# Patient Record
Sex: FEMALE | Race: BLACK OR AFRICAN AMERICAN | Employment: UNEMPLOYED | URBAN - METROPOLITAN AREA
[De-identification: names, ages, dates, MRNs, and addresses within clinical notes are randomized per-mention and may not be internally consistent; named-entity substitution may affect disease eponyms.]

---

## 2018-07-28 ENCOUNTER — HOSPITAL ENCOUNTER (EMERGENCY)
Age: 23
Discharge: HOME | End: 2018-07-28
Payer: COMMERCIAL

## 2018-07-28 VITALS
BODY MASS INDEX: 38.76 KG/M2 | TEMPERATURE: 98.3 F | HEIGHT: 64 IN | HEART RATE: 86 BPM | RESPIRATION RATE: 16 BRPM | SYSTOLIC BLOOD PRESSURE: 146 MMHG | DIASTOLIC BLOOD PRESSURE: 68 MMHG | OXYGEN SATURATION: 99 % | WEIGHT: 227 LBS

## 2018-07-28 DIAGNOSIS — O26.899 SPOTTING AND CRAMPING AFFECTING PREGNANCY, ANTEPARTUM: ICD-10-CM

## 2018-07-28 DIAGNOSIS — R10.9 SPOTTING AND CRAMPING AFFECTING PREGNANCY, ANTEPARTUM: ICD-10-CM

## 2018-07-28 DIAGNOSIS — O26.859 SPOTTING AND CRAMPING AFFECTING PREGNANCY, ANTEPARTUM: ICD-10-CM

## 2018-07-28 DIAGNOSIS — O20.0 THREATENED ABORTION, ANTEPARTUM: Primary | ICD-10-CM

## 2018-07-28 LAB
ABO/RH: NORMAL
ALBUMIN SERPL-MCNC: 4.1 G/DL (ref 3.5–5.2)
ALP BLD-CCNC: 63 U/L (ref 35–104)
ALT SERPL-CCNC: 5 U/L (ref 0–32)
ANION GAP SERPL CALCULATED.3IONS-SCNC: 12 MMOL/L (ref 7–16)
APTT: 28.6 SEC (ref 25.7–34.7)
AST SERPL-CCNC: 16 U/L (ref 0–31)
BACTERIA: NORMAL /HPF
BILIRUB SERPL-MCNC: <0.2 MG/DL (ref 0–1.2)
BILIRUBIN URINE: NEGATIVE
BLOOD, URINE: ABNORMAL
BUN BLDV-MCNC: 14 MG/DL (ref 6–20)
CALCIUM SERPL-MCNC: 9.9 MG/DL (ref 8.6–10.2)
CHLORIDE BLD-SCNC: 102 MMOL/L (ref 98–107)
CLARITY: CLEAR
CLUE CELLS: NORMAL
CO2: 23 MMOL/L (ref 22–29)
COLOR: YELLOW
CREAT SERPL-MCNC: 0.7 MG/DL (ref 0.5–1)
EPITHELIAL CELLS, UA: NORMAL /HPF
GFR AFRICAN AMERICAN: >60
GFR NON-AFRICAN AMERICAN: >60 ML/MIN/1.73
GLUCOSE BLD-MCNC: 97 MG/DL (ref 74–109)
GLUCOSE URINE: NEGATIVE MG/DL
GONADOTROPIN, CHORIONIC (HCG) QUANT: ABNORMAL MIU/ML
HCG(URINE) PREGNANCY TEST: POSITIVE
INR BLD: 1
KETONES, URINE: NEGATIVE MG/DL
LEUKOCYTE ESTERASE, URINE: NEGATIVE
NITRITE, URINE: NEGATIVE
PH UA: 7 (ref 5–9)
POTASSIUM SERPL-SCNC: 3.9 MMOL/L (ref 3.5–5)
PROTEIN UA: NEGATIVE MG/DL
PROTHROMBIN TIME: 12 SEC (ref 9.3–12.4)
RBC UA: NORMAL /HPF (ref 0–2)
SODIUM BLD-SCNC: 137 MMOL/L (ref 132–146)
SOURCE WET PREP: NORMAL
SPECIFIC GRAVITY UA: 1.02 (ref 1–1.03)
TOTAL PROTEIN: 8.3 G/DL (ref 6.4–8.3)
TRICHOMONAS PREP: NORMAL
UROBILINOGEN, URINE: 1 E.U./DL
WBC UA: NORMAL /HPF (ref 0–5)
YEAST WET PREP: NORMAL

## 2018-07-28 PROCEDURE — 84702 CHORIONIC GONADOTROPIN TEST: CPT

## 2018-07-28 PROCEDURE — 87491 CHLMYD TRACH DNA AMP PROBE: CPT

## 2018-07-28 PROCEDURE — 85025 COMPLETE CBC W/AUTO DIFF WBC: CPT

## 2018-07-28 PROCEDURE — 6370000000 HC RX 637 (ALT 250 FOR IP): Performed by: NURSE PRACTITIONER

## 2018-07-28 PROCEDURE — 86900 BLOOD TYPING SEROLOGIC ABO: CPT

## 2018-07-28 PROCEDURE — 85610 PROTHROMBIN TIME: CPT

## 2018-07-28 PROCEDURE — 86901 BLOOD TYPING SEROLOGIC RH(D): CPT

## 2018-07-28 PROCEDURE — 81001 URINALYSIS AUTO W/SCOPE: CPT

## 2018-07-28 PROCEDURE — 2580000003 HC RX 258: Performed by: NURSE PRACTITIONER

## 2018-07-28 PROCEDURE — 87210 SMEAR WET MOUNT SALINE/INK: CPT

## 2018-07-28 PROCEDURE — 87591 N.GONORRHOEAE DNA AMP PROB: CPT

## 2018-07-28 PROCEDURE — 80053 COMPREHEN METABOLIC PANEL: CPT

## 2018-07-28 PROCEDURE — 6360000002 HC RX W HCPCS

## 2018-07-28 PROCEDURE — 96374 THER/PROPH/DIAG INJ IV PUSH: CPT

## 2018-07-28 PROCEDURE — 36415 COLL VENOUS BLD VENIPUNCTURE: CPT

## 2018-07-28 PROCEDURE — 99284 EMERGENCY DEPT VISIT MOD MDM: CPT

## 2018-07-28 PROCEDURE — 85730 THROMBOPLASTIN TIME PARTIAL: CPT

## 2018-07-28 PROCEDURE — 81025 URINE PREGNANCY TEST: CPT

## 2018-07-28 RX ORDER — ACETAMINOPHEN 500 MG
1000 TABLET ORAL ONCE
Status: COMPLETED | OUTPATIENT
Start: 2018-07-28 | End: 2018-07-28

## 2018-07-28 RX ORDER — ONDANSETRON 2 MG/ML
4 INJECTION INTRAMUSCULAR; INTRAVENOUS ONCE
Status: COMPLETED | OUTPATIENT
Start: 2018-07-28 | End: 2018-07-28

## 2018-07-28 RX ORDER — 0.9 % SODIUM CHLORIDE 0.9 %
1000 INTRAVENOUS SOLUTION INTRAVENOUS ONCE
Status: COMPLETED | OUTPATIENT
Start: 2018-07-28 | End: 2018-07-28

## 2018-07-28 RX ORDER — ONDANSETRON 2 MG/ML
INJECTION INTRAMUSCULAR; INTRAVENOUS
Status: COMPLETED
Start: 2018-07-28 | End: 2018-07-28

## 2018-07-28 RX ADMIN — ONDANSETRON 4 MG: 2 INJECTION INTRAMUSCULAR; INTRAVENOUS at 20:17

## 2018-07-28 RX ADMIN — SODIUM CHLORIDE 1000 ML: 9 INJECTION, SOLUTION INTRAVENOUS at 20:17

## 2018-07-28 RX ADMIN — ACETAMINOPHEN 1000 MG: 500 TABLET, FILM COATED ORAL at 20:17

## 2018-07-28 ASSESSMENT — PAIN DESCRIPTION - DESCRIPTORS: DESCRIPTORS: CRAMPING;CONSTANT

## 2018-07-28 ASSESSMENT — PAIN DESCRIPTION - ORIENTATION: ORIENTATION: LOWER

## 2018-07-28 ASSESSMENT — PAIN DESCRIPTION - LOCATION: LOCATION: ABDOMEN

## 2018-07-28 ASSESSMENT — PAIN SCALES - GENERAL
PAINLEVEL_OUTOF10: 5
PAINLEVEL_OUTOF10: 5

## 2018-07-28 ASSESSMENT — PAIN DESCRIPTION - ONSET: ONSET: ON-GOING

## 2018-07-28 ASSESSMENT — PAIN DESCRIPTION - PROGRESSION: CLINICAL_PROGRESSION: NOT CHANGED

## 2018-07-28 ASSESSMENT — PAIN DESCRIPTION - PAIN TYPE: TYPE: ACUTE PAIN

## 2018-07-28 NOTE — ED PROVIDER NOTES
P1    ROS    Pertinent positives and negatives are stated within HPI, all other systems reviewed and are negative. Past Surgical History:   Procedure Laterality Date     SECTION        Social History:  reports that she has never smoked. She has never used smokeless tobacco. She reports that she does not drink alcohol or use drugs. Family History: family history is not on file. Allergies: Patient has no known allergies. Physical Exam           ED Triage Vitals   BP Temp Temp src Pulse Resp SpO2 Height Weight   -- -- -- -- -- -- -- --      Oxygen Saturation Interpretation: Normal.    General Appearance/Constitutional:  Alert, development consistent with age, NAD. HEENT:  NC/NT. PERRLA. Airway patent. Neck:  Supple. No lymphadenopathy. Respiratory:  Clear to auscultation and breath sounds equal.  CV:  Regular rate and rhythm. GI:  General Appearance: normal.       Bowel sounds: normal bowel sounds. Distension:  None. Tenderness: No abdominal tenderness, no rebound tenderness, no guarding, abdominal rigidity is absent. Liver: non-palpable and non-tender. Spleen:  non-palpable and non-tender. Pulsatile Mass: absent. Hernia:  no inguinal or femoral hernias noted. Back: CVA Tenderness: No.  : (chaperone present during examination).  Rosario WORRELL       External Genitalia: General appearance; normal, Hair distribution; normal, Lesions absent. Urethral Meatus: Size normal, Location normal, Lesions absent, Prolapse absent. Vagina: no abnormal lesions, discharge slight light brown with no bright red bleeding and Wet Prep/KOH no pathogens. Cervix: normal appearing cervix without discharge or lesions, cervical motion tenderness absent, lesions absent and multiparous os. Uterus:  normal size slightly enlarged, contour, position, consistency, mobility, tenderness directly over the uterus. [facility]:  Masses script for repeat HCG quant level in 48 hours. Patient feels cramping is completely resolved and has no bleeding in the ED. Patient instructed on complete pelvic rest and no lifting until re examined and given work excuse. Counseling: The emergency provider has spoken with the patient and discussed todays results, in addition to providing specific details for the plan of care and counseling regarding the diagnosis and prognosis. Questions are answered at this time and they are agreeable with the plan. Assessment      1. Threatened , antepartum    2. Spotting and cramping affecting pregnancy, antepartum      Plan   Discharge to home  Patient condition is good    New Medications     There are no discharge medications for this patient. Electronically signed by TJ Escalante CNP   DD: 18  **This report was transcribed using voice recognition software. Every effort was made to ensure accuracy; however, inadvertent computerized transcription errors may be present.   END OF ED PROVIDER NOTE     TJ Escalante CNP  [phone]

## 2018-07-29 LAB
BASOPHILS ABSOLUTE: 0.04 E9/L (ref 0–0.2)
BASOPHILS RELATIVE PERCENT: 0.3 % (ref 0–2)
EOSINOPHILS ABSOLUTE: 0.04 E9/L (ref 0.05–0.5)
EOSINOPHILS RELATIVE PERCENT: 0.3 % (ref 0–6)
HCT VFR BLD CALC: 38.2 % (ref 34–48)
HEMOGLOBIN: 13.1 G/DL (ref 11.5–15.5)
IMMATURE GRANULOCYTES #: 0.05 E9/L
IMMATURE GRANULOCYTES %: 0.3 % (ref 0–5)
LYMPHOCYTES ABSOLUTE: 2.49 E9/L (ref 1.5–4)
LYMPHOCYTES RELATIVE PERCENT: 16.1 % (ref 20–42)
MCH RBC QN AUTO: 31 PG (ref 26–35)
MCHC RBC AUTO-ENTMCNC: 34.3 % (ref 32–34.5)
MCV RBC AUTO: 90.3 FL (ref 80–99.9)
MONOCYTES ABSOLUTE: 1.23 E9/L (ref 0.1–0.95)
MONOCYTES RELATIVE PERCENT: 7.9 % (ref 2–12)
NEUTROPHILS ABSOLUTE: 11.65 E9/L (ref 1.8–7.3)
NEUTROPHILS RELATIVE PERCENT: 75.1 % (ref 43–80)
PDW BLD-RTO: 13.2 FL (ref 11.5–15)
PLATELET # BLD: 210 E9/L (ref 130–450)
PMV BLD AUTO: 11.5 FL (ref 7–12)
RBC # BLD: 4.23 E12/L (ref 3.5–5.5)
RBC # BLD: NORMAL 10*6/UL
WBC # BLD: 15.5 E9/L (ref 4.5–11.5)

## 2018-07-29 NOTE — ED NOTES
Second call to [facility] for Dr. Mariela Wallace.       Yvonne Harris, RN  07/28/18 0789

## 2018-07-31 LAB
CHLAMYDIA TRACHOMATIS AMPLIFIED DET: NORMAL
N GONORRHOEAE AMPLIFIED DET: NORMAL

## 2018-09-22 ENCOUNTER — HOSPITAL ENCOUNTER (EMERGENCY)
Age: 23
Discharge: HOME | End: 2018-09-22
Attending: EMERGENCY MEDICINE
Payer: COMMERCIAL

## 2018-09-22 ENCOUNTER — APPOINTMENT (OUTPATIENT)
Dept: ULTRASOUND IMAGING | Age: 23
End: 2018-09-22
Payer: COMMERCIAL

## 2018-09-22 VITALS
TEMPERATURE: 98.6 F | SYSTOLIC BLOOD PRESSURE: 143 MMHG | HEIGHT: 64 IN | RESPIRATION RATE: 18 BRPM | BODY MASS INDEX: 38.76 KG/M2 | WEIGHT: 227 LBS | DIASTOLIC BLOOD PRESSURE: 82 MMHG | OXYGEN SATURATION: 98 % | HEART RATE: 99 BPM

## 2018-09-22 DIAGNOSIS — R10.9 ABDOMINAL PAIN IN PREGNANCY, FIRST TRIMESTER: Primary | ICD-10-CM

## 2018-09-22 DIAGNOSIS — O26.891 ABDOMINAL PAIN IN PREGNANCY, FIRST TRIMESTER: Primary | ICD-10-CM

## 2018-09-22 DIAGNOSIS — N39.0 URINARY TRACT INFECTION WITH HEMATURIA, SITE UNSPECIFIED: ICD-10-CM

## 2018-09-22 DIAGNOSIS — R31.21 ASYMPTOMATIC MICROSCOPIC HEMATURIA: ICD-10-CM

## 2018-09-22 DIAGNOSIS — R31.9 URINARY TRACT INFECTION WITH HEMATURIA, SITE UNSPECIFIED: ICD-10-CM

## 2018-09-22 LAB
AMORPHOUS: ABNORMAL
BACTERIA: ABNORMAL /HPF
BILIRUBIN URINE: NEGATIVE
BLOOD, URINE: ABNORMAL
CLARITY: CLEAR
CLUE CELLS: NORMAL
COLOR: YELLOW
EPITHELIAL CELLS, UA: ABNORMAL /HPF
GLUCOSE URINE: NEGATIVE MG/DL
KETONES, URINE: 40 MG/DL
LEUKOCYTE ESTERASE, URINE: NEGATIVE
NITRITE, URINE: NEGATIVE
PH UA: 6 (ref 5–9)
PROTEIN UA: ABNORMAL MG/DL
RBC UA: ABNORMAL /HPF (ref 0–2)
SOURCE WET PREP: NORMAL
SPECIFIC GRAVITY UA: 1.02 (ref 1–1.03)
TRICHOMONAS PREP: NORMAL
UROBILINOGEN, URINE: 0.2 E.U./DL
WBC UA: ABNORMAL /HPF (ref 0–5)
YEAST WET PREP: NORMAL

## 2018-09-22 PROCEDURE — 81001 URINALYSIS AUTO W/SCOPE: CPT

## 2018-09-22 PROCEDURE — 87591 N.GONORRHOEAE DNA AMP PROB: CPT

## 2018-09-22 PROCEDURE — 99284 EMERGENCY DEPT VISIT MOD MDM: CPT

## 2018-09-22 PROCEDURE — 87491 CHLMYD TRACH DNA AMP PROBE: CPT

## 2018-09-22 PROCEDURE — 87210 SMEAR WET MOUNT SALINE/INK: CPT

## 2018-09-22 PROCEDURE — 76770 US EXAM ABDO BACK WALL COMP: CPT

## 2018-09-22 PROCEDURE — 76805 OB US >/= 14 WKS SNGL FETUS: CPT

## 2018-09-22 RX ORDER — CEPHALEXIN 500 MG/1
500 CAPSULE ORAL 2 TIMES DAILY
Qty: 20 CAPSULE | Refills: 0 | Status: SHIPPED | OUTPATIENT
Start: 2018-09-22 | End: 2018-10-02

## 2018-09-22 ASSESSMENT — PAIN DESCRIPTION - DESCRIPTORS: DESCRIPTORS: DULL;PRESSURE

## 2018-09-22 ASSESSMENT — PAIN DESCRIPTION - PAIN TYPE: TYPE: ACUTE PAIN

## 2018-09-22 ASSESSMENT — PAIN SCALES - GENERAL: PAINLEVEL_OUTOF10: 7

## 2018-09-22 ASSESSMENT — PAIN DESCRIPTION - LOCATION: LOCATION: ABDOMEN

## 2018-09-22 NOTE — ED PROVIDER NOTES
HPI:  18, Time: 5:19 PM         Mayra Villa is a 20 y.o. female presenting to the ED for low abdominal pain and back, beginning yesterday and worsening today. The complaint has been constant, moderate in severity, and worsened by nothing. She states she works as an [employer] but hasnt lifted patients. She denies vaginal bleeding, discharge, leaking fluid, dysuria, hematuria or fever or trauma. She is  currently at 14 weeks 6 days gestation. Her first delivery was a  but otherwise uncomplicated. She also c/o nausea, but that has been an ongoing issue during this pregnancy. Patient denies fever/chills, cough, congestion, chest pain, shortness of breath, edema, headache, visual disturbances, focal paresthesias, focal weakness, vomiting, diarrhea, constipation, dysuria, hematuria, trauma, neck or back pain or other complaints. ROS:   Pertinent positives and negatives are stated within HPI, all other systems reviewed and are negative.      --------------------------------------------- PAST HISTORY ---------------------------------------------  Past Medical History:  has no past medical history on file. Past Surgical History:  has a past surgical history that includes  section. Social History:  reports that she has never smoked. She has never used smokeless tobacco. She reports that she does not drink alcohol or use drugs. Family History: family history is not on file. The patients home medications have been reviewed. Allergies: Patient has no known allergies.         ---------------------------------------------------PHYSICAL EXAM--------------------------------------    Constitutional:  Well developed, well nourished, no acute distress, non-toxic appearance   Eyes:  PERRL, conjunctiva normal, EOMI  HENT:  Atraumatic, external ears normal, nose normal, oropharynx moist. Neck- normal range of motion, no tenderness, supple   Respiratory:  No respiratory distress, normal breath sounds, no rales, no wheezing   Cardiovascular:  Normal rate, normal rhythm, no murmurs, no gallops, no rubs. Radial and DP pulses 2+ bilaterally. GI:  Soft, gravid, normal bowel sounds, nontender, no organomegaly, no mass, no rebound, no guarding   :  No costovertebral angle tenderness   Pelvic: expected long and closed. No discharge, no bleeding. No adnexal tenderness, no cervical motion tenderness. No lesions noted. Musculoskeletal:  No edema, no tenderness, no deformities. Back- no tenderness  Integument:  Well hydrated, no rash. Adequate perfusion. Neurologic:  Alert & oriented x 3, CN 2-12 normal, normal gait, no focal deficits noted. Psychiatric:  Speech and behavior appropriate     Fetal heart tones: 180 per RN  -------------------------------------------------- RESULTS -------------------------------------------------  I have personally reviewed all laboratory and imaging results for this patient. Results are listed below.      LABS:  Results for orders placed or performed during the hospital encounter of 09/22/18   Wet prep, genital   Result Value Ref Range    Trichomonas Prep None Seen     Yeast, Wet Prep None Seen     Clue Cells, Wet Prep None Seen     Source Wet Prep VAGINAL    Urinalysis with Microscopic   Result Value Ref Range    Color, UA Yellow Straw/Yellow    Clarity, UA Clear Clear    Glucose, Ur Negative Negative mg/dL    Bilirubin Urine Negative Negative    Ketones, Urine 40 (A) Negative mg/dL    Specific Gravity, UA 1.025 1.005 - 1.030    Blood, Urine MODERATE (A) Negative    pH, UA 6.0 5.0 - 9.0    Protein, UA TRACE Negative mg/dL    Urobilinogen, Urine 0.2 <2.0 E.U./dL    Nitrite, Urine Negative Negative    Leukocyte Esterase, Urine Negative Negative    WBC, UA 0-1 0 - 5 /HPF    RBC, UA 5-10 (A) 0 - 2 /HPF    Epi Cells FEW /HPF    Bacteria, UA RARE (A) /HPF    Amorphous, UA RARE        RADIOLOGY:  Interpreted by Radiologist.  US OB 14 PLUS WEEKS SINGLE OR FIRST GESTATION   Final Result   Single living intrauterine pregnancy with 15 weeks and 1   day of development as above commented. Cervical canal is closed, measures 3.1 cm. US RETROPERITONEAL COMPLETE   Final Result   Unremarkable bilateral renal ultrasound.            ------------------------- NURSING NOTES AND VITALS REVIEWED ---------------------------   The nursing notes within the ED encounter and vital signs as below have been reviewed by myself. BP (!) 143/82   Pulse 99   Temp 98.6 °F (37 °C)   Resp 18   Ht 5' 4\" (1.626 m)   Wt 227 lb (103 kg)   LMP 06/01/2018   SpO2 98%   BMI 38.96 kg/m²   Oxygen Saturation Interpretation: Normal    The patients available past medical records and past encounters were reviewed. ------------------------------ ED COURSE/MEDICAL DECISION MAKING----------------------  Medications - No data to display        Procedures:  none      Medical Decision Making:    Blood type A+ per medical record    This patient's ED course included: re-evaluation prior to disposition and a personal history and physicial eaxmination    This patient has remained hemodynamically stable and improved during their ED course. Re-Evaluations:             Time: 8:11 PM  Re-evaluation. Patients symptoms are improving  Repeat physical examination is not changed        Consultations:             5:52 PM  Spoke with Dr Kendall Ochoa, discussed case, patient may go home    Critical Care: none        Counseling: The emergency provider has spoken with the patient and spouse/SO and discussed todays results, in addition to providing specific details for the plan of care and counseling regarding the diagnosis and prognosis. Questions are answered at this time and they are agreeable with the plan.       --------------------------------- IMPRESSION AND DISPOSITION ---------------------------------    IMPRESSION  1. Abdominal pain in pregnancy, first trimester    2.  Asymptomatic microscopic hematuria    3.  Urinary tract infection with hematuria, site unspecified        DISPOSITION  Disposition: Discharge to home  Patient condition is stable                 Lita Fried DO  09/22/18 2011

## 2018-09-27 LAB
CHLAMYDIA TRACHOMATIS AMPLIFIED DET: NORMAL
N GONORRHOEAE AMPLIFIED DET: NORMAL

## 2018-12-21 ENCOUNTER — HOSPITAL ENCOUNTER (OUTPATIENT)
Age: 23
Discharge: HOME | End: 2018-12-22
Attending: OBSTETRICS & GYNECOLOGY | Admitting: OBSTETRICS & GYNECOLOGY
Payer: COMMERCIAL

## 2018-12-21 VITALS
SYSTOLIC BLOOD PRESSURE: 110 MMHG | BODY MASS INDEX: 39.95 KG/M2 | WEIGHT: 234 LBS | HEIGHT: 64 IN | HEART RATE: 101 BPM | TEMPERATURE: 98.5 F | RESPIRATION RATE: 16 BRPM | DIASTOLIC BLOOD PRESSURE: 59 MMHG

## 2018-12-21 LAB
BILIRUBIN URINE: NEGATIVE
BLOOD, URINE: ABNORMAL
CLARITY: CLEAR
COLOR: YELLOW
GLUCOSE URINE: NEGATIVE MG/DL
KETONES, URINE: ABNORMAL MG/DL
LEUKOCYTE ESTERASE, URINE: NEGATIVE
NITRITE, URINE: NEGATIVE
PH UA: 6 (ref 5–9)
PROTEIN UA: NEGATIVE MG/DL
SPECIFIC GRAVITY UA: >=1.03 (ref 1–1.03)
UROBILINOGEN, URINE: 0.2 E.U./DL

## 2018-12-21 PROCEDURE — 81001 URINALYSIS AUTO W/SCOPE: CPT

## 2018-12-21 PROCEDURE — 87088 URINE BACTERIA CULTURE: CPT

## 2018-12-22 LAB
BACTERIA: ABNORMAL /HPF
EPITHELIAL CELLS, UA: ABNORMAL /HPF
MUCUS: PRESENT
RBC UA: ABNORMAL /HPF (ref 0–2)
WBC UA: ABNORMAL /HPF (ref 0–5)

## 2018-12-22 PROCEDURE — 99212 OFFICE O/P EST SF 10 MIN: CPT

## 2018-12-22 NOTE — H&P
Subjective:      Hue Sewell is an 20 y.o. female at 32 and 5/7 weeks gestation presenting with   Chief Complaint   Patient presents with    Lower Back Pain   she denies any contractions or flank pain. Denies fever or chills. Pain is mostly mid lower. Fetal Movement: normal.  No past medical history on file. Review of Systems  Pertinent items are noted in HPI. Objective:      BP (!) 110/59   Pulse 101   Temp 98.5 °F (36.9 °C) (Oral)   Resp 16   Ht 5' 4\" (1.626 m)   Wt 234 lb (106.1 kg)   LMP 06/01/2018   BMI 40.17 kg/m²   General:   alert, appears stated age and cooperative   Cervix:   not checked. FHT:   140 BPM     Lab Review    CBC:   Lab Results   Component Value Date    WBC 15.5 07/28/2018    RBC 4.23 07/28/2018    HGB 13.1 07/28/2018    HCT 38.2 07/28/2018    MCV 90.3 07/28/2018    MCH 31.0 07/28/2018    MCHC 34.3 07/28/2018    RDW 13.2 07/28/2018     07/28/2018    MPV 11.5 07/28/2018     CMP:    Lab Results   Component Value Date     07/28/2018    K 3.9 07/28/2018     07/28/2018    CO2 23 07/28/2018    BUN 14 07/28/2018    CREATININE 0.7 07/28/2018    GFRAA >60 07/28/2018    LABGLOM >60 07/28/2018    GLUCOSE 97 07/28/2018    PROT 8.3 07/28/2018    LABALBU 4.1 07/28/2018    CALCIUM 9.9 07/28/2018    BILITOT <0.2 07/28/2018    ALKPHOS 63 07/28/2018    AST 16 07/28/2018    ALT 5 07/28/2018     U/A:    Lab Results   Component Value Date    COLORU Yellow 09/22/2018    PHUR 6.0 09/22/2018    WBCUA 0-1 09/22/2018    RBCUA 5-10 09/22/2018    BACTERIA RARE 09/22/2018    CLARITYU Clear 09/22/2018    SPECGRAV 1.025 09/22/2018    LEUKOCYTESUR Negative 09/22/2018    UROBILINOGEN 0.2 09/22/2018    BILIRUBINUR Negative 09/22/2018    BLOODU MODERATE 09/22/2018    GLUCOSEU Negative 09/22/2018    AMORPHOUS RARE 09/22/2018          Assessment:     Low back pain in pregnancy     Plan:      Monitored for contractions - findings: no contractions noted and reactive strip. Orders: U/A.

## 2018-12-24 LAB — URINE CULTURE, ROUTINE: NORMAL

## 2019-03-11 ENCOUNTER — HOSPITAL ENCOUNTER (INPATIENT)
Age: 24
LOS: 3 days | Discharge: HOME | DRG: 560 | End: 2019-03-14
Attending: OBSTETRICS & GYNECOLOGY | Admitting: OBSTETRICS & GYNECOLOGY
Payer: COMMERCIAL

## 2019-03-11 ENCOUNTER — ANESTHESIA EVENT (OUTPATIENT)
Dept: LABOR AND DELIVERY | Age: 24
DRG: 560 | End: 2019-03-11
Payer: COMMERCIAL

## 2019-03-11 ENCOUNTER — ANESTHESIA (OUTPATIENT)
Dept: LABOR AND DELIVERY | Age: 24
DRG: 560 | End: 2019-03-11
Payer: COMMERCIAL

## 2019-03-11 PROBLEM — Z34.90 NORMAL PREGNANCY, ANTEPARTUM: Status: ACTIVE | Noted: 2019-03-11

## 2019-03-11 LAB
ABO/RH: NORMAL
AMPHETAMINE SCREEN, URINE: NOT DETECTED
ANTIBODY SCREEN: NORMAL
BARBITURATE SCREEN URINE: NOT DETECTED
BENZODIAZEPINE SCREEN, URINE: NOT DETECTED
CANNABINOID SCREEN URINE: NOT DETECTED
COCAINE METABOLITE SCREEN URINE: NOT DETECTED
HCT VFR BLD CALC: 37.7 % (ref 34–48)
HEMOGLOBIN: 12.4 G/DL (ref 11.5–15.5)
MCH RBC QN AUTO: 29.5 PG (ref 26–35)
MCHC RBC AUTO-ENTMCNC: 32.9 % (ref 32–34.5)
MCV RBC AUTO: 89.8 FL (ref 80–99.9)
METHADONE SCREEN, URINE: NOT DETECTED
OPIATE SCREEN URINE: NOT DETECTED
PDW BLD-RTO: 14.1 FL (ref 11.5–15)
PHENCYCLIDINE SCREEN URINE: NOT DETECTED
PLATELET # BLD: 258 E9/L (ref 130–450)
PMV BLD AUTO: 10.4 FL (ref 7–12)
PROPOXYPHENE SCREEN: NOT DETECTED
RBC # BLD: 4.2 E12/L (ref 3.5–5.5)
WBC # BLD: 12.7 E9/L (ref 4.5–11.5)

## 2019-03-11 PROCEDURE — 86900 BLOOD TYPING SEROLOGIC ABO: CPT

## 2019-03-11 PROCEDURE — 2500000003 HC RX 250 WO HCPCS: Performed by: ANESTHESIOLOGY

## 2019-03-11 PROCEDURE — 3700000025 EPIDURAL BLOCK: Performed by: ANESTHESIOLOGY

## 2019-03-11 PROCEDURE — 6360000002 HC RX W HCPCS

## 2019-03-11 PROCEDURE — 86850 RBC ANTIBODY SCREEN: CPT

## 2019-03-11 PROCEDURE — 6360000002 HC RX W HCPCS: Performed by: OBSTETRICS & GYNECOLOGY

## 2019-03-11 PROCEDURE — 86901 BLOOD TYPING SEROLOGIC RH(D): CPT

## 2019-03-11 PROCEDURE — 36415 COLL VENOUS BLD VENIPUNCTURE: CPT

## 2019-03-11 PROCEDURE — 59050 FETAL MONITOR W/REPORT: CPT

## 2019-03-11 PROCEDURE — 80307 DRUG TEST PRSMV CHEM ANLYZR: CPT

## 2019-03-11 PROCEDURE — 85027 COMPLETE CBC AUTOMATED: CPT

## 2019-03-11 PROCEDURE — 2580000003 HC RX 258: Performed by: OBSTETRICS & GYNECOLOGY

## 2019-03-11 PROCEDURE — 1220000001 HC SEMI PRIVATE L&D R&B

## 2019-03-11 RX ORDER — ONDANSETRON 2 MG/ML
4 INJECTION INTRAMUSCULAR; INTRAVENOUS EVERY 6 HOURS PRN
Status: DISCONTINUED | OUTPATIENT
Start: 2019-03-11 | End: 2019-03-12 | Stop reason: HOSPADM

## 2019-03-11 RX ORDER — ACETAMINOPHEN 650 MG
TABLET, EXTENDED RELEASE ORAL
Status: COMPLETED
Start: 2019-03-11 | End: 2019-03-12

## 2019-03-11 RX ORDER — LIDOCAINE HYDROCHLORIDE AND EPINEPHRINE 15; 5 MG/ML; UG/ML
INJECTION, SOLUTION EPIDURAL
Status: DISPENSED
Start: 2019-03-11 | End: 2019-03-12

## 2019-03-11 RX ORDER — SODIUM CHLORIDE, SODIUM LACTATE, POTASSIUM CHLORIDE, CALCIUM CHLORIDE 600; 310; 30; 20 MG/100ML; MG/100ML; MG/100ML; MG/100ML
INJECTION, SOLUTION INTRAVENOUS CONTINUOUS
Status: DISCONTINUED | OUTPATIENT
Start: 2019-03-11 | End: 2019-03-14 | Stop reason: HOSPADM

## 2019-03-11 RX ORDER — NALOXONE HYDROCHLORIDE 0.4 MG/ML
0.4 INJECTION, SOLUTION INTRAMUSCULAR; INTRAVENOUS; SUBCUTANEOUS PRN
Status: DISCONTINUED | OUTPATIENT
Start: 2019-03-11 | End: 2019-03-12 | Stop reason: HOSPADM

## 2019-03-11 RX ORDER — NALBUPHINE HCL 10 MG/ML
5 AMPUL (ML) INJECTION EVERY 4 HOURS PRN
Status: DISCONTINUED | OUTPATIENT
Start: 2019-03-11 | End: 2019-03-12 | Stop reason: HOSPADM

## 2019-03-11 RX ORDER — LIDOCAINE HYDROCHLORIDE 10 MG/ML
INJECTION, SOLUTION INFILTRATION; PERINEURAL
Status: COMPLETED
Start: 2019-03-11 | End: 2019-03-12

## 2019-03-11 RX ADMIN — BUTORPHANOL TARTRATE 2 MG: 2 INJECTION, SOLUTION INTRAMUSCULAR; INTRAVENOUS at 09:51

## 2019-03-11 RX ADMIN — SODIUM CHLORIDE, POTASSIUM CHLORIDE, SODIUM LACTATE AND CALCIUM CHLORIDE: 600; 310; 30; 20 INJECTION, SOLUTION INTRAVENOUS at 17:00

## 2019-03-11 RX ADMIN — BUTORPHANOL TARTRATE 2 MG: 2 INJECTION, SOLUTION INTRAMUSCULAR; INTRAVENOUS at 14:58

## 2019-03-11 RX ADMIN — Medication 15 ML/HR: at 19:20

## 2019-03-11 RX ADMIN — SODIUM CHLORIDE, POTASSIUM CHLORIDE, SODIUM LACTATE AND CALCIUM CHLORIDE: 600; 310; 30; 20 INJECTION, SOLUTION INTRAVENOUS at 08:00

## 2019-03-11 RX ADMIN — Medication 2 MG: at 14:58

## 2019-03-11 RX ADMIN — Medication 1 MILLI-UNITS/MIN: at 14:08

## 2019-03-11 RX ADMIN — Medication 15 ML: at 19:20

## 2019-03-11 RX ADMIN — SODIUM CHLORIDE, POTASSIUM CHLORIDE, SODIUM LACTATE AND CALCIUM CHLORIDE: 600; 310; 30; 20 INJECTION, SOLUTION INTRAVENOUS at 20:28

## 2019-03-11 ASSESSMENT — PAIN SCALES - GENERAL
PAINLEVEL_OUTOF10: 9
PAINLEVEL_OUTOF10: 8

## 2019-03-12 PROCEDURE — 6360000002 HC RX W HCPCS: Performed by: ANESTHESIOLOGY

## 2019-03-12 PROCEDURE — 51701 INSERT BLADDER CATHETER: CPT

## 2019-03-12 PROCEDURE — 1220000000 HC SEMI PRIVATE OB R&B

## 2019-03-12 PROCEDURE — 6360000002 HC RX W HCPCS

## 2019-03-12 PROCEDURE — 7200000001 HC VAGINAL DELIVERY

## 2019-03-12 PROCEDURE — 6370000000 HC RX 637 (ALT 250 FOR IP): Performed by: OBSTETRICS & GYNECOLOGY

## 2019-03-12 PROCEDURE — 6370000000 HC RX 637 (ALT 250 FOR IP)

## 2019-03-12 PROCEDURE — 59050 FETAL MONITOR W/REPORT: CPT

## 2019-03-12 RX ORDER — LANOLIN 100 %
OINTMENT (GRAM) TOPICAL PRN
Status: DISCONTINUED | OUTPATIENT
Start: 2019-03-12 | End: 2019-03-14 | Stop reason: HOSPADM

## 2019-03-12 RX ORDER — IBUPROFEN 800 MG/1
800 TABLET ORAL EVERY 8 HOURS PRN
Status: DISCONTINUED | OUTPATIENT
Start: 2019-03-12 | End: 2019-03-12

## 2019-03-12 RX ORDER — METHYLERGONOVINE MALEATE 0.2 MG/ML
INJECTION INTRAVENOUS
Status: COMPLETED
Start: 2019-03-12 | End: 2019-03-12

## 2019-03-12 RX ORDER — DOCUSATE SODIUM 100 MG/1
100 CAPSULE, LIQUID FILLED ORAL 2 TIMES DAILY
Status: DISCONTINUED | OUTPATIENT
Start: 2019-03-12 | End: 2019-03-14 | Stop reason: HOSPADM

## 2019-03-12 RX ORDER — ACETAMINOPHEN 325 MG/1
TABLET ORAL
Status: COMPLETED
Start: 2019-03-12 | End: 2019-03-12

## 2019-03-12 RX ORDER — SODIUM CHLORIDE 0.9 % (FLUSH) 0.9 %
10 SYRINGE (ML) INJECTION PRN
Status: DISCONTINUED | OUTPATIENT
Start: 2019-03-12 | End: 2019-03-14 | Stop reason: HOSPADM

## 2019-03-12 RX ORDER — SODIUM CHLORIDE 0.9 % (FLUSH) 0.9 %
10 SYRINGE (ML) INJECTION EVERY 12 HOURS SCHEDULED
Status: DISCONTINUED | OUTPATIENT
Start: 2019-03-12 | End: 2019-03-14 | Stop reason: HOSPADM

## 2019-03-12 RX ORDER — ACETAMINOPHEN 325 MG/1
650 TABLET ORAL EVERY 4 HOURS PRN
Status: DISCONTINUED | OUTPATIENT
Start: 2019-03-12 | End: 2019-03-14 | Stop reason: HOSPADM

## 2019-03-12 RX ORDER — FERROUS SULFATE 325(65) MG
325 TABLET ORAL 2 TIMES DAILY WITH MEALS
Status: DISCONTINUED | OUTPATIENT
Start: 2019-03-12 | End: 2019-03-14 | Stop reason: HOSPADM

## 2019-03-12 RX ORDER — IBUPROFEN 800 MG/1
800 TABLET ORAL EVERY 8 HOURS
Status: DISCONTINUED | OUTPATIENT
Start: 2019-03-12 | End: 2019-03-14 | Stop reason: HOSPADM

## 2019-03-12 RX ORDER — SODIUM CHLORIDE, SODIUM LACTATE, POTASSIUM CHLORIDE, CALCIUM CHLORIDE 600; 310; 30; 20 MG/100ML; MG/100ML; MG/100ML; MG/100ML
INJECTION, SOLUTION INTRAVENOUS CONTINUOUS
Status: DISCONTINUED | OUTPATIENT
Start: 2019-03-12 | End: 2019-03-14 | Stop reason: HOSPADM

## 2019-03-12 RX ADMIN — IBUPROFEN 800 MG: 800 TABLET, FILM COATED ORAL at 19:42

## 2019-03-12 RX ADMIN — DOCUSATE SODIUM 100 MG: 100 CAPSULE, LIQUID FILLED ORAL at 08:24

## 2019-03-12 RX ADMIN — DOCUSATE SODIUM 100 MG: 100 CAPSULE, LIQUID FILLED ORAL at 21:08

## 2019-03-12 RX ADMIN — ACETAMINOPHEN 650 MG: 325 TABLET ORAL at 11:17

## 2019-03-12 RX ADMIN — METHYLERGONOVINE MALEATE 200 MCG: 0.2 INJECTION INTRAVENOUS at 00:56

## 2019-03-12 RX ADMIN — IBUPROFEN 800 MG: 800 TABLET, FILM COATED ORAL at 08:24

## 2019-03-12 RX ADMIN — Medication: at 00:30

## 2019-03-12 RX ADMIN — ONDANSETRON 4 MG: 2 INJECTION INTRAMUSCULAR; INTRAVENOUS at 01:30

## 2019-03-12 RX ADMIN — ACETAMINOPHEN 650 MG: 325 TABLET ORAL at 02:53

## 2019-03-12 ASSESSMENT — PAIN SCALES - GENERAL
PAINLEVEL_OUTOF10: 8
PAINLEVEL_OUTOF10: 3
PAINLEVEL_OUTOF10: 5

## 2019-03-13 LAB
ATYPICAL LYMPHOCYTE RELATIVE PERCENT: 1.7 % (ref 0–4)
BASOPHILS ABSOLUTE: 0.27 E9/L (ref 0–0.2)
BASOPHILS RELATIVE PERCENT: 1.7 % (ref 0–2)
EOSINOPHILS ABSOLUTE: 0.14 E9/L (ref 0.05–0.5)
EOSINOPHILS RELATIVE PERCENT: 0.9 % (ref 0–6)
HCT VFR BLD CALC: 30.5 % (ref 34–48)
HEMOGLOBIN: 9.8 G/DL (ref 11.5–15.5)
LYMPHOCYTES ABSOLUTE: 2.84 E9/L (ref 1.5–4)
LYMPHOCYTES RELATIVE PERCENT: 16.7 % (ref 20–42)
MCH RBC QN AUTO: 29.6 PG (ref 26–35)
MCHC RBC AUTO-ENTMCNC: 32.1 % (ref 32–34.5)
MCV RBC AUTO: 92.1 FL (ref 80–99.9)
MONOCYTES ABSOLUTE: 1.42 E9/L (ref 0.1–0.95)
MONOCYTES RELATIVE PERCENT: 8.8 % (ref 2–12)
MYELOCYTE PERCENT: 0.9 % (ref 0–0)
NEUTROPHILS ABSOLUTE: 11.06 E9/L (ref 1.8–7.3)
NEUTROPHILS RELATIVE PERCENT: 69.3 % (ref 43–80)
NUCLEATED RED BLOOD CELLS: 0 /100 WBC
PDW BLD-RTO: 14.4 FL (ref 11.5–15)
PLATELET # BLD: 222 E9/L (ref 130–450)
PMV BLD AUTO: 10.4 FL (ref 7–12)
RBC # BLD: 3.31 E12/L (ref 3.5–5.5)
RBC # BLD: NORMAL 10*6/UL
WBC # BLD: 15.8 E9/L (ref 4.5–11.5)

## 2019-03-13 PROCEDURE — 85025 COMPLETE CBC W/AUTO DIFF WBC: CPT

## 2019-03-13 PROCEDURE — 36415 COLL VENOUS BLD VENIPUNCTURE: CPT

## 2019-03-13 PROCEDURE — 1220000000 HC SEMI PRIVATE OB R&B

## 2019-03-13 PROCEDURE — 6370000000 HC RX 637 (ALT 250 FOR IP): Performed by: OBSTETRICS & GYNECOLOGY

## 2019-03-13 RX ORDER — IBUPROFEN 800 MG/1
800 TABLET ORAL EVERY 8 HOURS
Qty: 120 TABLET | Refills: 1 | Status: SHIPPED | OUTPATIENT
Start: 2019-03-13 | End: 2019-12-09

## 2019-03-13 RX ADMIN — ACETAMINOPHEN 650 MG: 325 TABLET ORAL at 14:57

## 2019-03-13 RX ADMIN — DOCUSATE SODIUM 100 MG: 100 CAPSULE, LIQUID FILLED ORAL at 08:23

## 2019-03-13 RX ADMIN — DOCUSATE SODIUM 100 MG: 100 CAPSULE, LIQUID FILLED ORAL at 20:32

## 2019-03-13 RX ADMIN — ACETAMINOPHEN 650 MG: 325 TABLET ORAL at 08:23

## 2019-03-13 RX ADMIN — IBUPROFEN 800 MG: 800 TABLET, FILM COATED ORAL at 20:32

## 2019-03-13 RX ADMIN — FERROUS SULFATE TAB 325 MG (65 MG ELEMENTAL FE) 325 MG: 325 (65 FE) TAB at 08:23

## 2019-03-13 RX ADMIN — IBUPROFEN 800 MG: 800 TABLET, FILM COATED ORAL at 12:00

## 2019-03-13 RX ADMIN — FERROUS SULFATE TAB 325 MG (65 MG ELEMENTAL FE) 325 MG: 325 (65 FE) TAB at 20:32

## 2019-03-13 RX ADMIN — IBUPROFEN 800 MG: 800 TABLET, FILM COATED ORAL at 04:00

## 2019-03-13 RX ADMIN — ACETAMINOPHEN 650 MG: 325 TABLET ORAL at 02:22

## 2019-03-13 ASSESSMENT — PAIN SCALES - GENERAL
PAINLEVEL_OUTOF10: 3
PAINLEVEL_OUTOF10: 7
PAINLEVEL_OUTOF10: 5
PAINLEVEL_OUTOF10: 7
PAINLEVEL_OUTOF10: 3

## 2019-03-14 VITALS
TEMPERATURE: 98.2 F | RESPIRATION RATE: 16 BRPM | DIASTOLIC BLOOD PRESSURE: 69 MMHG | HEIGHT: 64 IN | BODY MASS INDEX: 40.97 KG/M2 | SYSTOLIC BLOOD PRESSURE: 114 MMHG | HEART RATE: 91 BPM | OXYGEN SATURATION: 99 % | WEIGHT: 240 LBS

## 2019-03-14 PROCEDURE — 6370000000 HC RX 637 (ALT 250 FOR IP): Performed by: OBSTETRICS & GYNECOLOGY

## 2019-03-14 RX ADMIN — IBUPROFEN 800 MG: 800 TABLET, FILM COATED ORAL at 09:25

## 2019-03-14 RX ADMIN — Medication: at 12:24

## 2019-03-14 RX ADMIN — IBUPROFEN 800 MG: 800 TABLET, FILM COATED ORAL at 04:36

## 2019-03-14 RX ADMIN — DOCUSATE SODIUM 100 MG: 100 CAPSULE, LIQUID FILLED ORAL at 09:24

## 2019-03-14 RX ADMIN — FERROUS SULFATE TAB 325 MG (65 MG ELEMENTAL FE) 325 MG: 325 (65 FE) TAB at 09:24

## 2019-03-14 ASSESSMENT — PAIN SCALES - GENERAL
PAINLEVEL_OUTOF10: 6
PAINLEVEL_OUTOF10: 6

## 2019-08-15 ENCOUNTER — HOSPITAL ENCOUNTER (EMERGENCY)
Age: 24
Discharge: HOME | End: 2019-08-15
Payer: COMMERCIAL

## 2019-08-15 ENCOUNTER — APPOINTMENT (OUTPATIENT)
Dept: CT IMAGING | Age: 24
End: 2019-08-15
Payer: COMMERCIAL

## 2019-08-15 VITALS
WEIGHT: 235 LBS | BODY MASS INDEX: 40.12 KG/M2 | TEMPERATURE: 98.9 F | HEART RATE: 90 BPM | OXYGEN SATURATION: 99 % | SYSTOLIC BLOOD PRESSURE: 128 MMHG | RESPIRATION RATE: 16 BRPM | DIASTOLIC BLOOD PRESSURE: 88 MMHG | HEIGHT: 64 IN

## 2019-08-15 DIAGNOSIS — S39.012A STRAIN OF LUMBAR REGION, INITIAL ENCOUNTER: Primary | ICD-10-CM

## 2019-08-15 DIAGNOSIS — R10.30 LOWER ABDOMINAL PAIN: ICD-10-CM

## 2019-08-15 LAB
ALBUMIN SERPL-MCNC: 4.4 G/DL (ref 3.5–5.2)
ALP BLD-CCNC: 92 U/L (ref 35–104)
ALT SERPL-CCNC: 5 U/L (ref 0–32)
AMORPHOUS: ABNORMAL
ANION GAP SERPL CALCULATED.3IONS-SCNC: 13 MMOL/L (ref 7–16)
AST SERPL-CCNC: 27 U/L (ref 0–31)
BACTERIA: ABNORMAL /HPF
BASOPHILS ABSOLUTE: 0.05 E9/L (ref 0–0.2)
BASOPHILS RELATIVE PERCENT: 0.5 % (ref 0–2)
BILIRUB SERPL-MCNC: 0.5 MG/DL (ref 0–1.2)
BILIRUBIN URINE: NEGATIVE
BLOOD, URINE: NEGATIVE
BUN BLDV-MCNC: 9 MG/DL (ref 6–20)
CALCIUM SERPL-MCNC: 9.4 MG/DL (ref 8.6–10.2)
CHLORIDE BLD-SCNC: 105 MMOL/L (ref 98–107)
CLARITY: NORMAL
CO2: 23 MMOL/L (ref 22–29)
COLOR: YELLOW
CREAT SERPL-MCNC: 0.8 MG/DL (ref 0.5–1)
EOSINOPHILS ABSOLUTE: 0.09 E9/L (ref 0.05–0.5)
EOSINOPHILS RELATIVE PERCENT: 0.9 % (ref 0–6)
EPITHELIAL CELLS, UA: ABNORMAL /HPF
GFR AFRICAN AMERICAN: >60
GFR NON-AFRICAN AMERICAN: >60 ML/MIN/1.73
GLUCOSE BLD-MCNC: 78 MG/DL (ref 74–99)
GLUCOSE URINE: NEGATIVE MG/DL
HCG(URINE) PREGNANCY TEST: NEGATIVE
HCT VFR BLD CALC: 40.1 % (ref 34–48)
HEMOGLOBIN: 13.1 G/DL (ref 11.5–15.5)
IMMATURE GRANULOCYTES #: 0.03 E9/L
IMMATURE GRANULOCYTES %: 0.3 % (ref 0–5)
KETONES, URINE: NEGATIVE MG/DL
LEUKOCYTE ESTERASE, URINE: NEGATIVE
LYMPHOCYTES ABSOLUTE: 2.87 E9/L (ref 1.5–4)
LYMPHOCYTES RELATIVE PERCENT: 29.9 % (ref 20–42)
MCH RBC QN AUTO: 29.6 PG (ref 26–35)
MCHC RBC AUTO-ENTMCNC: 32.7 % (ref 32–34.5)
MCV RBC AUTO: 90.5 FL (ref 80–99.9)
MONOCYTES ABSOLUTE: 0.88 E9/L (ref 0.1–0.95)
MONOCYTES RELATIVE PERCENT: 9.2 % (ref 2–12)
NEUTROPHILS ABSOLUTE: 5.67 E9/L (ref 1.8–7.3)
NEUTROPHILS RELATIVE PERCENT: 59.2 % (ref 43–80)
NITRITE, URINE: NEGATIVE
PDW BLD-RTO: 14 FL (ref 11.5–15)
PH UA: 7.5 (ref 5–9)
PLATELET # BLD: 276 E9/L (ref 130–450)
PMV BLD AUTO: 10.3 FL (ref 7–12)
POTASSIUM REFLEX MAGNESIUM: 4.1 MMOL/L (ref 3.5–5)
PROTEIN UA: NEGATIVE MG/DL
RBC # BLD: 4.43 E12/L (ref 3.5–5.5)
RBC UA: ABNORMAL /HPF (ref 0–2)
SODIUM BLD-SCNC: 141 MMOL/L (ref 132–146)
SPECIFIC GRAVITY UA: 1.01 (ref 1–1.03)
TOTAL PROTEIN: 8.1 G/DL (ref 6.4–8.3)
UROBILINOGEN, URINE: 1 E.U./DL
WBC # BLD: 9.6 E9/L (ref 4.5–11.5)
WBC UA: ABNORMAL /HPF (ref 0–5)

## 2019-08-15 PROCEDURE — 74176 CT ABD & PELVIS W/O CONTRAST: CPT

## 2019-08-15 PROCEDURE — 81001 URINALYSIS AUTO W/SCOPE: CPT

## 2019-08-15 PROCEDURE — 2580000003 HC RX 258: Performed by: PHYSICIAN ASSISTANT

## 2019-08-15 PROCEDURE — 36415 COLL VENOUS BLD VENIPUNCTURE: CPT

## 2019-08-15 PROCEDURE — 81025 URINE PREGNANCY TEST: CPT

## 2019-08-15 PROCEDURE — 80053 COMPREHEN METABOLIC PANEL: CPT

## 2019-08-15 PROCEDURE — 85025 COMPLETE CBC W/AUTO DIFF WBC: CPT

## 2019-08-15 PROCEDURE — 99283 EMERGENCY DEPT VISIT LOW MDM: CPT

## 2019-08-15 RX ORDER — METHOCARBAMOL 750 MG/1
750 TABLET, FILM COATED ORAL 4 TIMES DAILY
Qty: 40 TABLET | Refills: 0 | Status: SHIPPED | OUTPATIENT
Start: 2019-08-15 | End: 2019-08-25

## 2019-08-15 RX ORDER — 0.9 % SODIUM CHLORIDE 0.9 %
1000 INTRAVENOUS SOLUTION INTRAVENOUS ONCE
Status: COMPLETED | OUTPATIENT
Start: 2019-08-15 | End: 2019-08-15

## 2019-08-15 RX ORDER — TRAMADOL HYDROCHLORIDE 50 MG/1
50 TABLET ORAL EVERY 6 HOURS PRN
Qty: 12 TABLET | Refills: 0 | Status: SHIPPED | OUTPATIENT
Start: 2019-08-15 | End: 2019-08-18

## 2019-08-15 RX ADMIN — SODIUM CHLORIDE 1000 ML: 9 INJECTION, SOLUTION INTRAVENOUS at 21:19

## 2019-08-15 ASSESSMENT — PAIN DESCRIPTION - ONSET: ONSET: GRADUAL

## 2019-08-15 ASSESSMENT — PAIN DESCRIPTION - PROGRESSION: CLINICAL_PROGRESSION: NOT CHANGED

## 2019-08-15 ASSESSMENT — PAIN DESCRIPTION - FREQUENCY: FREQUENCY: CONTINUOUS

## 2019-08-15 ASSESSMENT — PAIN DESCRIPTION - ORIENTATION: ORIENTATION: LOWER;RIGHT;LEFT

## 2019-08-15 ASSESSMENT — PAIN SCALES - GENERAL: PAINLEVEL_OUTOF10: 8

## 2019-08-15 ASSESSMENT — PAIN DESCRIPTION - LOCATION: LOCATION: BACK

## 2019-08-15 ASSESSMENT — PAIN DESCRIPTION - PAIN TYPE: TYPE: ACUTE PAIN

## 2019-08-15 ASSESSMENT — PAIN DESCRIPTION - DESCRIPTORS: DESCRIPTORS: ACHING;CONSTANT;CRAMPING

## 2019-08-16 NOTE — ED PROVIDER NOTES
[facility]  HPI:  8/15/19, Time: 8:16 PM         Angella Bhat is a 30 y.o. female presenting to the ED for dysuria, beginning \"a while\" ago. The complaint has been persistent, moderate in severity, and worsened by nothing. Complains of lower back pain with dysuria that is been going on for quite a while now. The pain is nonradiating. She denies any екатерина hematuria denies any flank discomfort. Denies any fever chills or abdominal pain no vomiting no diarrhea. She denies IV drug abuse. Denies any saddle paresthesias. Denies urinary retention or urinary incontinence. Review of Systems:   Pertinent positives and negatives are stated within HPI, all other systems reviewed and are negative.          --------------------------------------------- PAST HISTORY ---------------------------------------------  Past Medical History:  has no past medical history on file. Past Surgical History:  has a past surgical history that includes  section. Social History:  reports that she has never smoked. She has never used smokeless tobacco. She reports that she does not drink alcohol or use drugs. Family History: family history is not on file. The patients home medications have been reviewed. Allergies: Patient has no known allergies.     -------------------------------------------------- RESULTS -------------------------------------------------  All laboratory and radiology results have been personally reviewed by myself   LABS:  Results for orders placed or performed during the hospital encounter of 08/15/19   Urinalysis   Result Value Ref Range    Color, UA Yellow Straw/Yellow    Clarity, UA SL CLOUDY Clear    Glucose, Ur Negative Negative mg/dL    Bilirubin Urine Negative Negative    Ketones, Urine Negative Negative mg/dL    Specific Gravity, UA 1.015 1.005 - 1.030    Blood, Urine Negative Negative    pH, UA 7.5 5.0 - 9.0    Protein, UA Negative Negative mg/dL    Urobilinogen, Urine 1.0 <2.0 E.U./dL    Nitrite, Urine Negative Negative    Leukocyte Esterase, Urine Negative Negative   Pregnancy, Urine   Result Value Ref Range    HCG(Urine) Pregnancy Test NEGATIVE NEGATIVE   Microscopic Urinalysis   Result Value Ref Range    WBC, UA 0-1 0 - 5 /HPF    RBC, UA NONE 0 - 2 /HPF    Epi Cells FEW /HPF    Bacteria, UA RARE (A) /HPF    Amorphous, UA MODERATE    CBC Auto Differential   Result Value Ref Range    WBC 9.6 4.5 - 11.5 E9/L    RBC 4.43 3.50 - 5.50 E12/L    Hemoglobin 13.1 11.5 - 15.5 g/dL    Hematocrit 40.1 34.0 - 48.0 %    MCV 90.5 80.0 - 99.9 fL    MCH 29.6 26.0 - 35.0 pg    MCHC 32.7 32.0 - 34.5 %    RDW 14.0 11.5 - 15.0 fL    Platelets 492 029 - 678 E9/L    MPV 10.3 7.0 - 12.0 fL    Neutrophils % 59.2 43.0 - 80.0 %    Immature Granulocytes % 0.3 0.0 - 5.0 %    Lymphocytes % 29.9 20.0 - 42.0 %    Monocytes % 9.2 2.0 - 12.0 %    Eosinophils % 0.9 0.0 - 6.0 %    Basophils % 0.5 0.0 - 2.0 %    Neutrophils # 5.67 1.80 - 7.30 E9/L    Immature Granulocytes # 0.03 E9/L    Lymphocytes # 2.87 1.50 - 4.00 E9/L    Monocytes # 0.88 0.10 - 0.95 E9/L    Eosinophils # 0.09 0.05 - 0.50 E9/L    Basophils # 0.05 0.00 - 0.20 E9/L   Comprehensive Metabolic Panel w/ Reflex to MG   Result Value Ref Range    Sodium 141 132 - 146 mmol/L    Potassium reflex Magnesium 4.1 3.5 - 5.0 mmol/L    Chloride 105 98 - 107 mmol/L    CO2 23 22 - 29 mmol/L    Anion Gap 13 7 - 16 mmol/L    Glucose 78 74 - 99 mg/dL    BUN 9 6 - 20 mg/dL    CREATININE 0.8 0.5 - 1.0 mg/dL    GFR Non-African American >60 >=60 mL/min/1.73    GFR African American >60     Calcium 9.4 8.6 - 10.2 mg/dL    Total Protein 8.1 6.4 - 8.3 g/dL    Alb 4.4 3.5 - 5.2 g/dL    Total Bilirubin 0.5 0.0 - 1.2 mg/dL    Alkaline Phosphatase 92 35 - 104 U/L    ALT 5 0 - 32 U/L    AST 27 0 - 31 U/L       RADIOLOGY:  Interpreted by Radiologist.  CT ABDOMEN PELVIS WO CONTRAST Additional Contrast? None   Final Result   Possible enteritis. Recommend clinical correlation.       Note diverticulosis or diverticulitis. There is no evidence of appendicitis, colitis, bowel obstruction or bowel    perforation. No intrarenal calculus or hydronephrosis. This report has been electronically signed by David Connelly MD.          ------------------------- NURSING NOTES AND VITALS REVIEWED ---------------------------   The nursing notes within the ED encounter and vital signs as below have been reviewed. /88   Pulse 90   Temp 98.9 °F (37.2 °C) (Oral)   Resp 16   Ht 5' 4\" (1.626 m)   Wt 235 lb (106.6 kg)   SpO2 99%   BMI 40.34 kg/m²   Oxygen Saturation Interpretation: Normal      ---------------------------------------------------PHYSICAL EXAM--------------------------------------      Constitutional/General: Alert and oriented x3, well appearing, non toxic in NAD  Head: Normocephalic and atraumatic  Eyes: PERRL, EOMI  Mouth: Oropharynx clear, handling secretions, no trismus  Neck: Supple, full ROM, no meningeal signs  Pulmonary: Lungs clear to auscultation bilaterally, no wheezes, rales, or rhonchi. Not in respiratory distress  Cardiovascular:  Regular rate and rhythm, no murmurs, gallops, or rubs. 2+ distal pulses  Abdomen: Soft, non tender, non distended, no peritoneal signs. No CVA tenderness. She is tender in the lumbar spine region of L4-L5 without evidence of radiculopathy. Straight leg test is negative. DTRs 2+/4. Good strength of the hallux longus bilaterally. Extremities: Moves all extremities x 4. Warm and well perfused  Skin: warm and dry without rash  Neurologic: GCS 15,  Psych: Normal Affect      ------------------------------ ED COURSE/MEDICAL DECISION MAKING----------------------  Medications   0.9 % sodium chloride bolus (0 mLs Intravenous Stopped 8/15/19 1679)         ED COURSE:       Medical Decision Making:    Her urine not look infected. She has 1 white cell, will send off for urine culture.   Her abdomen she had some diffuse tenderness in the lateral aspects of her abdomen. She certainly does not have any peritoneal signs. Her labs look okay. CT scan did not show anything acute. We will treat her pain. If she has any worsening symptoms she is to come back for reevaluation. Otherwise she is to follow-up with her primary care physician. She verbalized understanding. Discharged in stable condition  Counseling: The emergency provider has spoken with the patient and discussed todays results, in addition to providing specific details for the plan of care and counseling regarding the diagnosis and prognosis. Questions are answered at this time and they are agreeable with the plan.      --------------------------------- IMPRESSION AND DISPOSITION ---------------------------------    IMPRESSION  1. Strain of lumbar region, initial encounter    2. Lower abdominal pain        DISPOSITION  Disposition: Discharge to home  Patient condition is good      NOTE: This report was transcribed using voice recognition software.  Every effort was made to ensure accuracy; however, inadvertent computerized transcription errors may be present       Zoe Lerma PA-C  08/15/19 5901

## 2019-12-09 ENCOUNTER — HOSPITAL ENCOUNTER (EMERGENCY)
Age: 24
Discharge: HOME | End: 2019-12-09
Attending: EMERGENCY MEDICINE
Payer: COMMERCIAL

## 2019-12-09 VITALS
DIASTOLIC BLOOD PRESSURE: 82 MMHG | RESPIRATION RATE: 16 BRPM | BODY MASS INDEX: 40.97 KG/M2 | TEMPERATURE: 98.7 F | WEIGHT: 240 LBS | HEIGHT: 64 IN | SYSTOLIC BLOOD PRESSURE: 136 MMHG | OXYGEN SATURATION: 99 % | HEART RATE: 92 BPM

## 2019-12-09 DIAGNOSIS — N30.00 ACUTE CYSTITIS WITHOUT HEMATURIA: Primary | ICD-10-CM

## 2019-12-09 LAB
BACTERIA: ABNORMAL /HPF
BILIRUBIN URINE: NEGATIVE
BLOOD, URINE: ABNORMAL
CLARITY: ABNORMAL
COLOR: YELLOW
EPITHELIAL CELLS, UA: ABNORMAL /HPF
GLUCOSE URINE: NEGATIVE MG/DL
HCG(URINE) PREGNANCY TEST: NEGATIVE
KETONES, URINE: NEGATIVE MG/DL
LEUKOCYTE ESTERASE, URINE: NEGATIVE
NITRITE, URINE: NEGATIVE
PH UA: 8 (ref 5–9)
PROTEIN UA: NEGATIVE MG/DL
RBC UA: ABNORMAL /HPF (ref 0–2)
SPECIFIC GRAVITY UA: 1.02 (ref 1–1.03)
UROBILINOGEN, URINE: 1 E.U./DL
WBC UA: ABNORMAL /HPF (ref 0–5)

## 2019-12-09 PROCEDURE — 81025 URINE PREGNANCY TEST: CPT

## 2019-12-09 PROCEDURE — 81001 URINALYSIS AUTO W/SCOPE: CPT

## 2019-12-09 PROCEDURE — 99283 EMERGENCY DEPT VISIT LOW MDM: CPT

## 2019-12-09 RX ORDER — LEVOFLOXACIN 250 MG/1
250 TABLET ORAL DAILY
Qty: 3 TABLET | Refills: 0 | Status: SHIPPED | OUTPATIENT
Start: 2019-12-09 | End: 2019-12-12

## 2019-12-09 ASSESSMENT — PAIN DESCRIPTION - LOCATION: LOCATION: BACK

## 2019-12-09 ASSESSMENT — PAIN DESCRIPTION - ORIENTATION: ORIENTATION: LOWER

## 2019-12-09 ASSESSMENT — PAIN DESCRIPTION - PAIN TYPE: TYPE: ACUTE PAIN

## 2019-12-09 ASSESSMENT — PAIN SCALES - GENERAL: PAINLEVEL_OUTOF10: 7

## 2019-12-09 ASSESSMENT — PAIN DESCRIPTION - DESCRIPTORS: DESCRIPTORS: STABBING

## 2019-12-21 ENCOUNTER — APPOINTMENT (OUTPATIENT)
Dept: GENERAL RADIOLOGY | Age: 24
End: 2019-12-21
Payer: COMMERCIAL

## 2019-12-21 ENCOUNTER — HOSPITAL ENCOUNTER (EMERGENCY)
Age: 24
Discharge: HOME | End: 2019-12-21
Attending: EMERGENCY MEDICINE
Payer: COMMERCIAL

## 2019-12-21 VITALS
RESPIRATION RATE: 20 BRPM | TEMPERATURE: 98.2 F | DIASTOLIC BLOOD PRESSURE: 87 MMHG | OXYGEN SATURATION: 99 % | SYSTOLIC BLOOD PRESSURE: 142 MMHG | HEART RATE: 87 BPM

## 2019-12-21 DIAGNOSIS — S82.891A CLOSED FRACTURE OF RIGHT ANKLE, INITIAL ENCOUNTER: Primary | ICD-10-CM

## 2019-12-21 PROCEDURE — 29515 APPLICATION SHORT LEG SPLINT: CPT

## 2019-12-21 PROCEDURE — 99283 EMERGENCY DEPT VISIT LOW MDM: CPT

## 2019-12-21 PROCEDURE — 73610 X-RAY EXAM OF ANKLE: CPT

## 2019-12-21 RX ORDER — HYDROCODONE BITARTRATE AND ACETAMINOPHEN 5; 325 MG/1; MG/1
1 TABLET ORAL EVERY 6 HOURS PRN
Qty: 8 TABLET | Refills: 0 | Status: SHIPPED | OUTPATIENT
Start: 2019-12-21 | End: 2019-12-24

## 2019-12-21 RX ORDER — HYDROCODONE BITARTRATE AND ACETAMINOPHEN 5; 325 MG/1; MG/1
1 TABLET ORAL EVERY 6 HOURS PRN
Qty: 8 TABLET | Refills: 0 | Status: SHIPPED | OUTPATIENT
Start: 2019-12-21 | End: 2019-12-21 | Stop reason: SDUPTHER

## 2019-12-21 ASSESSMENT — PAIN SCALES - GENERAL: PAINLEVEL_OUTOF10: 10

## 2019-12-21 ASSESSMENT — PAIN DESCRIPTION - ORIENTATION: ORIENTATION: RIGHT

## 2019-12-21 ASSESSMENT — PAIN DESCRIPTION - LOCATION: LOCATION: ANKLE

## 2019-12-21 ASSESSMENT — PAIN DESCRIPTION - PAIN TYPE: TYPE: ACUTE PAIN

## 2019-12-23 ENCOUNTER — TELEPHONE (OUTPATIENT)
Dept: ADMINISTRATIVE | Age: 24
End: 2019-12-23

## 2019-12-23 NOTE — TELEPHONE ENCOUNTER
Patient called was in the ER at [address] [place] [address] over the weekend and was told she has a broken right ankle and to see Dr. Daiana De Luna I one week, I was unable to find an appointment, patient can be reached at [phone].

## 2020-01-03 ENCOUNTER — OFFICE VISIT (OUTPATIENT)
Dept: ORTHOPEDIC SURGERY | Age: 25
End: 2020-01-03
Payer: COMMERCIAL

## 2020-01-03 ENCOUNTER — HOSPITAL ENCOUNTER (OUTPATIENT)
Dept: GENERAL RADIOLOGY | Age: 25
Discharge: HOME | End: 2020-01-05
Payer: COMMERCIAL

## 2020-01-03 VITALS — SYSTOLIC BLOOD PRESSURE: 141 MMHG | HEART RATE: 99 BPM | DIASTOLIC BLOOD PRESSURE: 88 MMHG

## 2020-01-03 PROBLEM — S82.831A CLOSED FRACTURE OF DISTAL END OF RIGHT FIBULA: Status: ACTIVE | Noted: 2020-01-03

## 2020-01-03 PROCEDURE — 99203 OFFICE O/P NEW LOW 30 MIN: CPT | Performed by: ORTHOPAEDIC SURGERY

## 2020-01-03 PROCEDURE — G8427 DOCREV CUR MEDS BY ELIG CLIN: HCPCS | Performed by: ORTHOPAEDIC SURGERY

## 2020-01-03 PROCEDURE — 73610 X-RAY EXAM OF ANKLE: CPT

## 2020-01-03 PROCEDURE — G8484 FLU IMMUNIZE NO ADMIN: HCPCS | Performed by: ORTHOPAEDIC SURGERY

## 2020-01-03 PROCEDURE — 1036F TOBACCO NON-USER: CPT | Performed by: ORTHOPAEDIC SURGERY

## 2020-01-03 PROCEDURE — G8417 CALC BMI ABV UP PARAM F/U: HCPCS | Performed by: ORTHOPAEDIC SURGERY

## 2020-01-03 RX ORDER — HYDROCODONE BITARTRATE AND ACETAMINOPHEN 5; 325 MG/1; MG/1
1 TABLET ORAL EVERY 6 HOURS PRN
Qty: 28 TABLET | Refills: 0 | Status: SHIPPED | OUTPATIENT
Start: 2020-01-03 | End: 2020-01-10

## 2020-01-03 NOTE — PATIENT INSTRUCTIONS
Continue nonweightbearing right lower extremity  Maintain foot in boot, okay to come out of boot for hygiene purposes and at home to work on gentle range of motion exercises   Take pain medication as prescribed, attempt to decrease dosage as possible, call for refill if needed  Call [facility] to get prescription filled for scooter   Ice and elevation to affected extremity

## 2020-01-03 NOTE — PROGRESS NOTES
Department of Orthopedic Trauma Surgery  Office History & Physical Exam      CHIEF COMPLAINT:   Chief Complaint   Patient presents with    Follow-up     ED f/u R ankle fx. Fall , felt and heard a \"pop\" in R ankle. Cast removed. Tingling on top of foot, denies numbness anywhere. Good cap refill. Pain 7/10    Other     XR in EPIC       HISTORY OF PRESENT ILLNESS:                The patient is a 30 y.o. female who presents roughly 2 weeks out from the above mentioned injury. Patient was initially seen in the [facility] ER on  after she had a fall and she rolled her right ankle. Patient states that she felt and heard an immediate pop in the right ankle and had 10 out of 10 pain and inability to bear weight. She was seen by ER staff and placed into a splint and told to follow-up with orthopedics for a lateral malleolus fracture. Patient states pain today is roughly 7 out of 10, she is out of pain medications, she states that she has tingling and some numbness on the dorsum of her foot but nowhere else. She states difficulty with dorsiflexing foot as well as wiggling her toes. She denies any tobacco use or history of diabetes. Denies any other acute orthopedic complaints today. Past Medical History:    History reviewed. No pertinent past medical history. Past Surgical History:        Procedure Laterality Date     SECTION       Current Medications:   No current facility-administered medications for this visit. Allergies:  Patient has no known allergies. Social History:   TOBACCO:   reports that she has never smoked. She has never used smokeless tobacco.  ETOH:   reports no history of alcohol use. DRUGS:   reports no history of drug use. ACTIVITIES OF DAILY LIVING:    OCCUPATION:    Family History:   History reviewed. No pertinent family history.     REVIEW OF SYSTEMS:   Skin: no abnormal pigmentation, rash  Eyes: no blurring or eye pain   Ears/Nose/Throat: no hearing loss,

## 2020-01-14 ENCOUNTER — TELEPHONE (OUTPATIENT)
Dept: ORTHOPEDIC SURGERY | Age: 25
End: 2020-01-14

## 2020-06-03 ENCOUNTER — HOSPITAL ENCOUNTER (OUTPATIENT)
Dept: PHYSICAL THERAPY | Age: 25
Setting detail: THERAPIES SERIES
Discharge: HOME | End: 2020-06-03
Payer: COMMERCIAL

## 2020-06-03 PROCEDURE — 97161 PT EVAL LOW COMPLEX 20 MIN: CPT | Performed by: PHYSICAL THERAPIST

## 2020-06-03 PROCEDURE — 97110 THERAPEUTIC EXERCISES: CPT | Performed by: PHYSICAL THERAPIST

## 2020-06-03 ASSESSMENT — PAIN SCALES - GENERAL: PAINLEVEL_OUTOF10: 10

## 2020-06-03 ASSESSMENT — PAIN DESCRIPTION - DESCRIPTORS: DESCRIPTORS: ACHING;CONSTANT

## 2020-06-03 ASSESSMENT — PAIN DESCRIPTION - PAIN TYPE: TYPE: ACUTE PAIN

## 2020-06-03 ASSESSMENT — PAIN - FUNCTIONAL ASSESSMENT: PAIN_FUNCTIONAL_ASSESSMENT: PREVENTS OR INTERFERES WITH MANY ACTIVE NOT PASSIVE ACTIVITIES

## 2020-06-03 ASSESSMENT — PAIN DESCRIPTION - ORIENTATION: ORIENTATION: RIGHT

## 2020-06-03 ASSESSMENT — PAIN DESCRIPTION - LOCATION: LOCATION: ANKLE

## 2020-06-03 NOTE — PROGRESS NOTES
[facility]                Phone: [phone]   Fax: [phone]    Physical Therapy Daily Treatment Note  Date:  6/3/2020    Patient Name:  Bishop Tam    :  1995  MRN: [id number]    Evaluating therapist:  ROMEO Allan               (6/3/20)  Restrictions/Precautions:    Diagnosis:  R fibular fx                   (29)  Treatment Diagnosis:    Insurance/Certification information:  [employer]   Referring Physician:  Elisabet Felix Plan of care signed (Y/N):    Visit# / total visits:  -10  Pain level: 10/10   Time In:  Time Out:    Subjective:      Exercises:  Exercise/Equipment Resistance/Repetitions Other comments   StepOne              towel st 4x20s ea    ankle PF/DF 15x3              IN/EV 15x3s    toe crunches 15x3s    fascial massage 5 min            toe raises     step ups             side             rocker board:  A/P                              M/L                                          Other Therapeutic Activities:      Home Exercise Program:  provided 6/3/20    Manual Treatments:      Modalities:  IFC/ICE to R ankle PRN    Timed Code Treatment Minutes: Total Treatment Minutes:      Treatment/Activity Tolerance:  [] Patient tolerated treatment well [] Patient limited by fatique  [] Patient limited by pain  [] Patient limited by other medical complications  [] Other:     Prognosis: [] Good [] Fair  [] Poor    Patient Requires Follow-up: [] Yes  [] No    Plan:   [] Continue per plan of care [] Alter current plan (see comments)  [] Plan of care initiated [] Hold pending MD visit [] Discharge  Plan for Next Session:      See Weekly Progress Note: []  Yes  []  No  Next due:        Electronically signed by:   Daniel Prieto
tightness/pain   Balance  Comments: static/dynamic balance is GOOD-     Assessment   Conditions Requiring Skilled Therapeutic Intervention  Body structures, Functions, Activity limitations: Decreased strength;Decreased endurance;Decreased balance;Decreased functional mobility   Prognosis: Good  Decision Making: Low Complexity  Activity Tolerance  Activity Tolerance: Patient Tolerated treatment well       Plan   Plan  Times per week: pt to be seen 2x/week/4-5 weeks   Current Treatment Recommendations: ROM, Strengthening, Gait Training, Balance Training, Endurance Training, Modalities, Home Exercise Program    Goals  Time Frame for goals: 4-5 weeks   goal 1: decrease pain across R ankle with all prolonged activities, 0-3/10   goal 2: increase strength across R ankle to grossly 4+, 5/5 for all ranges improving static/dynamic balance to GOOD/GOOD+  goal 3: improve endurance for all prolonged activities to GOOD/GOOD+  goal 4: restore normal/independent gait mechanics across R LE  goal 5: assure I with [facility] for home management of condition              Jett Paris, PT

## 2020-06-09 ENCOUNTER — HOSPITAL ENCOUNTER (OUTPATIENT)
Dept: PHYSICAL THERAPY | Age: 25
Setting detail: THERAPIES SERIES
Discharge: HOME | End: 2020-06-09
Payer: COMMERCIAL

## 2020-06-09 PROCEDURE — 97110 THERAPEUTIC EXERCISES: CPT

## 2020-06-09 PROCEDURE — 97530 THERAPEUTIC ACTIVITIES: CPT

## 2020-06-12 ENCOUNTER — HOSPITAL ENCOUNTER (OUTPATIENT)
Dept: PHYSICAL THERAPY | Age: 25
Setting detail: THERAPIES SERIES
Discharge: HOME | End: 2020-06-12
Payer: COMMERCIAL

## 2020-06-12 PROCEDURE — 97110 THERAPEUTIC EXERCISES: CPT

## 2020-06-12 PROCEDURE — 97530 THERAPEUTIC ACTIVITIES: CPT

## 2020-06-12 NOTE — PROGRESS NOTES
Other:     Prognosis: [x] Good [x] Fair  [] Poor    Patient Requires Follow-up: [x] Yes  [] No    Plan:   [x] Continue per plan of care [] Alter current plan (see comments)  [] Plan of care initiated [] Hold pending MD visit [] Discharge    CPT codes 6/9/2020 Units    Low Complexity PT evaluation 17130 46573     Moderate Complexity PT evaluation  24046     High Complexity PT evaluation 68741     PT Re-evaluation  50656     Gait training 90766     Manual therapy  [id number]     Therapeutic activities  52248 2    Therapeutic exercises  06914 2   Neuromuscular reeducation  [phone]         Plan for Next Session:  Continue to progress R ankle ROM; progress R ankle strengthening when able as well as gait training and stair training.     See Weekly Progress Note: []  Yes  [x]  No         Electronically signed by:  Gilbert Fernandes, PT, DPT

## 2020-06-17 ENCOUNTER — HOSPITAL ENCOUNTER (OUTPATIENT)
Dept: PHYSICAL THERAPY | Age: 25
Setting detail: THERAPIES SERIES
Discharge: HOME | End: 2020-06-17
Payer: COMMERCIAL

## 2020-06-17 PROCEDURE — 97530 THERAPEUTIC ACTIVITIES: CPT

## 2020-06-17 PROCEDURE — 97110 THERAPEUTIC EXERCISES: CPT

## 2020-06-17 NOTE — PROGRESS NOTES
[address] [facility]                Phone: [phone]   Fax: [phone]    Physical Therapy Daily Treatment Note  Date:  2020    Patient Name:  Colleen Ward    :  1995  MRN: [id number]    Evaluating therapist:  ROMEO Betancourt               (6/3/20)  Diagnosis:  R fibular fx                   (10/29/97)  Insurance/Certification information:  [facility]   Referring Physician:  Zhou Mariscal Visit# / total visits:  -10   Time In:  08  Time Out:  0900    7/3/2020 follow-up with Dr. Blackwell Record    Subjective:  Pt reported \"minimal\" R ankle pain this morning. Per pt, she saw Dr. Blackwell Record yesterday and is going to be starting a steroid to help with the inflammation. Pt stated she goes back to Dr. Rishi Juárez on 3 weeks for another follow-up. Exercises:   Exercise/Equipment Resistance/Repetitions Other comments     Bike 10:00, L3            NT towel st 4x20s ea    NT ankle PF/DF 15x3    NT           IN/EV 15x3s    NT toe crunches 15x3s    NT fascial massage 5 min              Heel raises 3x12     step ups              side              NT rocker board:  A/P  2x25                             M/L 2x25               BAPS board L2 2x25 A/P, M/L            NT   Calf stretch Slant board 5x 30sec each Unable to tolerate step stretch this morning. Ankle 5-way Light flexband 3x12 each direction           Gait training 200' without R ankle brace Verbal cues for gait mechanics. Stair training 4 steps with 2 HR's - ascend reciprocal/descend step-to    8 steps with 2 HR's - ascend/descend reciprocal       Focused on descending with reciprocal pattern; pt reported pain and noted decreased ROM. Assessment/Comments:  Pt presented wearing R ankle brace; brace removed prior to beginning session this morning. Pt given verbal cues PRN for R ankle strengthening exercises with band.   Noted slight improvement in gait mechanics this morning however, pt continues to demonstrate slow gait speed and antalgic gait pattern. Focused on descending steps with reciprocal pattern; pt demonstrated decreased R ankle DF when descending and c/o pain. Pt relied somewhat heavily on HR's when descending steps. Pt encouraged to start going down steps with reciprocal pattern at home and to start weaning out of R ankle brace when at home. Home Exercise Program:  provided 6/3/20    Treatment/Activity Tolerance:  [x] Patient tolerated treatment well [] Patient limited by fatigue  [] Patient limited by pain  [] Patient limited by other medical complications  [] Other:     Prognosis: [x] Good [x] Fair  [] Poor    Patient Requires Follow-up: [x] Yes  [] No    Plan:   [x] Continue per plan of care [] Alter current plan (see comments)  [] Plan of care initiated [] Hold pending MD visit [] Discharge    CPT codes 6/9/2020 Units    Low Complexity PT evaluation 92473 79468     Moderate Complexity PT evaluation  47162     High Complexity PT evaluation 40899     PT Re-evaluation  93403     Gait training 68700     Manual therapy  [id number]     Therapeutic activities  14337 2    Therapeutic exercises  37386 2   Neuromuscular reeducation  [phone]         Plan for Next Session:  Continue to progress R ankle ROM; progress R ankle strengthening when able as well as gait training and stair training.     See Weekly Progress Note: []  Yes  [x]  No         Electronically signed by:  Gillian Nagy, PT, DPT

## 2020-06-19 ENCOUNTER — HOSPITAL ENCOUNTER (OUTPATIENT)
Dept: PHYSICAL THERAPY | Age: 25
Setting detail: THERAPIES SERIES
Discharge: HOME | End: 2020-06-19
Payer: COMMERCIAL

## 2020-06-19 PROCEDURE — 97110 THERAPEUTIC EXERCISES: CPT

## 2020-06-19 PROCEDURE — 97530 THERAPEUTIC ACTIVITIES: CPT

## 2020-06-23 ENCOUNTER — HOSPITAL ENCOUNTER (OUTPATIENT)
Dept: PHYSICAL THERAPY | Age: 25
Setting detail: THERAPIES SERIES
Discharge: HOME | End: 2020-06-23
Payer: COMMERCIAL

## 2020-06-23 NOTE — PROGRESS NOTES
[address] [facility]                Phone: [phone]  Fax: [phone]    Physical Therapy  Cancellation/No-show Note  Patient Name:  Lynne Gordillo  :  1995   Date:  2020    For today's appointment patient:  [x]  Cancelled  []  Rescheduled appointment  []  No-show     Reason given by patient:  []  Patient ill  []  Conflicting appointment  []  No transportation    []  Conflict with work  [x]  No reason given  []  Other:     Comments:  Pt's next scheduled appointment is 2020 at 0800.     Electronically signed by:  Brian Walker, PT, DPT

## 2020-06-26 ENCOUNTER — HOSPITAL ENCOUNTER (OUTPATIENT)
Dept: PHYSICAL THERAPY | Age: 25
Setting detail: THERAPIES SERIES
Discharge: HOME | End: 2020-06-26
Payer: COMMERCIAL

## 2020-06-26 PROCEDURE — 97530 THERAPEUTIC ACTIVITIES: CPT

## 2020-06-26 PROCEDURE — 97110 THERAPEUTIC EXERCISES: CPT

## 2020-06-26 NOTE — PROGRESS NOTES
session well despite c/o increased pain upon arrival this morning. Per pt request, session ended early this morning due to pt having another appointment at 0900. Pt instructed to continue with band strengthening exercises at home at least 2x/day. Home Exercise Program:  provided 6/3/20  6/19/2020 - PTB and handout administered for ankle 5-way exercises (3x25, 2x/day)    Treatment/Activity Tolerance:  [x] Patient tolerated treatment well [] Patient limited by fatigue  [] Patient limited by pain  [] Patient limited by other medical complications  [] Other:     Prognosis: [x] Good [x] Fair  [] Poor    Patient Requires Follow-up: [x] Yes  [] No    Plan:   [x] Continue per plan of care [] Alter current plan (see comments)  [] Plan of care initiated [] Hold pending MD visit [] Discharge    CPT codes 6/9/2020 Units    Low Complexity PT evaluation 79695 35357     Moderate Complexity PT evaluation  09174     High Complexity PT evaluation 44004     PT Re-evaluation  03386     Gait training 70153     Manual therapy  [id number]     Therapeutic activities  62268 1   Therapeutic exercises  32129 2   Neuromuscular reeducation  [phone]         Plan for Next Session:  Continue to progress R ankle ROM; progress R ankle strengthening when able as well as gait training and stair training.     See Weekly Progress Note: []  Yes  [x]  No         Electronically signed by:  Wan Tubbs, PT, DPT

## 2020-06-30 ENCOUNTER — HOSPITAL ENCOUNTER (OUTPATIENT)
Dept: PHYSICAL THERAPY | Age: 25
Setting detail: THERAPIES SERIES
Discharge: HOME | End: 2020-06-30
Payer: COMMERCIAL

## 2020-06-30 PROCEDURE — 97110 THERAPEUTIC EXERCISES: CPT

## 2020-06-30 PROCEDURE — 97530 THERAPEUTIC ACTIVITIES: CPT

## 2020-06-30 NOTE — PROGRESS NOTES
[address] [facility]                Phone: [phone]   Fax: [phone]    Physical Therapy Daily Treatment Note  Date:  2020    Patient Name:  Nico Olivares    :  1995  MRN: [id number]    Evaluating therapist:  ROMEO Dunn               (6/3/20)  Diagnosis:  R fibular fx                   (8970/11)  Insurance/Certification information:  [facility]   Referring Physician:  Katarzyna Huertas Visit# / total visits:  -10   Time In:  0805  Time Out:  5353    2020 follow-up with Dr. Shay Huang    Subjective:  Pt reported 5/10 pain in R ankle this morning. Exercises:   Exercise/Equipment Resistance/Repetitions Other comments     Bike 10:00, L3            NT towel st 4x20s ea    NT ankle PF/DF 15x3    NT           IN/EV 15x3s    NT toe crunches 15x3s    NT fascial massage 5 min              Heel raises 3x12     step ups              side              NT rocker board:  A/P  2x25                             M/L 2x25            NT   BAPS board L2 2x25 A/P, M/L           Squats  3x12 Verbal cues and demonstration for form; chair used for tactile cue for form. Calf stretch Slant board 5x 1 minute each           Single-leg stance Level surface with parallel bars for support - to fatigue    1 hand on bars with mini-knee bends x10          NT Ankle 5-way PTB 3x15 each direction           Gait training Pt ambulated throughout PT gym without R ankle brace Verbal cues for gait mechanics. NT Stair training 4 steps with 2 HR's - ascend reciprocal/descend step-to    8 steps with 2 HR's - ascend/descend reciprocal       Focused on descending with reciprocal pattern; pt reported pain and noted decreased ROM. Assessment/Comments:  Pt presented wearing R ankle brace; brace removed prior to beginning session this morning. Pt given verbal cues and demonstrations for proper form with exercises PRN this morning.   Pt had difficulty tolerating single-leg stance this morning and required frequent rest breaks; pt also c/o weakness with single-leg stance this morning. Home Exercise Program:  provided 6/3/20  6/19/2020 - PTB and handout administered for ankle 5-way exercises (3x25, 2x/day)    Treatment/Activity Tolerance:  [x] Patient tolerated treatment well [] Patient limited by fatigue  [] Patient limited by pain  [] Patient limited by other medical complications  [] Other:     Prognosis: [x] Good [x] Fair  [] Poor    Patient Requires Follow-up: [x] Yes  [] No    Plan:   [x] Continue per plan of care [] Alter current plan (see comments)  [] Plan of care initiated [] Hold pending MD visit [] Discharge    CPT codes 6/9/2020 Units    Low Complexity PT evaluation 17963 79184     Moderate Complexity PT evaluation  06030     High Complexity PT evaluation 62035     PT Re-evaluation  97533     Gait training 10130     Manual therapy  [id number]     Therapeutic activities  12378 1   Therapeutic exercises  12964 2   Neuromuscular reeducation  [id number]         Plan for Next Session:  Continue to progress R ankle ROM; progress R ankle strengthening when able as well as gait training and stair training.     See Weekly Progress Note: []  Yes  [x]  No         Electronically signed by:  Dora Christensen, PT, DPT

## 2020-07-07 ENCOUNTER — HOSPITAL ENCOUNTER (OUTPATIENT)
Dept: PHYSICAL THERAPY | Age: 25
Setting detail: THERAPIES SERIES
Discharge: HOME | End: 2020-07-07
Payer: COMMERCIAL

## 2020-07-07 PROCEDURE — 97110 THERAPEUTIC EXERCISES: CPT

## 2020-07-07 PROCEDURE — 97530 THERAPEUTIC ACTIVITIES: CPT

## 2020-07-07 NOTE — PROGRESS NOTES
[address] [facility]                Phone: [phone]   Fax: [phone]    Physical Therapy Daily Treatment Note  Date:  2020    Patient Name:  Jamarcus Gutiérrez    :  1995  MRN: [id number]    Evaluating therapist:  ROMEO Maria               (6/3/20)  Diagnosis:  R fibular fx                   (48//49)  Insurance/Certification information:  [facility]   Referring Physician:  Portillo Price Visit# / total visits:  -10   Time In:  1829  Time Out:  6953    2020 follow-up with Dr. Melchor Bolivar    Subjective:  Pt reported soreness in R ankle this morning. Exercises:   Exercise/Equipment Resistance/Repetitions Other comments     Bike 10:00, L3            NT towel st 4x20s ea    NT ankle PF/DF 15x3    NT           IN/EV 15x3s    NT toe crunches 15x3s    NT fascial massage 5 min              Heel raises 3x15     step ups              side              NT rocker board:  A/P  2x25                             M/L 2x25            NT   BAPS board L2 2x25 A/P, M/L           Squats  3x15 Verbal cues and demonstration for form; chair used for tactile cue for form. Calf stretch Slant board 5x 1 minute each           Single-leg stance Level surface with parallel bars for support - to fatigue    With mini-knee bends 2x10    Static on blue foam pad - to fatigue          NT Ankle 5-way PTB 3x15 each direction           Gait training Pt ambulated throughout PT gym without R ankle brace Verbal cues for gait mechanics. NT Stair training 4 steps with 2 HR's - ascend reciprocal/descend step-to    8 steps with 2 HR's - ascend/descend reciprocal       Focused on descending with reciprocal pattern; pt reported pain and noted decreased ROM. Assessment/Comments:  Pt presented wearing R ankle brace; brace removed prior to beginning session this morning. Pt given verbal cues and demonstrations for proper form with exercises PRN this morning.   Pt continues to c/o R ankle weakness and pain with single-leg balance and requires frequent breaks due to R LE fatigue. Home Exercise Program:  provided 6/3/20  6/19/2020 - PTB and handout administered for ankle 5-way exercises (3x25, 2x/day)    Treatment/Activity Tolerance:  [x] Patient tolerated treatment well [] Patient limited by fatigue  [] Patient limited by pain  [] Patient limited by other medical complications  [] Other:     Prognosis: [x] Good [x] Fair  [] Poor    Patient Requires Follow-up: [x] Yes  [] No    Plan:   [x] Continue per plan of care [] Alter current plan (see comments)  [] Plan of care initiated [] Hold pending MD visit [] Discharge    CPT codes 6/9/2020 Units    Low Complexity PT evaluation 92711 43642     Moderate Complexity PT evaluation  66578     High Complexity PT evaluation 49493     PT Re-evaluation  54694     Gait training 48717     Manual therapy  [id number]     Therapeutic activities  02627 2   Therapeutic exercises  01827 2   Neuromuscular reeducation  [phone]         Plan for Next Session:  Continue to progress R ankle ROM; progress R ankle strengthening when able as well as gait training and stair training.     See Weekly Progress Note: []  Yes  [x]  No         Electronically signed by:  Alphonse Encarnacion, PT, DPT

## 2020-07-10 ENCOUNTER — HOSPITAL ENCOUNTER (OUTPATIENT)
Dept: PHYSICAL THERAPY | Age: 25
Setting detail: THERAPIES SERIES
Discharge: HOME | End: 2020-07-10
Payer: COMMERCIAL

## 2020-07-10 PROCEDURE — 97530 THERAPEUTIC ACTIVITIES: CPT

## 2020-07-10 NOTE — PROGRESS NOTES
[address] [facility]                Phone: [phone]   Fax: [phone]    Physical Therapy Daily Treatment Note  Date:  7/10/2020    Patient Name:  Lacey Shelton    :  1995  MRN: [id number]    Evaluating therapist:  ROMEO Davison               (6/3/20)  Diagnosis:  R fibular fx                   (94/87/85)  Insurance/Certification information:  [facility]   Referring Physician:  Ronaldo Raygoza Visit# / total visits:  9/8-10   Time In:  805  Time Out:  2020 follow-up with Dr. Tay Ricketts    Subjective:  Pt reported soreness in R ankle this morning. Pt stated she received a cortisone injection on Tuesday when she went for her follow-up with Dr. Tay Ricketts. Otherwise, pt stated her appointment was uneventful and she has another follow-up in 3 weeks. Exercises:   Exercise/Equipment Resistance/Repetitions Other comments     Bike 10:00, L3            NT towel st 4x20s ea    NT ankle PF/DF 15x3    NT           IN/EV 15x3s    NT toe crunches 15x3s    NT fascial massage 5 min             NT Heel raises 3x15     step ups              side              NT rocker board:  A/P  2x25                             M/L 2x25            NT   BAPS board L2 2x25 A/P, M/L          NT Squats  3x15 Verbal cues and demonstration for form; chair used for tactile cue for form. NT   Calf stretch Slant board 5x 1 minute each           Single-leg stance Level surface - to fatigue        Static on blue foam pad - to fatigue    BOSU (black side up) - to fatigue Parallel bars for support with balance activities. NT Ankle 5-way PTB 3x15 each direction           Gait training Pt ambulated throughout PT gym without R ankle brace Verbal cues for gait mechanics.          NT Stair training 4 steps with 2 HR's - ascend reciprocal/descend step-to    8 steps with 2 HR's - ascend/descend reciprocal       Focused on descending with reciprocal pattern; pt reported pain and noted decreased ROM.     Assessment/Comments:  Pt presented wearing R ankle brace; brace removed prior to beginning session this morning. Pt requested to end session early this morning due to having another commitment. Home Exercise Program:  provided 6/3/20  6/19/2020 - PTB and handout administered for ankle 5-way exercises (3x25, 2x/day)    Treatment/Activity Tolerance:  [x] Patient tolerated treatment well [] Patient limited by fatigue  [] Patient limited by pain  [] Patient limited by other medical complications  [] Other:     Prognosis: [x] Good [x] Fair  [] Poor    Patient Requires Follow-up: [x] Yes  [] No    Plan:   [x] Continue per plan of care [] Alter current plan (see comments)  [] Plan of care initiated [] Hold pending MD visit [] Discharge    CPT codes 6/9/2020 Units    Low Complexity PT evaluation 99467 85226     Moderate Complexity PT evaluation  93173     High Complexity PT evaluation 26648     PT Re-evaluation  41373     Gait training 41868     Manual therapy  [id number]     Therapeutic activities  34241 2   Therapeutic exercises  21422    Neuromuscular reeducation  [id number]         Plan for Next Session:  Continue to progress R ankle ROM; progress R ankle strengthening when able as well as gait training and stair training.     See Weekly Progress Note: []  Yes  [x]  No         Electronically signed by:  Dora Christensen, PT, DPT

## 2020-07-14 ENCOUNTER — HOSPITAL ENCOUNTER (OUTPATIENT)
Dept: PHYSICAL THERAPY | Age: 25
Setting detail: THERAPIES SERIES
Discharge: HOME | End: 2020-07-14
Payer: COMMERCIAL

## 2020-07-14 PROCEDURE — 97110 THERAPEUTIC EXERCISES: CPT

## 2020-07-14 PROCEDURE — 97530 THERAPEUTIC ACTIVITIES: CPT

## 2020-07-14 NOTE — DISCHARGE SUMMARY
[address] [facility]                Phone: [phone]   Fax: [phone]      Date: 2020  To:  Dr. Marijo Holter   From: Martha Summers PT, DPT    Patient: Lupe Friedman         : 1995  Diagnosis:  R fibular fracture          Physical Therapy Progress/Discharge Note    Total Visits to date:   10 Cancels/No-shows to date:  1 cancellation    Plan of Care/Treatment to date:  [x] Therapeutic Exercise    [] Modalities:  [x] Therapeutic Activity     [] Ultrasound  [] Electrical Stimulation  [x] Gait Training      [] Cervical Traction   [] Lumbar Traction  [] Neuromuscular Re-education  [] Cold/hotpack [] Iontophoresis  [x] Instruction in HEP      Other:  [] Manual Therapy       []    [] Aquatic Therapy       []                          Significant Findings At Last Visit/Comments:    R ankle AROM grossly WFL    R ankle strength:  DF/PF 4/5  INV/EV 4-/5    Gait:  Pt ambulated throughout PT gym without AD and without R ankle brace independently. Pt demonstrated slow gait speed and stiff, guarded movements. Steps:  Pt ascended/descended 12 steps with 1 HR modified independent with reciprocal pattern. Pt again demonstrated slow speed and stiff/guarded movements. Pt also reported fear or re-injuring R ankle while on steps. Progress towards goals:    Pt has made fair progress towards established physical therapy goals. Goal Status:  [] Achieved [x] Partially Achieved  [] Not Achieved      Rehab Potential: [] Excellent [x] Good [] Fair  [] Poor        Patient Status: [] Continue per initial plan of Care. [x] Patient now discharged. [] Additional visits requested, Please re-certify for additional visits:      Requested frequency/duration:  X/week for weeks    Electronically signed by:  Martha Summers PT, DPT    If you have any questions or concerns, please don't hesitate to call.   Thank you for your referral.

## 2020-07-14 NOTE — PROGRESS NOTES
[address] [facility]                Phone: [phone]   Fax: [phone]    Physical Therapy Daily Treatment Note  Date:  2020    Patient Name:  Emily Flowers    :  1995  MRN: [id number]    Evaluating therapist:  ROMEO Ulloa               (6/3/20)  Diagnosis:  R fibular fx                   ()  Insurance/Certification information:  [facility]   Referring Physician:  Thanh Verma Visit# / total visits:  10/8-10   Time In:  1000  Time Out:  1100    2020 follow-up with Dr. Cam Victoria    Subjective:  Pt reported 6/10 pain in R ankle this morning. Exercises:   Exercise/Equipment Resistance/Repetitions Other comments     Bike 10:00, L3            NT towel st 4x20s ea    NT ankle PF/DF 15x3    NT           IN/EV 15x3s    NT toe crunches 15x3s    NT fascial massage 5 min              Heel raises 3x15     step ups              side              NT rocker board:  A/P  2x25                             M/L 2x25            NT   BAPS board L2 2x25 A/P, M/L           Squats  3x15 Verbal cues and demonstration for form; chair used for tactile cue for form. Calf stretch Slant board 5x 1 minute each           Single-leg stance Level surface - to fatigue        Static on blue foam pad - to fatigue    BOSU (black side up) - to fatigue Parallel bars for support with balance activities. NT Ankle 5-way PTB 3x15 each direction           Gait training Pt ambulated throughout PT gym without R ankle brace Verbal cues for gait mechanics. Stair training 12 steps with 1 HR Verbal cues to use reciprocal pattern. R ankle strength:  DF/PF 4/5  INV/EV 4-/5    Assessment/Comments:  Pt presented wearing R ankle brace; brace removed prior to beginning session this morning. Pt tolerated session well; pt c/o pain with single-leg balance only. Pt instructed to continue with R ankle strengthening exercises and balance activities at home.   Pt agreeable to discharge at this time with HEP. Home Exercise Program:  provided 6/3/20  6/19/2020 - PTB and handout administered for ankle 5-way exercises (3x25, 2x/day)    Treatment/Activity Tolerance:  [x] Patient tolerated treatment well [] Patient limited by fatigue  [] Patient limited by pain  [] Patient limited by other medical complications  [] Other:     Prognosis: [x] Good [x] Fair  [] Poor    Patient Requires Follow-up: [] Yes  [x] No    Plan:   [] Continue per plan of care [] Alter current plan (see comments)  [] Plan of care initiated [] Hold pending MD visit [x] Discharge    CPT codes 6/9/2020 Units    Low Complexity PT evaluation 66981 00358     Moderate Complexity PT evaluation  16427     High Complexity PT evaluation 97454     PT Re-evaluation  18954     Gait training 37222     Manual therapy  [phone]     Therapeutic activities   2   Therapeutic exercises  61621 2   Neuromuscular reeducation  [id number]         Plan for Next Session:  Pt now discharged.     See Weekly Progress Note: []  Yes  [x]  No         Electronically signed by:  Vinny Eagle, PT, DPT

## 2020-10-28 ENCOUNTER — HOSPITAL ENCOUNTER (OUTPATIENT)
Dept: PHYSICAL THERAPY | Age: 25
Setting detail: THERAPIES SERIES
Discharge: HOME | End: 2020-10-28
Payer: COMMERCIAL

## 2020-10-28 PROCEDURE — 97161 PT EVAL LOW COMPLEX 20 MIN: CPT | Performed by: PHYSICAL THERAPIST

## 2020-10-28 ASSESSMENT — PAIN DESCRIPTION - DESCRIPTORS: DESCRIPTORS: ACHING;CONSTANT

## 2020-10-28 ASSESSMENT — PAIN SCALES - GENERAL: PAINLEVEL_OUTOF10: 8

## 2020-10-28 ASSESSMENT — PAIN DESCRIPTION - PAIN TYPE: TYPE: ACUTE PAIN;SURGICAL PAIN

## 2020-10-28 ASSESSMENT — PAIN DESCRIPTION - LOCATION: LOCATION: ANKLE

## 2020-10-28 ASSESSMENT — PAIN DESCRIPTION - ORIENTATION: ORIENTATION: RIGHT

## 2020-10-28 ASSESSMENT — PAIN - FUNCTIONAL ASSESSMENT: PAIN_FUNCTIONAL_ASSESSMENT: PREVENTS OR INTERFERES WITH MANY ACTIVE NOT PASSIVE ACTIVITIES

## 2020-10-28 NOTE — PROGRESS NOTES
Physical Therapy  Initial Assessment  Date: 10/28/2020  Patient Name: Miesha Vaughan  MRN: [id number]  : 1995        Subjective   General  Chart Reviewed: Yes  Referring Practitioner: Yuliya Almanza   Referral Date : 10/19/20  Diagnosis: s/p ankle stabilization  PT Visit Information  Onset Date: 10/19/20  Total # of Visits Approved: 12  Total # of Visits to Date: 1  Subjective  Subjective: pt presents to therapy s/p R ankle stabilization done back in August of this yr; pt fx R ankle back in  had previous PT with little relief noted; [facility] for R ankle sp/st; MEDS of little help; c/o N/T into R foot at times as well as feeling of buckling; sleep hampered due to pain; RTD for follow-up 11/10/20  Pain Screening  Patient Currently in Pain: Yes  Pain Assessment  Pain Assessment: 0-10  Pain Level: 8  Pain Type: Acute pain;Surgical pain  Pain Location: Ankle  Pain Orientation: Right  Pain Descriptors: Aching;Constant  Functional Pain Assessment: Prevents or interferes with many active not passive activities  Vital Signs  Patient Currently in Pain: Yes    Objective     Observation/Palpation  Palpation: discomfort noted across lateral aspect of ankle across malleolous into midfoot and toes  Observation: moderate soft tissue swelling observed across B malleoli into midfoot and toes    AROM RLE (degrees)  RLE General AROM: WNL for all ranges R hip/knee; R ankle:  PF/DF= 30*/0*, IN/EV= 4*/4*  AROM LLE (degrees)  LLE AROM : WNL    Strength Other  Other: grossly 3, 3+/5 for all ranges R ankle     Additional Measures  Other: endurance for all prolonged activities is FAIR+  Sensation  Overall Sensation Status: [facility]     Ambulation  Ambulation?: Yes  Ambulation 1  Surface: level tile  Device: No Device  Other Apparatus: AFO  Assistance: Independent  Quality of Gait: slow/guarded shelia noted with decreased heel-toe progression/stance noted R LE due to ankle stiffness/pain  Balance  Comments: static/dynamic balance is GOOD-     Assessment   Conditions Requiring Skilled Therapeutic Intervention  Body structures, Functions, Activity limitations: Decreased ROM; Decreased strength;Decreased endurance  Assessment: pain noted across R ankle/foot with all prolonged activities, 8/10  Prognosis: Good  Decision Making: Low Complexity  Activity Tolerance  Activity Tolerance: Patient Tolerated treatment well       Plan   Plan  Times per week: pt to be seen 2x/week/6 weeks  Current Treatment Recommendations: ROM, Strengthening, Gait Training, Endurance Training    Goals  Time Frame for goals: 6 weeks  goal 1: decrease pain across R ankle with all prolonged activities, 0-3/10  goal 2: restore AROM across R ankle to [facility] allowing for normal/independent gait mechanics across R LE  goal 3: increase strength across R ankle to grossly 4, 4+/5 for all ranges improving static/dynamic balance to GOOD/GOOD+  goal 4: assure I with [facility] for home management of condition    Patient goals : to be able to walk normally again          Nany Ngo, PT

## 2020-11-03 ENCOUNTER — HOSPITAL ENCOUNTER (OUTPATIENT)
Dept: PHYSICAL THERAPY | Age: 25
Setting detail: THERAPIES SERIES
Discharge: HOME | End: 2020-11-03
Payer: COMMERCIAL

## 2020-11-03 PROCEDURE — 97110 THERAPEUTIC EXERCISES: CPT | Performed by: PHYSICAL THERAPIST

## 2020-11-03 NOTE — PROGRESS NOTES
[address] [facility]                Phone: [phone]   Fax: [phone]    Physical Therapy Daily Treatment Note  Date:  11/3/2020    Patient Name:  Chantal Wade    :  1995  MRN: [id number]    Evaluating therapist:  Lynelle Collet, MPT                 (10/28/20)  Restrictions/Precautions:    Diagnosis:  s/p R ankle stabilization  Treatment Diagnosis:    Insurance/Certification information:  [id number] [facility],[address]  Referring Physician:  Rosina Denise. Plan of care signed (Y/N):    Visit# / total visits:    Pain level: 8/10   Time In:  1540  Time Out: 1610    Subjective:  Pt with no new report.     Exercises:  Exercise/Equipment Resistance/Repetitions Other comments   StepOne   5 minutes           towel st 3x20s    toe crunches   20x3s    ankle: PF/DF 15x3s              IN/EV 15x3s AAROM    fascial massage 5 min       alphabet x1      Ankle circles CW and CCW x 20 reps each                                                                               Other Therapeutic Activities:      Home Exercise Program:  provided 10/28/20    Manual Treatments:      Modalities:  IFC/ICE to R ankle PRN     Timed Code Treatment Minutes:  30    Total Treatment Minutes:  30    Treatment/Activity Tolerance:  [] Patient tolerated treatment well [] Patient limited by fatique  [x] Patient limited by pain  [] Patient limited by other medical complications  [] Other:     Prognosis: [x] Good [] Fair  [] Poor    Patient Requires Follow-up: [] Yes  [] No    Plan:   [x] Continue per plan of care [] Alter current plan (see comments)  [] Plan of care initiated [] Hold pending MD visit [] Discharge  Plan for Next Session:      See Weekly Progress Note: []  Yes  []  No  Next due:        Electronically signed by:  Enrique Rubalcava, PT [id number]

## 2020-11-07 ENCOUNTER — HOSPITAL ENCOUNTER (EMERGENCY)
Age: 25
Discharge: HOME | End: 2020-11-07
Attending: EMERGENCY MEDICINE
Payer: COMMERCIAL

## 2020-11-07 ENCOUNTER — APPOINTMENT (OUTPATIENT)
Dept: CT IMAGING | Age: 25
End: 2020-11-07
Payer: COMMERCIAL

## 2020-11-07 VITALS
TEMPERATURE: 98 F | DIASTOLIC BLOOD PRESSURE: 93 MMHG | BODY MASS INDEX: 40.97 KG/M2 | RESPIRATION RATE: 18 BRPM | SYSTOLIC BLOOD PRESSURE: 149 MMHG | OXYGEN SATURATION: 99 % | HEART RATE: 94 BPM | WEIGHT: 240 LBS | HEIGHT: 64 IN

## 2020-11-07 LAB
ALBUMIN SERPL-MCNC: 4.1 G/DL (ref 3.5–5.2)
ALP BLD-CCNC: 90 U/L (ref 35–104)
ALT SERPL-CCNC: <5 U/L (ref 0–32)
ANION GAP SERPL CALCULATED.3IONS-SCNC: 11 MMOL/L (ref 7–16)
AST SERPL-CCNC: 20 U/L (ref 0–31)
BACTERIA: ABNORMAL /HPF
BASOPHILS ABSOLUTE: 0.05 E9/L (ref 0–0.2)
BASOPHILS RELATIVE PERCENT: 0.5 % (ref 0–2)
BILIRUB SERPL-MCNC: 0.5 MG/DL (ref 0–1.2)
BILIRUBIN URINE: NEGATIVE
BLOOD, URINE: NORMAL
BUN BLDV-MCNC: 7 MG/DL (ref 6–20)
CALCIUM SERPL-MCNC: 9.1 MG/DL (ref 8.6–10.2)
CHLORIDE BLD-SCNC: 103 MMOL/L (ref 98–107)
CLARITY: CLEAR
CO2: 25 MMOL/L (ref 22–29)
COLOR: YELLOW
CREAT SERPL-MCNC: 0.8 MG/DL (ref 0.5–1)
EOSINOPHILS ABSOLUTE: 0.05 E9/L (ref 0.05–0.5)
EOSINOPHILS RELATIVE PERCENT: 0.5 % (ref 0–6)
GFR AFRICAN AMERICAN: >60
GFR NON-AFRICAN AMERICAN: >60 ML/MIN/1.73
GLUCOSE BLD-MCNC: 91 MG/DL (ref 74–99)
GLUCOSE URINE: NEGATIVE MG/DL
HCG(URINE) PREGNANCY TEST: NEGATIVE
HCT VFR BLD CALC: 41 % (ref 34–48)
HEMOGLOBIN: 13.6 G/DL (ref 11.5–15.5)
IMMATURE GRANULOCYTES #: 0.03 E9/L
IMMATURE GRANULOCYTES %: 0.3 % (ref 0–5)
KETONES, URINE: NEGATIVE MG/DL
LACTIC ACID: 1.4 MMOL/L (ref 0.5–2.2)
LEUKOCYTE ESTERASE, URINE: NEGATIVE
LIPASE: 36 U/L (ref 13–60)
LYMPHOCYTES ABSOLUTE: 2.75 E9/L (ref 1.5–4)
LYMPHOCYTES RELATIVE PERCENT: 27.2 % (ref 20–42)
MCH RBC QN AUTO: 30.6 PG (ref 26–35)
MCHC RBC AUTO-ENTMCNC: 33.2 % (ref 32–34.5)
MCV RBC AUTO: 92.1 FL (ref 80–99.9)
MONOCYTES ABSOLUTE: 0.98 E9/L (ref 0.1–0.95)
MONOCYTES RELATIVE PERCENT: 9.7 % (ref 2–12)
MUCUS: PRESENT /LPF
NEUTROPHILS ABSOLUTE: 6.25 E9/L (ref 1.8–7.3)
NEUTROPHILS RELATIVE PERCENT: 61.8 % (ref 43–80)
NITRITE, URINE: NEGATIVE
PDW BLD-RTO: 13.2 FL (ref 11.5–15)
PH UA: 7.5 (ref 5–9)
PLATELET # BLD: 292 E9/L (ref 130–450)
PMV BLD AUTO: 10.4 FL (ref 7–12)
POTASSIUM REFLEX MAGNESIUM: 4.3 MMOL/L (ref 3.5–5)
PROTEIN UA: NEGATIVE MG/DL
RBC # BLD: 4.45 E12/L (ref 3.5–5.5)
RBC UA: ABNORMAL /HPF (ref 0–2)
SARS-COV-2, NAAT: NOT DETECTED
SODIUM BLD-SCNC: 139 MMOL/L (ref 132–146)
SPECIFIC GRAVITY UA: 1.01 (ref 1–1.03)
TOTAL PROTEIN: 7.8 G/DL (ref 6.4–8.3)
UROBILINOGEN, URINE: 1 E.U./DL
WBC # BLD: 10.1 E9/L (ref 4.5–11.5)
WBC UA: ABNORMAL /HPF (ref 0–5)

## 2020-11-07 PROCEDURE — 81025 URINE PREGNANCY TEST: CPT

## 2020-11-07 PROCEDURE — 85025 COMPLETE CBC W/AUTO DIFF WBC: CPT

## 2020-11-07 PROCEDURE — 96372 THER/PROPH/DIAG INJ SC/IM: CPT

## 2020-11-07 PROCEDURE — 6360000002 HC RX W HCPCS: Performed by: EMERGENCY MEDICINE

## 2020-11-07 PROCEDURE — 81001 URINALYSIS AUTO W/SCOPE: CPT

## 2020-11-07 PROCEDURE — U0002 COVID-19 LAB TEST NON-CDC: HCPCS

## 2020-11-07 PROCEDURE — 99283 EMERGENCY DEPT VISIT LOW MDM: CPT

## 2020-11-07 PROCEDURE — 2580000003 HC RX 258: Performed by: EMERGENCY MEDICINE

## 2020-11-07 PROCEDURE — 83605 ASSAY OF LACTIC ACID: CPT

## 2020-11-07 PROCEDURE — 83690 ASSAY OF LIPASE: CPT

## 2020-11-07 PROCEDURE — 80053 COMPREHEN METABOLIC PANEL: CPT

## 2020-11-07 PROCEDURE — 70450 CT HEAD/BRAIN W/O DYE: CPT

## 2020-11-07 RX ORDER — BUTALBITAL, ACETAMINOPHEN, CAFFEINE AND CODEINE PHOSPHATE 50; 325; 40; 30 MG/1; MG/1; MG/1; MG/1
1 CAPSULE ORAL EVERY 6 HOURS PRN
Qty: 10 CAPSULE | Refills: 0 | Status: SHIPPED | OUTPATIENT
Start: 2020-11-07 | End: 2020-11-08

## 2020-11-07 RX ORDER — KETOROLAC TROMETHAMINE 30 MG/ML
15 INJECTION, SOLUTION INTRAMUSCULAR; INTRAVENOUS ONCE
Status: DISCONTINUED | OUTPATIENT
Start: 2020-11-07 | End: 2020-11-08 | Stop reason: HOSPADM

## 2020-11-07 RX ORDER — ONDANSETRON 8 MG/1
8 TABLET, ORALLY DISINTEGRATING ORAL EVERY 8 HOURS PRN
Qty: 12 TABLET | Refills: 0 | Status: SHIPPED | OUTPATIENT
Start: 2020-11-07 | End: 2021-05-03 | Stop reason: ALTCHOICE

## 2020-11-07 RX ORDER — KETOROLAC TROMETHAMINE 30 MG/ML
15 INJECTION, SOLUTION INTRAMUSCULAR; INTRAVENOUS ONCE
Status: COMPLETED | OUTPATIENT
Start: 2020-11-07 | End: 2020-11-07

## 2020-11-07 RX ORDER — SODIUM CHLORIDE 9 MG/ML
1000 INJECTION, SOLUTION INTRAVENOUS CONTINUOUS
Status: DISCONTINUED | OUTPATIENT
Start: 2020-11-07 | End: 2020-11-08 | Stop reason: HOSPADM

## 2020-11-07 RX ORDER — DIPHENHYDRAMINE HYDROCHLORIDE 50 MG/ML
25 INJECTION INTRAMUSCULAR; INTRAVENOUS ONCE
Status: DISCONTINUED | OUTPATIENT
Start: 2020-11-07 | End: 2020-11-08 | Stop reason: HOSPADM

## 2020-11-07 RX ORDER — METOCLOPRAMIDE HYDROCHLORIDE 5 MG/ML
10 INJECTION INTRAMUSCULAR; INTRAVENOUS ONCE
Status: DISCONTINUED | OUTPATIENT
Start: 2020-11-07 | End: 2020-11-08 | Stop reason: HOSPADM

## 2020-11-07 RX ADMIN — KETOROLAC TROMETHAMINE 15 MG: 30 INJECTION, SOLUTION INTRAMUSCULAR at 22:30

## 2020-11-07 RX ADMIN — SODIUM CHLORIDE 1000 ML: 9 INJECTION, SOLUTION INTRAVENOUS at 21:26

## 2020-11-07 ASSESSMENT — PAIN SCALES - GENERAL: PAINLEVEL_OUTOF10: 9

## 2020-11-07 ASSESSMENT — PAIN DESCRIPTION - PAIN TYPE: TYPE: ACUTE PAIN

## 2020-11-07 ASSESSMENT — PAIN DESCRIPTION - LOCATION: LOCATION: HEAD

## 2020-11-07 NOTE — ED NOTES
Bed: 18B-18  Expected date:   Expected time:   Means of arrival:   Comments:  Triage      Pamela Jones RN  11/07/20 7293

## 2020-11-07 NOTE — ED PROVIDER NOTES
HPI:  20,   Time: 6:54 PM EST Sari Primrose is a 30 y.o. female presenting to the ED for vague nausea fatigue back pain general malaise, beginning 3 days ago. The complaint has been persistent, moderate in severity, and worsened by nothing. Patient states today her headache became dramatically worse. She states there is associated nausea with it. Patient states the headache is around her temples and throbbing and severe. ROS:   Pertinent positives and negatives are stated within HPI, all other systems reviewed and are negative.  --------------------------------------------- PAST HISTORY ---------------------------------------------  Past Medical History:  has a past medical history of Closed fracture of distal end of right fibula. Past Surgical History:  has a past surgical history that includes  section. Social History:  reports that she has never smoked. She has never used smokeless tobacco. She reports that she does not drink alcohol or use drugs. Family History: family history is not on file. The patients home medications have been reviewed. Allergies: Patient has no known allergies.     -------------------------------------------------- RESULTS -------------------------------------------------  All laboratory and radiology results have been personally reviewed by myself   LABS:  Results for orders placed or performed during the hospital encounter of 20   CBC Auto Differential   Result Value Ref Range    WBC 10.1 4.5 - 11.5 E9/L    RBC 4.45 3.50 - 5.50 E12/L    Hemoglobin 13.6 11.5 - 15.5 g/dL    Hematocrit 41.0 34.0 - 48.0 %    MCV 92.1 80.0 - 99.9 fL    MCH 30.6 26.0 - 35.0 pg    MCHC 33.2 32.0 - 34.5 %    RDW 13.2 11.5 - 15.0 fL    Platelets 904 695 - 481 E9/L    MPV 10.4 7.0 - 12.0 fL    Neutrophils % 61.8 43.0 - 80.0 %    Immature Granulocytes % 0.3 0.0 - 5.0 %    Lymphocytes % 27.2 20.0 - 42.0 %    Monocytes % 9.7 2.0 - 12.0 %    Eosinophils % 0.5 0.0 - 6.0 %    Basophils % 0.5 0.0 - 2.0 %    Neutrophils Absolute 6.25 1.80 - 7.30 E9/L    Immature Granulocytes # 0.03 E9/L    Lymphocytes Absolute 2.75 1.50 - 4.00 E9/L    Monocytes Absolute 0.98 (H) 0.10 - 0.95 E9/L    Eosinophils Absolute 0.05 0.05 - 0.50 E9/L    Basophils Absolute 0.05 0.00 - 0.20 E9/L   Comprehensive Metabolic Panel w/ Reflex to MG   Result Value Ref Range    Sodium 139 132 - 146 mmol/L    Potassium reflex Magnesium 4.3 3.5 - 5.0 mmol/L    Chloride 103 98 - 107 mmol/L    CO2 25 22 - 29 mmol/L    Anion Gap 11 7 - 16 mmol/L    Glucose 91 74 - 99 mg/dL    BUN 7 6 - 20 mg/dL    CREATININE 0.8 0.5 - 1.0 mg/dL    GFR Non-African American >60 >=60 mL/min/1.73    GFR African American >60     Calcium 9.1 8.6 - 10.2 mg/dL    Total Protein 7.8 6.4 - 8.3 g/dL    Alb 4.1 3.5 - 5.2 g/dL    Total Bilirubin 0.5 0.0 - 1.2 mg/dL    Alkaline Phosphatase 90 35 - 104 U/L    ALT <5 0 - 32 U/L    AST 20 0 - 31 U/L   Lipase   Result Value Ref Range    Lipase 36 13 - 60 U/L   Lactic Acid, Plasma   Result Value Ref Range    Lactic Acid 1.4 0.5 - 2.2 mmol/L   Urinalysis, reflex to microscopic   Result Value Ref Range    Color, UA Yellow Straw/Yellow    Clarity, UA Clear Clear    Glucose, Ur Negative Negative mg/dL    Bilirubin Urine Negative Negative    Ketones, Urine Negative Negative mg/dL    Specific Gravity, UA 1.015 1.005 - 1.030    Blood, Urine TRACE-INTACT Negative    pH, UA 7.5 5.0 - 9.0    Protein, UA Negative Negative mg/dL    Urobilinogen, Urine 1.0 <2.0 E.U./dL    Nitrite, Urine Negative Negative    Leukocyte Esterase, Urine Negative Negative   Microscopic Urinalysis   Result Value Ref Range    Mucus, UA Present (A) None Seen /LPF    WBC, UA 0-1 0 - 5 /HPF    RBC, UA 1-3 0 - 2 /HPF    Bacteria, UA NONE SEEN None Seen /HPF   Pregnancy, urine   Result Value Ref Range    HCG(Urine) Pregnancy Test NEGATIVE NEGATIVE   COVID-19   Result Value Ref Range    SARS-CoV-2, NAAT Not Detected Not Detected regarding the diagnosis and prognosis. Questions are answered at this time and they are agreeable with the plan.      --------------------------------- IMPRESSION AND DISPOSITION ---------------------------------    IMPRESSION  1. Acute nonintractable headache, unspecified headache type    2.  Generalized weakness        DISPOSITION  Disposition: Discharge to home  Patient condition is stable                  Antonina Padron MD  11/08/20 6408

## 2020-11-08 NOTE — ED PROVIDER NOTES
Care of patient from Dr. Jose San pending CT imaging reevaluation with the intent to discharge home if everything is negative. Patient imaging and labs showed the following:    Labs Reviewed   CBC WITH AUTO DIFFERENTIAL - Abnormal; Notable for the following components:       Result Value    Monocytes Absolute 0.98 (*)     All other components within normal limits   MICROSCOPIC URINALYSIS - Abnormal; Notable for the following components:    Mucus, UA Present (*)     All other components within normal limits   COMPREHENSIVE METABOLIC PANEL W/ REFLEX TO MG FOR LOW K   LIPASE   LACTIC ACID, PLASMA   URINALYSIS   PREGNANCY, URINE   COVID-19   COVID-19   COVID-19    Narrative:     COVID-19, DMITRY   POC PREGNANCY UR-QUAL     CT Head WO Contrast   Final Result   No acute intracranial abnormality. Patient's labs unremarkable. CT negative. Patient received Toradol with improvement of her symptoms. Patient swab for Covid. Patient given self-isolation instructions until Covid swab back. She will be discharged home with conservative treatment. Return precautions given. Patient agrees with plan.      Keyla Azul DO  11/07/20 2127

## 2021-04-15 LAB — VARICELLA-ZOSTER VIRUS AB, IGG: NORMAL

## 2021-06-28 ENCOUNTER — APPOINTMENT (OUTPATIENT)
Dept: GENERAL RADIOLOGY | Age: 26
End: 2021-06-28
Payer: COMMERCIAL

## 2021-06-28 ENCOUNTER — HOSPITAL ENCOUNTER (EMERGENCY)
Age: 26
Discharge: HOME | End: 2021-06-28
Payer: COMMERCIAL

## 2021-06-28 ENCOUNTER — APPOINTMENT (OUTPATIENT)
Dept: CT IMAGING | Age: 26
End: 2021-06-28
Payer: COMMERCIAL

## 2021-06-28 VITALS
HEIGHT: 64 IN | WEIGHT: 245 LBS | HEART RATE: 90 BPM | BODY MASS INDEX: 41.83 KG/M2 | TEMPERATURE: 97.7 F | DIASTOLIC BLOOD PRESSURE: 81 MMHG | OXYGEN SATURATION: 97 % | SYSTOLIC BLOOD PRESSURE: 140 MMHG | RESPIRATION RATE: 20 BRPM

## 2021-06-28 DIAGNOSIS — E86.0 DEHYDRATION: ICD-10-CM

## 2021-06-28 DIAGNOSIS — R42 DIZZINESS: Primary | ICD-10-CM

## 2021-06-28 LAB
ALBUMIN SERPL-MCNC: 4 G/DL (ref 3.5–5.2)
ALP BLD-CCNC: 88 U/L (ref 35–104)
ALT SERPL-CCNC: <5 U/L (ref 0–32)
ANION GAP SERPL CALCULATED.3IONS-SCNC: 16 MMOL/L (ref 7–16)
AST SERPL-CCNC: 20 U/L (ref 0–31)
BACTERIA: ABNORMAL /HPF
BASOPHILS ABSOLUTE: 0.04 E9/L (ref 0–0.2)
BASOPHILS RELATIVE PERCENT: 0.3 % (ref 0–2)
BILIRUB SERPL-MCNC: 0.5 MG/DL (ref 0–1.2)
BILIRUBIN URINE: NEGATIVE
BLOOD, URINE: ABNORMAL
BUN BLDV-MCNC: 11 MG/DL (ref 6–20)
CALCIUM SERPL-MCNC: 9.2 MG/DL (ref 8.6–10.2)
CHLORIDE BLD-SCNC: 106 MMOL/L (ref 98–107)
CLARITY: CLEAR
CO2: 18 MMOL/L (ref 22–29)
COLOR: YELLOW
CREAT SERPL-MCNC: 0.7 MG/DL (ref 0.5–1)
EOSINOPHILS ABSOLUTE: 0.06 E9/L (ref 0.05–0.5)
EOSINOPHILS RELATIVE PERCENT: 0.5 % (ref 0–6)
EPITHELIAL CELLS, UA: ABNORMAL /HPF
GFR AFRICAN AMERICAN: >60
GFR NON-AFRICAN AMERICAN: >60 ML/MIN/1.73
GLUCOSE BLD-MCNC: 84 MG/DL (ref 74–99)
GLUCOSE URINE: NEGATIVE MG/DL
HCG, URINE, POC: NEGATIVE
HCT VFR BLD CALC: 41.7 % (ref 34–48)
HEMOGLOBIN: 13.2 G/DL (ref 11.5–15.5)
IMMATURE GRANULOCYTES #: 0.04 E9/L
IMMATURE GRANULOCYTES %: 0.3 % (ref 0–5)
KETONES, URINE: ABNORMAL MG/DL
LEUKOCYTE ESTERASE, URINE: NEGATIVE
LYMPHOCYTES ABSOLUTE: 2.75 E9/L (ref 1.5–4)
LYMPHOCYTES RELATIVE PERCENT: 22.2 % (ref 20–42)
Lab: NORMAL
MAGNESIUM: 2.4 MG/DL (ref 1.6–2.6)
MCH RBC QN AUTO: 29.1 PG (ref 26–35)
MCHC RBC AUTO-ENTMCNC: 31.7 % (ref 32–34.5)
MCV RBC AUTO: 91.9 FL (ref 80–99.9)
MONOCYTES ABSOLUTE: 0.83 E9/L (ref 0.1–0.95)
MONOCYTES RELATIVE PERCENT: 6.7 % (ref 2–12)
NEGATIVE QC PASS/FAIL: NORMAL
NEUTROPHILS ABSOLUTE: 8.67 E9/L (ref 1.8–7.3)
NEUTROPHILS RELATIVE PERCENT: 70 % (ref 43–80)
NITRITE, URINE: NEGATIVE
PDW BLD-RTO: 13.5 FL (ref 11.5–15)
PH UA: 6 (ref 5–9)
PLATELET # BLD: 272 E9/L (ref 130–450)
PMV BLD AUTO: 10.6 FL (ref 7–12)
POSITIVE QC PASS/FAIL: NORMAL
POTASSIUM SERPL-SCNC: 4.2 MMOL/L (ref 3.5–5)
PROTEIN UA: NEGATIVE MG/DL
RBC # BLD: 4.54 E12/L (ref 3.5–5.5)
RBC UA: ABNORMAL /HPF (ref 0–2)
SODIUM BLD-SCNC: 140 MMOL/L (ref 132–146)
SPECIFIC GRAVITY UA: >=1.03 (ref 1–1.03)
TOTAL PROTEIN: 7.9 G/DL (ref 6.4–8.3)
TROPONIN, HIGH SENSITIVITY: <6 NG/L (ref 0–9)
UROBILINOGEN, URINE: 0.2 E.U./DL
WBC # BLD: 12.4 E9/L (ref 4.5–11.5)
WBC UA: ABNORMAL /HPF (ref 0–5)

## 2021-06-28 PROCEDURE — 83735 ASSAY OF MAGNESIUM: CPT

## 2021-06-28 PROCEDURE — 96372 THER/PROPH/DIAG INJ SC/IM: CPT

## 2021-06-28 PROCEDURE — 99285 EMERGENCY DEPT VISIT HI MDM: CPT

## 2021-06-28 PROCEDURE — 80053 COMPREHEN METABOLIC PANEL: CPT

## 2021-06-28 PROCEDURE — 84484 ASSAY OF TROPONIN QUANT: CPT

## 2021-06-28 PROCEDURE — 6370000000 HC RX 637 (ALT 250 FOR IP): Performed by: NURSE PRACTITIONER

## 2021-06-28 PROCEDURE — 70450 CT HEAD/BRAIN W/O DYE: CPT

## 2021-06-28 PROCEDURE — 93005 ELECTROCARDIOGRAM TRACING: CPT | Performed by: NURSE PRACTITIONER

## 2021-06-28 PROCEDURE — 71046 X-RAY EXAM CHEST 2 VIEWS: CPT

## 2021-06-28 PROCEDURE — 85025 COMPLETE CBC W/AUTO DIFF WBC: CPT

## 2021-06-28 PROCEDURE — 81001 URINALYSIS AUTO W/SCOPE: CPT

## 2021-06-28 PROCEDURE — 6360000002 HC RX W HCPCS: Performed by: NURSE PRACTITIONER

## 2021-06-28 RX ORDER — IBUPROFEN 800 MG/1
800 TABLET ORAL EVERY 8 HOURS PRN
Qty: 30 TABLET | Refills: 0 | Status: SHIPPED | OUTPATIENT
Start: 2021-06-28 | End: 2022-08-06 | Stop reason: ALTCHOICE

## 2021-06-28 RX ORDER — ONDANSETRON 4 MG/1
4 TABLET, ORALLY DISINTEGRATING ORAL EVERY 8 HOURS PRN
Qty: 12 TABLET | Refills: 0 | Status: SHIPPED | OUTPATIENT
Start: 2021-06-28 | End: 2021-06-28 | Stop reason: SDUPTHER

## 2021-06-28 RX ORDER — IBUPROFEN 800 MG/1
800 TABLET ORAL ONCE
Status: COMPLETED | OUTPATIENT
Start: 2021-06-28 | End: 2021-06-28

## 2021-06-28 RX ORDER — KETOROLAC TROMETHAMINE 30 MG/ML
60 INJECTION, SOLUTION INTRAMUSCULAR; INTRAVENOUS ONCE
Status: COMPLETED | OUTPATIENT
Start: 2021-06-28 | End: 2021-06-28

## 2021-06-28 RX ORDER — IBUPROFEN 800 MG/1
800 TABLET ORAL EVERY 8 HOURS PRN
Qty: 30 TABLET | Refills: 0 | Status: SHIPPED | OUTPATIENT
Start: 2021-06-28 | End: 2021-06-28 | Stop reason: SDUPTHER

## 2021-06-28 RX ORDER — ONDANSETRON 4 MG/1
4 TABLET, ORALLY DISINTEGRATING ORAL ONCE
Status: COMPLETED | OUTPATIENT
Start: 2021-06-28 | End: 2021-06-28

## 2021-06-28 RX ORDER — ONDANSETRON 4 MG/1
4 TABLET, ORALLY DISINTEGRATING ORAL EVERY 8 HOURS PRN
Qty: 12 TABLET | Refills: 0 | Status: SHIPPED | OUTPATIENT
Start: 2021-06-28 | End: 2022-06-24

## 2021-06-28 RX ORDER — 0.9 % SODIUM CHLORIDE 0.9 %
1000 INTRAVENOUS SOLUTION INTRAVENOUS ONCE
Status: DISCONTINUED | OUTPATIENT
Start: 2021-06-28 | End: 2021-06-28 | Stop reason: HOSPADM

## 2021-06-28 RX ADMIN — ONDANSETRON 4 MG: 4 TABLET, ORALLY DISINTEGRATING ORAL at 19:44

## 2021-06-28 RX ADMIN — IBUPROFEN 800 MG: 800 TABLET, FILM COATED ORAL at 15:42

## 2021-06-28 RX ADMIN — ONDANSETRON 4 MG: 4 TABLET, ORALLY DISINTEGRATING ORAL at 15:42

## 2021-06-28 RX ADMIN — KETOROLAC TROMETHAMINE 60 MG: 30 INJECTION, SOLUTION INTRAMUSCULAR at 19:44

## 2021-06-28 ASSESSMENT — PAIN SCALES - GENERAL
PAINLEVEL_OUTOF10: 8

## 2021-06-28 ASSESSMENT — PAIN DESCRIPTION - LOCATION: LOCATION: HEAD;BACK

## 2021-06-28 ASSESSMENT — PAIN DESCRIPTION - PAIN TYPE: TYPE: ACUTE PAIN

## 2021-06-28 NOTE — ED NOTES
Called pt 3 times to move to room w/no answer. Pt has eloped before treatment complete.      Rakesh Martinez RN  06/28/21 2822

## 2021-06-28 NOTE — ED NOTES
FIRST PROVIDER CONTACT ASSESSMENT NOTE      Department of Emergency Medicine   Admit Date: No admission date for patient encounter. Chief Complaint: Dizziness (w/nausea and a headache.  Started around noon today while working. )      History of Present Illness:    Al Lockett is a 27 y.o. female who presents to the ED for dizziness, nausea, headache, began around noon, hash history of headaches, denies fall or head injury        -----------------END OF FIRST PROVIDER CONTACT ASSESSMENT NOTE--------------  Electronically signed by TJ Callahan CNP   DD: 6/28/21               TJ Botello CNP  06/28/21 1515

## 2021-06-29 LAB
EKG ATRIAL RATE: 66 BPM
EKG P AXIS: 36 DEGREES
EKG P-R INTERVAL: 164 MS
EKG Q-T INTERVAL: 386 MS
EKG QRS DURATION: 90 MS
EKG QTC CALCULATION (BAZETT): 404 MS
EKG R AXIS: 78 DEGREES
EKG T AXIS: 62 DEGREES
EKG VENTRICULAR RATE: 66 BPM

## 2021-06-29 PROCEDURE — 93010 ELECTROCARDIOGRAM REPORT: CPT | Performed by: INTERNAL MEDICINE

## 2021-06-29 NOTE — ED PROVIDER NOTES
[address]  Department of Emergency Medicine   ED  Encounter Note  Admit Date/RoomTime: 2021  6:39 PM  ED Room: [facility]    NAME: Jeremy Ly  : 1995  MRN: [id number]     Chief Complaint:  Dizziness (w/nausea and a headache. Started around noon today while working. )    History of Present Illness        Jeremy Ly is a 27 y.o. old female who presents to the emergency department by private vehicle, for gradual onset dizziness and Lightheaded which began a few hour(s) prior to arrival.  Since recognized her symptoms have been stable. She has associated symptoms of headaches and nausea and denies any confusion, fever, chest pain, palpitations, diplopia, loss of vision, slurred speech, focal weakness, focal sensory loss, vomiting or neck pain. The symptoms are aggravated by standing up. She has a history of no prior episodes. She takes no blood thinning agents. Pertinent positives and negatives are stated within HPI, all other systems reviewed and are negative. Past Medical History:  has a past medical history of Abnormal Papanicolaou smear of cervix with positive human papilloma virus (HPV) test and Closed fracture of distal end of right fibula. Surgical History:  has a past surgical history that includes LEEP (2017) and  section (). Social History:  reports that she has never smoked. She has never used smokeless tobacco. She reports that she does not drink alcohol and does not use drugs. Family History: family history is not on file. Allergies: Patient has no known allergies.     Physical Exam   Oxygen Saturation Interpretation: Normal.        ED Triage Vitals   BP Temp Temp Source Pulse Resp SpO2 Height Weight   21 1514 21 1315 21 1315 21 1315 21 1315 21 1315 21 1514 21 1514   (!) 140/81 97.7 °F (36.5 °C) Temporal 105 18 97 % 5' 4\" (1.626 m) 245 lb (111.1 kg) Constitutional:  Level of Consciousness: Awake and alert. ETOH: No.         Distress: none,  cooperative. Eyes:  PERRL, EOMI, no discharge or conjunctival injection. Ears:  External ears without lesions. Throat:  Pharynx without injection, exudate, or tonsillar hypertrophy. Airway patient. Neck:  Normal ROM. Supple. Respiratory:  Clear to auscultation and breath sounds equal.  CV:  Regular rate and rhythm, normal heart sounds, without pathological murmurs, ectopy, gallops, or rubs. GI:  Abdomen Soft, nontender, good bowel sounds. No firm or pulsatile mass. Back:  No costovertebral tenderness. Integument:  Normal turgor. Warm, dry, without visible rash, unless noted elsewhere. Lymphatic: no lymphadenopathy noted  Neurological:  Oriented. Motor functions intact. 5/5 strength upper/lower extremities bilaterally.     Lab / Imaging Results   (All laboratory and radiology results have been personally reviewed by myself)  Labs:  Results for orders placed or performed during the hospital encounter of 06/28/21   Troponin   Result Value Ref Range    Troponin, High Sensitivity <6 0 - 9 ng/L   CBC Auto Differential   Result Value Ref Range    WBC 12.4 (H) 4.5 - 11.5 E9/L    RBC 4.54 3.50 - 5.50 E12/L    Hemoglobin 13.2 11.5 - 15.5 g/dL    Hematocrit 41.7 34.0 - 48.0 %    MCV 91.9 80.0 - 99.9 fL    MCH 29.1 26.0 - 35.0 pg    MCHC 31.7 (L) 32.0 - 34.5 %    RDW 13.5 11.5 - 15.0 fL    Platelets 664 555 - 483 E9/L    MPV 10.6 7.0 - 12.0 fL    Neutrophils % 70.0 43.0 - 80.0 %    Immature Granulocytes % 0.3 0.0 - 5.0 %    Lymphocytes % 22.2 20.0 - 42.0 %    Monocytes % 6.7 2.0 - 12.0 %    Eosinophils % 0.5 0.0 - 6.0 %    Basophils % 0.3 0.0 - 2.0 %    Neutrophils Absolute 8.67 (H) 1.80 - 7.30 E9/L    Immature Granulocytes # 0.04 E9/L    Lymphocytes Absolute 2.75 1.50 - 4.00 E9/L    Monocytes Absolute 0.83 0.10 - 0.95 E9/L    Eosinophils Absolute 0.06 0.05 - 0.50 E9/L    Basophils Absolute 0.04 0.00 - 0.20 E9/L Comprehensive Metabolic Panel   Result Value Ref Range    Sodium 140 132 - 146 mmol/L    Potassium 4.2 3.5 - 5.0 mmol/L    Chloride 106 98 - 107 mmol/L    CO2 18 (L) 22 - 29 mmol/L    Anion Gap 16 7 - 16 mmol/L    Glucose 84 74 - 99 mg/dL    BUN 11 6 - 20 mg/dL    CREATININE 0.7 0.5 - 1.0 mg/dL    GFR Non-African American >60 >=60 mL/min/1.73    GFR African American >60     Calcium 9.2 8.6 - 10.2 mg/dL    Total Protein 7.9 6.4 - 8.3 g/dL    Albumin 4.0 3.5 - 5.2 g/dL    Total Bilirubin 0.5 0.0 - 1.2 mg/dL    Alkaline Phosphatase 88 35 - 104 U/L    ALT <5 0 - 32 U/L    AST 20 0 - 31 U/L   Magnesium   Result Value Ref Range    Magnesium 2.4 1.6 - 2.6 mg/dL   Urinalysis   Result Value Ref Range    Color, UA Yellow Straw/Yellow    Clarity, UA Clear Clear    Glucose, Ur Negative Negative mg/dL    Bilirubin Urine Negative Negative    Ketones, Urine TRACE (A) Negative mg/dL    Specific Gravity, UA >=1.030 1.005 - 1.030    Blood, Urine TRACE-INTACT Negative    pH, UA 6.0 5.0 - 9.0    Protein, UA Negative Negative mg/dL    Urobilinogen, Urine 0.2 <2.0 E.U./dL    Nitrite, Urine Negative Negative    Leukocyte Esterase, Urine Negative Negative   Microscopic Urinalysis   Result Value Ref Range    WBC, UA NONE 0 - 5 /HPF    RBC, UA NONE 0 - 2 /HPF    Epithelial Cells, UA FEW /HPF    Bacteria, UA RARE (A) None Seen /HPF   POC Pregnancy Urine   Result Value Ref Range    HCG, Urine, POC Negative Negative    Lot Number [id number]     Positive QC Pass/Fail Pass     Negative QC Pass/Fail Pass    EKG 12 Lead   Result Value Ref Range    Ventricular Rate 66 BPM    Atrial Rate 66 BPM    P-R Interval 164 ms    QRS Duration 90 ms    Q-T Interval 386 ms    QTc Calculation (Bazett) 404 ms    P Axis 36 degrees    R Axis 78 degrees    T Axis 62 degrees     Imaging: All Radiology results interpreted by Radiologist unless otherwise noted. CT HEAD WO CONTRAST   Final Result   No acute intracranial abnormality.          XR CHEST (2 VW)   Final Result No acute process. EKG #1:  Interpreted by emergency department physician unless otherwise noted. Time:  1520    Rate: 66 bpm  Rhythm: Sinus rhythm. Interpretation: Normal EKG, normal sinus rhythm. Comparison: There are no  prior tracings. ED Course / Medical Decision Making     Medications   ibuprofen (ADVIL;MOTRIN) tablet 800 mg (800 mg Oral Given 6/28/21 1542)   ondansetron (ZOFRAN-ODT) disintegrating tablet 4 mg (4 mg Oral Given 6/28/21 1542)   ondansetron (ZOFRAN-ODT) disintegrating tablet 4 mg (4 mg Oral Given 6/28/21 1944)   ketorolac (TORADOL) injection 60 mg (60 mg Intramuscular Given 6/28/21 1944)        Re-Evaluations:  6/29/21      Time: 2102 patient reports last headache and nausea. Consultations:             None    Procedures:   none    MDM: Patient is well-appearing, afebrile. Presents with dizziness lightheadedness and headache nausea that began while working earlier today. Labs obtained reviewed slight leukocytosis trace ketonuria otherwise reassuring. No neurological deficits or meningeal signs. CT scan of the head and chest x-ray were obtained negative for any acute findings. Patient was given pain control while in ED. IV fluids were ordered however nursing unable to place IV patient tolerating p.o. fluids therefore she declined further attempts. Plan will be for symptom control and appropriate outpatient follow-up with PCP as needed. Educated on signs and symptoms which require emergent evaluation. Plan of Care/Counseling:  TJ Irwin CNP reviewed today's visit with the patient in addition to providing specific details for the plan of care and counseling regarding the diagnosis and prognosis. Questions are answered at this time and are agreeable with the plan. Assessment      1. Dizziness    2.  Dehydration      This patient's ED course included: a personal history and physicial examination and re-evaluation prior to disposition  This patient has remained hemodynamically stable, improved and remained unchanged during their ED course. Plan   Discharged home. Patient condition is good. New Medications     Discharge Medication List as of 6/28/2021  8:56 PM        Electronically signed by TJ Carrillo CNP   DD: 6/29/21  **This report was transcribed using voice recognition software. Every effort was made to ensure accuracy; however, inadvertent computerized transcription errors may be present.   END OF PROVIDER NOTE      TJ Bartlett CNP  06/29/21 0354

## 2021-08-12 ENCOUNTER — HOSPITAL ENCOUNTER (OUTPATIENT)
Dept: PHYSICAL THERAPY | Age: 26
Setting detail: THERAPIES SERIES
Discharge: HOME | End: 2021-08-12
Payer: COMMERCIAL

## 2021-08-12 PROCEDURE — 97161 PT EVAL LOW COMPLEX 20 MIN: CPT | Performed by: PHYSICAL THERAPIST

## 2021-08-12 ASSESSMENT — PAIN DESCRIPTION - PAIN TYPE: TYPE: CHRONIC PAIN

## 2021-08-12 ASSESSMENT — PAIN DESCRIPTION - LOCATION: LOCATION: BACK

## 2021-08-12 ASSESSMENT — PAIN DESCRIPTION - DESCRIPTORS: DESCRIPTORS: ACHING;CONSTANT

## 2021-08-12 ASSESSMENT — PAIN SCALES - GENERAL: PAINLEVEL_OUTOF10: 10

## 2021-08-12 ASSESSMENT — PAIN - FUNCTIONAL ASSESSMENT: PAIN_FUNCTIONAL_ASSESSMENT: PREVENTS OR INTERFERES WITH MANY ACTIVE NOT PASSIVE ACTIVITIES

## 2021-08-12 ASSESSMENT — PAIN DESCRIPTION - ORIENTATION: ORIENTATION: RIGHT;LEFT

## 2021-08-12 NOTE — PROGRESS NOTES
Physical Therapy  Initial Assessment  Date: 2021  Patient Name: Thelma Cee  MRN: [id number]  : 1995           Subjective   General  Chart Reviewed: Yes  Referring Practitioner: JUNIOR Zimmerman  Referral Date : 21  Diagnosis: LB sp/st  PT Visit Information  Onset Date: 21  PT Insurance Information: [id number] [place], [place] [address]                            CPT codes:  51517, 53758, 95266  Total # of Visits Approved: 6  Total # of Visits to Date: 1  Subjective  Subjective: pt presents to therapy with c/o LBP for several months of insidious onset; no PMH for LB/LE injury/sx per pt; symptoms have increased over last 2 months; MEDS help somewhat; no c/o N/T or buckling B LE's; sleep is hampered due to aching; RTD for follow-up in 4 weeks  Pain Screening  Patient Currently in Pain: Yes  Pain Assessment  Pain Assessment: 0-10  Pain Level: 10  Pain Type: Chronic pain  Pain Location: Back  Pain Orientation: Right;Left  Pain Descriptors: Aching;Constant  Functional Pain Assessment: Prevents or interferes with many active not passive activities  Vital Signs  Patient Currently in Pain: Yes    Objective     Observation/Palpation  Palpation: discomfort noted across B sides LB paraspinals L1-5 into B SI lines  Observation: posture:  level ASIS/PSIS/iliacs; ant pelvic tilt noted    AROM RLE (degrees)  RLE AROM: WNL  AROM LLE (degrees)  LLE AROM : WNL  AROM RUE (degrees)  RUE AROM : WNL  AROM LUE (degrees)  LUE AROM : WNL  Spine  Lumbar: limited ~ 25% WNL for all ranges with no c/o radiculopathy noted  Special Tests: hyperflex/rot  +/+;  slump  -    Strength Other  Other: grossly 5/5 for all ranges B LE's     Additional Measures  Other: endurance for all prolonged activities is FAIR+/GOOD-  Sensation  Overall Sensation Status: WNL     Ambulation  Ambulation?: Yes  Ambulation 1  Surface: level tile  Device: No Device  Assistance: Independent  Gait Deviations: None  Balance  Comments: static/dynamic balance is GOOD/GOOD+ Assessment   Conditions Requiring Skilled Therapeutic Intervention  Body structures, Functions, Activity limitations: Decreased ROM; Decreased endurance  Assessment: pain noted across B sides LB with all prolonged activities, 10/10  Prognosis: Fair;Good  Decision Making: Low Complexity  Activity Tolerance  Activity Tolerance: Patient Tolerated treatment well       Plan   Plan  Times per week: pt to be seen 2x/week/3 weeks  Current Treatment Recommendations: ROM, Strengthening, Endurance Training, Modalities, Home Exercise Program    Goals  Time Frame for goals: 3 weeks  goal 1: decrease pain across B sides LB with all prolonged activities, 0-4/10  goal 2: restore LB AROM to WNL for all ranges  goal 3: improve endurance for all prolonged activities to GOOD/GOOD+  goal 4: assure I with [facility] for home management of condition    Patient goals : to get rid of the pain across her back          Zuly Estrada, PT

## 2021-08-12 NOTE — PROGRESS NOTES
[address] [facility]                Phone: [phone]   Fax: [phone]    Physical Therapy Daily Treatment Note  Date:  2021    Patient Name:  Alison Otero    :  1995  MRN: [id number]    Evaluating therapist:  ROMEO Jacobsen                  (21)  Restrictions/Precautions:    Diagnosis:  LB sp/st  Treatment Diagnosis:    Insurance/Certification information:  [id number] [facility],[address]  Referring Physician:  Annetta Kamara of care signed (Y/N):    Visit# / total visits:    Pain level: 10/10   Time In:  Time Out:    Subjective:      Exercises:  Exercise/Equipment Resistance/Repetitions Other comments   StepOne              tball flex/rot             rot st     SKTC     piriformis st            pelvic tilts               bridges              standing ext st                                                        Other Therapeutic Activities:      Home Exercise Program:  provided 21    Manual Treatments:      Modalities:  IFC/MH to LB       Timed Code Treatment Minutes: Total Treatment Minutes:      Treatment/Activity Tolerance:  [] Patient tolerated treatment well [] Patient limited by fatique  [] Patient limited by pain  [] Patient limited by other medical complications  [] Other:     Prognosis: [] Good [] Fair  [] Poor    Patient Requires Follow-up: [] Yes  [] No    Plan:   [] Continue per plan of care [] Alter current plan (see comments)  [] Plan of care initiated [] Hold pending MD visit [] Discharge  Plan for Next Session:      See Weekly Progress Note: []  Yes  []  No  Next due:        Electronically signed by:   Myriam Buchanan PT

## 2021-08-17 ENCOUNTER — HOSPITAL ENCOUNTER (OUTPATIENT)
Dept: PHYSICAL THERAPY | Age: 26
Setting detail: THERAPIES SERIES
Discharge: HOME | End: 2021-08-17
Payer: COMMERCIAL

## 2021-08-17 PROCEDURE — 97110 THERAPEUTIC EXERCISES: CPT

## 2021-08-17 PROCEDURE — G0283 ELEC STIM OTHER THAN WOUND: HCPCS

## 2021-08-17 NOTE — PROGRESS NOTES
[address] [facility]                Phone: [phone]   Fax: [phone]    Physical Therapy Daily Treatment Note  Date:  2021    Patient Name:  Andriy Sanders    :  1995  MRN: [id number]    Evaluating therapist:  ROMEO Dixon                  (21)  Restrictions/Precautions:    Diagnosis:  LB sp/st  Treatment Diagnosis:    Insurance/Certification information:  [id number] [facility],[address]  Referring Physician:  Annetta Singer of care signed (Y/N):    Visit# / total visits:   2/6  Pain level: 8/10     Time In:     8549  Time Out:  1008    Subjective:  Patient presents for first treatment session following initial evaluation. She reports constant pain in LB as 8/10 prior to session this morning. Exercises:  Exercise/Equipment Resistance/Repetitions Other comments   StepOne   8 min L2           tball flex/rot  5x 10s ea stopped after 2 sets due to c/o increased pain          LTR st   L/R 2 x20s ea increased pain   SKTC    L/R NT    piriformis st   L/R 4 x20s ea increased pain          pelvic tilts NT              bridges  NT            Prone props 3 x10           supine HF stretch   4 x20s ea w/ manual PTA assist        standing ext st 3 x10                    Objective:  Ther. exercise/activity as listed per flow sheet above. Treatment completed with MH/IFC to LB x 15 minutes    Assessment:  Tball flexion/rotation discontinued after 2 sets as patient had c/o of increasing pain in LB. Patient also reported increased pain with Piriformis and LTR stretches. She reports standing trunk extension were more uncomfortable than prone props. Home Exercise Program:  provided 21   hold on previous HEP begin prone props 3-4x10 every 2-3 hours 21  Postural awareness/positioning reviewed with patient 21    Manual Treatments:      Modalities:  IFC/MH to LB  x 15 minutes    Timed Code Treatment Minutes:       Total Treatment Minutes:      Treatment/Activity Tolerance:  [] Patient tolerated treatment well [] Patient limited by fatigue  [x] Patient limited by pain  [] Patient limited by other medical complications  [] Other:     Prognosis: [] Good [] Fair  [] Poor    Patient Requires Follow-up: [x] Yes  [] No    Plan:   [x] Continue per plan of care [] Alter current plan (see comments)  [] Plan of care initiated [] Hold pending MD visit [] Discharge    Plan for Next Session:  Progressions per patient tolerance.       See Weekly Progress Note: []  Yes  []  No  Next due:        CPT codes 6/2/2020 Units    Low Complexity PT evaluation 79265 91829     Moderate Complexity PT evaluation  53564     High Complexity PT evaluation 36144     PT Re-evaluation  02908     Gait training 60125     Electrical Stimulation 80018 1   Manual therapy  09478     Therapeutic activities  37053     Therapeutic exercises  98936 3    Neuromuscular reeducation  01747             Electronically signed by:  Jeanmarie Richards, GREGORY [id number]

## 2021-08-20 ENCOUNTER — HOSPITAL ENCOUNTER (OUTPATIENT)
Dept: PHYSICAL THERAPY | Age: 26
Setting detail: THERAPIES SERIES
Discharge: HOME | End: 2021-08-20
Payer: COMMERCIAL

## 2021-08-20 PROCEDURE — 97110 THERAPEUTIC EXERCISES: CPT

## 2021-08-20 NOTE — PROGRESS NOTES
[address] [facility]                Phone: [phone]   Fax: [phone]    Physical Therapy Daily Treatment Note  Date:  2021    Patient Name:  Aye Redman    :  1995  MRN: [id number]    Evaluating therapist:  ROMEO Carreon                  (21)  Restrictions/Precautions:    Diagnosis:  LB sp/st  Treatment Diagnosis:    Insurance/Certification information:  [id number] [facility],[address]  Referring Physician:  Annetta Brunner of care signed (Y/N):    Visit# / total visits:   3/6  Pain level: 8/10     Time In:     1050  Time Out:  1135    Subjective:  Patient reports that her pain in low back is \"the same\" since performing hip flexor stretching at home. She reports doing stretches 3x daily since last visit. Pain is located in low back and bilateral buttock today with R worse than L. Exercises:   Exercise/Equipment Resistance/Repetitions Other comments   NT StepOne   8 min L2            NT tball flex/rot  5x 10s ea stopped after 2 sets due to c/o increased pain           NT LTR st   L/R 2 x20s ea increased pain   NT SKTC    L/R NT    NT piriformis st   L/R 4 x20s ea increased pain           NT pelvic tilts NT    NT           bridges  NT              Prone press-ups  3 x10          -flat 3x10   I, NW          - flat with exhale at top  2x10  I, NW           - on wedge with exhale  3x10  I, W (midline)  3x10  I, B Pt reports possible slight decrease in lateral low back pain with pain slightly more midline                NT supine HF stretch   4 x20s ea w/ manual PTA assist         NT standing ext st 3 x10                      Objective:  Ther. exercise/activity as listed per flow sheet above. HEP changed to prone press-ups on elevated surface (wedge) for 3x10 every 2-3 hours. Pt verbalized understanding and to discontinue if any symptoms begin to peripheralize.       Assessment:    Slight centralization of low back pain by end of session     Home Exercise Program:  provided 8/12/21   hold on previous HEP begin prone props 3-4x10 every 2-3 hours 8/17/21  Postural awareness/positioning reviewed with patient 8/17/21    Manual Treatments:      Modalities:  IFC/MH to LB  x 15 minutes- NT     Timed Code Treatment Minutes:    45 min   Total Treatment Minutes:    45 min   Treatment/Activity Tolerance:  [] Patient tolerated treatment well [] Patient limited by fatigue  [x] Patient limited by pain  [] Patient limited by other medical complications  [] Other:     Prognosis: [] Good [x] Fair  [] Poor    Patient Requires Follow-up: [x] Yes  [] No    Plan:   [x] Continue per plan of care [] Alter current plan (see comments)  [] Plan of care initiated [] Hold pending MD visit [] Discharge    Plan for Next Session:  Progressions per patient tolerance.       See Weekly Progress Note: []  Yes  []  No  Next due:        CPT codes 6/2/2020 Units    Low Complexity PT evaluation 78688 94895     Moderate Complexity PT evaluation  52636     High Complexity PT evaluation 45556     PT Re-evaluation  47799     Gait training 77674     Electrical Stimulation 57864    Manual therapy  56477     Therapeutic activities  82943     Therapeutic exercises  90465 3    Neuromuscular reeducation  39813             Electronically signed by:    Emilee Cantrell, PT, DPT  [id number]

## 2021-08-24 ENCOUNTER — HOSPITAL ENCOUNTER (OUTPATIENT)
Dept: PHYSICAL THERAPY | Age: 26
Setting detail: THERAPIES SERIES
Discharge: HOME | End: 2021-08-24
Payer: COMMERCIAL

## 2021-08-24 PROCEDURE — G0283 ELEC STIM OTHER THAN WOUND: HCPCS

## 2021-08-24 PROCEDURE — 97110 THERAPEUTIC EXERCISES: CPT

## 2021-08-24 NOTE — PROGRESS NOTES
[address] [facility]                Phone: [phone]   Fax: [phone]    Physical Therapy Daily Treatment Note  Date:  2021    Patient Name:  Lilian Martins    :  1995  MRN: [id number]      Evaluating therapist:  ROMEO Borrego                  (21)  Restrictions/Precautions:    Diagnosis:  LB sp/st  Treatment Diagnosis:    Insurance/Certification information:  [id number] [facility],[address]  Referring Physician:  Annetta Allen of care signed (Y/N):    Visit# / total visits:      Pain level:  8/10     Time In:       2538  Time Out:    1030     Subjective:   Patient presents for first of two scheduled treatment sessions this week. She reports pain in LB as 8/10 prior to session. Exercises:   Exercise/Equipment Resistance/Repetitions Other comments    StepOne   8 min L2             pelvic tilts 2 x10               bridges  2 x10              Prone press-ups  3 x10           Step ups fwd  L/R 2 x15 6\"           Total gym squats BL 2 x15 L7          NT supine HF stretch   w/ manual PTA assist        NT standing ext st                      Objective:  Ther. exercise/activity as listed per flow sheet above. Treatment completed with MH/IFC x 15 minutes. Assessment:  Patient performs activities well with good effort and pacing. No significant increase in pain noted with exercises of session. Home Exercise Program:  provided 21   hold on previous HEP begin prone props 3-4x10 every 2-3 hours 21  Postural awareness/positioning reviewed with patient 21    Manual Treatments:      Modalities:  IFC/MH to LB  x 15 minutes- NT     Timed Code Treatment Minutes:          Total Treatment Minutes:        Treatment/Activity Tolerance:  [x] Patient tolerated treatment well [] Patient limited by fatigue  [] Patient limited by pain  [] Patient limited by other medical complications  [] Other:     Prognosis: [] Good [x] Fair  [] Poor    Patient Requires Follow-up: [x] Yes  [] No    Plan:   [x] Continue per plan of care [] Alter current plan (see comments)  [] Plan of care initiated [] Hold pending MD visit [] Discharge    Plan for Next Session:  Progressions per patient tolerance.       See Weekly Progress Note: []  Yes  []  No  Next due:        CPT codes 6/2/2020 Units    Low Complexity PT evaluation 92673 97988     Moderate Complexity PT evaluation  19913     High Complexity PT evaluation 25933     PT Re-evaluation  59146     Gait training 62591     Electrical Stimulation 90783 1   Manual therapy  18184     Therapeutic activities  49365     Therapeutic exercises  84420 2   Neuromuscular reeducation  98560             Electronically signed by:  Jeanmarie Lorenzo, PTA [id number]

## 2021-08-27 ENCOUNTER — HOSPITAL ENCOUNTER (OUTPATIENT)
Dept: PHYSICAL THERAPY | Age: 26
Setting detail: THERAPIES SERIES
Discharge: HOME | End: 2021-08-27
Payer: COMMERCIAL

## 2021-08-27 NOTE — PROGRESS NOTES
[address] [facility]                Phone: [phone]  Fax: [phone]    Physical Therapy  Cancellation/No-show Note  Patient Name:  Sloane Sánchez  :  1995   Date:  2021    For today's appointment patient:  [x]  Cancelled  []  Rescheduled appointment  []  No-show     Reason given by patient:  []  Patient ill  []  Conflicting appointment  []  No transportation    []  Conflict with work  [x]  No reason given  []  Other:     Comments:      Electronically signed by:   Thelma Hogan, PT

## 2021-08-31 ENCOUNTER — HOSPITAL ENCOUNTER (OUTPATIENT)
Dept: PHYSICAL THERAPY | Age: 26
Setting detail: THERAPIES SERIES
Discharge: HOME | End: 2021-08-31
Payer: COMMERCIAL

## 2021-08-31 PROCEDURE — G0283 ELEC STIM OTHER THAN WOUND: HCPCS

## 2021-08-31 PROCEDURE — 97110 THERAPEUTIC EXERCISES: CPT

## 2021-08-31 NOTE — PROGRESS NOTES
[address] [facility]                Phone: [phone]   Fax: [phone]    Physical Therapy Daily Treatment Note  Date:  2021    Patient Name:  Tarun Sosa    :  1995  MRN: [id number]      Evaluating therapist:  ROMEO Fenton                  (21)  Restrictions/Precautions:    Diagnosis:  LB sp/st  Treatment Diagnosis:    Insurance/Certification information:  [id number] [facility],[address]  Referring Physician:  Annetta Jung of care signed (Y/N):    Visit# / total visits:     6  Pain level: 8/10     Time In:       1102  Time Out:    1135      Subjective:   Patient presents for first of two scheduled treatment sessions this week. She reports pain in LB as  8/10 prior to session. She reports her LBP is the same with no improvement at this time. Patient reports she has another appt this morning and will have to leave her session early. Exercises:  Exercise/Equipment Resistance/Repetitions Comments   StepOne   8 min L2           pelvic tilts 2 x10              bridges  2 x10            Prone press-ups  3 x10         Step ups fwd  L/R        Total gym squats BL         supine HF stretch   w/ manual PTA assist       standing ext st                    Objective:  Ther. exercise/activity as listed per flow sheet above. Treatment completed with /IFC x 8 minutes. LB AROM limited less than 20% for all ranges. Assessment:  Patient performs activities well with good effort and pacing. No significant increase in pain noted with exercises of session. Endurance for prolonged activities not improved at this time. Mild improvement in Lumbar AROM. Patient is independent with HEP.         Goals:    3 weeks  goal 1: decrease pain across B sides LB with all prolonged activities, 0-4/10  goal 2: restore LB AROM to WNL for all ranges  goal 3: improve endurance for all prolonged activities to GOOD/GOOD+  goal 4: assure I with HEP for home management of

## 2021-09-09 ENCOUNTER — HOSPITAL ENCOUNTER (OUTPATIENT)
Dept: PHYSICAL THERAPY | Age: 26
Setting detail: THERAPIES SERIES
Discharge: HOME | End: 2021-09-09
Payer: COMMERCIAL

## 2021-09-09 PROCEDURE — 97110 THERAPEUTIC EXERCISES: CPT

## 2021-09-09 PROCEDURE — G0283 ELEC STIM OTHER THAN WOUND: HCPCS

## 2021-09-09 NOTE — PROGRESS NOTES
[address] [facility]                Phone: [phone]   Fax: [phone]    Physical Therapy Daily Treatment Note  Date:  2021    Patient Name:  Sari Primrose    :  1995  MRN: [id number]      Evaluating therapist:  ROMEO Angeles                  (21)  Restrictions/Precautions:    Diagnosis:  LB sp/st  Treatment Diagnosis:    Insurance/Certification information:  [id number] [facility],[address]  Referring Physician:  Annetta Cervantes of care signed (Y/N):    Visit# / total visits:       Pain level: 7-8/10     Time In:       1307  Time Out:     1400     Subjective:   Patient presents for final scheduled treatment session. She reports pain in LB as   7-8/10 prior to session. She reports she worked the past two days (12 hr shifts) and she is more painful in her LB. Exercises:  Exercise/Equipment Resistance/Repetitions Comments   StepOne   8 min L2           pelvic tilts 2 x10              bridges  2 x10            Prone press-ups  3 x10         Step ups fwd  L/R 2 x15 6\"         Total gym squats BL 2 x15 L7         supine HF stretch   nt         standing ext st 3 x10                    Objective:  Ther. exercise/activity as listed per flow sheet above. Treatment completed with MH/IFC x 15 minutes. Assessment:  Patient performs activities well with good effort and pacing. Goals:    3 weeks  goal 1: decrease pain across B sides LB with all prolonged activities, 0-4/10  goal 2: restore LB AROM to WNL for all ranges  goal 3: improve endurance for all prolonged activities to GOOD/GOOD+  goal 4: assure I with HEP for home management of condition    Home Exercise Program:  provided 21   hold on previous HEP begin prone props 3-4x10 every 2-3 hours 21  Postural awareness/positioning reviewed with patient 21    Manual Treatments:      Modalities:  IFC/MH to LB  x 15 minutes     Timed Code Treatment Minutes:          Total Treatment Minutes: Treatment/Activity Tolerance:  [x] Patient tolerated treatment well [] Patient limited by fatigue  [] Patient limited by pain  [] Patient limited by other medical complications  [] Other:     Prognosis: [] Good [x] Fair  [] Poor    Patient Requires Follow-up: [x] Yes  [x] No    Plan:   [] Continue per plan of care [] Alter current plan (see comments)  [] Plan of care initiated [] Hold pending MD visit [] Discharge    Plan for Next Session:        See Weekly Progress Note: []  Yes  []  No  Next due:        CPT codes 6/2/2020 Units    Low Complexity PT evaluation 81850 90077     Moderate Complexity PT evaluation  33277     High Complexity PT evaluation 57327     PT Re-evaluation  52221     Gait training 68515     Electrical Stimulation 64514 1   Manual therapy  86661     Therapeutic activities  72248     Therapeutic exercises  20970 2   Neuromuscular reeducation  74704             Electronically signed by:  Tramaine Rinaldi [place], [facility] [id number]

## 2021-09-21 NOTE — PROGRESS NOTES
[address] [facility]                Phone: [phone]   Fax: [phone]      Physical Therapy  Treatment  Summary       Date:  2021    Patient Name:  Tani Bull    :  1995  MRN: [id number]    DIAGNOSIS:  LB sp/st  REFERRING PHYSICIAN:  Nathan Garcia  PHYSICAL THERAPIST:  ROMEO Evangelista            ATTENDANCE:  Patient has attended 6 of 6 scheduled treatments from 21  to 21. TREATMENTS RECEIVED:  Therapeutic exercise, stretching, strengthening, functional activities, postural awareness/positioning training, HEP, modalities. INITIAL STATUS:  · c/o pain noted across B sides LB with all prolonged activities, 1010  · Palpation: discomfort noted across B sides LB paraspinals L1-5 into B SI lines  · Observation: posture:  level ASIS/PSIS/iliacs; ant pelvic tilt noted  · Strength grossly 5/5 for all ranges B LE's  · Endurance for all prolonged activities is FAIR+/GOOD-  · Static/dynamic balance is GOOD/GOOD+      FINAL STATUS:  · c/o pain noted across B sides LB with all prolonged activities, 7-8/10  · Endurance for all prolonged activities is  GOOD-  · Static/dynamic balance is  GOOD+  · Independent with HEP    GOALS:  1 out of 4 Long Term Goals were obtained. LONG TERM GOALS OBTAINED/NOT OBTAINED:   goal 1: decrease pain across B sides LB with all prolonged activities, 0-4/10  NOT ACHIEVED  goal 2: restore LB AROM to WNL for all ranges  PROGRESSED  goal 3: improve endurance for all prolonged activities to GOOD/GOOD+  NOT ACHIEVED  goal 4: assure I with HEP for home management of condition  ACHIEVED      PATIENT EDUCATION/INSTRUCTIONS:   Patient instructed on and provided copy of HEP.           Electronically Signed by: Jeanmarie Marshall, PTA [id number]  2021

## 2022-01-25 ENCOUNTER — TELEPHONE (OUTPATIENT)
Dept: BARIATRICS/WEIGHT MGMT | Age: 27
End: 2022-01-25

## 2022-03-23 ENCOUNTER — TELEPHONE (OUTPATIENT)
Dept: BARIATRICS/WEIGHT MGMT | Age: 27
End: 2022-03-23

## 2022-03-23 ENCOUNTER — INITIAL CONSULT (OUTPATIENT)
Dept: BARIATRICS/WEIGHT MGMT | Age: 27
End: 2022-03-23
Payer: COMMERCIAL

## 2022-03-23 VITALS
RESPIRATION RATE: 20 BRPM | BODY MASS INDEX: 43.19 KG/M2 | WEIGHT: 253 LBS | SYSTOLIC BLOOD PRESSURE: 156 MMHG | HEIGHT: 64 IN | HEART RATE: 71 BPM | DIASTOLIC BLOOD PRESSURE: 85 MMHG | TEMPERATURE: 97.2 F

## 2022-03-23 DIAGNOSIS — K21.9 GASTROESOPHAGEAL REFLUX DISEASE WITHOUT ESOPHAGITIS: ICD-10-CM

## 2022-03-23 DIAGNOSIS — K30 INDIGESTION: Primary | ICD-10-CM

## 2022-03-23 DIAGNOSIS — E66.01 MORBID OBESITY DUE TO EXCESS CALORIES (HCC): ICD-10-CM

## 2022-03-23 PROCEDURE — G8417 CALC BMI ABV UP PARAM F/U: HCPCS | Performed by: SURGERY

## 2022-03-23 PROCEDURE — G8484 FLU IMMUNIZE NO ADMIN: HCPCS | Performed by: SURGERY

## 2022-03-23 PROCEDURE — G8427 DOCREV CUR MEDS BY ELIG CLIN: HCPCS | Performed by: SURGERY

## 2022-03-23 PROCEDURE — 99204 OFFICE O/P NEW MOD 45 MIN: CPT | Performed by: SURGERY

## 2022-03-23 PROCEDURE — 1036F TOBACCO NON-USER: CPT | Performed by: SURGERY

## 2022-03-23 PROCEDURE — 99202 OFFICE O/P NEW SF 15 MIN: CPT

## 2022-03-23 NOTE — PROGRESS NOTES
Lisa Pearson  3/23/2022  [facility]    Initial Evaluation  Bariatric Surgery and EGD       Subjective:  CHIEF COMPLAINT: Morbid obesity, malnutrition, GERD    HISTORY OF PRESENT ILLNESS: Lisa Pearson is a morbidly-obese 27 y.o.  female, who weighs 253 lb (114.8 kg). She is 102 pounds over her ideal body weight. The Body mass index is 43.43 kg/m². She has multiple medical problems aggravated by her obesity. She wishes to have bariatric surgery so that she can lose a large amount of weight and keep the weight off. I have met with her in the [facility] where we discussed the surgery in great detail and went over the risks and benefits. She has watched our informational video so she understands all of the extensive risks involved. She states that she understands all of the risks and wishes to proceed with the evaluation. Past Medical History  Patient Active Problem List   Diagnosis    Labor and delivery, indication for care    Normal pregnancy, antepartum    Closed fracture of distal end of right fibula     Past Surgical History  Past Surgical History:   Procedure Laterality Date     SECTION      LEE  2017      Allergies: Patient has no known allergies. No family history on file. Home Medications  Current Outpatient Medications   Medication Sig Dispense Refill    GABAPENTIN PO Take by mouth      tiZANidine HCl (ZANAFLEX PO) Take by mouth      NAPROXEN DR PO Take by mouth      ibuprofen (IBU) 800 MG tablet Take 1 tablet by mouth every 8 hours as needed for Pain Take with food. 30 tablet 0    ondansetron (ZOFRAN ODT) 4 MG disintegrating tablet Place 1 tablet under the tongue every 8 hours as needed for Nausea 12 tablet 0     No current facility-administered medications for this visit. Social History:   TOBACCO:   reports that she has never smoked.  She has never used smokeless tobacco.  All smokers must join the free smoking cessation program and stop smoking for 3 months before having any Bariatric surgery. ETOH:    reports no history of alcohol use. The patient has a family history that is negative for severe cardiovascular or respiratory issues, negative for reaction to anesthesia.       Review of Systems    Review of Systems - General ROS: negative for - chills, fatigue or malaise  ENT ROS: negative for - hearing change, nasal congestion or nasal discharge  Allergy and Immunology ROS: negative for - hives, itchy/watery eyes or nasal congestion  Hematological and Lymphatic ROS: negative for - blood clots, blood transfusions, bruising or fatigue  Endocrine ROS: negative for - malaise/lethargy, mood swings, palpitations or polydipsia/polyuria  Breast ROS: negative for - new or changing breast lumps or nipple changes  Respiratory ROS: negative for - sputum changes, stridor, tachypnea or wheezing  Cardiovascular ROS: negative for - irregular heartbeat, loss of consciousness, murmur or orthopnea  Gastrointestinal ROS: negative for - constipation, diarrhea, gas/bloating, heartburn or hematemesis  Genito-Urinary ROS: negative for - genital discharge, genital ulcers or hematuria  Musculoskeletal ROS: negative for - gait disturbance, muscle pain or muscular weakness}    Physical Exam:   VITALS: BP (!) 156/85 (Site: Left Lower Arm, Position: Sitting, Cuff Size: Medium Adult)   Pulse 71   Temp 97.2 °F (36.2 °C) (Temporal)   Resp 20   Ht 5' 4\" (1.626 m)   Wt 253 lb (114.8 kg)   BMI 43.43 kg/m²   General appearance: alert, appears stated age and cooperative  Head: Normocephalic, without obvious abnormality, atraumatic  Neck: no adenopathy, no carotid bruit, no JVD, supple, symmetrical, trachea midline and thyroid not enlarged, symmetric, no tenderness/mass/nodules  Lungs: clear to auscultation bilaterally  Heart: regular rate and rhythm  Abdomen: soft, non-tender; bowel sounds normal; no masses,  no organomegaly  Extremities: extremities normal, atraumatic, no cyanosis or edema  : no CVA tenderness    Assessment:  Morbid obesity with inability to keep the weight off with diet and exercise. She is interested in Laparoscopic Erum-en- Y Gastric Bypass or Sleeve Gastrectomy. We discussed that our goal is to ameliorate the medical problems and not to obtain a specific body mass index. She understands the risks and benefits, and wishes to proceed with the evaluation. Plan:  Psych evaluation, medical and cardio clearance, gallbladder ultrasound. These patients often have vitamin deficiencies so I will do screening labs for malnutrition. I will do an upper endoscopy to check for hiatal hernias, ulcers and H. Pylori while we wait for insurance approval for the surgery.     Physician Signature: Electronically signed by Dr. Lexy Cruz MD    Send copy of H&P to PCP, Carlos Eduardo Sarah DO

## 2022-03-23 NOTE — TELEPHONE ENCOUNTER
Prior Authorization Form  DEMOGRAPHICS:    Patient Name:  Yumiko Cotton  Patient :  1995            Insurance:  Payor: Julian Kyle / Plan: Juan Axon / Product Type: *No Product type* /   Insurance ID Number:    Payor/Plan Subscr  Sex Relation Sub.  Ins. ID Effective Group Num   1. MALU - * SHANE MARTINEZ* 1995 Female Self [id number] [address] [facility] [address]         DIAGNOSIS & PROCEDURE:    Procedure/Operation: egd           CPT Code: 95240    Diagnosis:  gerd    ICD10 Code: k21.9    Location:  [facility]    Surgeon:  Tayla Van INFORMATION:    Date: 22    Time:               Anesthesia:  MAC/TIVA                                                       Status:  Outpatient        Special Comments:         Electronically signed by Nieves Morel MA on 3/23/2022 at 2:12 PM

## 2022-03-23 NOTE — PATIENT INSTRUCTIONS
What is the next step to proceed with weight loss surgery? Please be aware that any co-pays or deductibles may be requested prior to testing and / or procedures. You will need to schedule a psychological evaluation for weight loss surgery. Patients will be required to complete all psychological testing as required by the mental health provider. Patients must also follow all of the provider's recommendations before weight loss surgery can be scheduled. The evaluation must be done a standard way for weight loss surgery. We strongly recommend that you contact one of our preferred providers listed below to arrange this:      Winston Dunn, [facility]  [address] [place], [place]   [phone]    [facility] and [address]   [phone]    Dr. Sybil Tubbs, PhD    [place] [place], [place]    [phone]      You will also need to plan on attending a 2 hour nutrition class at [facility] prior to your surgery. We will schedule this for you when we schedule your surgery. Please remember to have your labs drawn 10 days prior to your first scheduled dietary appointment. Please remember, that while we will submit your case to insurance for surgery authorization, it is your responsibility to know if your plan covers weight loss surgery and keep up-to-date with changes to your insurance coverage. We will do everything possible to help you get approved for weight loss surgery, but cannot guarantee an approval.     Please note that you will not be submitted to your insurance company until all pre-operative testing requirements are met.

## 2022-03-24 ENCOUNTER — TELEPHONE (OUTPATIENT)
Dept: BARIATRICS/WEIGHT MGMT | Age: 27
End: 2022-03-24

## 2022-03-25 ENCOUNTER — PREP FOR PROCEDURE (OUTPATIENT)
Dept: SURGERY | Age: 27
End: 2022-03-25

## 2022-03-25 RX ORDER — SODIUM CHLORIDE, SODIUM LACTATE, POTASSIUM CHLORIDE, CALCIUM CHLORIDE 600; 310; 30; 20 MG/100ML; MG/100ML; MG/100ML; MG/100ML
INJECTION, SOLUTION INTRAVENOUS CONTINUOUS
Status: CANCELLED | OUTPATIENT
Start: 2022-03-25

## 2022-04-22 ENCOUNTER — INITIAL CONSULT (OUTPATIENT)
Dept: BARIATRICS/WEIGHT MGMT | Age: 27
End: 2022-04-22

## 2022-04-22 VITALS — BODY MASS INDEX: 42.85 KG/M2 | HEIGHT: 64 IN | WEIGHT: 251 LBS

## 2022-04-22 DIAGNOSIS — Z71.3 DIETARY COUNSELING: Primary | ICD-10-CM

## 2022-04-22 DIAGNOSIS — E66.01 MORBID OBESITY DUE TO EXCESS CALORIES (HCC): ICD-10-CM

## 2022-04-22 PROCEDURE — 99999 PR OFFICE/OUTPT VISIT,PROCEDURE ONLY: CPT

## 2022-04-22 NOTE — PATIENT INSTRUCTIONS
[facility]  Dietary Initial Appointment Patient Instructions    By: Melva Castro RD / JASMYN  [phone]      At your initial dietary appointment you have received the following information packets:    Please be aware at each visit you have been instructed that in order for your insurance company to approve your surgery you must show a consistent weight loss of 2 lbs or greater at each visit. We can not guarantee an approval by your insurance company we can only provide the information given to us it is up to you the patient to show compliancy to your insurance company. If you do gain weight during your supervised weight loss counseling sessions insurance companies starting in 2018 are denying patients for not showing consistent weight loss results when part of a supervised weight loss counseling program. Pt was instructed on 4/22/22. Pt verbalized understanding. · Low-Fat Diet  - Please be sure to begin your low-fat diet from today's date until your scheduled surgery date. If you are not at goal weight by your history and physical appointment with your surgeon your surgery will be cancelled. · Goal Weight: 243 lbs (BMI of 41.7)  · Low-Fat Diet Contract - Patient Acknowledge and Signed  · Supplement List - Please be sure to be saving to purchase your 3 month supply of supplements. If you do not bring supplements to your history and physical appointment your surgery will be cancelled. · Supplement Contract - Patient Acknowledge and Signed  · Please be sure to take a daily Multi-vitamin from now until surgery to reduce your incidence of infection. · If your insurance company requires additional dietary counseling you will be scheduled to see the dietitian the following month. ·  If your psychological evaluation is completed and all your dietary requirements for your individual insurance company have been completed you will be submitted to insurance. Please make sure to check with us to see if we have received your psychological evaluation. Please be aware that any co-pays or deductibles may be requested prior to testing and / or procedures. You will need to schedule a psychological evaluation for weight loss surgery. Patients will be required to complete all psychological testing as required by the psychologist. Patients must follow all of the psychologist's recommendations before weight loss surgery can be scheduled. The evaluation must be done a standard way for weight loss surgery. We strongly recommend that you contact one of our preferred providers listed below to arrange this:    Marcus Larose, [address] [facility]                                                              [address] [place] [address]                                                                                      [phone]     Padma Bruno [address] [place] [address]                                                                                                [phone]        Dr. Naresh West, PhD                         [address] [place], [place]                                                                                              [phone]     You will also need to plan on attending a 2 hour nutrition class at [facility] prior to your surgery. We will schedule this for you when we schedule your surgery. Please remember to have your labs drawn prior to your scheduled appointment to review the results of your testing. Please remember, that while we will submit your case to insurance for surgery authorization, it is your responsibility to know if your plan covers weight loss surgery and keep up-to-date with changes to your insurance coverage.   We will do everything possible to help you get approved for weight loss surgery, but cannot guarantee an approval.      Please note that you will not be submitted to your insurance company until all   pre-operative testing requirements are met. · Please remember you need to schedule to attend one Support Group meeting held at the [facility] before surgery. This one meeting is mandatory. You can attend as many support group meetings before and after surgery. Support group meetings are held at the [facility] from 6:00 - 8:00pm 1st, and 3rd Tuesday of every month. See dates listed below. · Each individual person has his / her own medical requirements that need fulfilled before being able to schedule bariatric surgery . Please finalize these requirements by contacting our Bariatric Nurse at [phone].    3/30/22 Pre-Op Labs  Hgb 13. 5WNL, Hct 41.5 WNL, Ferritin 81 WNL, ALT 5L, vit D 17L    Note:  Vitamin D deficiency noted above. Pt instructed to treat this with her PCP and no recheck is required prior to Dr Steve Salamanca prior to surgery. Pt voiced understanding. High Vitamin D Foods:  Written By: Grady Ngo RD/JASMYN    What role does Vitamin D play in the body? Vitamin D is known as the sunshine vitamin. Vitamin D is actually a hormone that is produced in our bodies by ultraviolet B rays. These light rays trigger the production of vitamin D by our skin cells. The hormone that is produced is called calcitriol and once it is made it travels to the intestines to help with the absorption of dietary calcium, as well as fluoride. How does Vitamin D work? Vitamin D and Calcium work together to promote the growth of bones and development of healthy teeth. The amount of vitamin D you produce effects the amount of calcium that can be absorbed by the body. When calcium from foods you eat reaches the intestines, it waits for the presence of the vitamin D hormone, calcitriol, to be able to cross the intestinal membranes and to be transported to your bones.   Without sufficient vitamin D, calcium levels in the bones and teeth will decrease leading to weak, brittle bones and increasing the possibility of osteoporosis. WHY? Calcium is primarily found in the blood and needs to remain balanced at a constant level. The calcium that is in the blood is there to be used for quick transport to muscles and nerves when required. When calcium is sent to required organs, the calcium levels in the blood become low. Calcium is then pulled out of the bones and goes into the blood to replace what was sent to the muscles or nerves. This is the normal cycle and is perfect if you have a continuous supply of calcium from the diet to replace calcium from bones and teeth to keep blood levels steady. If dietary calcium levels are depleted, the blood will keep pulling calcium away from the bones and teeth and the structures will be weak. Where can Vitamin D be found? Vitamin D can be self-synthesized with sunlight. Vitamin D is also found in fortified milk, fortified margarine, egg yolks, liver and fatty fish. What occurs when you are deficient in Vitamin D? Bones and Teeth:  [place] can occur with Vitamin D deficiency, which is known as softening of the bones and teeth. Due to the softening effect we can see deformities of the limbs, spine, thorax and pelvis. Also seen is pain in the pelvis, lower back and legs, including increase risk to bone fractures. Blood:  Vitamin D deficiency can cause decreased calcium and/or phosphorus in the blood and increased alkaline phosphatase. Nervous/Muscular Systems:  Vitamin D deficiency can cause lax muscles resulting in protrusion of the abdomen (Pot Belly) and muscle spasms. Some patients complain of involuntary twitching and  muscle spasms. Excretory System:  Vitamin D deficiency can cause increased calcium in the stool and decreased calcium in the urine.     Other:  Vitamin D deficiency can cause abnormally high secretions of parathormone, which is why we run lab work at 1 year to make sure the calcium supplements you are taking are being absorbed correctly. Vitamin D in Commonly Eaten Foods Ranked Richest to [employer]:      Food Serving Size IU of   Vitamin D----------        Herring, fresh, raw,  1 oz. 255 IU   [place] 1 oz. 142 IU   Milk,Cows Fortified 1 Cup 100 IU   Sardines, canned 1 oz. 85 IU   Chicken-Liver, Cooked 3oz. 45 IU   Shrimp, Canned 1oz. 30 IU   Egg Yolk 1 25 IU   Calf-Liver, Cooked 3oz. 12 IU   Cream, Light 1 T 8 IU   Cheddar Cheese 1 oz. 3 IU   Oysters 4 3 IU   Butter  1 tsp 1.4 IU            Unfortunately after RYGB surgery you will not be able to increase your Vitamin D with diet alone. A Vitamin D supplement will be needed in order to improve Vitamin D lab values.

## 2022-04-22 NOTE — PROGRESS NOTES
hair loss awhile back  yes - Skin Changes   Mole that has increased in size     Food Allergies: no    Food Intolerances: no     Yes - Past medically assisted weight loss history reviewed. Yes - Provided education regarding the basic role of nutrition in junction with Bariatric Surgery. Yes - Provided overview of required dietary and behavioral changes pre and post operatively. Yes - Stated / reinforced that changes in dietary behaviors are critical to successful, long term weight Loss. Patient is aware that in order to proceed with bariatric surgery he / she will need to follow a low-fat diet prior to surgery. Patient is aware that bariatric surgery is a lifetime change that will require the patient to follow a low-fat, low-calorie, low-sugar, portion controlled diet with at least 30 minutes of physical activity daily. Patient is aware that certain foods may not be tolerated after bariatric surgery and certain foods will need avoided all together. Rubia Casas / LD feels that this patient knowledge / readiness to make appropriate diet / lifestyle changes assessed to be? - Good    RD / LD feels this patients expected adherence for post surgical diet? - Good    Patient was instructed and signed consents on a low-fat diet and required supplementation at initial consult. Comments: Patient is able to verbalize diet concepts for bariatric surgery. Patient is aware diet concepts will be reinforced at 2-hour nutrition education consult. RD / LD will monitor progress.

## 2022-05-05 ENCOUNTER — INITIAL CONSULT (OUTPATIENT)
Dept: BARIATRICS/WEIGHT MGMT | Age: 27
End: 2022-05-05
Payer: COMMERCIAL

## 2022-05-05 DIAGNOSIS — F50.81 BINGE-EATING DISORDER, MODERATE: ICD-10-CM

## 2022-05-05 DIAGNOSIS — Z71.89 ENCOUNTER FOR PSYCHOLOGICAL ASSESSMENT PRIOR TO BARIATRIC SURGERY: Primary | ICD-10-CM

## 2022-05-05 DIAGNOSIS — F50.9 COMPULSIVE EATING PATTERNS: ICD-10-CM

## 2022-05-05 PROCEDURE — 1036F TOBACCO NON-USER: CPT | Performed by: SOCIAL WORKER

## 2022-05-05 PROCEDURE — 90791 PSYCH DIAGNOSTIC EVALUATION: CPT | Performed by: SOCIAL WORKER

## 2022-05-05 NOTE — PROGRESS NOTES
Diagnostic Evaluation without Medical Services. Clinton Quick is a 32 y.o. Single, -American  female, referred by mother  for evaluation and treatment. Patient identify verified by Name and . Those attending session : patient      Chief Complaint   Patient presents with    Consultation     Evaluation for bariatric surgery         Motivation for surgery: Other: \"I feel like I need professional help to lose weight\"    Understanding of procedure: The patient has researched the procedure and understands the possibility of potential weight gain. , The patient  has talked with other people who have undergone the procedure. and The patient  has not attended a gastric bypas surgery group. Reported Current weight 252. Wt Readings from Last 3 Encounters:   22 251 lb (113.9 kg)   22 253 lb (114.8 kg)   10/26/21 252 lb (114.3 kg)       Expectations: The patient expects to loose 20-30 lbs following surgery over 12 months. Goal weight post surgery: 150 lbs. Other expectations: Improvement in health    HISTORY OF PRESENT ILLNESS       EAT/WEIGHT HISTORY    How old were you when you first became concerned with your weight? 24  Most successful diet in the past? Portion control and exercise. Weight lost on the diet listed above? 20   Patient stated he / she maintained his / her weight for the following time? 6 months  How much control over your eating do you feel you Have? \"I can avoid temptations but I do \"catch myself over eating\"       Cravings: For what types of food: sweets                  Strategies used to deal with cravings: \"look for a substitute\"      Eating habits: Typcally skipping meals and eating one main meal in the evening (high starch foods), snacking on crackers and chip, drinks coffee and soda and some herbal teas        Emotional eating: Stress and depression. BINGE EATING    Recurrent episodes of binge eating:  An episode is characterized by:  1. Eating a larger amount of food than normal during a short period of time (within any two hour period): Yes  2. Lack of control over eating during the binge episode (i.e. The feeling that one cannot stop eating): Yes  Both Symptoms Met: Yes    Binge eating episodes are associated with three or more of the followin. Eating until feeling uncomfortably full: Yes  2. Eating large amounts of food when not physically hungary: Yes  3. Eating much more rapidly than normal: No  4. Eating alone because you are embarrassed by how much you are eating; Yes  5. Feeling disgusted, depressed, or guilty after eating: Yes  THREE ASSOCIATED SYMPTOMS MET:Yes    Marked distress regarding binge eating is present: Yes    Binge eating occurs at least once a week for 3 months: Yes  The patient reports at least 3 or 4 binge episodes per week for the past 12 months. COMPULSIVE EATING PATTERN    1. Compulsive overeating without thoughts to harmful consequences (weight gain, diabetes, chronic pain, low self esteem); Yes  2. Inability to reduce or change continuous patterns of food intake (snacking/grazing, overeating foods that are high in simple carbs and/or fats); Yes  3. Continued compulsive eating in spite of negative consequences. (Obesity, diabetes complications, others); Yes    The binge eating is not associated with the regular use of inappropriate compensatory behavior (i.e. Purging, excessive exercise etc) and does not occur exclusively during the course of bulimia nervosa or anorexia nervosa: No    PATIENT MEETS ABOVE CRITERIA FOR  EATING DISORDER: Yes    What lifestyle changes started: cut down on soda, using artifical sweeteners, switching to decaf, eating more protein, reading labels.      MEDICAL PROBLEMS    Medical History:  Past Medical History:   Diagnosis Date    Abnormal Papanicolaou smear of cervix with positive human papilloma virus (HPV) test     Closed fracture of distal end of right fibula 01/03/2020    Morbid obesity due to excess calories (HCC)         Current Outpatient Medications   Medication Sig Dispense Refill    GABAPENTIN PO Take by mouth      tiZANidine HCl (ZANAFLEX PO) Take by mouth      NAPROXEN DR PO Take by mouth      ibuprofen (IBU) 800 MG tablet Take 1 tablet by mouth every 8 hours as needed for Pain Take with food. 30 tablet 0    ondansetron (ZOFRAN ODT) 4 MG disintegrating tablet Place 1 tablet under the tongue every 8 hours as needed for Nausea 12 tablet 0     No current facility-administered medications for this visit. PSYCHIATRIC HISTORY    Past Psychiatric History:  PT stated that she is planning to see a therapist because she does feel depressed and anxious from time to time. Family Psychiatric History: Mother and siblings have had problems with mental health issue. No reported family hx of FRANCESCO    Family History of Obesity? 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 Legal none  Domestic Assessment   none reported  The patient reports the following stressors: Father has passed away 2016 and this still affects her. PSYCHOSOCIAL HISTORY    The patient was born and raised in [place]  The patient raised in an step parent home, biological father was not in the home but stepfather raised her. Describes childhood as: \"Great, I had the best childhood anyone could have , my parents were great\" .   Siblings: Myriam Alvarado 23  Family relationships: T stated that she has a very close nurturing family, she misses her step father and has a good relationship with her biological father. Sexual orientation/gender identification: Heterosexual   history:none  Spiritual/ Shinto orientation: [facility]   Cultural beliefs: none  Educational history: PT reported being a good student, she reported a hx of ADHD and some lack in social skills. After HS she attend [employer] for 2 semester but stated that at the time there was too much going on. She is currently taking online classes. Abuse History: yes, none  Trauma: yes, Father's death in 2016    The patient reports the following strengths: loving, respectful, honest    Mental Status Exam: appearance:  appropriately dressed and appropriately groomed, behavior:  normal, attitude:  cooperative, speech:  appropriate, mood:  euthymic, affect:  congruent with mood, thought content:  no evidence of psychosis, thought process:  logical and coherent, orientation:  oriented in all spheres, memory:  recent:  good and remote:  good, insight:  fair , judgment:  fair  and cognitive:  intact and intelligent    RISK ASSESSMENT    Suicide screen: denies current suicidal ideation, plan and intent    Protective factors are NA     Self Injurious Behavior: denies    Homicide screen: denies current homicidal ideation, plan and intent    History of Violence: denies    Access to Guns/Weapons: no    CLINICAL ASSESSMENT    Major Psychiatric Contraindications for surgery: The patient acknowledged a history of mental health issues:  depression and anxiety, however has not had any treatment for that. She stated that she plans to see a therapist as she goes through this process to help with any emerging issues. The patient appeared to have reasonable expectations regarding surgery. Patient was fairly informed about the surgery and changes needed post surgery. The patient appears to be motivated to make lifestyle changes as evidenced by  meal plan changes.     The patient appears to have fair social support as evidenced by family and friends supporting    Partner's and Family's evidence of level of support for surgery: Changed own dietary habilts  agree with decision for surgery     Other social supports: Friends    DIAGNOSIS:   Encounter Diagnosis   Name Primary?  Encounter for psychological assessment prior to bariatric surgery Yes        TREATMENT PLAN    Patient Goals: Increased understanding of role of emotional factors contributing to issues with food and obesity and strategies to cope with these. Interventions in session: Explored emotional, behavioral, cognitive and environmental factors contributing to issues with food and obesity, with goal of promoting optimal post bariatric surgery outcome. Discussed the importance of attending support group and reinforced the benefits of attending. Provided pt. with hand out \"Why We Eat\", \"Reality Journal\" and \"Identifying and Handling Cravings\"     Safety Plan: not applicable     Assignments/Recommendations: 1-2 follow-up sessions with SW for further education/evaluation. Complete entries in \"Why We Eat\", \"Reality Journal\" and \"Identifying and Handling Cravings\" to discuss next session. Attend [facility] support group. Follow-up with: referrals/present providers/all scheduled appointments at [facility]. Handouts provided  In office. Next steps: Schedule follow up with me for  60MIN in 8 weeks and Continue with dietitian and bariatric support group    Bariatric Surgery: Based on the information gathered through the interview process - there is no current evidence of mental health or substance abuse issues that would impact on the patient receiving bariatric surgery.     Patient and/or family/guardian verbalizes understanding of and agreement with treatment recommendations and plan: yes    Start time: 2:30 pm         End time: 3:21 pm    Visit Time: [address], [place]

## 2022-05-05 NOTE — PATIENT INSTRUCTIONS
Assignments/Recommendations: 1-2 follow-up sessions with SW for further education/evaluation. Complete entries in \"Why We Eat\", \"Reality Journal\" and \"Identifying and Handling Cravings\" to discuss next session. Attend [facility] support group. Follow-up with: referrals/present providers/all scheduled appointments at [facility]. Handouts provided  In office.

## 2022-05-13 ENCOUNTER — ANESTHESIA (OUTPATIENT)
Dept: ENDOSCOPY | Age: 27
End: 2022-05-13
Payer: COMMERCIAL

## 2022-05-13 ENCOUNTER — ANESTHESIA EVENT (OUTPATIENT)
Dept: ENDOSCOPY | Age: 27
End: 2022-05-13
Payer: COMMERCIAL

## 2022-05-13 ENCOUNTER — HOSPITAL ENCOUNTER (OUTPATIENT)
Dept: ULTRASOUND IMAGING | Age: 27
Discharge: HOME | End: 2022-05-13
Attending: SURGERY
Payer: COMMERCIAL

## 2022-05-13 ENCOUNTER — HOSPITAL ENCOUNTER (OUTPATIENT)
Age: 27
Setting detail: OUTPATIENT SURGERY
Discharge: HOME | End: 2022-05-13
Attending: SURGERY | Admitting: SURGERY
Payer: COMMERCIAL

## 2022-05-13 VITALS
WEIGHT: 251 LBS | HEART RATE: 91 BPM | DIASTOLIC BLOOD PRESSURE: 67 MMHG | RESPIRATION RATE: 18 BRPM | BODY MASS INDEX: 42.85 KG/M2 | HEIGHT: 64 IN | TEMPERATURE: 98.1 F | SYSTOLIC BLOOD PRESSURE: 118 MMHG | OXYGEN SATURATION: 100 %

## 2022-05-13 VITALS
DIASTOLIC BLOOD PRESSURE: 87 MMHG | SYSTOLIC BLOOD PRESSURE: 130 MMHG | OXYGEN SATURATION: 97 % | RESPIRATION RATE: 11 BRPM

## 2022-05-13 DIAGNOSIS — K30 INDIGESTION: ICD-10-CM

## 2022-05-13 LAB — HCG(URINE) PREGNANCY TEST: NEGATIVE

## 2022-05-13 PROCEDURE — 3700000001 HC ADD 15 MINUTES (ANESTHESIA): Performed by: SURGERY

## 2022-05-13 PROCEDURE — 3609012400 HC EGD TRANSORAL BIOPSY SINGLE/MULTIPLE: Performed by: SURGERY

## 2022-05-13 PROCEDURE — 76705 ECHO EXAM OF ABDOMEN: CPT

## 2022-05-13 PROCEDURE — 2709999900 HC NON-CHARGEABLE SUPPLY: Performed by: SURGERY

## 2022-05-13 PROCEDURE — 7100000010 HC PHASE II RECOVERY - FIRST 15 MIN: Performed by: SURGERY

## 2022-05-13 PROCEDURE — 81025 URINE PREGNANCY TEST: CPT

## 2022-05-13 PROCEDURE — 2580000003 HC RX 258: Performed by: SURGERY

## 2022-05-13 PROCEDURE — 88342 IMHCHEM/IMCYTCHM 1ST ANTB: CPT

## 2022-05-13 PROCEDURE — 99024 POSTOP FOLLOW-UP VISIT: CPT | Performed by: SURGERY

## 2022-05-13 PROCEDURE — 43239 EGD BIOPSY SINGLE/MULTIPLE: CPT | Performed by: SURGERY

## 2022-05-13 PROCEDURE — 88305 TISSUE EXAM BY PATHOLOGIST: CPT

## 2022-05-13 PROCEDURE — 6360000002 HC RX W HCPCS: Performed by: NURSE ANESTHETIST, CERTIFIED REGISTERED

## 2022-05-13 PROCEDURE — 7100000011 HC PHASE II RECOVERY - ADDTL 15 MIN: Performed by: SURGERY

## 2022-05-13 PROCEDURE — 3700000000 HC ANESTHESIA ATTENDED CARE: Performed by: SURGERY

## 2022-05-13 RX ORDER — PROPOFOL 10 MG/ML
INJECTION, EMULSION INTRAVENOUS PRN
Status: DISCONTINUED | OUTPATIENT
Start: 2022-05-13 | End: 2022-05-13 | Stop reason: SDUPTHER

## 2022-05-13 RX ORDER — SODIUM CHLORIDE 0.9 % (FLUSH) 0.9 %
5-40 SYRINGE (ML) INJECTION PRN
Status: DISCONTINUED | OUTPATIENT
Start: 2022-05-13 | End: 2022-05-13 | Stop reason: HOSPADM

## 2022-05-13 RX ORDER — SODIUM CHLORIDE, SODIUM LACTATE, POTASSIUM CHLORIDE, CALCIUM CHLORIDE 600; 310; 30; 20 MG/100ML; MG/100ML; MG/100ML; MG/100ML
INJECTION, SOLUTION INTRAVENOUS CONTINUOUS
Status: DISCONTINUED | OUTPATIENT
Start: 2022-05-13 | End: 2022-05-13 | Stop reason: HOSPADM

## 2022-05-13 RX ADMIN — PROPOFOL 200 MG: 10 INJECTION, EMULSION INTRAVENOUS at 08:04

## 2022-05-13 RX ADMIN — SODIUM CHLORIDE, POTASSIUM CHLORIDE, SODIUM LACTATE AND CALCIUM CHLORIDE: 600; 310; 30; 20 INJECTION, SOLUTION INTRAVENOUS at 07:19

## 2022-05-13 ASSESSMENT — PAIN DESCRIPTION - DESCRIPTORS: DESCRIPTORS: ACHING;THROBBING

## 2022-05-13 ASSESSMENT — PAIN - FUNCTIONAL ASSESSMENT
PAIN_FUNCTIONAL_ASSESSMENT: PREVENTS OR INTERFERES SOME ACTIVE ACTIVITIES AND ADLS
PAIN_FUNCTIONAL_ASSESSMENT: 0-10

## 2022-05-13 NOTE — PROGRESS NOTES
Pt to ultrasound and returned . Jaiden Pichardo RN  5/13/2022 1000
SBAR FORM COMPLETED AND PLACED ON CHART. CHART WITH PATIENT IN TRANSIT.
(including no eye makeup) or nail polish on your fingers or toes. 11. DO NOT wear any jewelry or piercings on day of surgery. All body piercing jewelry must be removed. 12. Shower the night before surgery with _x__Antibacterial soap /SHAMAR WIPES________no creams lotions or powders from the neck down. 13. TOTAL JOINT REPLACEMENT/HYSTERECTOMY PATIENTS ONLY---Remember to bring Blood Bank bracelet to the hospital on the day of surgery. 14. If you have a Living Will and Durable Power of  for Healthcare, please bring in a copy. 15. If appropriate bring crutches, inspirex, WALKER, CANE etc... 12. Notify your Surgeon if you develop any illness between now and surgery time, cough, cold, fever, sore throat, nausea, vomiting, etc.  Please notify your surgeon if you experience dizziness, shortness of breath or blurred vision between now & the time of your surgery. 17. If you have ___dentures, they will be removed before going to the OR; we will provide you a container. If you wear ___contact lenses or ___glasses, they will be removed; please bring a case for them. 18. To provide excellent care visitors will be limited to 2 in the room at any given time. 19. Please bring picture ID and insurance card. 20. During flu season no children under the age of 15 are permitted in the hospital for the safety of all patients. 21. Other please check in at the information desk. Please wear a mask. Please call AMBULATORY CARE if you have any further questions.    [address]     [address]
right

## 2022-05-13 NOTE — ANESTHESIA POSTPROCEDURE EVALUATION
Department of Anesthesiology  Postprocedure Note    Patient: Sabra Moffett  MRN: [id number]  YOB: 1995  Date of evaluation: 5/13/2022  Time:  8:50 AM     Procedure Summary     Date: 05/13/22 Room / Location: [address] [place] [address] / [facility]    Anesthesia Start: Stana Comp Anesthesia Stop: 0813    Procedure: EGD BIOPSY (N/A ) Diagnosis: (GERD)    Surgeons: Geraldine Oliver MD Responsible Provider: Gonzalo Cespedes MD    Anesthesia Type: MAC ASA Status: 2          Anesthesia Type: No value filed. Uli Phase I: Uli Score: 10    Uli Phase II: Uli Score: 10    Last vitals: Reviewed and per EMR flowsheets.        Anesthesia Post Evaluation    Patient location during evaluation: bedside  Patient participation: complete - patient participated  Level of consciousness: awake  Pain score: 3  Airway patency: patent  Nausea & Vomiting: no nausea  Complications: no  Cardiovascular status: blood pressure returned to baseline  Respiratory status: acceptable  Hydration status: euvolemic

## 2022-05-13 NOTE — ANESTHESIA PRE PROCEDURE
Department of Anesthesiology  Preprocedure Note       Name:  Tomer Ramey   Age:  32 y.o.  :  1995                                          MRN:  [id number]         Date:  2022      Surgeon: Fabiola Hernandez):  Rosa Macdonald MD    Procedure: Procedure(s):  EGD ESOPHAGOGASTRODUODENOSCOPY    Medications prior to admission:   Prior to Admission medications    Medication Sig Start Date End Date Taking? Authorizing Provider   GABAPENTIN PO Take 600 mg by mouth in the morning and at bedtime     Historical Provider, MD   tiZANidine HCl (ZANAFLEX PO) Take by mouth    Historical Provider, MD   NAPROXEN DR PO Take by mouth    Historical Provider, MD   ibuprofen (IBU) 800 MG tablet Take 1 tablet by mouth every 8 hours as needed for Pain Take with food.  21   TJ Jones CNP   ondansetron (ZOFRAN ODT) 4 MG disintegrating tablet Place 1 tablet under the tongue every 8 hours as needed for Nausea 21   TJ Jones CNP       Current medications:    Current Facility-Administered Medications   Medication Dose Route Frequency Provider Last Rate Last Admin    sodium chloride flush 0.9 % injection 5-40 mL  5-40 mL IntraVENous PRN Floyd Mckeon MD        lactated ringers infusion   IntraVENous Continuous Rosa Macdonald MD 75 mL/hr at 22 New Bag at 22 07       Allergies:  No Known Allergies    Problem List:    Patient Active Problem List   Diagnosis Code    Labor and delivery, indication for care O75.9    Normal pregnancy, antepartum Z34.90    Closed fracture of distal end of right fibula S82.831A       Past Medical History:        Diagnosis Date    Abnormal Papanicolaou smear of cervix with positive human papilloma virus (HPV) test     Closed fracture of distal end of right fibula 2020    Morbid obesity due to excess calories [facility])        Past Surgical History:        Procedure Laterality Date    ANKLE SURGERY Right      SECTION 2014    [facility]  2017       Social History:    Social History     Tobacco Use    Smoking status: Never Smoker    Smokeless tobacco: Never Used   Substance Use Topics    Alcohol use: No                                Counseling given: Not Answered      Vital Signs (Current):   Vitals:    05/13/22 0658   BP: 139/82   Pulse: 82   Resp: 18   Temp: 97.8 °F (36.6 °C)   TempSrc: Temporal   SpO2: 97%   Weight: 251 lb (113.9 kg)   Height: 5' 4\" (1.626 m)                                              BP Readings from Last 3 Encounters:   05/13/22 139/82   03/23/22 (!) 156/85   10/26/21 136/83       NPO Status: Time of last liquid consumption: 0001                        Time of last solid consumption: 2030                        Date of last liquid consumption: 05/13/22                        Date of last solid food consumption: 05/12/22    BMI:   Wt Readings from Last 3 Encounters:   05/13/22 251 lb (113.9 kg)   04/22/22 251 lb (113.9 kg)   03/23/22 253 lb (114.8 kg)     Body mass index is 43.08 kg/m². CBC:   Lab Results   Component Value Date    WBC 12.4 06/28/2021    RBC 4.54 06/28/2021    HGB 13.2 06/28/2021    HCT 41.7 06/28/2021    MCV 91.9 06/28/2021    RDW 13.5 06/28/2021     06/28/2021       CMP:   Lab Results   Component Value Date     06/28/2021    K 4.2 06/28/2021    K 4.3 11/07/2020     06/28/2021    CO2 18 06/28/2021    BUN 11 06/28/2021    CREATININE 0.7 06/28/2021    GFRAA >60 06/28/2021    LABGLOM >60 06/28/2021    GLUCOSE 84 06/28/2021    PROT 7.9 06/28/2021    CALCIUM 9.2 06/28/2021    BILITOT 0.5 06/28/2021    ALKPHOS 88 06/28/2021    AST 20 06/28/2021    ALT <5 06/28/2021       POC Tests: No results for input(s): POCGLU, POCNA, POCK, POCCL, POCBUN, POCHEMO, POCHCT in the last 72 hours.     Coags:   Lab Results   Component Value Date    PROTIME 12.0 07/28/2018    INR 1.0 07/28/2018    APTT 28.6 07/28/2018       HCG (If Applicable):   Lab Results   Component Value Date    PREGTESTUR NEGATIVE 05/13/2022        ABGs: No results found for: PHART, PO2ART, CVN0HZV, UMW7DKW, BEART, N2LUITJL     Type & Screen (If Applicable):  No results found for: LABABO, LABRH    Drug/Infectious Status (If Applicable):  No results found for: HIV, HEPCAB    COVID-19 Screening (If Applicable):   Lab Results   Component Value Date    COVID19 Not Detected 11/07/2020           Anesthesia Evaluation  Patient summary reviewed  Airway: Mallampati: II  TM distance: >3 FB   Neck ROM: full  Mouth opening: > = 3 FB Dental:          Pulmonary:Negative Pulmonary ROS breath sounds clear to auscultation                             Cardiovascular:Negative CV ROS            Rhythm: regular  Rate: normal                    Neuro/Psych:   Negative Neuro/Psych ROS              GI/Hepatic/Renal:   (+) morbid obesity          Endo/Other:    (+) : arthritis:., .                 Abdominal:   (+) obese,     Abdomen: soft. Vascular: Other Findings:             Anesthesia Plan      MAC     ASA 2             Anesthetic plan and risks discussed with patient. Plan discussed with CRNA.                   James Tim MD   5/13/2022

## 2022-05-13 NOTE — H&P
Initial Evaluation  Bariatric Surgery and EGD       Subjective:  CHIEF COMPLAINT: Morbid obesity, malnutrition, GERD     HISTORY OF PRESENT ILLNESS: Alyce Burger is a morbidly-obese 27 y.o.  female, who weighs 253 lb (114.8 kg). She is 102 pounds over her ideal body weight. The Body mass index is 43.43 kg/m². She has multiple medical problems aggravated by her obesity. She wishes to have bariatric surgery so that she can lose a large amount of weight and keep the weight off. I have met with her in the [facility] where we discussed the surgery in great detail and went over the risks and benefits. She has watched our informational video so she understands all of the extensive risks involved. She states that she understands all of the risks and wishes to proceed with the evaluation.     Past Medical History      Patient Active Problem List   Diagnosis    Labor and delivery, indication for care    Normal pregnancy, antepartum    Closed fracture of distal end of right fibula      Past Surgical History  Past Surgical History         Past Surgical History:   Procedure Laterality Date     SECTION       LEE   2017         Allergies: Patient has no known allergies. Family History   No family history on file.        Home Medications  Current Facility-Administered Medications          Current Outpatient Medications   Medication Sig Dispense Refill    GABAPENTIN PO Take by mouth        tiZANidine HCl (ZANAFLEX PO) Take by mouth        NAPROXEN DR PO Take by mouth        ibuprofen (IBU) 800 MG tablet Take 1 tablet by mouth every 8 hours as needed for Pain Take with food. 30 tablet 0    ondansetron (ZOFRAN ODT) 4 MG disintegrating tablet Place 1 tablet under the tongue every 8 hours as needed for Nausea 12 tablet 0      No current facility-administered medications for this visit. Social History:   TOBACCO:   reports that she has never smoked.  She has never used smokeless tobacco.  All smokers must join the free smoking cessation program and stop smoking for 3 months before having any Bariatric surgery. ETOH:    reports no history of alcohol use.    The patient has a family history that is negative for severe cardiovascular or respiratory issues, negative for reaction to anesthesia.        Review of Systems     Review of Systems - General ROS: negative for - chills, fatigue or malaise  ENT ROS: negative for - hearing change, nasal congestion or nasal discharge  Allergy and Immunology ROS: negative for - hives, itchy/watery eyes or nasal congestion  Hematological and Lymphatic ROS: negative for - blood clots, blood transfusions, bruising or fatigue  Endocrine ROS: negative for - malaise/lethargy, mood swings, palpitations or polydipsia/polyuria  Breast ROS: negative for - new or changing breast lumps or nipple changes  Respiratory ROS: negative for - sputum changes, stridor, tachypnea or wheezing  Cardiovascular ROS: negative for - irregular heartbeat, loss of consciousness, murmur or orthopnea  Gastrointestinal ROS: negative for - constipation, diarrhea, gas/bloating, heartburn or hematemesis  Genito-Urinary ROS: negative for - genital discharge, genital ulcers or hematuria  Musculoskeletal ROS: negative for - gait disturbance, muscle pain or muscular weakness}     Physical Exam:   VITALS: BP (!) 156/85 (Site: Left Lower Arm, Position: Sitting, Cuff Size: Medium Adult)   Pulse 71   Temp 97.2 °F (36.2 °C) (Temporal)   Resp 20   Ht 5' 4\" (1.626 m)   Wt 253 lb (114.8 kg)   BMI 43.43 kg/m²   General appearance: alert, appears stated age and cooperative  Head: Normocephalic, without obvious abnormality, atraumatic  Neck: no adenopathy, no carotid bruit, no JVD, supple, symmetrical, trachea midline and thyroid not enlarged, symmetric, no tenderness/mass/nodules  Lungs: clear to auscultation bilaterally  Heart: regular rate and rhythm  Abdomen: soft, non-tender; bowel sounds normal; no masses,  no organomegaly  Extremities: extremities normal, atraumatic, no cyanosis or edema  : no CVA tenderness     Assessment:  Morbid obesity with inability to keep the weight off with diet and exercise. She is interested in Laparoscopic Erum-en- Y Gastric Bypass or Sleeve Gastrectomy. We discussed that our goal is to ameliorate the medical problems and not to obtain a specific body mass index. She understands the risks and benefits, and wishes to proceed with the evaluation.     Plan:  Psych evaluation, medical and cardio clearance, gallbladder ultrasound. These patients often have vitamin deficiencies so I will do screening labs for malnutrition.  I will do an upper endoscopy to check for hiatal hernias, ulcers and H. Pylori while we wait for insurance approval for the surgery.     Physician Signature: Electronically signed by Dr. Sabino Medina MD

## 2022-05-13 NOTE — OP NOTE
SURGEON: Lo Joyner M.D. PREOPERATIVE DIAGNOSES:  gerd     POSTOPERATIVE DIAGNOSES: normal     OPERATION: Cisahkjo-pdezub-cjdoqbmdoicw with biopsy    BLOOD LOSS: 0ML    ANESTHESIA: LMAC    COMPLICATIONS: None. OPERATIONS: The patient was placed on the table in left lateral decubitus position and sedated. Bite block was placed. A lubricated scope was easily passed into the upper esophagus which looked normal. The distal esophagus looked normal. The scope was passed into the stomach and retroflexed. There was no hiatal hernia. The scope was passed down toward the pylorus. The antral mucosa all looked normal. Biopsy was taken to check for H. pylori. The scope was then passed through the pylorus into the duodenal bulb which looked normal, then around to the distal duodenum which looked normal, and the scope was then withdrawn. The GE junction was at 39 cm from the teeth. The patient tolerated the procedure well.      Physician Signature: Electronically signed by Dr. Twan Adkins

## 2022-05-18 ENCOUNTER — SCHEDULED TELEPHONE ENCOUNTER (OUTPATIENT)
Dept: BARIATRICS/WEIGHT MGMT | Age: 27
End: 2022-05-18
Payer: COMMERCIAL

## 2022-05-18 DIAGNOSIS — R89.9 ABNORMAL LABORATORY TEST RESULT: Primary | ICD-10-CM

## 2022-05-18 DIAGNOSIS — R79.89 LOW VITAMIN D LEVEL: ICD-10-CM

## 2022-05-18 PROCEDURE — 99211 OFF/OP EST MAY X REQ PHY/QHP: CPT

## 2022-05-18 PROCEDURE — 99442 PR PHYS/QHP TELEPHONE EVALUATION 11-20 MIN: CPT | Performed by: SURGERY

## 2022-05-18 RX ORDER — ERGOCALCIFEROL 1.25 MG/1
50000 CAPSULE ORAL WEEKLY
Qty: 12 CAPSULE | Refills: 1 | Status: SHIPPED
Start: 2022-05-18 | End: 2022-10-18

## 2022-05-18 NOTE — PROGRESS NOTES
[address]  5/18/2022  [address]    Post-EGD Follow-up. Subjective:   Stefanie S Main  is a 32 y.o. female post EGD done 2 weeks ago. She did not have a hiatal hernia, did not have gastritis. Biopsy did not show Helicobacter pylori. The gallbladder ultrasound showed no stones. Weight:  .    Physical exam:    Not done due to phone visit    Assessment:    Doing well two weeks post EGD, vit d deficit    Plan:   Stay on the high protein, low calorie diet while waiting for insurance approval. Walk as much as possible but keep your legs elevated when sitting. Continue deep breathing exercizes. Aim for 60 gm Protein and 90 oz of liquids per day. Track protein and fluid intake. Make sure the bowel move daily by taking fiber such as Metamucil. We discussed results and surgery for over 15 minutes. Physician Signature: Electronically signed by Dr. Maribel Dill MD   Cc:  Keon Sorensen,     Consent:  He and/or health care decision maker is aware that that he may receive a bill for this telephone service, depending on his insurance coverage, and has provided verbal consent to proceed: Yes      Documentation:  I communicated with the patient and/or health care decision maker about results. Details of this discussion including any medical advice provided: supplementing vit d      I affirm his is a Patient Initiated Episode with a Patient who has not had a related appointment within my department in the past 7 days or scheduled within the next 24 hours.         Patient's location: home address  Physician  location office address in [facility]  Other people involved in call none          Total Time: minutes: 11-20 minutes

## 2022-05-24 ENCOUNTER — INITIAL CONSULT (OUTPATIENT)
Dept: BARIATRICS/WEIGHT MGMT | Age: 27
End: 2022-05-24

## 2022-05-24 VITALS — WEIGHT: 249 LBS | BODY MASS INDEX: 42.51 KG/M2 | HEIGHT: 64 IN

## 2022-05-24 DIAGNOSIS — E66.01 MORBID OBESITY DUE TO EXCESS CALORIES (HCC): ICD-10-CM

## 2022-05-24 DIAGNOSIS — Z71.3 DIETARY COUNSELING: Primary | ICD-10-CM

## 2022-05-24 PROCEDURE — 99999 PR OFFICE/OUTPT VISIT,PROCEDURE ONLY: CPT

## 2022-05-24 NOTE — PROGRESS NOTES
WEIGHT:  249 lb (112.9 kg)    Pt was in th office for his/her 2nd weight Loss appointment. Pt is aware he/she must meet his/her pre-op weight loss goal in order to proceed with weight loss surgery. [employer] faxed a copy of what was reviewed with the pt to her PCP. Pt verbalized understanding. Rd// Ld enc the following at today's visit for successful pre/post surgical weight loss. Education:  Pt has been educated on the importance of weighing and measuring food for adequate portion control and to avoid extra calorie consumption from eating large portions. [employer] L[employer] instructed the pt on the use and the importance of using a scale and measuring cups in order to achieve adequate measurements of common portion sizes both pre-op and post-op. [employer] used actual food model replica's to show what an actual portion and serving size would look like 3 month's post-surgical after weight loss sx. [employer] completed an exercise in which they had to weigh and measure foods for adequate portion control. [employer] Ld also reviewed the use of a portion controlled plate after weight loss surgery. 1. Weigh yourself daily and record it. 2. Keep documented food records daily. 3. Just be more active in day to day routine. 4. Higher protein intake and a higher fiber intake. Not a high protein or a high fiber diet     just a higher intake. 5.Eliminate empty calorie consumption. [employer] Pe[employer] addressed the following with the pt:  - [employer] encouraged pt to comply with nutrition recommendations.  - [employer] encouraged participation in support group meetings.  - [employer] encouraged pt to go back to maintenance of food records and weight monitoring   records. - [employer] Peck reviewed the importance of adequate sleep and stress management.  - [employer] Pe[employer] reviewed non-food strategies to cope with emotions and stress.  - [employer] Pe[employer] encouraged pt to practice the following: Mindful eating: Eating slowly: Focusing     on the eating experience without distraction.   - [employer] Pe[employer] encouraged pt to pay attention to hunger and fullness cues. - [employer] L[employer] encouraged meal planning.  - [employer] Ld  encouraged pt to chose nutrient dense whole foods instead of soft, high     calorie foods.  - Rd / Ld encouraged not drinking large amounts of fluids with or immediately after      meals and avoiding high caloric empty beverage consumption. Portion control ,meal planning and avoiding empty calorie consumption was reviewed. Pt was encouraged to practice this daily for weight loss success. [employer] reviewed insurance company weight loss requirement on 5/24/22. Pt verbalized understanding. Please be aware at each visit you have been instructed that in order for your insurance company to approve your surgery you must show a consistent weight loss of 2 lbs or greater at each visit. We can not guarantee an approval by your insurance company we can only provide the information given to us it is up to you the patient to show compliancy to your insurance company. If you do gain weight during your supervised weight loss counseling sessions insurance companies starting in 2018 are denying patients for not showing consistent weight loss results when part of a supervised weight loss counseling program.     Pt spent 120 minutes with the [employer] Peck today preparing the patient for pre/post surgical dietary changes.

## 2022-06-07 DIAGNOSIS — Z01.818 PRE-OP EVALUATION: Primary | ICD-10-CM

## 2022-06-08 ENCOUNTER — TELEPHONE (OUTPATIENT)
Dept: ADMINISTRATIVE | Age: 27
End: 2022-06-08

## 2022-06-08 NOTE — TELEPHONE ENCOUNTER
NP scheduled from the [facility]. Patient Appointment Form:      PCP: Dr. Brandon Byrd  Referring: Dr. Steve Salamanca    Has the Patient:    Seen a Cardiologist? No    Had a heart catheterization? No    Had heart surgery? No    Had a stress test or nuclear stress test? No    Had an echocardiogram? No    Had a vascular ultrasound? No    Had a 24/48 heart monitor or extended cardiac event monitor? No    Had recent blood work in the last 6 months? Yes    Had a pacemaker/ICD/ILR implant?  No    Seen an Electrophysiologist? No        Will send records via: No prior cardiology recs - ref Wu Dye in [facility]       Date & time of appointment:  6/27/22 @ 3:00 with Dr. Elise Byrd

## 2022-06-14 ENCOUNTER — INITIAL CONSULT (OUTPATIENT)
Dept: BARIATRICS/WEIGHT MGMT | Age: 27
End: 2022-06-14

## 2022-06-14 VITALS — HEIGHT: 64 IN | WEIGHT: 244 LBS | BODY MASS INDEX: 41.66 KG/M2

## 2022-06-14 DIAGNOSIS — E66.01 MORBID OBESITY DUE TO EXCESS CALORIES (HCC): ICD-10-CM

## 2022-06-14 DIAGNOSIS — Z71.3 DIETARY COUNSELING: Primary | ICD-10-CM

## 2022-06-14 PROCEDURE — 99999 PR OFFICE/OUTPT VISIT,PROCEDURE ONLY: CPT

## 2022-06-14 NOTE — PROGRESS NOTES
WEIGHT:  244 lb (110.7 kg)    Pt was in th office for his/her 3rd weight Loss appointment. Pt is aware he/she must meet his/her pre-op weight loss goal in order to proceed with weight loss surgery. [employer] faxed a copy of what was reviewed with the pt to her PCP. Pt verbalized understanding. C[employer]ar[employer] enc the following at today's visit for successful post-surgical Weight Maintenance    1. Weigh yourself daily and record it. 2. Keep documented food records daily   3. Just be more active in day to day routine   4. Higher protein intake and a higher fiber intake. Not a high protein or a high fiber diet just a higher intake. 5.Eliminate empty calorie consumption    César Peck addressed the following with the pt:  - César Peck enc pt to comply with nutrition recommendations  - [employer] enc Participation in support group meetings  - César Peck enc pt to go back to maintenance of food records and weight monitoring records   - César Peck reviewed the importance of adequate sleep and stress management  - César Peck reviewed nonfood strategies to cope with emotions and stress  - César Peck encouraged pt to practice the following: Mindful eating: Eating slowly: Focusing   on the eating experience without distraction  - César Peck enc. pt to pay attention to hunger and fullness  - [employer] enc meal planning  - César Banerjee pt to chose nutrient dense whole foods instead of soft, high calorie foods  - [employer] enc not dr Maxi Thomas large amounts of fluids with or immediately after meals    Portion control ,meal planning and avoiding empty calorie consumption. [employer] reviewed insurance company weight loss requirement on 6/14/22. Pt verbalized understanding. Please be aware at each visit you have been instructed that in order for your insurance company to approve your surgery you must show a consistent weight loss of 2 lbs or greater at each visit.  We can not guarantee an approval by your insurance company we can only provide the information given to us it is up to you the patient to show compliancy to your insurance company. If you do gain weight during your supervised weight loss counseling sessions insurance companies starting in 2018 are denying patients for not showing consistent weight loss results when part of a supervised weight loss counseling program.       The following education was reviewed:  Megan Cho and [facility]  Supplement Guidelines for   Bariatric Surgery        Listed Below are the recommended supplements for Erum-en-Y Gastric By-pass Surgery or laparoscopic Sleeve Gastrectomy. Please be aware you must purchase a 3 month supply of these supplements within the next month after attending your nutrition class and bring these to your History and Physical Appointment.      Approved supplements for use after Erum-en-Y Gastric Bypass Surgery    Vitamins - Select One for usage after surgery:  (a) Optisource Chewable Multi-Vitamin (1 tablet 4 times daily)    (b) Bariatric Fusion Chewable Complete Multi-Vitamin (1 tablet 4 times daily)    (c) Celebrate Chewable Multi-Vitamin (1 tablet 2 times daily) - and -  Celebrate Iron 30 mg + C (1 tablet daily)    (d) Bariatric Advantage Chewable Multi-Formula (1 tablet 2 times daily) - and -   Bariatric Advantage Chewable Iron 29 mg (1 tablet daily)      Calcium - Select One for usage after surgery:  (a) Citracal with Vitamin D - 315 mg calcium citrate per tablet, 250 iu Vitamin D per tablet (1 tablet 5 times daily) - Dissolved in Water    (b) Citracal with Vitamin D Petities - 200 mg calcium citrate per tablet, 250 iu Vitamin D per tablet (2 tablets 4 times daily) - Dissolved in Water    (c) Bariatric Advantage Calcium Lozenges Chewable (1 tablet 3 times daily)    (d) Bariatric Advantage Calcium Chews (1 tablet 6 times daily) - contains calories    (e) Celebrate Calcium Plus 500 (1 tablet 3 times daily)    (f) Calcet Creamy Bites (1 tablet 3 times daily) - contains calories      Laparoscopic Sleeve Gastectomy patients will be required to take Vitamin B12 500 mcgs 1 tablet daily in addition to the supplements listed above      Suggested Post-Operative Vitamin Supplementation Information:   A high-potency vitamin containing at least 200% of the daily value for at least 2/3 of the nutrients.  Begin with chewable or liquid vitamins for the first three months.  Caution liquid vitamins containing iron can stain teeth.  Avoid time-released supplements.  Avoid gel capsules.  Avoid enteric coating.  Choose a complete formula with at least 18 mg of iron, 048SE of folic acid, and contains selenium and zinc in each serving.  Avoid childrens formulas that are incomplete.  Taking vitamins close to food intake may improve gastrointestinal tolerance.  Do separate your dosage of vitamins when taking throughout the day.  Do not mix multivitamin containing iron with calcium supplements, take at least 2 hours apart. Suggested Post-Operative Calcium Supplementation Information:  Calcium supplements are needed due to the great amount of malabsorption that occurs after surgery which, can lead to developing osteoporosis or osteopenia within the first 2 years post-op. A calcium supplement will help maintain bone strength, help your heart pump correctly and aid in the repair of soft tissue. Calcium supplementation is needed lifelong. It is best to always take one dose right before bedtime because calcium is absorbed better when at rest, and another dose around 10:00am and another does around 3:00pm.     ? If you have a predisposition to kidney stones, please talk with your dietitian. Calcium Citrate should block kidney stone formation so this should not be a problem after surgery. If you do not take the correct form of calcium or take the wrong calcium you are more likely to develop kidney stones.     ? Make sure your calcium supplement contains Vitamin D3 at least 800 -1000 iu daily. Spilt calcium into 500-600 mg doses; be mindful of serving size on supplement label. Space doses evenly throughout the day. ? If you are post menopausal and on hormone replacement therapy, please see your dietitian for calcium recommendations. ? If you currently have osteoporosis or osteopenia and are being treated medically (e.g. Actonel, Fosamax), please see your dietitian for calcium recommendations. ? Do not take any of your Calcium within 2 hours of other medications or iron containing multi-vitamins because it may interfere with absorption of the medication. ? Calcium supplements are not a replacement for calcium found within your food. We also recommend in addition to your calcium supplement at least 3 servings of low-fat dairy total daily to meet your calcium requirements. Combined calcium from food and calcium from calcium supplements intake should be greater than 1700 mg. total daily to prevent bone loss during rapid weight loss phase. ? Stay away from Calcium Carbonate. I can not stress this enough after surgery. It is Calcium Citrate we want all patients taking. Do not let supplement shops talk you into something that we do not recommend. It is important in your surgical outcome and overall medical care. ? We Do Not recommend the new Calcium Citrate Crystals  - These are not absorbed well after surgery because they are mainly Calcium Lactate-Gluconate. They are not Calcium Citrate. The company does market them as Calcium Citrate because Calcium Lactate- Gluconate has the same bioavailability as Calcium Citrate but can be made into a drink form. Store Location:  [employer], [employer], [employer], [employer] or by calling [phone] or on-line at wwwMailMeNetwork. Bariatric Advantage   [phone] or on-line at www.bariatricadvantageDoCircuits  Bariatric Fusion   [phone] or on-line at www.bariatricfusion. com  Celebrate    [phone] or on-line at www.Jayride.com. [employer]                                                            Types of Protein Supplements:    Whey Protein:   Is the highest quality of protein available. It is a rich source of Branched Chain Amino Acids containing the highest known levels of any natural food source. Types of Whey Protein:  Whey Protein Isolate -  Most amount of protein per serving and   literally zero carbohydrates, lactose and fat. 90% Protein. Highly recommended. Whey Protein Blends -  Blend Whey Protein Concentrate and whey   protein isolate to make a good quality   protein. Do not recommend due to trace     amounts of heavy metals present. Whey Protein Concentrate - cheapest , contain high levels of fat and        lactose ( Not good if you are Lactose        Intolerant) 75% Protein. Do ot recommend      due to trace amounts of heavy metals    present. Casein Protein:  Micellar Casein is a slow digesting and rich protein source that continues to feed your muscles long after whey proteins have dropped off. ( More for Body Builders not a good protein for bariatric patients due to it taking up to seven hours to fully digest)    Egg Protein:  Egg Protein is made from pure egg whites. It is fat-free, fast digesting and rich in Branched Chain Amino Acids and Glutamic Acid. 100% protein. Soy Protein:  Fast digesting form of protein with a solid Branched chain Amino Acid profile. Made from non-animal sources, ideal for Lactose Intolerant and Vegans! Avoid Soy-Protein Isolate Powders. Recommend soy protein without added hormones. Collagen Protein:  Is the component of human connective tissue found in skin, hair and nails and has no Essential Amino Acids to help build protein levels. Avoid. Protein Supplement Drinks Comparisons    We do not endorse these stores or products.  These are only to help you with your lifestyle changes. (All serving sizes are 1 scoop, 1 can or 1 packet)  Product Source Cost  (approx.) Calories Fat  (grams) Protein  (grams) Sugars  (grams)              Darline Part - Whey Protein         [employer] $45.00 110 0 25 0   Sari Eladio - Egg Protein [employer] $25.00 120 0 24 0   Iso Pure Powder [employer] / The  Vitamin  Shoppe $ 40.00 100-105 0 25 0   Nectar Protein The  [employer] $ 30.00 80 0 23 0   BeneProtein [address] $ 42.00     case of 6 25 0 6 0   ProCel and UpCal D  Plus Phone order  [phone] $ 50.00  case of 6 28 0 5 0-1     Bariatric Advantage [phone]   $50.00 150 1.5 - 2.0 27 .5   Bariatric Fusion [phone] $35.00 138 0 27 <1   WheyBolic Extreme 60  GNC $ 45.00 90 0.5 20 0.5   [employer] $35.00 138 0 27 <1   When choosing a supplement: Do not consume Ensure, Glycerna, Boost or any other high-calorie nutrition supplement on the market. We recommend that the fat content of your protein drink be less than 2 grams of fat, less than 2 grams of sugar and that it be 200 or less calories per serving. We also recommend that you are able to consume enough of your supplement to get your daily protein intake of 60-80 grams. If you are having difficulty getting your protein or are not able to find a supplement, please call your dietitian at [phone].

## 2022-06-27 ENCOUNTER — OFFICE VISIT (OUTPATIENT)
Dept: CARDIOLOGY CLINIC | Age: 27
End: 2022-06-27
Payer: COMMERCIAL

## 2022-06-27 VITALS
WEIGHT: 248.2 LBS | HEIGHT: 64 IN | SYSTOLIC BLOOD PRESSURE: 110 MMHG | RESPIRATION RATE: 16 BRPM | OXYGEN SATURATION: 99 % | HEART RATE: 82 BPM | DIASTOLIC BLOOD PRESSURE: 68 MMHG | BODY MASS INDEX: 42.37 KG/M2

## 2022-06-27 DIAGNOSIS — R06.89 GASPING FOR BREATH: ICD-10-CM

## 2022-06-27 DIAGNOSIS — Z01.810 PREOPERATIVE CARDIOVASCULAR EXAMINATION: Primary | ICD-10-CM

## 2022-06-27 PROCEDURE — 1036F TOBACCO NON-USER: CPT | Performed by: INTERNAL MEDICINE

## 2022-06-27 PROCEDURE — G8427 DOCREV CUR MEDS BY ELIG CLIN: HCPCS | Performed by: INTERNAL MEDICINE

## 2022-06-27 PROCEDURE — 93000 ELECTROCARDIOGRAM COMPLETE: CPT | Performed by: INTERNAL MEDICINE

## 2022-06-27 PROCEDURE — 99202 OFFICE O/P NEW SF 15 MIN: CPT | Performed by: INTERNAL MEDICINE

## 2022-06-27 PROCEDURE — G8417 CALC BMI ABV UP PARAM F/U: HCPCS | Performed by: INTERNAL MEDICINE

## 2022-06-27 ASSESSMENT — ENCOUNTER SYMPTOMS
BLOOD IN STOOL: 0
COUGH: 0
SHORTNESS OF BREATH: 0
CONSTIPATION: 0
NAUSEA: 0
ABDOMINAL PAIN: 0
WHEEZING: 0
VOMITING: 0
DIARRHEA: 0
BACK PAIN: 0

## 2022-06-27 NOTE — PROGRESS NOTES
OUTPATIENT CARDIOLOGY CONSULT    Name: Rosy Ross    Age: 32 y.o. Date of Service: 6/27/2022    Reason for Consultation:   Chief Complaint   Patient presents with    Cardiac Clearance     Consult per Dr. Genaro Sawyer for bariatric surgery. Denies cardiac complaints. Has RLE (ankle) edema from and old injury. Referring Physician: Eli Gómez DO    History of Present Illness:  29-year-old obese non-smoker -American female who is referred for cardiac evaluation prior to gastric bypass surgery versus sleeve. 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 She has history of GERD and abnormal Pap smear. She is very active. She can go up 3 flights of stairs with no chest discomfort but sometimes feels dyspnea on exertion. She denies palpitations, dizziness or syncope. She admits to get lower extremity edema. She has no family history for premature CAD. EKG revealed sinus rhythm at 82 bpm with artifacts. Review of Systems:  Review of Systems   Constitutional: Positive for fatigue. Negative for chills and fever. HENT: Negative for nosebleeds. Respiratory: Negative for cough, shortness of breath and wheezing. She snores at night and sometimes gasps for air. Cardiovascular: Positive for leg swelling. Gastrointestinal: Negative for abdominal pain, blood in stool, constipation, diarrhea, nausea and vomiting. Genitourinary: Negative for dysuria and hematuria. Musculoskeletal: Negative for back pain, joint swelling and myalgias. Neurological: Negative for syncope and light-headedness. Psychiatric/Behavioral: The patient is not nervous/anxious.            Past Medical History:  Past Medical History:   Diagnosis Date    Abnormal Papanicolaou smear of cervix with positive human papilloma virus (HPV) test     Closed fracture of distal end of right fibula 01/03/2020    Morbid obesity due to excess calories [facility])        Past Surgical History:  Past Surgical History:   Procedure Laterality Date    ANKLE SURGERY Right      SECTION      [facility]  2017    UPPER GASTROINTESTINAL ENDOSCOPY N/A 2022    EGD BIOPSY performed by Maribel Dlil MD at [address] [place] [address] History:  No family history on file. Social History:  Social History     Socioeconomic History    Marital status: Single     Spouse name: Not on file    Number of children: Not on file    Years of education: Not on file    Highest education level: Not on file   Occupational History    Not on file   Tobacco Use    Smoking status: Never Smoker    Smokeless tobacco: Never Used   Substance and Sexual Activity    Alcohol use: No    Drug use: No    Sexual activity: Not on file   Other Topics Concern    Not on file   Social History Narrative    2 cups coffee daily. 1 5-hour energy drinks daily      Social Determinants of Health     Financial Resource Strain:     Difficulty of Paying Living Expenses: Not on file   Food Insecurity:     Worried About Running Out of Food in the Last Year: Not on file    [place] of Food in the Last Year: Not on file   Transportation Needs:     Lack of Transportation (Medical): Not on file    Lack of Transportation (Non-Medical):  Not on file   Physical Activity:     Days of Exercise per Week: Not on file    Minutes of Exercise per Session: Not on file   Stress:     Feeling of Stress : Not on file   Social Connections:     Frequency of Communication with Friends and Family: Not on file    Frequency of Social Gatherings with Friends and Family: Not on file    Attends Mu-ism Services: Not on file    Active Member of Clubs or Organizations: Not on file    Attends Club or Organization Meetings: Not on file    Marital Status: Not on file   Intimate Partner Violence:     Fear of Current or Ex-Partner: Not on file    Emotionally Abused: Not on file    Physically Abused: Not on file    Sexually Abused: Not on file Housing Stability:     Unable to Pay for Housing in the Last Year: Not on file    Number of Places Lived in the Last Year: Not on file    Unstable Housing in the Last Year: Not on file       Allergies:  No Known Allergies    Current Medications:  Current Outpatient Medications   Medication Sig Dispense Refill    gabapentin (NEURONTIN) 600 MG tablet take 1 tablet by mouth twice a day      tiZANidine (ZANAFLEX) 4 MG tablet take 1 tablet by mouth three times a day if needed for SPASMS      norethindrone-ethinyl estradiol (LOESTRIN FE 1/20) 1-20 MG-MCG per tablet Take 1 tablet by mouth daily 1 packet 3    vitamin D (ERGOCALCIFEROL) 1.25 MG (18377 UT) CAPS capsule Take 1 capsule by mouth once a week 12 capsule 1    NAPROXEN DR PO Take by mouth      ibuprofen (IBU) 800 MG tablet Take 1 tablet by mouth every 8 hours as needed for Pain Take with food. 30 tablet 0     No current facility-administered medications for this visit. Physical Exam:  /68 (Site: Left Upper Arm, Position: Sitting, Cuff Size: Large Adult)   Pulse 82   Resp 16   Ht 5' 4\" (1.626 m)   Wt 248 lb 3.2 oz (112.6 kg)   SpO2 99%   BMI 42.60 kg/m²   Wt Readings from Last 3 Encounters:   06/27/22 248 lb 3.2 oz (112.6 kg)   06/24/22 248 lb (112.5 kg)   06/14/22 244 lb (110.7 kg)     Physical Exam:  /68 (Site: Left Upper Arm, Position: Sitting, Cuff Size: Large Adult)   Pulse 82   Resp 16   Ht 5' 4\" (1.626 m)   Wt 248 lb 3.2 oz (112.6 kg)   SpO2 99%   BMI 42.60 kg/m²   Wt Readings from Last 3 Encounters:   06/27/22 248 lb 3.2 oz (112.6 kg)   06/24/22 248 lb (112.5 kg)   06/14/22 244 lb (110.7 kg)     Physical Exam  Constitutional:       General: She is not in acute distress. Appearance: She is well-developed. She is obese. HENT:      Head: Normocephalic and atraumatic. Neck:      Vascular: No carotid bruit or JVD. Cardiovascular:      Rate and Rhythm: Normal rate and regular rhythm.       Heart sounds: No murmur heard.  No friction rub. No gallop. Pulmonary:      Breath sounds: No wheezing or rales. Comments: Fair air entry with no wheezing or crackles. Chest:      Chest wall: No tenderness. Abdominal:      General: Bowel sounds are normal. There is no distension. Palpations: Abdomen is soft. There is no mass. Tenderness: There is no abdominal tenderness. Comments: No abdominal bruit. Musculoskeletal:      Cervical back: Neck supple. Right lower leg: No edema. Left lower leg: No edema. Skin:     General: Skin is warm and dry. Neurological:      Mental Status: She is alert and oriented to person, place, and time. Laboratory Tests:  Lab Results   Component Value Date    CREATININE 0.7 06/28/2021    BUN 11 06/28/2021     06/28/2021    K 4.2 06/28/2021     06/28/2021    CO2 18 (L) 06/28/2021     Lab Results   Component Value Date    MG 2.4 06/28/2021     Lab Results   Component Value Date    WBC 12.4 (H) 06/28/2021    HGB 13.2 06/28/2021    HCT 41.7 06/28/2021    MCV 91.9 06/28/2021     06/28/2021     Lab Results   Component Value Date    ALT <5 06/28/2021    AST 20 06/28/2021    ALKPHOS 88 06/28/2021    BILITOT 0.5 06/28/2021       Lab Results   Component Value Date    INR 1.0 07/28/2018    PROTIME 12.0 07/28/2018         ASSESSMENT / PLAN:  -Preop clearance: Patient bariatric surgery or sleeve carries a low risk for perioperative cardiovascular events. Since patient has no significant risk factors except for her obesity and since she is active with no chest discomfort and has normal EKG, she is cleared to undergo her surgery from the cardiac standpoint of view.  -History of GERD. -History of abnormal Pap smear.  -Obesity with probable obstructive sleep apnea. Will arrange for the patient to have a sleep study to rule out obstructive sleep apnea. Patient can be seen on a as needed basis.     Thank you for allowing me to participate in your patient's care. Please feel free to contact me if you have any questions or concerns.     Jass Patino MD [facility] [place], [address] [facility] Cardiology

## 2022-07-11 ENCOUNTER — INITIAL CONSULT (OUTPATIENT)
Dept: BARIATRICS/WEIGHT MGMT | Age: 27
End: 2022-07-11
Payer: COMMERCIAL

## 2022-07-11 DIAGNOSIS — F50.9 COMPULSIVE EATING PATTERNS: ICD-10-CM

## 2022-07-11 DIAGNOSIS — F50.81 BINGE-EATING DISORDER, MODERATE: Primary | ICD-10-CM

## 2022-07-11 PROCEDURE — 1036F TOBACCO NON-USER: CPT | Performed by: SOCIAL WORKER

## 2022-07-11 PROCEDURE — 90837 PSYTX W PT 60 MINUTES: CPT | Performed by: SOCIAL WORKER

## 2022-07-11 NOTE — PROGRESS NOTES
INDIVIDUAL SESSION:  [facility] - ALEJANDRA CLEARANCE     Warren Fields is a 32 y.o. Single, -American  female, referred by Primary Care Provider  for evaluation and treatment. Patient identify verified by Name and . Those attending session : patient    DX:   Encounter Diagnoses   Name Primary?  Binge-eating disorder, moderate Yes    Compulsive eating patterns        Chief Complaint   Patient presents with    Consultation     2nd visit final clearance         Wt Readings from Last 3 Encounters:   22 248 lb 3.2 oz (112.6 kg)   22 248 lb (112.5 kg)   22 244 lb (110.7 kg)            Narrative: Doris Dickey stated that she did complete the homework without any problems. She was able to identify problems with emotional eating patterns including problems resisting some of the foods she feeds her children. She stated that she is somewhat conflicted about restricting food she prepares for her children that she can not eat. She stated that family events and picnics are times when making healthier choices is difficult for her. She was also able to identify things that triggers cravings for food. She shared that places, smells, sounds all trigger thoughts about food and eating. Doris Dickey stated that she has made some progress with compulsive eating patterns/binge eating and reported making the following changes: She is watching portion sizes, drinking more water and substituting healthier options. She stated that she stays focused on her goals when she is having cravings to manage them. She is listening to music, takes walks and talks to her friends when she is upset instead of reaching for food. She also stated that she has a good support system, her mother and boyfriend are supportive and she is starting to reach out more to her friends for support.        Mental Status Exam: appearance:  appropriately dressed, behavior:  normal, attitude:  cooperative, speech:  appropriate, mood:  euthymic, affect:  congruent with mood, thought content:  no evidence of psychosis, thought process:  logical and coherent, orientation:  oriented in all spheres, memory:  recent:  good and remote:  good, insight:  fair , judgment:  fair  and cognitive:  intact and intelligent    RISK ASSESSMENT    Suicide screen: denies current suicidal ideation, plan and intent    Self Injurious Behavior: denies    Homicide screen: denies current homicidal ideation, plan and intent    TREATMENT PLAN:  Goal: Increase understanding of role of emotional factors contributing to issues with food and obesity and strategies to cope. Interventions in session:  Reviewed previous information provided to the patient and reinforced the need for continued engagement in support group and other resources of the [facility]. Provided Psychoeducational regarding physical, emotional, and behavioral changes that might occur for the patient post WLS. Assignments/Recommendations: Follow-up with SW as needed. Attend [facility] support group. Follow up with present providers/all scheduled appointment at [facility]. Next steps: Follow up with SW post surgery as needed. Bariatric Surgery: Final visit:  The patient has completed a Biopsychosocial assessment and 1 educational sessions to evaluate his/her appropriateness for bariatric surgery. This patient has been compliant with all scheduled sessions and completed all assignments/recommendations including attendance at least one support group meeting. She does  present with mental health issues which appear to be stable and so would not interfere with her ability to adapt to the EBC changes that will be necessary for success. The patient appears well motivated to make necessary changes and achieve weight loss goals. Based on the information gathered during assessment and subsequent session it appears that she is a fair candidate for Bariatric surgery.      Patient and/or family/guardian verbalizes understanding of and agreement with treatment recommendations and plan: yes    Start time: 2:44 pm         End time: 3:39    Visit Time: [address], LISW

## 2022-07-11 NOTE — PATIENT INSTRUCTIONS
Assignments/Recommendations: Follow-up with SW as needed. Attend [facility] support group. Follow up with present providers/all scheduled appointment at [facility].

## 2022-07-19 ENCOUNTER — OFFICE VISIT (OUTPATIENT)
Dept: BARIATRICS/WEIGHT MGMT | Age: 27
End: 2022-07-19
Payer: COMMERCIAL

## 2022-07-19 VITALS
DIASTOLIC BLOOD PRESSURE: 86 MMHG | RESPIRATION RATE: 20 BRPM | HEIGHT: 64 IN | TEMPERATURE: 97.5 F | SYSTOLIC BLOOD PRESSURE: 148 MMHG | WEIGHT: 247 LBS | BODY MASS INDEX: 42.17 KG/M2 | HEART RATE: 86 BPM

## 2022-07-19 DIAGNOSIS — E66.01 MORBID OBESITY DUE TO EXCESS CALORIES (HCC): Primary | ICD-10-CM

## 2022-07-19 DIAGNOSIS — Z71.3 DIETARY COUNSELING: ICD-10-CM

## 2022-07-19 PROCEDURE — 1036F TOBACCO NON-USER: CPT | Performed by: NURSE PRACTITIONER

## 2022-07-19 PROCEDURE — G8427 DOCREV CUR MEDS BY ELIG CLIN: HCPCS | Performed by: NURSE PRACTITIONER

## 2022-07-19 PROCEDURE — 99213 OFFICE O/P EST LOW 20 MIN: CPT | Performed by: NURSE PRACTITIONER

## 2022-07-19 PROCEDURE — G8417 CALC BMI ABV UP PARAM F/U: HCPCS | Performed by: NURSE PRACTITIONER

## 2022-07-19 PROCEDURE — 99211 OFF/OP EST MAY X REQ PHY/QHP: CPT | Performed by: NURSE PRACTITIONER

## 2022-07-19 NOTE — PATIENT INSTRUCTIONS
What is the next step to proceed with weight loss surgery? Please be aware that any co-pays or deductibles may be requested prior to testing and / or procedures. You will need to schedule a psychological evaluation for weight loss surgery. Patients will be required to complete all psychological testing as required by the mental health provider. Patients must also follow all of the provider's recommendations before weight loss surgery can be scheduled. The evaluation must be done a standard way for weight loss surgery. We strongly recommend that you contact one of our preferred providers listed below to arrange this:      Winston Lamar, [facility]  [address] [address], [place]   [phone]    [facility] [address]   [phone]    Dr. Nela Drummond, PhD    Katie Mondragon. [place], [place]    [phone]      You will also need to plan on attending a 2 hour nutrition class at [facility] prior to your surgery. We will schedule this for you when we schedule your surgery. Please remember to have your labs drawn 10 days prior to your first scheduled dietary appointment. Please remember, that while we will submit your case to insurance for surgery authorization, it is your responsibility to know if your plan covers weight loss surgery and keep up-to-date with changes to your insurance coverage. We will do everything possible to help you get approved for weight loss surgery, but cannot guarantee an approval.     Please note that you will not be submitted to your insurance company until all pre-operative testing requirements are met.

## 2022-07-19 NOTE — PROGRESS NOTES
Jg Kruse  7/19/2022  Bariatric Weight loss appointment for Obesity  Nutrition Education Session      Subjective:   Jg Kruse is a 32 y.o. female here for pre-surgical dietary education today. Patient is by herself at today's visit. Patient reports that they are doing good following their previous dietary education. Questions today include none. Estellehas been using stevia. She has been trying to increase her water intake. She has cut out her pop intake. She has been trying to cut out caffeine as well. Weight=247 lb (112 kg)  Today's weight represents a total weight loss to date of 6 pounds. Today we will discuss the topics of phone apps to track foods. Patients previous 24 hour dietary recall includes:  Breakfast: eggs and toast   Snack: protein shake  Lunch: ramen noodles  Snack: protein shake  Dinner: none  Snack: m and m and chips  Water intake: 164 ounces  Other beverages:none  Exercise: none    Prior to Admission medications    Medication Sig Start Date End Date Taking? Authorizing Provider   gabapentin (NEURONTIN) 600 MG tablet take 1 tablet by mouth twice a day 4/1/22  Yes Historical Provider, MD   tiZANidine (ZANAFLEX) 4 MG tablet take 1 tablet by mouth three times a day if needed for SPASMS 6/6/22  Yes Historical Provider, MD   norethindrone-ethinyl estradiol ([address] [place] 1/20) 1-20 MG-MCG per tablet Take 1 tablet by mouth daily 6/24/22  Yes Paty Caicedo MD   vitamin D (ERGOCALCIFEROL) 1.25 MG (18036 UT) CAPS capsule Take 1 capsule by mouth once a week 5/18/22  Yes Stuart Arce MD   NAPROXEN  PO Take by mouth   Yes Historical Provider, MD   ibuprofen (IBU) 800 MG tablet Take 1 tablet by mouth every 8 hours as needed for Pain Take with food.  6/28/21  Yes TJ Eid - CNP       No Known Allergies  Past Medical History:   Diagnosis Date    Abnormal Papanicolaou smear of cervix with positive human papilloma virus (HPV) test     Closed fracture of distal end of right fibula 2020    Morbid obesity due to excess calories [facility])        Past Surgical History:   Procedure Laterality Date    ANKLE SURGERY Right      SECTION      LEEP  2017    UPPER GASTROINTESTINAL ENDOSCOPY N/A 2022    EGD BIOPSY performed by Hakan Ch MD at [facility]COPY       Current Outpatient Medications   Medication Sig Dispense Refill    gabapentin (NEURONTIN) 600 MG tablet take 1 tablet by mouth twice a day      tiZANidine (ZANAFLEX) 4 MG tablet take 1 tablet by mouth three times a day if needed for SPASMS      norethindrone-ethinyl estradiol (LOESTRIN FE ) 1-20 MG-MCG per tablet Take 1 tablet by mouth daily 1 packet 3    vitamin D (ERGOCALCIFEROL) 1.25 MG (05711 UT) CAPS capsule Take 1 capsule by mouth once a week 12 capsule 1    NAPROXEN DR PO Take by mouth      ibuprofen (IBU) 800 MG tablet Take 1 tablet by mouth every 8 hours as needed for Pain Take with food. 30 tablet 0     No current facility-administered medications for this visit. No Known Allergies    History reviewed. No pertinent family history. Social History     Socioeconomic History    Marital status: Single     Spouse name: Not on file    Number of children: Not on file    Years of education: Not on file    Highest education level: Not on file   Occupational History    Not on file   Tobacco Use    Smoking status: Never    Smokeless tobacco: Never   Substance and Sexual Activity    Alcohol use: No    Drug use: No    Sexual activity: Not on file   Other Topics Concern    Not on file   Social History Narrative    2 cups coffee daily.  1 5-hour energy drinks daily      Social Determinants of Health     Financial Resource Strain: Not on file   Food Insecurity: Not on file   Transportation Needs: Not on file   Physical Activity: Not on file   Stress: Not on file   Social Connections: Not on file   Intimate Partner Violence: Not on file   Housing Stability: Not on file           A complete 10 system review was performed and are otherwise negative unless mentioned in the above HPI. Specific negatives are listed below but may not include all those reviewed. General ROS: negative obtundation, AMS  ENT ROS: negative rhinorrhea, epistaxis  Allergy and Immunology ROS: negative itchy/watery eyes or nasal congestion  Hematological and Lymphatic ROS: negative spontaneous bleeding or bruising  Endocrine ROS: negative  lethargy, mood swings, palpitations or polydipsia/polyuria  Respiratory ROS: negative sputum changes, stridor, tachypnea or wheezing  Cardiovascular ROS: negative for - loss of consciousness, murmur or orthopnea  Gastrointestinal ROS: negative for - hematochezia or hematemesis  Genito-Urinary ROS: negative for -  genital discharge or hematuria  Musculoskeletal ROS: negative for - focal weakness, gangrene  Psych/Neuro ROS: negative for - visual or auditory hallucinations, suicidal ideation    Physical exam:   BP (!) 148/86 (Site: Left Lower Arm, Position: Sitting, Cuff Size: Medium Adult)   Pulse 86   Temp 97.5 °F (36.4 °C) (Temporal)   Resp 20   Ht 5' 4\" (1.626 m)   Wt 247 lb (112 kg)   BMI 42.40 kg/m²   General appearance: AAO, NAD  Eyes: PERRL, EOMI, red conjunctiva  Lungs: Equal chest rise bilateral  Chest wall: atraumatic, no tenderness, no echymosis or abrasions  Heart: reg rate, no murmur  Abdomen: soft, nondistended, tender minimal, no hernias, no peritioneal signs  Extremities: full ROM all 4 ext, no gross motor or sensory deficits  Pulses: 2+ distal  Skin: warm and dry  Neurologic: spontanous eye opening, purposeful, follows complex commands  Psych: No tremor, no suicidal ideation, no hallucinations      Assessment:   1. Morbid obesity due to excess calories ([address]  See below    2. Dietary counseling  See below    Portion Control:  Pt states he / she wants to work on decreasing the volume of food he / she is consuming daily.   Pt states he / she is going to purchase a scale and measuring cups to start to weigh and measure all foods he / she is consuming. Pt is going to prepare meals and snacks ahead of time that are portion controlled. Pt is going to reduce his / her plate size to a saucer for meals to hep with head hunger and portion distortion. Pt states if he / she is being served a meal pt is going to cut the meals in half in places where he / she is not able to use a scale in order to reduce the volume of food he / she is consuming. Pt is encouraged to eat the meal slowly and chew all bites 30 - 35 times per bite in order to increase his / her feeling of fullness and satiety. Pt is also encouraged to use smaller silverware and serving utensils to reduce the volume of food he / she is consuming. Pt states he / she is going to keep accurate food records to help with accurate portion consumption. Portions before surgery pt can review the following reference - Serving Size versus Portion Size Is There a Difference - Academy of Nutrition and Dietetics. Portions after Weight Loss Surgery pt can review the following reference - Sample menus offered in the [facility] Diet Manual. Pt verbalized understanding. 4th session of dietary education pre weight loss surgery. Patient has 2 more sessions to attend. Plan:   Continue to keep food diet, bring to next appointment with Nurse Practitioner or RD/LD  Continue to read food labels and make smart food choices  Increase protein and fluid intake as discussed in the Patient Guide to Bariatric Surgery  Increase physical activity at home    I have spent 30  minutes educating patient on nutrition. All questions were answered to patients and family's satisfaction. They verbalize the importance of pre surgical weight loss at this time.      Provider Signature: Electronically signed by TJ Zhou - CRYSTAL

## 2022-08-02 ENCOUNTER — OFFICE VISIT (OUTPATIENT)
Dept: BARIATRICS/WEIGHT MGMT | Age: 27
End: 2022-08-02
Payer: COMMERCIAL

## 2022-08-02 VITALS
HEIGHT: 64 IN | TEMPERATURE: 97.2 F | WEIGHT: 245 LBS | RESPIRATION RATE: 20 BRPM | BODY MASS INDEX: 41.83 KG/M2 | SYSTOLIC BLOOD PRESSURE: 147 MMHG | DIASTOLIC BLOOD PRESSURE: 96 MMHG | HEART RATE: 91 BPM

## 2022-08-02 DIAGNOSIS — E66.01 MORBID OBESITY DUE TO EXCESS CALORIES (HCC): Primary | ICD-10-CM

## 2022-08-02 DIAGNOSIS — Z71.3 DIETARY COUNSELING: ICD-10-CM

## 2022-08-02 PROCEDURE — 1036F TOBACCO NON-USER: CPT | Performed by: NURSE PRACTITIONER

## 2022-08-02 PROCEDURE — 99213 OFFICE O/P EST LOW 20 MIN: CPT | Performed by: NURSE PRACTITIONER

## 2022-08-02 PROCEDURE — 99211 OFF/OP EST MAY X REQ PHY/QHP: CPT

## 2022-08-02 PROCEDURE — G8427 DOCREV CUR MEDS BY ELIG CLIN: HCPCS | Performed by: NURSE PRACTITIONER

## 2022-08-02 PROCEDURE — G8417 CALC BMI ABV UP PARAM F/U: HCPCS | Performed by: NURSE PRACTITIONER

## 2022-08-02 NOTE — PROGRESS NOTES
Tony Butler  8/2/2022  Bariatric Weight loss appointment for Obesity  Nutrition Education Session      Subjective:   Tony Butler is a 32 y.o. female here for pre-surgical dietary education today. Patient is accompanied by herself at today's visit. Patient reports that they are doing good following their previous dietary education. Questions today include none. Weight=245 lb (111.1 kg)  Today's weight represents a total weight loss to date of 8 pounds. Today we will discuss the topics of increasing physical activity. She has not been using any weight loss or diet apps. 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 Authorizing Provider   gabapentin (NEURONTIN) 600 MG tablet take 1 tablet by mouth twice a day 4/1/22  Yes Historical Provider, MD   tiZANidine (ZANAFLEX) 4 MG tablet take 1 tablet by mouth three times a day if needed for SPASMS 6/6/22  Yes Historical Provider, MD   norethindrone-ethinyl estradiol ([address] [place] 1/20) 1-20 MG-MCG per tablet Take 1 tablet by mouth daily 6/24/22  Yes Freida Tidwell MD   vitamin D (ERGOCALCIFEROL) 1.25 MG (76890 UT) CAPS capsule Take 1 capsule by mouth once a week 5/18/22  Yes Genevieve Holley MD   NAPROXEN  PO Take by mouth   Yes Historical Provider, MD   ibuprofen (IBU) 800 MG tablet Take 1 tablet by mouth every 8 hours as needed for Pain Take with food.  6/28/21  Yes TJ Batista - CNP       No Known Allergies  Past Medical History:   Diagnosis Date    Abnormal Papanicolaou smear of cervix with positive human papilloma virus (HPV) test     Closed fracture of distal end of right fibula 2020    Morbid obesity due to excess calories [facility])        Past Surgical History:   Procedure Laterality Date    ANKLE SURGERY Right      SECTION      LEEP  2017    UPPER GASTROINTESTINAL ENDOSCOPY N/A 2022    EGD BIOPSY performed by Jerardo Garnica MD at [facility] ENDOSCOPY       Current Outpatient Medications   Medication Sig Dispense Refill    gabapentin (NEURONTIN) 600 MG tablet take 1 tablet by mouth twice a day      tiZANidine (ZANAFLEX) 4 MG tablet take 1 tablet by mouth three times a day if needed for SPASMS      norethindrone-ethinyl estradiol (LOESTRIN FE ) 1-20 MG-MCG per tablet Take 1 tablet by mouth daily 1 packet 3    vitamin D (ERGOCALCIFEROL) 1.25 MG (18666 UT) CAPS capsule Take 1 capsule by mouth once a week 12 capsule 1    NAPROXEN DR PO Take by mouth      ibuprofen (IBU) 800 MG tablet Take 1 tablet by mouth every 8 hours as needed for Pain Take with food. 30 tablet 0     No current facility-administered medications for this visit. No Known Allergies    History reviewed. No pertinent family history. Social History     Socioeconomic History    Marital status: Single     Spouse name: Not on file    Number of children: Not on file    Years of education: Not on file    Highest education level: Not on file   Occupational History    Not on file   Tobacco Use    Smoking status: Never    Smokeless tobacco: Never   Substance and Sexual Activity    Alcohol use: No    Drug use: No    Sexual activity: Not on file   Other Topics Concern    Not on file   Social History Narrative    2 cups coffee daily.  1 5-hour energy drinks daily      Social Determinants of Health     Financial Resource Strain: Not on file   Food Insecurity: Not on file   Transportation Needs: Not on file   Physical Activity: Not on file   Stress: Not on file   Social Connections: Not on file   Intimate Partner Violence: Not on file   Housing Stability: Not on file           A complete 10 system review was performed and are otherwise negative unless mentioned in the above HPI. Specific negatives are listed below but may not include all those reviewed. General ROS: negative obtundation, AMS  ENT ROS: negative rhinorrhea, epistaxis  Allergy and Immunology ROS: negative itchy/watery eyes or nasal congestion  Hematological and Lymphatic ROS: negative spontaneous bleeding or bruising  Endocrine ROS: negative  lethargy, mood swings, palpitations or polydipsia/polyuria  Respiratory ROS: negative sputum changes, stridor, tachypnea or wheezing  Cardiovascular ROS: negative for - loss of consciousness, murmur or orthopnea  Gastrointestinal ROS: negative for - hematochezia or hematemesis  Genito-Urinary ROS: negative for -  genital discharge or hematuria  Musculoskeletal ROS: negative for - focal weakness, gangrene  Psych/Neuro ROS: negative for - visual or auditory hallucinations, suicidal ideation    Physical exam:   BP (!) 147/96 (Site: Left Lower Arm, Position: Sitting, Cuff Size: Medium Adult)   Pulse 91   Temp 97.2 °F (36.2 °C) (Temporal)   Resp 20   Ht 5' 4\" (1.626 m)   Wt 245 lb (111.1 kg)   BMI 42.05 kg/m²   General appearance: AAO, NAD  Eyes: PERRL, EOMI, red conjunctiva  Lungs: Equal chest rise bilateral  Chest wall: atraumatic, no tenderness, no echymosis or abrasions  Heart: reg rate, no murmur  Abdomen: soft, nondistended, tender minimal, no hernias, no peritioneal signs  Extremities: full ROM all 4 ext, no gross motor or sensory deficits  Pulses: 2+ distal  Skin: warm and dry  Neurologic: spontanous eye opening, purposeful, follows complex commands  Psych: No tremor, no suicidal ideation, no hallucinations      Assessment:     1. Morbid obesity due to excess calories ([address]  We did discuss making better choices when going out to eat.      2. Dietary counseling  Continue to keep food diet, bring to next appointment with Nurse Practitioner or RD/LD  Continue to read food labels and make smart food choices  Increase protein and fluid intake as discussed in the Patient Guide to Bariatric Surgery  Increase physical activity at home    5th session of dietary education pre weight loss surgery. Patient has 1 more sessions to attend. Goals: Increase physical activity  Track food intake on surinder - make sure you are getting 65-85 g protein per day  Increase variety of foods you are eating    I have spent 30 minutes educating patient on nutrition. All questions were answered to patients and family's satisfaction. They verbalize the importance of pre surgical weight loss at this time.      Provider Signature: Electronically signed by TJ Zhou CNP

## 2022-08-02 NOTE — PATIENT INSTRUCTIONS
What is the next step to proceed with weight loss surgery? Please be aware that any co-pays or deductibles may be requested prior to testing and / or procedures. You will need to schedule a psychological evaluation for weight loss surgery. Patients will be required to complete all psychological testing as required by the mental health provider. Patients must also follow all of the provider's recommendations before weight loss surgery can be scheduled. The evaluation must be done a standard way for weight loss surgery. We strongly recommend that you contact one of our preferred providers listed below to arrange this:      Winston Lee, [facility]  [address] [place], [place]   [phone]    [facility] and [address] [facility] [address] [place], [place]   [phone]    Dr. Bc Gaspar, PhD    Brittany Aparicio. [place], [place]    [phone]      You will also need to plan on attending a 2 hour nutrition class at [facility] prior to your surgery. We will schedule this for you when we schedule your surgery. Please remember to have your labs drawn 10 days prior to your first scheduled dietary appointment. Please remember, that while we will submit your case to insurance for surgery authorization, it is your responsibility to know if your plan covers weight loss surgery and keep up-to-date with changes to your insurance coverage. We will do everything possible to help you get approved for weight loss surgery, but cannot guarantee an approval.     Please note that you will not be submitted to your insurance company until all pre-operative testing requirements are met. My fitness pal or lose it surinder    Patient was instructed on types of protein supplements and usage of protein supplements before and after surgery. The goal of protein intake after bariatric surgery is 60-80 grams daily.  Patient was able to verbalize the instruction of how to take the supplements and the importance of their use before and after surgery.

## 2022-08-06 ENCOUNTER — HOSPITAL ENCOUNTER (EMERGENCY)
Age: 27
Discharge: HOME | End: 2022-08-06
Payer: COMMERCIAL

## 2022-08-06 VITALS
DIASTOLIC BLOOD PRESSURE: 88 MMHG | RESPIRATION RATE: 20 BRPM | OXYGEN SATURATION: 99 % | HEART RATE: 98 BPM | SYSTOLIC BLOOD PRESSURE: 130 MMHG | TEMPERATURE: 97.1 F

## 2022-08-06 DIAGNOSIS — M54.32 BILATERAL SCIATICA: ICD-10-CM

## 2022-08-06 DIAGNOSIS — G89.29 ACUTE EXACERBATION OF CHRONIC LOW BACK PAIN: Primary | ICD-10-CM

## 2022-08-06 DIAGNOSIS — M54.31 BILATERAL SCIATICA: ICD-10-CM

## 2022-08-06 DIAGNOSIS — M54.50 ACUTE EXACERBATION OF CHRONIC LOW BACK PAIN: Primary | ICD-10-CM

## 2022-08-06 LAB
BACTERIA: ABNORMAL /HPF
BILIRUBIN URINE: NEGATIVE
BLOOD, URINE: ABNORMAL
CLARITY: CLEAR
COLOR: YELLOW
GLUCOSE URINE: NEGATIVE MG/DL
HCG, URINE, POC: NEGATIVE
KETONES, URINE: NEGATIVE MG/DL
LEUKOCYTE ESTERASE, URINE: NEGATIVE
Lab: NORMAL
NEGATIVE QC PASS/FAIL: NORMAL
NITRITE, URINE: NEGATIVE
PH UA: 6 (ref 5–9)
POSITIVE QC PASS/FAIL: NORMAL
PROTEIN UA: NEGATIVE MG/DL
RBC UA: ABNORMAL /HPF (ref 0–2)
SPECIFIC GRAVITY UA: 1.02 (ref 1–1.03)
UROBILINOGEN, URINE: 1 E.U./DL
WBC UA: ABNORMAL /HPF (ref 0–5)

## 2022-08-06 PROCEDURE — 6360000002 HC RX W HCPCS: Performed by: NURSE PRACTITIONER

## 2022-08-06 PROCEDURE — 81001 URINALYSIS AUTO W/SCOPE: CPT

## 2022-08-06 PROCEDURE — 96372 THER/PROPH/DIAG INJ SC/IM: CPT

## 2022-08-06 PROCEDURE — 2500000003 HC RX 250 WO HCPCS: Performed by: NURSE PRACTITIONER

## 2022-08-06 PROCEDURE — 6370000000 HC RX 637 (ALT 250 FOR IP): Performed by: NURSE PRACTITIONER

## 2022-08-06 PROCEDURE — 99284 EMERGENCY DEPT VISIT MOD MDM: CPT

## 2022-08-06 RX ORDER — ORPHENADRINE CITRATE 30 MG/ML
60 INJECTION INTRAMUSCULAR; INTRAVENOUS ONCE
Status: COMPLETED | OUTPATIENT
Start: 2022-08-06 | End: 2022-08-06

## 2022-08-06 RX ORDER — ONDANSETRON 4 MG/1
4 TABLET, ORALLY DISINTEGRATING ORAL ONCE
Status: COMPLETED | OUTPATIENT
Start: 2022-08-06 | End: 2022-08-06

## 2022-08-06 RX ORDER — NAPROXEN 500 MG/1
500 TABLET ORAL 2 TIMES DAILY PRN
Qty: 28 TABLET | Refills: 0 | Status: SHIPPED | OUTPATIENT
Start: 2022-08-06 | End: 2022-09-12

## 2022-08-06 RX ORDER — METHOCARBAMOL 500 MG/1
500 TABLET, FILM COATED ORAL 4 TIMES DAILY
Qty: 40 TABLET | Refills: 0 | Status: SHIPPED | OUTPATIENT
Start: 2022-08-06 | End: 2022-08-16

## 2022-08-06 RX ORDER — DEXAMETHASONE SODIUM PHOSPHATE 10 MG/ML
10 INJECTION, SOLUTION INTRAMUSCULAR; INTRAVENOUS ONCE
Status: COMPLETED | OUTPATIENT
Start: 2022-08-06 | End: 2022-08-06

## 2022-08-06 RX ORDER — HYDROCODONE BITARTRATE AND ACETAMINOPHEN 5; 325 MG/1; MG/1
1 TABLET ORAL EVERY 4 HOURS PRN
Qty: 4 TABLET | Refills: 0 | Status: SHIPPED | OUTPATIENT
Start: 2022-08-06 | End: 2022-08-09

## 2022-08-06 RX ORDER — PREDNISONE 10 MG/1
TABLET ORAL
Qty: 20 TABLET | Refills: 0 | Status: SHIPPED | OUTPATIENT
Start: 2022-08-06 | End: 2022-08-16

## 2022-08-06 RX ORDER — LIDOCAINE 50 MG/G
1 PATCH TOPICAL DAILY
Qty: 10 PATCH | Refills: 0 | Status: SHIPPED | OUTPATIENT
Start: 2022-08-06 | End: 2022-08-16

## 2022-08-06 RX ORDER — MORPHINE SULFATE 2 MG/ML
4 INJECTION, SOLUTION INTRAMUSCULAR; INTRAVENOUS ONCE
Status: COMPLETED | OUTPATIENT
Start: 2022-08-06 | End: 2022-08-06

## 2022-08-06 RX ORDER — KETOROLAC TROMETHAMINE 30 MG/ML
30 INJECTION, SOLUTION INTRAMUSCULAR; INTRAVENOUS ONCE
Status: COMPLETED | OUTPATIENT
Start: 2022-08-06 | End: 2022-08-06

## 2022-08-06 RX ADMIN — ONDANSETRON 4 MG: 4 TABLET, ORALLY DISINTEGRATING ORAL at 21:41

## 2022-08-06 RX ADMIN — DEXAMETHASONE SODIUM PHOSPHATE 10 MG: 10 INJECTION, SOLUTION INTRAMUSCULAR; INTRAVENOUS at 20:03

## 2022-08-06 RX ADMIN — KETOROLAC TROMETHAMINE 30 MG: 30 INJECTION, SOLUTION INTRAMUSCULAR at 21:41

## 2022-08-06 RX ADMIN — MORPHINE SULFATE 4 MG: 2 INJECTION, SOLUTION INTRAMUSCULAR; INTRAVENOUS at 20:14

## 2022-08-06 RX ADMIN — ORPHENADRINE CITRATE 60 MG: 30 INJECTION INTRAMUSCULAR; INTRAVENOUS at 20:03

## 2022-08-06 ASSESSMENT — PAIN SCALES - GENERAL
PAINLEVEL_OUTOF10: 10
PAINLEVEL_OUTOF10: 10

## 2022-08-06 ASSESSMENT — PAIN DESCRIPTION - ORIENTATION
ORIENTATION: LEFT
ORIENTATION: LOWER;MID;LEFT

## 2022-08-06 ASSESSMENT — PAIN DESCRIPTION - FREQUENCY: FREQUENCY: CONTINUOUS

## 2022-08-06 ASSESSMENT — PAIN DESCRIPTION - ONSET: ONSET: SUDDEN

## 2022-08-06 ASSESSMENT — PAIN DESCRIPTION - DESCRIPTORS: DESCRIPTORS: SHARP

## 2022-08-06 ASSESSMENT — PAIN DESCRIPTION - LOCATION
LOCATION: BACK;HIP
LOCATION: BACK

## 2022-08-06 ASSESSMENT — PAIN - FUNCTIONAL ASSESSMENT: PAIN_FUNCTIONAL_ASSESSMENT: 0-10

## 2022-08-06 ASSESSMENT — PAIN DESCRIPTION - PAIN TYPE: TYPE: ACUTE PAIN

## 2022-08-06 NOTE — ED PROVIDER NOTES
independant     HPI: Celi Goel 32 y.o. female with a past medical history of   Past Medical History:   Diagnosis Date    Abnormal Papanicolaou smear of cervix with positive human papilloma virus (HPV) test     Closed fracture of distal end of right fibula 01/03/2020    Morbid obesity due to excess calories ([facility] [address]      Presents with a bilateral lower back pain. The patient states that he has a long-standing history of chronic back pain. There is no apparent mechanism of injury for this ED Presentation. The time course of the symptoms were reported as gradual. The quality of the pain is aching. The severity of the symptoms are moderate. The patient denies any numbness, motor weakness, bowel incontinence, or bladder incontinence. The symptoms are exacerbated by movement. Patient states that the symptoms are relieved by nothing. The patient denies any history of fever. The patient denies a history of IV drug use or other high risk activities leading to epidural abscess. The patient denies any trauma. Patient presents emergency department for worsening lumbar back pain that is radiating down both lower extremities but left is worse than the right. She denies any new injury or trauma. States she does have known bulging disc to her lumbar spine. States she sees a doctor from  [facility] and at one point was going to [employer] and [employer]. Today she reports worsening pain states that she was doing walking at a festival and just the pain worsened. She denies any fevers with this no unusual rash or erythema. Does report taking over-the-counter meds without full relief. Patient denies any recent surgeries as well as no noted drug use. Patient also reports she has not seen a neurosurgeon regarding this. Patient also denies any saddle anesthesia as well as no unexplained incontinence patient with symptoms moderate in severity and persistent.       Review of Systems:   Pertinent positives and negatives are stated within HPI, all other systems reviewed and are negative.          --------------------------------------------- PAST HISTORY ---------------------------------------------  Past Medical History:  has a past medical history of Abnormal Papanicolaou smear of cervix with positive human papilloma virus (HPV) test, Closed fracture of distal end of right fibula, and Morbid obesity due to excess calories ([facility] [address] Past Surgical History:  has a past surgical history that includes LEEP (2017);  section (2014); Ankle surgery (Right); and Upper gastrointestinal endoscopy (N/A, 2022). Social History:  reports that she has never smoked. She has never used smokeless tobacco. She reports that she does not drink alcohol and does not use drugs. Family History: family history is not on file. The patients home medications have been reviewed. Allergies: Patient has no known allergies. ------------------------- NURSING NOTES AND VITALS REVIEWED ---------------------------   The nursing notes within the ED encounter and vital signs as below have been reviewed by myself. BP (!) 153/97   Pulse (!) 111   Temp 97.1 °F (36.2 °C)   Resp 20   LMP 2022   SpO2 99%   Oxygen Saturation Interpretation: Normal    The patients available past medical records and past encounters were reviewed. Physical exam:  Constitutional: Vital signs were reviewed. The patient is comfortable. Patient is alert and oriented x3. Head: Head is atraumatic and normocephalic. Eyes: There is no discharge from the eyes. Sclerae are normal.  ENT: The oropharynx is normal. The mouth is normal to inspection. Neck: Normal range of motion of the neck is present there is no JVD present no meningeal signs are present. Respiratory/chest: The chest is nontender breath sounds are normal  Cardiovascular: Regular rate and rhythm is noted. No murmurs. No rubs or gallops.   Skin: Skin is warm and dry, Skin exam normal  Neurologic exam: The patient's Kaylyn Coma Scale is 15. No focal motor deficits. There are no focal sensory deficits. Deep tendon reflexes are intact and present bilaterally in the lower extremities. Babinski is absent. Back exam: The patient has reproducible tenderness to palpation in the paravertebral lumbar region. There is no evidence of localized erythema nor is there any focal warmth to the back. The patient has no evidence of single vertebral tenderness or any single area of interspace tenderness. Pain does worsen with straight leg raise. Compartments soft full sensations intact.        -------------------------------------------------- RESULTS -------------------------------------------------  I have personally reviewed all laboratory and imaging results for this patient. Results are listed below. LABS:  No results found for this visit on 08/06/22. RADIOLOGY:  Interpreted by Radiologist.  No orders to display     During Your Evaluation in the Emergency Department Daniele Grant  received the following medication(s):    Medications   dexamethasone (PF) (DECADRON) injection 10 mg (10 mg IntraMUSCular Given 8/6/22 2003)   orphenadrine (NORFLEX) injection 60 mg (60 mg IntraMUSCular Given 8/6/22 2003)   morphine (PF) injection 4 mg (4 mg IntraMUSCular Given 8/6/22 2014)   ondansetron (ZOFRAN-ODT) disintegrating tablet 4 mg (4 mg Oral Given 8/6/22 2141)   ketorolac (TORADOL) injection 30 mg (30 mg IntraMUSCular Given 8/6/22 2141)          Medical decision making:  Acute on chronic low back pain. No evidence of cord compression nor is there any evidence of infection consistent with discitis or epidural abscess. Plan:  Review of past medical records for appropriate pain control and outpatient referral.  Patient did get some relief with medications provided. Patient with back pain its been ongoing for months.   Patient has seen specialist.  Plan will be for discharge home I did not order any imaging as this is the same pain just worsening over the last several days which brought her here to the emergency department. Patient will be discharged with prescriptions to assist with pain relief she was also provided with neurosurgery referral.  Patient educated on strict return precautions as well as when to return back to the emergency department and the importance of good follow-up care. Patient expressed understanding. Patient safely discharged home         --------------------------------- IMPRESSION AND DISPOSITION ---------------------------------    IMPRESSION  1. Acute exacerbation of chronic low back pain    2.  Bilateral sciatica        DISPOSITION  Disposition: Discharge to home  Patient condition is good           TJ Harris CNP  08/07/22 0354

## 2022-08-06 NOTE — Clinical Note
Mackenzie Palma was seen and treated in our emergency department on 8/6/2022. She may return to work on 08/11/2022. If you have any questions or concerns, please don't hesitate to call.       Saira Gann, TJ - CNP

## 2022-08-19 ENCOUNTER — OFFICE VISIT (OUTPATIENT)
Dept: NEUROSURGERY | Age: 27
End: 2022-08-19
Payer: COMMERCIAL

## 2022-08-19 VITALS
SYSTOLIC BLOOD PRESSURE: 119 MMHG | BODY MASS INDEX: 41.83 KG/M2 | HEART RATE: 85 BPM | TEMPERATURE: 98 F | HEIGHT: 64 IN | RESPIRATION RATE: 18 BRPM | DIASTOLIC BLOOD PRESSURE: 82 MMHG | WEIGHT: 245 LBS | OXYGEN SATURATION: 97 %

## 2022-08-19 DIAGNOSIS — M51.36 LUMBAR DEGENERATIVE DISC DISEASE: ICD-10-CM

## 2022-08-19 DIAGNOSIS — M54.42 CHRONIC BILATERAL LOW BACK PAIN WITH BILATERAL SCIATICA: Primary | ICD-10-CM

## 2022-08-19 DIAGNOSIS — G89.29 CHRONIC BILATERAL LOW BACK PAIN WITH BILATERAL SCIATICA: Primary | ICD-10-CM

## 2022-08-19 DIAGNOSIS — M54.41 CHRONIC BILATERAL LOW BACK PAIN WITH BILATERAL SCIATICA: Primary | ICD-10-CM

## 2022-08-19 PROCEDURE — 99203 OFFICE O/P NEW LOW 30 MIN: CPT | Performed by: STUDENT IN AN ORGANIZED HEALTH CARE EDUCATION/TRAINING PROGRAM

## 2022-08-19 PROCEDURE — 99202 OFFICE O/P NEW SF 15 MIN: CPT

## 2022-08-19 PROCEDURE — 1036F TOBACCO NON-USER: CPT | Performed by: STUDENT IN AN ORGANIZED HEALTH CARE EDUCATION/TRAINING PROGRAM

## 2022-08-19 PROCEDURE — G8427 DOCREV CUR MEDS BY ELIG CLIN: HCPCS | Performed by: STUDENT IN AN ORGANIZED HEALTH CARE EDUCATION/TRAINING PROGRAM

## 2022-08-19 PROCEDURE — G8417 CALC BMI ABV UP PARAM F/U: HCPCS | Performed by: STUDENT IN AN ORGANIZED HEALTH CARE EDUCATION/TRAINING PROGRAM

## 2022-08-19 RX ORDER — GABAPENTIN 600 MG/1
TABLET ORAL
Qty: 90 TABLET | Refills: 0 | Status: SHIPPED | OUTPATIENT
Start: 2022-08-19 | End: 2022-11-03

## 2022-08-22 ASSESSMENT — ENCOUNTER SYMPTOMS
ABDOMINAL PAIN: 0
SHORTNESS OF BREATH: 0
BACK PAIN: 1
PHOTOPHOBIA: 0
TROUBLE SWALLOWING: 0

## 2022-08-22 NOTE — PROGRESS NOTES
Subjective:      Patient ID: Kadi Cagle is a 32 y.o. female who presents for evaluation of low back pain. Patient states she has had this pain for about 1 year, but states a couple weeks ago she was walking at a fair when her back pain became progressively worse. She denies any trauma to the area. She describes this pain as sharp, stabbing shooting pain that radiates down both legs, left worse than right. She admits to numbness in her feet, cramps in her calf, and weakness associated with this pain. She has tried Physical therapy at [facility] with some relief. She currently follows with Dr. Grzegorz Diamond where she has received DELFIN which provided relief, but was unable to obtain another DELFIN d/t insurance denial. She denies any bowel or bladder incontinence with this pain. She is a nonsmoker and denies any blood thinner usage. MRI from [facility] reviewed. Review of Systems   Constitutional:  Negative for chills and fever. HENT:  Negative for trouble swallowing. Eyes:  Negative for photophobia. Respiratory:  Negative for shortness of breath. Cardiovascular:  Negative for chest pain. Gastrointestinal:  Negative for abdominal pain. Endocrine: Negative for heat intolerance. Genitourinary:  Negative for difficulty urinating and flank pain. Musculoskeletal:  Positive for arthralgias and back pain. Negative for myalgias. Skin:  Negative for wound. Neurological:  Positive for weakness and numbness. Negative for headaches. Psychiatric/Behavioral:  Negative for confusion. Objective:   Physical Exam  HENT:      Head: Normocephalic. Eyes:      Pupils: Pupils are equal, round, and reactive to light. Cardiovascular:      Rate and Rhythm: Normal rate. Pulmonary:      Effort: Pulmonary effort is normal.   Abdominal:      General: There is no distension. Musculoskeletal:         General: Normal range of motion. Cervical back: Normal range of motion.    Skin:     General: Skin is warm and dry. Neurological:      Mental Status: She is alert. Comments: A&Ox3  CN3-12 intact  Motor Strength full   Sensation intact to light touch   Reflexes normal   (-)Bilateral Straight Leg test  Mild tenderness to palpation of lumbar spine   Psychiatric:         Thought Content: Thought content normal.     Imagin2022 MRI Lumbar Spine from [facility]  Mild degenerative disc disease at L5-S1 with mild disc bulge. Multilevel mild facet arthropathy. No significant central canal stenosis, neuroforaminal narrowing or nerve root compression at any level. Assessment:      -Elena Chand is a 31 y/o female who presents low back pain that radiates down both legs, left worse than right. She has tried PT at [facility] with some relief. Has also had DELFIN with Dr. Zak Mueller with adequate relief. MRI as above. Plan:      -Pain control and expectations  -Continue with Dr. Zak uMeller- may benefit from facet joint injections  -Lumbar Flex/Ex XR  -Gabapentin refilled  -OARRS reviewed  -Follow up in clinic if conservative measures fail, or symptoms worsen  -Please call or return to clinic with worsening symptoms or any concerns.          Joesphine Litten, PA-C

## 2022-09-13 ENCOUNTER — TELEPHONE (OUTPATIENT)
Dept: BARIATRICS/WEIGHT MGMT | Age: 27
End: 2022-09-13

## 2022-09-13 ENCOUNTER — INITIAL CONSULT (OUTPATIENT)
Dept: BARIATRICS/WEIGHT MGMT | Age: 27
End: 2022-09-13

## 2022-09-13 VITALS — WEIGHT: 247 LBS | HEIGHT: 64 IN | BODY MASS INDEX: 42.17 KG/M2

## 2022-09-13 DIAGNOSIS — Z78.9 NONSMOKER: ICD-10-CM

## 2022-09-13 DIAGNOSIS — Z13.89 SCREENING FOR SUBSTANCE ABUSE: ICD-10-CM

## 2022-09-13 DIAGNOSIS — Z71.3 NUTRITIONAL COUNSELING: ICD-10-CM

## 2022-09-13 DIAGNOSIS — Z02.6 ENCOUNTER FOR EXAMINATION FOR INSURANCE PURPOSES: Primary | ICD-10-CM

## 2022-09-13 DIAGNOSIS — Z00.8 NUTRITIONAL ASSESSMENT: Primary | ICD-10-CM

## 2022-09-13 DIAGNOSIS — Z02.83 ENCOUNTER FOR DRUG SCREENING: ICD-10-CM

## 2022-09-13 PROCEDURE — 99999 PR OFFICE/OUTPT VISIT,PROCEDURE ONLY: CPT | Performed by: SURGERY

## 2022-09-13 NOTE — PROGRESS NOTES
Weight Loss Assessment: Completed by Nutrition Services RD/JASMYN Certified in Adult Weight Management:  Phone Number:  [phone]  Fax Number:   [phone]    Emory Prather   9/13/22  Weight Loss Appointment: 6th Weight Loss Appointment      Wt Readings from Last 3 Encounters:   09/13/22 247 lb (112 kg)   09/12/22 250 lb 3.2 oz (113.5 kg)   08/19/22 245 lb (111.1 kg)        247 lb (112 kg)  IBW: 151 lbs         % IBW: 164%       % EBWL: 6%           ABW: 175 lbs  % ABW: 141%       BMI: Body mass index is 42.4 kg/m². Patient's 24 Hour Recall:  Breakfast: None  Snack: SF Pudding  Lunch: Spaghetti  Snack: Wafers  Dinner: None  Snack: Cheeze-It's, Protein Shake and SF Pudding  Water Intake: 164 oz  Other Beverages: Protein Shake and SF Lemonade  Exercise: ADL's - 7 Day's A Week - Walking to the park or going to the gym 60 - 90 minutes    Education:   1. Weigh yourself daily and record it. 2. Keep documented food records daily   3. 220-225 minutes a week of moderate physical activity   4. Just be more active in day to day routine   5. Higher protein intake and a higher fiber intake. Not a high protein or a high fiber diet just a higher intake. César Peck addressed the following with the pt:  - César Peck enc pt to comply with nutrition recommendations  - César Peck enc to go back to maintenance of regular physical activity  - Periodic assessment to prevent and treat eating or other psychiatric disorders   - César Peck enc participation in support group meetings  - César Peck enc pt to go back to maintenance of daily food records and weight monitoring records   - César Peck reviewed the importance of adequate sleep and stress management  - César Peck reviewed nonfood strategies to cope with emotions and stress  - César Peck encouraged pt to practice the following: Mindful eating: Eating slowly:  Focusing on the eating experience without distraction  - César Peck enc. pt to pay attention to hunger and fullness cues  - César Peck enc meal program.    The registered dietitian spent the following time 60 minutes educating the patient and providing the patient with nutritional handouts to follow. __________________________________________________________________________________  Primary Care Physician Follow-up:  Pt. was seen by Willa José RD/JASMYN regarding weight loss education and follow-up on 9/13/22. This was the patients 6th appointment with the registered dietitian. The registered dietitian spent the following amount of time with the patient 60 minutes. Please Venetie IRA the following: The Primary Care Physician reviewed the above nutrition assessment and patient education and agrees with current diet plan. The Primary Care Physician wants the current diet plan changed to the following:_____________________________________________________________________________________________________________________________________________________________________________________________________________. Physician Signature:__________________________ Date:______________  Once signed please fax back to [facility] [phone]. We thank you for allowing us to participate in your patients care.

## 2022-09-13 NOTE — TELEPHONE ENCOUNTER
Last Dietary Appointment Notes: 22    [address] [facility]: Luis Blanca    Surgery Requested by Patient: As of 22 - RYGB    Date: 2 Hour Nutrition Class: Once all testing is complete    Rd / Ld reviewed the following with the patient:    Rd / Ld at the [facility] reviewed with the patient that he / she has not completed the following in order to proceed with bariatric surgery:     - Initial Appt with Surgeon was 3/23/22. Initial Appt is only good until 3/23/23. Testing will be required again after this date per your insurance company policy. Please remember just because you finished all of your requirements if you did not finish the requirements in a timely manner they can  and you can be required to complete these requirements over again. Each [employer] has its own set of requirements with its own set of deadlines. Once everything listed below is completed you will need to complete the following to proceed with sx. The [facility] will contact you to complete this process. You will be called in order to select your surgery date for insurance submission. You will be called and scheduled to attend a 3 Hour Nutrition Class on the type of surgery you are having completed. These are always scheduled on  from 10:00 am to 1:00 pm and dates vary depending on the type of surgery you are having completed. You will need to purchase your bariatric supplements at this appointment cost is $140.00 to $240.00. Failure to purchase supplements or attend the class will lead to your surgery being cancelled. You will need final Medical Clearance from your Primary Care Physician. Failure to complete will lead to your surgery being cancelled. You will be scheduled for a H&P appointment with your surgeon . It is at this appointment you will need to make goal weight. Failure to complete will lead to your surgery being cancelled.     You will be scheduled for PAT at [facility]

## 2022-09-16 LAB
CHOLESTEROL, TOTAL: 138 MG/DL (ref 0–199)
GLUCOSE BLD-MCNC: 85 MG/DL (ref 74–107)
HDLC SERPL-MCNC: 55 MG/DL
LDL CHOLESTEROL CALCULATED: 68 MG/DL (ref 0–99)
TRIGL SERPL-MCNC: 76 MG/DL (ref 0–149)

## 2022-10-06 ENCOUNTER — SCHEDULED TELEPHONE ENCOUNTER (OUTPATIENT)
Dept: BARIATRICS/WEIGHT MGMT | Age: 27
End: 2022-10-06

## 2022-10-06 DIAGNOSIS — Z00.8 NUTRITIONAL ASSESSMENT: Primary | ICD-10-CM

## 2022-10-06 DIAGNOSIS — Z71.3 NUTRITIONAL COUNSELING: ICD-10-CM

## 2022-10-06 PROCEDURE — 99999 PR OFFICE/OUTPT VISIT,PROCEDURE ONLY: CPT | Performed by: DIETITIAN, REGISTERED

## 2022-10-06 NOTE — PROGRESS NOTES
César Peck called the pt and scheduled the pt for the following. Pt is aware he/she needs to be down to their pre-op weight loss goal when they come in for their weight check or their sx will be cx. Pt verbalized understanding. Pre-op weight loss goal reviewed. Pt scheduled for the following see below. Pt is aware supplements are cash, check, credit or debt card. SX Type: RYGB  SX Date: ???  H&P Appt: Dr. Suzi Allen ???  Weight Check Appt: This will occur after the 3 hour class at the [facility]  3 Hour Class: At the [facility] From 8:00 - 11:00 pm on - 11/2/22 - RYGB Class  Supplements: Pt was provided a handout on approved protein supplements for use after WLS. (Handout - Emailed  - 10/5/22)Pt was instructed he / she will need to bring his / her protein supplements to the class in order to move forward with sx or sx can be cx. Handouts reviewed and provided. Pt verbalized understanding. Pt will purchase the Bariatric approved MVI, Fe+, Ca+ and Vit D at the [facility]. 2 Hour Pt Education Book: Pt needs      Pt was instructed he / she can call any time with questions. Goal Weight 243 lbs - Pt has copy of pre-op diet. Enc pt to follow pre-op diet to get down to pre-op weight loss goal. Pt verbalized understanding.   Pt is aware sx can be cx by his / her surgeon at H&P appt if he / she feels pt has not achieved pre-op weight loss goal.

## 2022-10-07 ENCOUNTER — HOSPITAL ENCOUNTER (EMERGENCY)
Age: 27
Discharge: HOME | End: 2022-10-07
Payer: COMMERCIAL

## 2022-10-07 ENCOUNTER — NURSE TRIAGE (OUTPATIENT)
Dept: OTHER | Facility: CLINIC | Age: 27
End: 2022-10-07

## 2022-10-07 ENCOUNTER — APPOINTMENT (OUTPATIENT)
Dept: CT IMAGING | Age: 27
End: 2022-10-07
Payer: COMMERCIAL

## 2022-10-07 VITALS
RESPIRATION RATE: 18 BRPM | OXYGEN SATURATION: 99 % | SYSTOLIC BLOOD PRESSURE: 128 MMHG | DIASTOLIC BLOOD PRESSURE: 78 MMHG | HEART RATE: 67 BPM | TEMPERATURE: 97.3 F

## 2022-10-07 DIAGNOSIS — M79.605 LOW BACK PAIN RADIATING TO LEFT LEG: ICD-10-CM

## 2022-10-07 DIAGNOSIS — G89.29 ACUTE EXACERBATION OF CHRONIC LOW BACK PAIN: ICD-10-CM

## 2022-10-07 DIAGNOSIS — S23.9XXA THORACIC BACK SPRAIN, INITIAL ENCOUNTER: Primary | ICD-10-CM

## 2022-10-07 DIAGNOSIS — M54.50 LOW BACK PAIN RADIATING TO LEFT LEG: ICD-10-CM

## 2022-10-07 DIAGNOSIS — M54.50 ACUTE EXACERBATION OF CHRONIC LOW BACK PAIN: ICD-10-CM

## 2022-10-07 LAB
HCG, URINE, POC: NEGATIVE
Lab: NORMAL
NEGATIVE QC PASS/FAIL: NORMAL
POSITIVE QC PASS/FAIL: NORMAL

## 2022-10-07 PROCEDURE — 6370000000 HC RX 637 (ALT 250 FOR IP): Performed by: NURSE PRACTITIONER

## 2022-10-07 PROCEDURE — 99284 EMERGENCY DEPT VISIT MOD MDM: CPT

## 2022-10-07 PROCEDURE — 72128 CT CHEST SPINE W/O DYE: CPT

## 2022-10-07 PROCEDURE — 6360000002 HC RX W HCPCS: Performed by: NURSE PRACTITIONER

## 2022-10-07 PROCEDURE — 96372 THER/PROPH/DIAG INJ SC/IM: CPT

## 2022-10-07 PROCEDURE — 72131 CT LUMBAR SPINE W/O DYE: CPT

## 2022-10-07 RX ORDER — METHYLPREDNISOLONE 4 MG/1
TABLET ORAL
Qty: 1 KIT | Refills: 0 | Status: SHIPPED | OUTPATIENT
Start: 2022-10-07 | End: 2022-10-13

## 2022-10-07 RX ORDER — KETOROLAC TROMETHAMINE 30 MG/ML
30 INJECTION, SOLUTION INTRAMUSCULAR; INTRAVENOUS ONCE
Status: COMPLETED | OUTPATIENT
Start: 2022-10-07 | End: 2022-10-07

## 2022-10-07 RX ORDER — NAPROXEN 500 MG/1
500 TABLET ORAL 2 TIMES DAILY PRN
Qty: 14 TABLET | Refills: 0 | Status: SHIPPED | OUTPATIENT
Start: 2022-10-07 | End: 2022-11-03

## 2022-10-07 RX ORDER — HYDROCODONE BITARTRATE AND ACETAMINOPHEN 5; 325 MG/1; MG/1
1 TABLET ORAL ONCE
Status: COMPLETED | OUTPATIENT
Start: 2022-10-07 | End: 2022-10-07

## 2022-10-07 RX ORDER — HYDROCODONE BITARTRATE AND ACETAMINOPHEN 5; 325 MG/1; MG/1
1 TABLET ORAL EVERY 6 HOURS PRN
Qty: 4 TABLET | Refills: 0 | Status: SHIPPED | OUTPATIENT
Start: 2022-10-07 | End: 2022-10-09

## 2022-10-07 RX ORDER — ONDANSETRON 4 MG/1
4 TABLET, ORALLY DISINTEGRATING ORAL ONCE
Status: COMPLETED | OUTPATIENT
Start: 2022-10-07 | End: 2022-10-07

## 2022-10-07 RX ORDER — DEXAMETHASONE SODIUM PHOSPHATE 10 MG/ML
10 INJECTION, SOLUTION INTRAMUSCULAR; INTRAVENOUS ONCE
Status: COMPLETED | OUTPATIENT
Start: 2022-10-07 | End: 2022-10-07

## 2022-10-07 RX ORDER — MORPHINE SULFATE 4 MG/ML
4 INJECTION, SOLUTION INTRAMUSCULAR; INTRAVENOUS ONCE
Status: COMPLETED | OUTPATIENT
Start: 2022-10-07 | End: 2022-10-07

## 2022-10-07 RX ORDER — ORPHENADRINE CITRATE 30 MG/ML
60 INJECTION INTRAMUSCULAR; INTRAVENOUS ONCE
Status: COMPLETED | OUTPATIENT
Start: 2022-10-07 | End: 2022-10-07

## 2022-10-07 RX ADMIN — DEXAMETHASONE SODIUM PHOSPHATE 10 MG: 10 INJECTION, SOLUTION INTRAMUSCULAR; INTRAVENOUS at 15:46

## 2022-10-07 RX ADMIN — ONDANSETRON 4 MG: 4 TABLET, ORALLY DISINTEGRATING ORAL at 15:39

## 2022-10-07 RX ADMIN — KETOROLAC TROMETHAMINE 30 MG: 30 INJECTION, SOLUTION INTRAMUSCULAR at 13:31

## 2022-10-07 RX ADMIN — ORPHENADRINE CITRATE 60 MG: 30 INJECTION INTRAMUSCULAR; INTRAVENOUS at 13:31

## 2022-10-07 RX ADMIN — MORPHINE SULFATE 4 MG: 4 INJECTION, SOLUTION INTRAMUSCULAR; INTRAVENOUS at 15:46

## 2022-10-07 RX ADMIN — HYDROCODONE BITARTRATE AND ACETAMINOPHEN 1 TABLET: 5; 325 TABLET ORAL at 14:28

## 2022-10-07 ASSESSMENT — PAIN DESCRIPTION - DESCRIPTORS
DESCRIPTORS: PRESSURE
DESCRIPTORS: ACHING;PRESSURE
DESCRIPTORS: TIGHTNESS;PRESSURE

## 2022-10-07 ASSESSMENT — PAIN DESCRIPTION - LOCATION
LOCATION: BACK

## 2022-10-07 ASSESSMENT — PAIN SCALES - GENERAL
PAINLEVEL_OUTOF10: 7
PAINLEVEL_OUTOF10: 7
PAINLEVEL_OUTOF10: 10
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
## 2022-10-07 NOTE — ED NOTES
Pt begins to complain of increased back pain and nausea at this time. Jim SPENCER-CRYSTAL informed and orders have been given.       Tasha Roman RN  10/07/22 [id number]

## 2022-10-07 NOTE — ED PROVIDER NOTES
[facility]  Department of Emergency Medicine   ED  Encounter Note  Admit Date/RoomTime: 10/7/2022 12:55 PM  ED Room: [facility]    NAME: Joseph Mcfarlane  : 1995  MRN: [id number]     Chief Complaint:  Back Pain (Patient was involved in a code violet and hurt her back. States the pain runs from the top to the bottom of her spine. Pt is able to ambulate, but feels shaky)    History of Present Illness       Joseph Mcfarlane is a 32 y.o. 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 She then had a dully achy throbbing pain from the top of her spine to the lower lumbar spine with radiation to the left leg. She denies any head injury, loss of consciousness, headache, dizziness, neck pain, chest pain abdominal pain, shoulder pain, wrist pain or shortness of breath. She reports she does have a history of having back pain in the past does take gabapentin daily she did not take any Tylenol or Motrin prior to arrival.  She did have an MRI 22 with [facility]. Patient has followed up with Dr. Yeny Rose for her back pain. Patient reports she usually has lower lumbar pain but it radiates to her hips and the sciatica but never radiated down her left leg until today. She denies any head injury, loc, neck injury, chest pain, shortness of breath, dizziness, headaches or abdominal pain. Since onset the symptoms have been persistent and gradually worsening and is mild-to-moderate in severity and feels \"shaky\" when attempting to ambulate.   She has associated signs & symptoms of nothing additional.   She denies any bladder or bowel incontinence , new weakness, tingling or paresthesias, recent back injection, recent spinal surgery, recent spinal/chiropractic manipulation, history of IVDA, fever, abdominal pain, bladder incontinence, bowel incontinence, bladder retention, bladder urgency, bowel urgency, saddle paresthesias , sacral numbness, burning, numbness, or tingling. The pain is aggraveated by nothing in particular and relieved by nothing in particular. She is not currently enrolled in an pain management program.      ROS   Pertinent positives and negatives are stated within HPI, all other systems reviewed and are negative. Past Medical History:  has a past medical history of Abnormal Papanicolaou smear of cervix with positive human papilloma virus (HPV) test, Closed fracture of distal end of right fibula, and Morbid obesity due to excess calories ([address] Surgical History:  has a past surgical history that includes LEEP (2017);  section (2014); Ankle surgery (Right); and Upper gastrointestinal endoscopy (N/A, 2022). Social History:  reports that she has never smoked. She has never used smokeless tobacco. She reports that she does not drink alcohol and does not use drugs. Family History: family history is not on file. Allergies: Patient has no known allergies. Physical Exam   Oxygen Saturation Interpretation: Normal.        ED Triage Vitals   BP Temp Temp Source Heart Rate Resp SpO2 Height Weight   10/07/22 1255 10/07/22 1318 10/07/22 1318 10/07/22 1245 10/07/22 1255 10/07/22 1245 -- --   (!) 145/86 97.3 °F (36.3 °C) Oral (!) 111 18 99 %           Constitutional:  Alert, development consistent with age. HEENT:  NC/NT. Airway patent. Neck:  Normal ROM. Supple. Respiratory:  Clear to auscultation and breath sounds equal.  CV:  Regular rate and rhythm, normal heart sounds, without pathological murmurs, ectopy, gallops, or rubs.   GI: Abdomen Soft, nontender, good bowel sounds. No firm or pulsatile mass. Back: upper, middle, and lower thoracic spine, lumbar spine, and sacral spine bilateral, central, diffuse, midline, and paraspinal.             Tenderness: Moderate to thoracic, lumbar spine and bilateral para spinous muscles. .             Swelling: no.              Range of Motion: diminished range with pain. CVA Tenderness: No CVA tenderness. Straight leg raising:  Bilateral positive at 40 degrees. Skin:  no wounds, erythema, or swelling. Distal Function:              Motor deficit: none. Sensory deficit: none. Full sensation intact to light touch in bilateral lower extremities. Pulse deficit: none. 2 + pedal and posterior tibial pulses intact bilaterally. Calf Tenderness:  No Bilateral.               Edema:  none Both lower extremity(s). Deep Tendon Reflexes (DTR's) to bilateral knee's and ankle's are normo-reflexive   Gait:  normal with use of required mobility aid. Integument:  Normal turgor. Warm, dry, without visible rash. Lymphatics: No lymphangitis or adenopathy noted. Neurological:  Oriented. Motor functions intact. NEXUS Criteria For C-Spine Imaging:     []   Focal Neurologic Deficit Present? []   Midline Spinal Tenderness Present? []   Altered Level of Consciousness Present? []   Intoxication Present? []   Distracting Injury Present? Lab / Imaging Results   (All laboratory and radiology results have been personally reviewed by myself)  Labs:  Results for orders placed or performed during the hospital encounter of 10/07/22   POC Pregnancy Urine Qual   Result Value Ref Range    HCG, Urine, POC Negative Negative    Lot Number [id number]     Positive QC Pass/Fail Pass     Negative QC Pass/Fail Pass        Imaging: All Radiology results interpreted by Radiologist unless otherwise noted.   CT LUMBAR SPINE WO CONTRAST   Final Result   No acute abnormality involving the lumbar spine. CT THORACIC SPINE WO CONTRAST   Final Result   No acute abnormality involving thoracic spine. ED Course / Medical Decision Making     Medications   ketorolac (TORADOL) injection 30 mg (30 mg IntraMUSCular Given 10/7/22 1331)   orphenadrine (NORFLEX) injection 60 mg (60 mg IntraMUSCular Given 10/7/22 1331)   HYDROcodone-acetaminophen (NORCO) 5-325 MG per tablet 1 tablet (1 tablet Oral Given 10/7/22 1428)   ondansetron (ZOFRAN-ODT) disintegrating tablet 4 mg (4 mg Oral Given 10/7/22 1539)   dexamethasone (PF) (DECADRON) injection 10 mg (10 mg IntraMUSCular Given 10/7/22 1546)   morphine sulfate (PF) injection 4 mg (4 mg IntraMUSCular Given 10/7/22 1546)        Re-examination:  10/7/22       Time: 1537 Discussed results of normal CT and patient tearful and nauseated from norco and will give zofran and getting no relief and will have her mother pick her up and will re medicate. Her heart rate did improve to 81 and is not longer tachycardic since arrival.   1650 patient was able to ambulate without any assistive devices and states her ride is here to pick her up. Patient does have muscle relaxants at home we will give a short course of steroid and NSAIDs. Advised she can follow-up with Dr. Roa Favor her orthopedic. Patient will also be given off till the 11th since she will be taking muscle relaxants and will give her a short course of narcotics. Patients condition is improving after treatment. Consult(s):   None    Procedure(s):   None    Medical Decision Making:    Imaging was obtained based on low suspicion for fracture / bony abnormality as per history/physical findings. Patient has no signs of cauda equina syndrome at this time she has no neurologic or sensory deficit on examination. CT of the lumbar and thoracic spine are negative for any acute findings.   She did have an MRI that revealed bulging disc on record and will treat additionally for exacerbation of chronic pain with steroids. She denies any IV drug use has no systemic symptoms and is afebrile nontoxic in appearance and has no signs of acute discitis. Patient did get nauseated from [address] and given Zofran and did resolve. She was remedicated with morphine since she was getting no relief with Toradol, Norflex and did experience moderate relief and will discharge with short course of norco and give a few days off work before returning. The patient is without objective evidence of an acute process requiring inpatient management. Patient will be discharged with short course of NSAIDs, Norco and Medrol Dosepak. Advised on taking food with Medrol Dosepak. Also advised on precautions while taking opioids. Discussed follow-up with PCP and with her orthopedic. Advised on red flag symptoms warranting immediate return to the ED for reevaluation anytime. Patient also advised on follow-up with corporate care and to avoid heavy lifting at this time until she is cleared from corporate care. Patient was ambulatory on discharge with a steady gait and no assistive devices and mother is coming to give her. Controlled Substance Monitoring:    Acute and Chronic Pain Monitoring:   RX Monitoring 10/7/2022   Periodic Controlled Substance Monitoring Possible medication side effects, risk of tolerance/dependence & alternative treatments discussed. ;No signs of potential drug abuse or diversion identified. Plan of Care/Counseling:  TJ Stern CNP reviewed today's visit with the patient in addition to providing specific details for the plan of care and counseling regarding the diagnosis and prognosis. Questions are answered at this time and are agreeable with the plan. Assessment      1. Thoracic back sprain, initial encounter    2. Low back pain radiating to left leg    3. Acute exacerbation of chronic low back pain      Plan   Discharged home.   Patient condition is good    New Medications     Discharge Medication List as of 10/7/2022  4:41 PM        START taking these medications    Details   methylPREDNISolone (MEDROL, KEITH,) 4 MG tablet Take by mouth., Disp-1 kit, R-0Normal      naproxen (NAPROSYN) 500 MG tablet Take 1 tablet by mouth 2 times daily as needed for Pain (take with food and full glass of water.), Disp-14 tablet, R-0Normal      HYDROcodone-acetaminophen (NORCO) 5-325 MG per tablet Take 1 tablet by mouth every 6 hours as needed for Pain for up to 4 doses. , Disp-4 tablet, R-0Normal           Electronically signed by TJ Mclain CNP   DD: 10/7/22  **This report was transcribed using voice recognition software. Every effort was made to ensure accuracy; however, inadvertent computerized transcription errors may be present.   END OF ED PROVIDER NOTE      TJ Mclain CNP  10/07/22 [address], APRN - CNP  10/07/22 7277

## 2022-10-07 NOTE — ED NOTES
Pt ambulated in awan roughly 100' pt gait was slow and steady, there was no signs of dizziness or shortness of breath.       [facility], RN  10/07/22 8398

## 2022-10-10 ENCOUNTER — TELEPHONE (OUTPATIENT)
Dept: BARIATRICS/WEIGHT MGMT | Age: 27
End: 2022-10-10

## 2022-10-10 DIAGNOSIS — E66.01 MORBID OBESITY (HCC): Primary | ICD-10-CM

## 2022-10-10 DIAGNOSIS — E46 MALNUTRITION, UNSPECIFIED TYPE (HCC): ICD-10-CM

## 2022-10-10 NOTE — LETTER
Medical Clearance / Medical Necessity for Erum-en-Y Gastric Bypass    Name: Kane Burn                                     YOB: 1995    I have been caring for the above patient for _____ years. He/she suffers from the following medical conditions:  __________________________________________________________________________________________________________________________________________________________________________________________________________________    The above medical conditions are either caused by or worsened by the patients morbid obesity. Yes_________ No__________    The above medical conditions are currently stable:      Yes_________ No__________    The following medical conditions are difficult to manage/require multiple medications to achieve control due to the patients weight: ______________________________________________________________________  ______________________________________________________________________                    The above patient has participated in the following medical weight loss efforts without long-term weight reduction:  ____________________________________________________________________________________________________________________________________________    I am in support of my patient undergoing a weight loss surgical procedure with [address] [place] [address] [facility] and the patient understands the risks and benefits of weight loss surgery. The patient has reasonable expectations and I believe the patient will be compliant with all post-surgical requirements. I understand the program is comprehensive with dedicated and specially trained staff.  In the event that my patient is over the age of 61, I would like to state that the patients physiological age and co-morbid conditions result in a positive risk to benefit ratio.    ________ I understand that extended-release medications are not recommended after bariatric surgery and, if possible, I will change my patients medications to another option. If my patient is diabetic, we will establish a plan for medications and blood sugar checks for post-surgery. These medications may need to be adjusted or stopped after weight loss surgery. The plan after surgery will be:  ________________________________________________________________________________________________________________________________________________________________________________________________________________________________________________________________________________________            If you do not currently manage your patients diabetic medications, what physician does? _____________________________________________________________________        ________  In my medical opinion, there are no medical contraindications to proceed with gastric bypass surgery and the patients benefits of surgery outweigh the risks of surgery.       Physician Name (Please Print): __________________________________    Primary Care Physicians Signature: __________________________________    Date: ______/______/______

## 2022-10-10 NOTE — TELEPHONE ENCOUNTER
Preparing for insurance submission and need TSH first. Discussed with patient, order placed and she will go have this done. Also discussed that we will need medical clearance. She has not seen PCP recently. I told her I will  fax request, and she needs to call and schedule appt.

## 2022-10-17 ENCOUNTER — TELEPHONE (OUTPATIENT)
Dept: BARIATRICS/WEIGHT MGMT | Age: 27
End: 2022-10-17

## 2022-10-17 NOTE — TELEPHONE ENCOUNTER
Received medical clearance and normal TSH level. Discussed with patient. Case prepared and submitted online to [address] [place] [address] with request for LRYGB 1-9-2023 (pt request). Online confirmation received.

## 2022-10-18 ENCOUNTER — HOSPITAL ENCOUNTER (OUTPATIENT)
Dept: SLEEP CENTER | Age: 27
Discharge: HOME | End: 2022-10-18
Payer: COMMERCIAL

## 2022-10-18 DIAGNOSIS — R06.83 SNORES: Primary | ICD-10-CM

## 2022-10-18 PROCEDURE — 95810 POLYSOM 6/> YRS 4/> PARAM: CPT

## 2022-10-18 RX ORDER — ERGOCALCIFEROL 1.25 MG/1
CAPSULE ORAL
Qty: 12 CAPSULE | Refills: 1 | Status: SHIPPED | OUTPATIENT
Start: 2022-10-18

## 2022-10-19 VITALS — HEIGHT: 64 IN | BODY MASS INDEX: 42.68 KG/M2 | HEART RATE: 85 BPM | WEIGHT: 250 LBS | OXYGEN SATURATION: 97 %

## 2022-10-19 ASSESSMENT — SLEEP AND FATIGUE QUESTIONNAIRES
HOW LIKELY ARE YOU TO NOD OFF OR FALL ASLEEP WHILE SITTING AND READING: 3
HOW LIKELY ARE YOU TO NOD OFF OR FALL ASLEEP WHILE SITTING QUIETLY AFTER LUNCH WITHOUT ALCOHOL: 3
HOW LIKELY ARE YOU TO NOD OFF OR FALL ASLEEP WHEN YOU ARE A PASSENGER IN A CAR FOR AN HOUR WITHOUT A BREAK: 3
HOW LIKELY ARE YOU TO NOD OFF OR FALL ASLEEP WHILE SITTING INACTIVE IN A PUBLIC PLACE: 3
HOW LIKELY ARE YOU TO NOD OFF OR FALL ASLEEP WHILE WATCHING TV: 3
ESS TOTAL SCORE: 23
HOW LIKELY ARE YOU TO NOD OFF OR FALL ASLEEP WHILE LYING DOWN TO REST IN THE AFTERNOON WHEN CIRCUMSTANCES PERMIT: 3
HOW LIKELY ARE YOU TO NOD OFF OR FALL ASLEEP WHILE SITTING AND TALKING TO SOMEONE: 3
HOW LIKELY ARE YOU TO NOD OFF OR FALL ASLEEP IN A CAR, WHILE STOPPED FOR A FEW MINUTES IN TRAFFIC: 2

## 2022-10-20 NOTE — PROGRESS NOTES
[address] [address]                               SLEEP STUDY REPORT    PATIENT NAME: Maryan Hawkins                :        1995  MED REC NO:   [id number]                            ROOM:  ACCOUNT NO:   [de-identified]                           ADMIT DATE: 10/18/2022  PROVIDER:     Zurdo Pfeiffer MD    DATE OF STUDY:  10/18/2022    REFERRING PROVIDER:  Edgar Guillen M.D.    STUDY PERFORMED:  Polysomnography. INDICATION FOR STUDY:  Witnessed apnea, excessive daytime sleepiness,  wakes gasping, loud snore, restless sleep, morning headaches, and  trouble with memory/concentration. CURRENT MEDICATIONS:  Gabapentin, Zanaflex, and oral contraceptive. INTERPRETATION:  SLEEP ARCHITECTURE:  This patient had a total time in bed of 409  minutes. Total sleep time was 323 minutes. Sleep efficiency was 79%  and sleep latency was 19 minutes. REM latency was 180 minutes. SLEEP STAGING:  The patient was awake 21% of the time in bed. Stage N1  was 5% and N2 was 77% of the total sleep time. Slow wave sleep was 2%  of the sleep time and stage REM sleep was 15% of the sleep time. RESPIRATION SUMMARY:  APNEA:  There was one apneic event which was a central event that  occurred during non-REM stage sleep and lasted 12 seconds. HYPOPNEA:  There were 13 hypopneic events, maximum duration was 37  seconds and eight occurred during REM stage sleep. APNEA/HYPOPNEA INDEX:  The apnea/hypopnea index was less than 3. AROUSAL ANALYSIS:  There were 29 arousals/awakenings, the sleep  disruption index is normal.    LIMB MOVEMENT SUMMARY:  There were two limb movements, the limb movement  index was normal.    OXYGEN SATURATION:  Average oxygen saturation while awake was 97%. Lowest saturation was 91%. HEART RATE SUMMARY:  Average heart rate while awake was 94 beats per  minute.   Maximum heart rate during sleep was 112 beats per minute and  minimum heart rate during sleep was 69 beats per minute. There were no  ectopic beats noted. MISCELLANEOUS:  Adrian Sleepiness Scale score is 23/24. Snoring was  mild. This was graded as 3 on a 1 to 10 scale. There was no apparent  bruxism. Bedtime and rise time are variable. IMPRESSION:  1. No evidence of sleep apnea. 2.  Very high Adrian Sleepiness Scale score. 3.  Excellent oxygen saturation. 4.  Mild snoring. 5.  No cardiac dysrhythmia. DISCUSSION:  This patient has an apnea/hypopnea index of less than  three, which is normal.  There is mild snoring and no oxygen  desaturation. Therefore, based on the results of this study, there is  no indication to treat for sleep apnea. It should be noted that the  bedtime and rise time are variable. If this patient is not getting  sufficient sleep, this could explain the high Adrian Sleepiness Scale  score as could the patient taking gabapentin. However, there is also a  history of what sounds like sleep paralysis and vivid dreams. Therefore, it is possible, (although unlikely), that this patient does  have a hypersomnolence state such as narcolepsy. Because of this,  although there is no indication to treat for sleep apnea, the patient  will be seen. PLAN:  1. No treatment for sleep apnea. 2.  The patient to be seen to discuss the results of study and determine  if further evaluation/treatment is indicated.         Jass Brar MD  Diplomat of Sleep Medicine    D: 10/19/2022 15:04:44       T: 10/19/2022 15:08:04     [id number]  Job#: [id number]     Doc#: [id number]    CC:

## 2022-10-25 ENCOUNTER — TELEPHONE (OUTPATIENT)
Dept: BARIATRICS/WEIGHT MGMT | Age: 27
End: 2022-10-25

## 2022-10-25 ENCOUNTER — PREP FOR PROCEDURE (OUTPATIENT)
Dept: BARIATRICS/WEIGHT MGMT | Age: 27
End: 2022-10-25

## 2022-10-25 PROBLEM — E66.01 MORBID OBESITY (HCC): Status: ACTIVE | Noted: 2022-10-25

## 2022-10-25 NOTE — TELEPHONE ENCOUNTER
Per order of Dr. Ying Fuentes patient is ready to be scheduled for LRYGB. Call placed to patient and she is in agreement with surgery on 1-9-23. Pre-op class is scheduled and patient knows she will need to purchase supplements at that visit. Pre-op letter will be mailed. She is working towards pre-op weight loss goal.   She does not smoke and will refrain from alcohol use. We reviewed at length all meds to avoid 2 weeks prior to surgery and patient voiced understanding. This list is in the pre-op letter which will be mailed to patient. Reviewed with patient instructions for Gatorade the night before surgery. Included written instructions with pre-op letter with consideration if patient is on insulin. She denies use a CPAP. She has final medical clearance. Instructed to discuss with PCP extended release medications. Patient placed on [employer] calendar. Patient surgery ordered in [employer]. Patient denies latex allergy. Patient confirms PONV.

## 2022-10-28 ENCOUNTER — PREP FOR PROCEDURE (OUTPATIENT)
Dept: SURGERY | Age: 27
End: 2022-10-28

## 2022-10-28 RX ORDER — SODIUM CHLORIDE, SODIUM LACTATE, POTASSIUM CHLORIDE, CALCIUM CHLORIDE 600; 310; 30; 20 MG/100ML; MG/100ML; MG/100ML; MG/100ML
INJECTION, SOLUTION INTRAVENOUS CONTINUOUS
OUTPATIENT
Start: 2022-10-28

## 2022-10-28 RX ORDER — SODIUM CHLORIDE 0.9 % (FLUSH) 0.9 %
5-40 SYRINGE (ML) INJECTION EVERY 12 HOURS SCHEDULED
OUTPATIENT
Start: 2022-10-28

## 2022-10-28 RX ORDER — SODIUM CHLORIDE 0.9 % (FLUSH) 0.9 %
5-40 SYRINGE (ML) INJECTION PRN
OUTPATIENT
Start: 2022-10-28

## 2022-10-28 RX ORDER — SODIUM CHLORIDE 9 MG/ML
INJECTION, SOLUTION INTRAVENOUS PRN
OUTPATIENT
Start: 2022-10-28

## 2022-10-28 RX ORDER — ONDANSETRON 2 MG/ML
4 INJECTION INTRAMUSCULAR; INTRAVENOUS ONCE
OUTPATIENT
Start: 2022-10-28 | End: 2022-10-28

## 2022-10-28 RX ORDER — SCOLOPAMINE TRANSDERMAL SYSTEM 1 MG/1
1 PATCH, EXTENDED RELEASE TRANSDERMAL
OUTPATIENT
Start: 2022-10-28 | End: 2022-10-31

## 2022-11-02 ENCOUNTER — INITIAL CONSULT (OUTPATIENT)
Dept: BARIATRICS/WEIGHT MGMT | Age: 27
End: 2022-11-02

## 2022-11-02 VITALS — BODY MASS INDEX: 42.34 KG/M2 | WEIGHT: 248 LBS | HEIGHT: 64 IN

## 2022-11-02 DIAGNOSIS — Z71.3 NUTRITIONAL COUNSELING: ICD-10-CM

## 2022-11-02 DIAGNOSIS — Z00.8 NUTRITIONAL ASSESSMENT: Primary | ICD-10-CM

## 2022-11-02 PROCEDURE — 99999 PR OFFICE/OUTPT VISIT,PROCEDURE ONLY: CPT | Performed by: SURGERY

## 2022-11-02 NOTE — PROGRESS NOTES
Patient attended a 3 Hour Initial Nutrition Consult Class for RYGB on 11/2/22. Patient was able to verbalize understanding of the class. [facility] and [address] [place] [address] [facility]  Nutrition History and Physical     Mason Boeck    Surgery Type: RYGB  Today's Date: 11/2/22    yes  Patient attended a 3 hour nutrition education class on 11/2/22, and was given written educational material.  Patient signed and verbalized understanding of nutrition therapy for Bariatric Surgery. Assessment:              Vitals:    11/02/22 1446   Weight: 248 lb (112.5 kg)   Height: 5' 4\" (1.626 m)    Height: 5' 4\" (1.626 m) Weight: 248 lb (112.5 kg)   BMI:Body mass index is 42.57 kg/m². Food Allergies and Allergies: No    Food Intolerances: No   Eating Problems:No  Chewing Problems:No  Swallowing Problems:No    Current Vitamins/Supplements and Medications:  Current Outpatient Medications:     vitamin D (ERGOCALCIFEROL) 1.25 MG (31822 UT) CAPS capsule, take 1 capsule by mouth every week, Disp: 12 capsule, Rfl: 1    naproxen (NAPROSYN) 500 MG tablet, Take 1 tablet by mouth 2 times daily as needed for Pain (take with food and full glass of water.), Disp: 14 tablet, Rfl: 0    tiZANidine (ZANAFLEX) 4 MG tablet, take 1 tablet by mouth three times a day if needed for SPASM(S), Disp: , Rfl:     norethindrone-ethinyl estradiol (LOESTRIN FE 1/20) 1-20 MG-MCG per tablet, Take 1 tablet by mouth daily, Disp: 1 packet, Rfl: 1    gabapentin (NEURONTIN) 600 MG tablet, take 1 tablet by mouth twice a day, Disp: 90 tablet, Rfl: 0  _    Please check all that apply:  Yes - Patient lost 10% of excess body weight prior to surgery  Yes - Patient  is able to verbalize a Bariatric Full Liquid Diet. Yes - Patient is able to verbalize the usage & importance of Protein Supplements. No- Patient did not purchase 3 month supply of protein vitamins and calcium.   YES - Patient is instructed to follow a low-fat diet from now until surgery date.  YES - Patient is instructed to take 30 grams of a protein supplement from  now until surgery date in addition to low-fat diet guidelines. YES - Patient is instructed to consume 64 ounces of water daily from now until surgery date.  ________________________________________________________________________  Yes - Patient did not lose 10% of excess body weight prior to surgery  ________________________________________________________________________  No - Patient did not purchase 3 month supply of protein, vitamins and Calcium. Comments:   [address] [employer] [place] Supplements - Pt will purchase at [facility]P and bring her protein supplement.  Appt 12/28/22

## 2022-11-02 NOTE — PATIENT INSTRUCTIONS
[facility]  Dietary Follow-up Appointment Instructions    Deana Seymour   Date: 11/2/2022     The RD / LD reviewed the following instructions with the patient and handouts have been given. Pt. is able to verbalize instruction and has been instructed to call with any problems or complications. If patient is not able to comply with the dietary or supplement instructions given pt. is instructed to call the [facility] at [phone]. Patient has been instructed to continue to follow a low-fat diet from today's date until the day before surgery. Patient has been instructed to drink 64 oz. of water daily eliminating carbonated and caffeinated beverages. ___________________________________________________________________________________________________  3 Month Supply of Supplements:    Since patient did not purchase a 3 month supply of protein, vitamins and calcium.   Patient needs to return to the [facility] on 12/28/22 with proof of 3 month supply of protein, vitamins and calcium supplements.    ___________________________________________________________________________________________________________

## 2022-12-13 ENCOUNTER — TELEPHONE (OUTPATIENT)
Dept: BARIATRICS/WEIGHT MGMT | Age: 27
End: 2022-12-13

## 2022-12-13 NOTE — TELEPHONE ENCOUNTER
Patient called in to cancel her surgery scheduled for 1-9-2023. She said she had to take a leave from work for another reason and cannot get any more time off. She needs to postpone until possibly around May. I reviewed with her that she may need to repeat/update many things by that time. She will call back in when she is able to resume the program, and will have to determine what needs repeated at that time. She voiced understanding. I called surgery scheduling and removed her from the [employer] calendar. H&P and 2 week post op appts cancelled.

## 2022-12-29 ENCOUNTER — APPOINTMENT (OUTPATIENT)
Dept: CT IMAGING | Age: 27
End: 2022-12-29
Payer: COMMERCIAL

## 2022-12-29 ENCOUNTER — HOSPITAL ENCOUNTER (EMERGENCY)
Age: 27
Discharge: HOME | End: 2022-12-29
Attending: EMERGENCY MEDICINE
Payer: COMMERCIAL

## 2022-12-29 VITALS
HEART RATE: 90 BPM | TEMPERATURE: 98 F | OXYGEN SATURATION: 99 % | SYSTOLIC BLOOD PRESSURE: 168 MMHG | RESPIRATION RATE: 16 BRPM | DIASTOLIC BLOOD PRESSURE: 99 MMHG

## 2022-12-29 DIAGNOSIS — G51.0 BELL'S PALSY: Primary | ICD-10-CM

## 2022-12-29 LAB
ALBUMIN SERPL-MCNC: 4 G/DL (ref 3.5–5.2)
ALP BLD-CCNC: 62 U/L (ref 35–104)
ALT SERPL-CCNC: 6 U/L (ref 0–32)
ANION GAP SERPL CALCULATED.3IONS-SCNC: 14 MMOL/L (ref 7–16)
AST SERPL-CCNC: 15 U/L (ref 0–31)
BASOPHILS ABSOLUTE: 0.03 E9/L (ref 0–0.2)
BASOPHILS RELATIVE PERCENT: 0.2 % (ref 0–2)
BILIRUB SERPL-MCNC: <0.2 MG/DL (ref 0–1.2)
BUN BLDV-MCNC: 13 MG/DL (ref 6–20)
CALCIUM SERPL-MCNC: 9.4 MG/DL (ref 8.6–10.2)
CHLORIDE BLD-SCNC: 106 MMOL/L (ref 98–107)
CO2: 21 MMOL/L (ref 22–29)
CREAT SERPL-MCNC: 0.8 MG/DL (ref 0.5–1)
EKG ATRIAL RATE: 79 BPM
EKG P AXIS: 43 DEGREES
EKG P-R INTERVAL: 134 MS
EKG Q-T INTERVAL: 348 MS
EKG QRS DURATION: 86 MS
EKG QTC CALCULATION (BAZETT): 399 MS
EKG R AXIS: 72 DEGREES
EKG T AXIS: 55 DEGREES
EKG VENTRICULAR RATE: 79 BPM
EOSINOPHILS ABSOLUTE: 0 E9/L (ref 0.05–0.5)
EOSINOPHILS RELATIVE PERCENT: 0 % (ref 0–6)
GFR SERPL CREATININE-BSD FRML MDRD: >60 ML/MIN/1.73
GLUCOSE BLD-MCNC: 104 MG/DL (ref 74–99)
HCG, URINE, POC: NEGATIVE
HCT VFR BLD CALC: 40.2 % (ref 34–48)
HEMOGLOBIN: 13.3 G/DL (ref 11.5–15.5)
IMMATURE GRANULOCYTES #: 0.12 E9/L
IMMATURE GRANULOCYTES %: 0.8 % (ref 0–5)
INR BLD: 1.1
LYMPHOCYTES ABSOLUTE: 1.58 E9/L (ref 1.5–4)
LYMPHOCYTES RELATIVE PERCENT: 10.1 % (ref 20–42)
Lab: NORMAL
MCH RBC QN AUTO: 30.6 PG (ref 26–35)
MCHC RBC AUTO-ENTMCNC: 33.1 % (ref 32–34.5)
MCV RBC AUTO: 92.4 FL (ref 80–99.9)
MONOCYTES ABSOLUTE: 0.56 E9/L (ref 0.1–0.95)
MONOCYTES RELATIVE PERCENT: 3.6 % (ref 2–12)
NEGATIVE QC PASS/FAIL: NORMAL
NEUTROPHILS ABSOLUTE: 13.39 E9/L (ref 1.8–7.3)
NEUTROPHILS RELATIVE PERCENT: 85.3 % (ref 43–80)
PDW BLD-RTO: 13.4 FL (ref 11.5–15)
PLATELET # BLD: 310 E9/L (ref 130–450)
PMV BLD AUTO: 10.9 FL (ref 7–12)
POSITIVE QC PASS/FAIL: NORMAL
POTASSIUM SERPL-SCNC: 3.9 MMOL/L (ref 3.5–5)
PROTHROMBIN TIME: 11.8 SEC (ref 9.3–12.4)
RBC # BLD: 4.35 E12/L (ref 3.5–5.5)
SODIUM BLD-SCNC: 141 MMOL/L (ref 132–146)
TOTAL PROTEIN: 7.7 G/DL (ref 6.4–8.3)
TROPONIN, HIGH SENSITIVITY: <6 NG/L (ref 0–9)
WBC # BLD: 15.7 E9/L (ref 4.5–11.5)

## 2022-12-29 PROCEDURE — 85610 PROTHROMBIN TIME: CPT

## 2022-12-29 PROCEDURE — 84484 ASSAY OF TROPONIN QUANT: CPT

## 2022-12-29 PROCEDURE — 85025 COMPLETE CBC W/AUTO DIFF WBC: CPT

## 2022-12-29 PROCEDURE — 80053 COMPREHEN METABOLIC PANEL: CPT

## 2022-12-29 PROCEDURE — 96372 THER/PROPH/DIAG INJ SC/IM: CPT

## 2022-12-29 PROCEDURE — 93005 ELECTROCARDIOGRAM TRACING: CPT | Performed by: PHYSICIAN ASSISTANT

## 2022-12-29 PROCEDURE — 6360000002 HC RX W HCPCS: Performed by: EMERGENCY MEDICINE

## 2022-12-29 PROCEDURE — 70450 CT HEAD/BRAIN W/O DYE: CPT

## 2022-12-29 PROCEDURE — 93010 ELECTROCARDIOGRAM REPORT: CPT | Performed by: INTERNAL MEDICINE

## 2022-12-29 PROCEDURE — 99284 EMERGENCY DEPT VISIT MOD MDM: CPT

## 2022-12-29 RX ORDER — KETOROLAC TROMETHAMINE 30 MG/ML
30 INJECTION, SOLUTION INTRAMUSCULAR; INTRAVENOUS ONCE
Status: COMPLETED | OUTPATIENT
Start: 2022-12-29 | End: 2022-12-29

## 2022-12-29 RX ORDER — ONDANSETRON 4 MG/1
4 TABLET, ORALLY DISINTEGRATING ORAL EVERY 8 HOURS PRN
Qty: 21 TABLET | Refills: 0 | Status: SHIPPED | OUTPATIENT
Start: 2022-12-29

## 2022-12-29 RX ORDER — PROCHLORPERAZINE MALEATE 10 MG
10 TABLET ORAL EVERY 6 HOURS PRN
Qty: 30 TABLET | Refills: 0 | Status: SHIPPED | OUTPATIENT
Start: 2022-12-29

## 2022-12-29 RX ORDER — ERYTHROMYCIN 5 MG/G
OINTMENT OPHTHALMIC EVERY 6 HOURS
Qty: 3.5 G | Refills: 0 | Status: SHIPPED | OUTPATIENT
Start: 2022-12-29 | End: 2023-01-05

## 2022-12-29 RX ADMIN — KETOROLAC TROMETHAMINE 30 MG: 30 INJECTION, SOLUTION INTRAMUSCULAR; INTRAVENOUS at 13:23

## 2022-12-29 ASSESSMENT — ENCOUNTER SYMPTOMS
SHORTNESS OF BREATH: 0
BACK PAIN: 0
COUGH: 0
NAUSEA: 0
BLOOD IN STOOL: 0
RHINORRHEA: 0
ABDOMINAL PAIN: 0
COLOR CHANGE: 0
VOMITING: 0

## 2022-12-29 NOTE — ED NOTES
Department of Emergency Medicine  FIRST PROVIDER TRIAGE NOTE             Independent MLP           12/29/22  9:20 AM EST    Date of Encounter: 12/29/22   MRN: [id number]      HPI: Minal Biggs is a 32 y.o. female who presents to the ED for Facial Swelling, Facial Droop, and Aphasia (Stutter noted. That is new. States that she had a Novocain feeling on the right side of her face since last Wednesday. States sxs progressing.  )    Patient was seen and evaluated for 1 week of right-sided facial droop and states she has bilateral tingling in her arms. Patient usually does not have high blood pressure, but states since the symptoms have started she has had it. Patient states she has a \"Novocain feeling on the right side of her face. \"  Patient denies any weakness in her upper or lower extremity. Patient is fully ambulatory. Patient denies any recent illness but does states she has not been sleeping much    ROS: Negative for cp, sob, abd pain, or back pain. PE: Gen Appearance/Constitutional: alert  HEENT: NC/NT. PERRLA,  Airway patent. Right-sided facial droop noted which does affect the forehead muscles. Neck: supple     Initial Plan of Care: All treatment areas with department are currently occupied. Plan to order/Initiate the following while awaiting opening in ED: labs, EKG, and imaging studies.   Initiate Treatment-Testing, Proceed toTreatment Area When Bed Available for ED Attending/MLP to Continue Care    Electronically signed by Sharifa Martinez PA-C   DD: 12/29/22       Sharifa Martinez PA-C  12/29/22 1698

## 2022-12-29 NOTE — ED PROVIDER NOTES
ED PROVIDER NOTE    Chief Complaint   Patient presents with    Facial Swelling    Facial Droop    Aphasia     Stutter noted. That is new. States that she had a Novocain feeling on the right side of her face since last Wednesday. States sxs progressing. HPI:  12/29/22,   Time: 1:41 PM HARVEY Abel is a 32 y.o. female presenting to the ED for facial droop and headache. Gradual onset 9 days ago, progressively worsening, moderate in severity, no aggravating/alleviating factors. Initially developed left sided facial numbness and weakness. Unable to close left eye.  1 day after symptom onset, went to PCP who diagnosed her with [place] palsy and prescribed antivirals and prednisone. Has been taking both. Over the past few days developed diffuse constant throbbing headache, nonradiating. No fever, chills, rash, cough, chest pain, shortness of breath, abdominal pain, nausea, vomiting. Normal po intake and urine output. No known hx of HTN. Review of Systems:     Review of Systems   Constitutional:  Negative for appetite change, chills and fever. HENT:  Negative for congestion and rhinorrhea. Eyes:  Negative for visual disturbance. Respiratory:  Negative for cough and shortness of breath. Cardiovascular:  Negative for chest pain. Gastrointestinal:  Negative for abdominal pain, blood in stool, nausea and vomiting. Genitourinary:  Negative for decreased urine volume and difficulty urinating. Musculoskeletal:  Negative for back pain and neck pain. Skin:  Negative for color change. Neurological:  Positive for facial asymmetry, speech difficulty, numbness and headaches. Negative for dizziness, syncope, weakness and light-headedness.        --------------------------------------------- PAST HISTORY ---------------------------------------------  Past Medical History:   Past Medical History:   Diagnosis Date    Abnormal Papanicolaou smear of cervix with positive human papilloma virus (HPV) test     Closed fracture of distal end of right fibula 2020    Morbid obesity due to excess calories [facility])        Past Surgical History:   Past Surgical History:   Procedure Laterality Date    ANKLE SURGERY Right      SECTION      [facility]  2017    UPPER GASTROINTESTINAL ENDOSCOPY N/A 2022    EGD BIOPSY performed by Jaycob Smith MD at [address] History:   Social History     Socioeconomic History    Marital status: Single     Spouse name: None    Number of children: None    Years of education: None    Highest education level: None   Tobacco Use    Smoking status: Never    Smokeless tobacco: Never   Substance and Sexual Activity    Alcohol use: No    Drug use: No   Social History Narrative    2 cups coffee daily. 1 5-hour energy drinks daily        Family History:   History reviewed. No pertinent family history. The patients home medications have been reviewed. Allergies:   No Known Allergies        ---------------------------------------------------PHYSICAL EXAM--------------------------------------    BP (!) 194/100   Pulse (!) 110   Temp 98 °F (36.7 °C) (Oral)   LMP 12/15/2022   SpO2 99%     Physical Exam  Vitals and nursing note reviewed. Constitutional:       General: She is not in acute distress. Appearance: She is not toxic-appearing. HENT:      Mouth/Throat:      Mouth: Mucous membranes are moist.   Eyes:      General: No scleral icterus. Extraocular Movements: Extraocular movements intact. Pupils: Pupils are equal, round, and reactive to light. Cardiovascular:      Rate and Rhythm: Normal rate and regular rhythm. Pulses: Normal pulses. Heart sounds: Normal heart sounds. No murmur heard. Pulmonary:      Effort: Pulmonary effort is normal. No respiratory distress. Breath sounds: Normal breath sounds. No wheezing or rales. Abdominal:      General: There is no distension. Palpations: Abdomen is soft. Tenderness: There is no abdominal tenderness. Musculoskeletal:         General: No swelling or tenderness. Normal range of motion. Cervical back: Normal range of motion and neck supple. Skin:     General: Skin is warm and dry. Neurological:      Mental Status: She is alert and oriented to person, place, and time. Comments: Diffuse left facial weakness including forehead. Unable to close left eye. Left facial droop. Sensation intact and equal bilaterally to light touch on face. Strength 5/5 and sensation grossly intact to light touch and equal bilaterally throughout all extremities             -------------------------------------------------- RESULTS -------------------------------------------------  I have personally reviewed all laboratory and imaging results for this patient. Results are listed below. LABS:  Labs Reviewed   CBC WITH AUTO DIFFERENTIAL - Abnormal; Notable for the following components:       Result Value    WBC 15.7 (*)     Neutrophils % 85.3 (*)     Lymphocytes % 10.1 (*)     Neutrophils Absolute 13.39 (*)     Eosinophils Absolute 0.00 (*)     All other components within normal limits   COMPREHENSIVE METABOLIC PANEL - Abnormal; Notable for the following components:    CO2 21 (*)     Glucose 104 (*)     All other components within normal limits   TROPONIN   PROTIME-INR   POC PREGNANCY UR-QUAL       RADIOLOGY:  Interpreted personally and by Radiologist.  CT HEAD WO CONTRAST   Final Result   No acute intracranial abnormality. EKG:  This EKG is signed and interpreted by the EP. Normal sinus rhythm, vent rate 79bpm, normal axis and intervals, no acute injury pattern, no clinically significant change compared w/ prior EKG       ------------------------- NURSING NOTES AND VITALS REVIEWED ---------------------------   The nursing notes within the ED encounter and vital signs as below have been reviewed by myself.   BP (!) 194/100   Pulse (!) 110   Temp 98 °F (36.7 °C) (Oral)   LMP 12/15/2022   SpO2 99%   Oxygen Saturation Interpretation: Normal    The patients available past medical records and past encounters were reviewed. ------------------------------ ED COURSE/MEDICAL DECISION MAKING----------------------  Medications   ketorolac (TORADOL) injection 30 mg (30 mg IntraMUSCular Given 22 1323)       Counseling: The emergency provider has spoken with the patient and discussed todays results, in addition to providing specific details for the plan of care and counseling regarding the diagnosis and prognosis. Questions are answered at this time and they are agreeable with the plan. ED Course/Medical Decision Makin y.o. female here with persistent and worsening symptoms of facial weakness 2/2 Bell's palsy which she is already being treated for. Non-toxic appearing, afebrile, hemodynamically stable, and in no acute distress. Breathing comfortably on room air without respiratory distress. Other than diffuse left facial weakness including forehead, nonfocal neuro exam.  CTH negative for acute process. Hypertensive likely 2/2 headache and steroid use. On reevaluation symptoms improving w/ toradol. Workup notable for leukocytosis in setting of steroid use. Low suspicion for acute CNS infection in addition to known [place] palsy. No neck stiffness, AMS, or additional focal neuro deficits to suggest acute ischemic stroke. After discussion of findings and return precautions, patient agrees with plan for discharge and outpatient follow up with PCP and neurology. --------------------------------- IMPRESSION AND DISPOSITION ---------------------------------    IMPRESSION  1. Bell's palsy        DISPOSITION  Disposition: Discharge to home  Patient condition is good    NOTE: This report was transcribed using voice recognition software.  Every effort was made to ensure accuracy; however, inadvertent computerized transcription errors may be present    Ck Luca Macdonald MD  Attending Emergency Physician        Deepak Joy MD  12/29/22 8536

## 2022-12-29 NOTE — Clinical Note
Susan Carpenter was seen and treated in our emergency department on 12/29/2022. She may return to work on 01/02/2023. If you have any questions or concerns, please don't hesitate to call.       Hansel Rock MD

## 2023-01-05 ENCOUNTER — APPOINTMENT (OUTPATIENT)
Dept: CT IMAGING | Age: 28
DRG: 074 | End: 2023-01-05
Payer: COMMERCIAL

## 2023-01-05 LAB
ALBUMIN SERPL-MCNC: 3.9 G/DL (ref 3.5–5.2)
ALP BLD-CCNC: 65 U/L (ref 35–104)
ALT SERPL-CCNC: 5 U/L (ref 0–32)
ANION GAP SERPL CALCULATED.3IONS-SCNC: 15 MMOL/L (ref 7–16)
ANISOCYTOSIS: ABNORMAL
AST SERPL-CCNC: 19 U/L (ref 0–31)
BACTERIA: ABNORMAL /HPF
BASOPHILS ABSOLUTE: 0 E9/L (ref 0–0.2)
BASOPHILS RELATIVE PERCENT: 0.2 % (ref 0–2)
BILIRUB SERPL-MCNC: <0.2 MG/DL (ref 0–1.2)
BILIRUBIN URINE: NEGATIVE
BLOOD, URINE: ABNORMAL
BUN BLDV-MCNC: 13 MG/DL (ref 6–20)
CALCIUM SERPL-MCNC: 9.6 MG/DL (ref 8.6–10.2)
CHLORIDE BLD-SCNC: 101 MMOL/L (ref 98–107)
CLARITY: ABNORMAL
CO2: 24 MMOL/L (ref 22–29)
COLOR: YELLOW
CREAT SERPL-MCNC: 0.7 MG/DL (ref 0.5–1)
EOSINOPHILS ABSOLUTE: 0 E9/L (ref 0.05–0.5)
EOSINOPHILS RELATIVE PERCENT: 0.1 % (ref 0–6)
GFR SERPL CREATININE-BSD FRML MDRD: >60 ML/MIN/1.73
GLUCOSE BLD-MCNC: 92 MG/DL (ref 74–99)
GLUCOSE URINE: NEGATIVE MG/DL
HCG, URINE, POC: NEGATIVE
HCT VFR BLD CALC: 43.6 % (ref 34–48)
HEMOGLOBIN: 14.5 G/DL (ref 11.5–15.5)
KETONES, URINE: ABNORMAL MG/DL
LEUKOCYTE ESTERASE, URINE: NEGATIVE
LYMPHOCYTES ABSOLUTE: 7.81 E9/L (ref 1.5–4)
LYMPHOCYTES RELATIVE PERCENT: 32.2 % (ref 20–42)
Lab: NORMAL
MCH RBC QN AUTO: 30.7 PG (ref 26–35)
MCHC RBC AUTO-ENTMCNC: 33.3 % (ref 32–34.5)
MCV RBC AUTO: 92.2 FL (ref 80–99.9)
MONOCYTES ABSOLUTE: 1.71 E9/L (ref 0.1–0.95)
MONOCYTES RELATIVE PERCENT: 6.9 % (ref 2–12)
NEGATIVE QC PASS/FAIL: NORMAL
NEUTROPHILS ABSOLUTE: 14.88 E9/L (ref 1.8–7.3)
NEUTROPHILS RELATIVE PERCENT: 60.9 % (ref 43–80)
NITRITE, URINE: NEGATIVE
PDW BLD-RTO: 14.2 FL (ref 11.5–15)
PH UA: 7 (ref 5–9)
PLATELET # BLD: 306 E9/L (ref 130–450)
PMV BLD AUTO: 10 FL (ref 7–12)
POIKILOCYTES: ABNORMAL
POLYCHROMASIA: ABNORMAL
POSITIVE QC PASS/FAIL: NORMAL
POTASSIUM SERPL-SCNC: 4.3 MMOL/L (ref 3.5–5)
PROTEIN UA: NEGATIVE MG/DL
RBC # BLD: 4.73 E12/L (ref 3.5–5.5)
RBC UA: ABNORMAL /HPF (ref 0–2)
REASON FOR REJECTION: NORMAL
REJECTED TEST: NORMAL
SODIUM BLD-SCNC: 140 MMOL/L (ref 132–146)
SPECIFIC GRAVITY UA: 1.02 (ref 1–1.03)
STOMATOCYTES: ABNORMAL
TARGET CELLS: ABNORMAL
TOTAL PROTEIN: 7.6 G/DL (ref 6.4–8.3)
UROBILINOGEN, URINE: 1 E.U./DL
WBC # BLD: 24.4 E9/L (ref 4.5–11.5)
WBC UA: ABNORMAL /HPF (ref 0–5)

## 2023-01-05 PROCEDURE — 99285 EMERGENCY DEPT VISIT HI MDM: CPT

## 2023-01-05 PROCEDURE — 80053 COMPREHEN METABOLIC PANEL: CPT

## 2023-01-05 PROCEDURE — 85025 COMPLETE CBC W/AUTO DIFF WBC: CPT

## 2023-01-05 PROCEDURE — 81001 URINALYSIS AUTO W/SCOPE: CPT

## 2023-01-05 PROCEDURE — 70450 CT HEAD/BRAIN W/O DYE: CPT

## 2023-01-05 NOTE — ED NOTES
Department of Emergency Medicine  FIRST PROVIDER TRIAGE NOTE             Independent MLP           1/5/23  12:11 PM EST    Date of Encounter: 1/5/23   MRN: [id number]      HPI: Tammy Downing is a [place] y.o. female who presents to the ED for No chief complaint on file. Pt presenting with stuttering, decreased vision for the a few days. Pt was dx with Bell's palsy on 12/29 and has been on valtrex and prednisne. Pt also c/o sob and nausea    ROS: Negative for cp, abdominal pain. PE: Gen Appearance/Constitutional: alert  HEENT: NC/NT. PERRLA,  Airway patent. Initial Plan of Care: All treatment areas with department are currently occupied. Plan to order/Initiate the following while awaiting opening in ED: labs, EKG, and imaging studies.   Initiate Treatment-Testing, Proceed toTreatment Area When Bed Available for ED Attending/MLP to Continue Care    Electronically signed by Fran Jimenez PA-C   DD: 1/5/23      Fran Jimenez PA-C  01/05/23 [id number]

## 2023-01-06 ENCOUNTER — HOSPITAL ENCOUNTER (INPATIENT)
Age: 28
LOS: 10 days | Discharge: HOME | DRG: 074 | End: 2023-01-16
Attending: EMERGENCY MEDICINE | Admitting: INTERNAL MEDICINE
Payer: COMMERCIAL

## 2023-01-06 ENCOUNTER — APPOINTMENT (OUTPATIENT)
Dept: MRI IMAGING | Age: 28
DRG: 074 | End: 2023-01-06
Payer: COMMERCIAL

## 2023-01-06 DIAGNOSIS — R41.82 ALTERED MENTAL STATUS, UNSPECIFIED ALTERED MENTAL STATUS TYPE: ICD-10-CM

## 2023-01-06 DIAGNOSIS — F41.9 ANXIETY: ICD-10-CM

## 2023-01-06 PROCEDURE — A9579 GAD-BASE MR CONTRAST NOS,1ML: HCPCS | Performed by: RADIOLOGY

## 2023-01-06 PROCEDURE — 96374 THER/PROPH/DIAG INJ IV PUSH: CPT

## 2023-01-06 PROCEDURE — 6360000002 HC RX W HCPCS: Performed by: STUDENT IN AN ORGANIZED HEALTH CARE EDUCATION/TRAINING PROGRAM

## 2023-01-06 PROCEDURE — 6360000004 HC RX CONTRAST MEDICATION: Performed by: RADIOLOGY

## 2023-01-06 PROCEDURE — 6360000002 HC RX W HCPCS: Performed by: EMERGENCY MEDICINE

## 2023-01-06 PROCEDURE — G0378 HOSPITAL OBSERVATION PER HR: HCPCS

## 2023-01-06 PROCEDURE — 1200000000 HC SEMI PRIVATE

## 2023-01-06 PROCEDURE — 70553 MRI BRAIN STEM W/O & W/DYE: CPT

## 2023-01-06 PROCEDURE — 6370000000 HC RX 637 (ALT 250 FOR IP): Performed by: NURSE PRACTITIONER

## 2023-01-06 RX ORDER — DIPHENHYDRAMINE HYDROCHLORIDE 50 MG/ML
25 INJECTION INTRAMUSCULAR; INTRAVENOUS ONCE
Status: COMPLETED | OUTPATIENT
Start: 2023-01-06 | End: 2023-01-06

## 2023-01-06 RX ORDER — HYDROCODONE BITARTRATE AND ACETAMINOPHEN 5; 325 MG/1; MG/1
1 TABLET ORAL ONCE
Status: COMPLETED | OUTPATIENT
Start: 2023-01-06 | End: 2023-01-06

## 2023-01-06 RX ORDER — PROCHLORPERAZINE EDISYLATE 5 MG/ML
10 INJECTION INTRAMUSCULAR; INTRAVENOUS ONCE
Status: COMPLETED | OUTPATIENT
Start: 2023-01-06 | End: 2023-01-06

## 2023-01-06 RX ORDER — KETOROLAC TROMETHAMINE 30 MG/ML
15 INJECTION, SOLUTION INTRAMUSCULAR; INTRAVENOUS ONCE
Status: COMPLETED | OUTPATIENT
Start: 2023-01-06 | End: 2023-01-06

## 2023-01-06 RX ADMIN — HYDROCODONE BITARTRATE AND ACETAMINOPHEN 1 TABLET: 5; 325 TABLET ORAL at 02:40

## 2023-01-06 RX ADMIN — KETOROLAC TROMETHAMINE 15 MG: 30 INJECTION, SOLUTION INTRAMUSCULAR; INTRAVENOUS at 15:00

## 2023-01-06 RX ADMIN — GADOTERIDOL 20 ML: 279.3 INJECTION, SOLUTION INTRAVENOUS at 14:30

## 2023-01-06 RX ADMIN — PROCHLORPERAZINE EDISYLATE 10 MG: 5 INJECTION INTRAMUSCULAR; INTRAVENOUS at 20:36

## 2023-01-06 RX ADMIN — DIPHENHYDRAMINE HYDROCHLORIDE 25 MG: 50 INJECTION, SOLUTION INTRAMUSCULAR; INTRAVENOUS at 20:37

## 2023-01-06 ASSESSMENT — PAIN SCALES - GENERAL
PAINLEVEL_OUTOF10: 5
PAINLEVEL_OUTOF10: 10
PAINLEVEL_OUTOF10: 9

## 2023-01-06 ASSESSMENT — ENCOUNTER SYMPTOMS
PHOTOPHOBIA: 0
SORE THROAT: 0
NAUSEA: 0
SHORTNESS OF BREATH: 0
ABDOMINAL PAIN: 0
DIARRHEA: 0
BACK PAIN: 0
COUGH: 0

## 2023-01-06 ASSESSMENT — PAIN - FUNCTIONAL ASSESSMENT: PAIN_FUNCTIONAL_ASSESSMENT: NONE - DENIES PAIN

## 2023-01-06 ASSESSMENT — PAIN DESCRIPTION - ORIENTATION: ORIENTATION: RIGHT;LEFT

## 2023-01-06 ASSESSMENT — PAIN DESCRIPTION - LOCATION: LOCATION: FACE;HEAD

## 2023-01-06 ASSESSMENT — PAIN DESCRIPTION - DESCRIPTORS: DESCRIPTORS: DULL;THROBBING

## 2023-01-06 NOTE — ED NOTES
Notified MRI pt is ready informed will notify when they are ready for her     Ressie Running, GM  01/06/23 0887

## 2023-01-06 NOTE — ED NOTES
This rn attempted iv site x 1 will find another rn to attempt for MRI     Genevieve Kern, RN  01/06/23 1010

## 2023-01-06 NOTE — ED NOTES
Informed Afshin saenz RN pt will need IV for MRI stated will send someone     Eddie Chavez RN  01/06/23 9578

## 2023-01-06 NOTE — ED PROVIDER NOTES
HPI  32 y.o. female presenting for headache, stuttering speech, bilateral arm tingling. Patient states for the past few days she has had persistent stuttering and tingling of her arms. She complains of headache. She denies all other complaints. She was recently seen here and diagnosed with Bell's palsy for which she was prescribed prednisone and valtrex.      --------------------------------------------- PAST HISTORY ---------------------------------------------  Past Medical History:  has a past medical history of Abnormal Papanicolaou smear of cervix with positive human papilloma virus (HPV) test, Closed fracture of distal end of right fibula, and Morbid obesity due to excess calories ([facility] [address] Past Surgical History:  has a past surgical history that includes LEEP (2017);  section (2014); Ankle surgery (Right); and Upper gastrointestinal endoscopy (N/A, 2022). Social History:  reports that she has never smoked. She has never used smokeless tobacco. She reports that she does not drink alcohol and does not use drugs. Family History: family history is not on file. The patients home medications have been reviewed. Allergies: Patient has no known allergies. Review of Systems   Constitutional:  Negative for chills and fever. HENT:  Negative for congestion and sore throat. Eyes:  Negative for photophobia and visual disturbance. Respiratory:  Negative for cough and shortness of breath. Cardiovascular:  Negative for chest pain and leg swelling. Gastrointestinal:  Negative for abdominal pain, diarrhea and nausea. Genitourinary:  Negative for dysuria and flank pain. Musculoskeletal:  Negative for back pain and myalgias. Skin:  Negative for rash and wound. Neurological:  Positive for speech difficulty and headaches. Negative for dizziness and light-headedness. Arm paresthesias      Physical Exam  Constitutional:       General: She is not in acute distress. Appearance: Normal appearance. She is not ill-appearing. HENT:      Head: Normocephalic and atraumatic. Right Ear: External ear normal.      Left Ear: External ear normal.      Nose: Nose normal.   Eyes:      Conjunctiva/sclera: Conjunctivae normal.   Cardiovascular:      Rate and Rhythm: Normal rate and regular rhythm. Pulmonary:      Effort: Pulmonary effort is normal. No respiratory distress. Breath sounds: Normal breath sounds. No stridor. No wheezing, rhonchi or rales. Abdominal:      General: There is no distension. Palpations: Abdomen is soft. Tenderness: There is no abdominal tenderness. Musculoskeletal:         General: No swelling or deformity. Skin:     General: Skin is warm and dry. Neurological:      General: No focal deficit present. Mental Status: She is alert. Comments: Mild left facial droop, difficulty fully closing left eye, patient is able to feel touch of bilateral upper extremities, but complains of pins and needles.  She has marked stuttering of speech, which is nearly incomprehensible   Psychiatric:         Mood and Affect: Mood normal.        Procedures          -------------------------------------------------- RESULTS -------------------------------------------------    LABS:  Results for orders placed or performed during the hospital encounter of 01/06/23   CBC with Auto Differential   Result Value Ref Range    WBC 24.4 (H) 4.5 - 11.5 E9/L    RBC 4.73 3.50 - 5.50 E12/L    Hemoglobin 14.5 11.5 - 15.5 g/dL    Hematocrit 43.6 34.0 - 48.0 %    MCV 92.2 80.0 - 99.9 fL    MCH 30.7 26.0 - 35.0 pg    MCHC 33.3 32.0 - 34.5 %    RDW 14.2 11.5 - 15.0 fL    Platelets 629 333 - 332 E9/L    MPV 10.0 7.0 - 12.0 fL    Neutrophils % 60.9 43.0 - 80.0 %    Lymphocytes % 32.2 20.0 - 42.0 %    Monocytes % 6.9 2.0 - 12.0 %    Eosinophils % 0.1 0.0 - 6.0 %    Basophils % 0.2 0.0 - 2.0 %    Neutrophils Absolute 14.88 (H) 1.80 - 7.30 E9/L    Lymphocytes Absolute 7.81 (H) 1.50 - 4.00 E9/L    Monocytes Absolute 1.71 (H) 0.10 - 0.95 E9/L    Eosinophils Absolute 0.00 (L) 0.05 - 0.50 E9/L    Basophils Absolute 0.00 0.00 - 0.20 E9/L    Anisocytosis 1+     Polychromasia 1+     Poikilocytes 2+     Stomatocytes 1+     Target Cells 2+    Comprehensive Metabolic Panel   Result Value Ref Range    Sodium 140 132 - 146 mmol/L    Potassium 4.3 3.5 - 5.0 mmol/L    Chloride 101 98 - 107 mmol/L    CO2 24 22 - 29 mmol/L    Anion Gap 15 7 - 16 mmol/L    Glucose 92 74 - 99 mg/dL    BUN 13 6 - 20 mg/dL    Creatinine 0.7 0.5 - 1.0 mg/dL    Est, Glom Filt Rate >60 >=60 mL/min/1.73    Calcium 9.6 8.6 - 10.2 mg/dL    Total Protein 7.6 6.4 - 8.3 g/dL    Albumin 3.9 3.5 - 5.2 g/dL    Total Bilirubin <0.2 0.0 - 1.2 mg/dL    Alkaline Phosphatase 65 35 - 104 U/L    ALT 5 0 - 32 U/L    AST 19 0 - 31 U/L   Urinalysis   Result Value Ref Range    Color, UA Yellow Straw/Yellow    Clarity, UA SL CLOUDY Clear    Glucose, Ur Negative Negative mg/dL    Bilirubin Urine Negative Negative    Ketones, Urine TRACE (A) Negative mg/dL    Specific Gravity, UA 1.020 1.005 - 1.030    Blood, Urine LARGE (A) Negative    pH, UA 7.0 5.0 - 9.0    Protein, UA Negative Negative mg/dL    Urobilinogen, Urine 1.0 <2.0 E.U./dL    Nitrite, Urine Negative Negative    Leukocyte Esterase, Urine Negative Negative   Microscopic Urinalysis   Result Value Ref Range    WBC, UA NONE 0 - 5 /HPF    RBC, UA 10-20 (A) 0 - 2 /HPF    Bacteria, UA NONE SEEN None Seen /HPF   SPECIMEN REJECTION   Result Value Ref Range    Rejected Test TROP     Reason for Rejection see below    POC Pregnancy Urine Qual   Result Value Ref Range    HCG, Urine, POC Negative Negative    Lot Number [id number]     Positive QC Pass/Fail Pass     Negative QC Pass/Fail Pass        RADIOLOGY:  MRI BRAIN W WO CONTRAST   Final Result   Unremarkable pre and post-contrast MRI of the brain. CT HEAD WO CONTRAST   Final Result   No acute intracranial abnormality. ------------------------- NURSING NOTES AND VITALS REVIEWED ---------------------------  Date / Time Roomed:  1/6/2023  8:49 AM  ED Bed Assignment:  [facility]    The nursing notes within the ED encounter and vital signs as below have been reviewed. Patient Vitals for the past 24 hrs:   BP Temp Temp src Pulse Resp SpO2   01/06/23 2000 (!) 136/99 98.9 °F (37.2 °C) Oral 88 16 98 %   01/06/23 0926 (!) 143/98 98 °F (36.7 °C) Oral 86 16 98 %   01/06/23 0231 (!) 147/93 98 °F (36.7 °C) -- 81 -- 99 %       Oxygen Saturation Interpretation: Normal      MDM  Number of Diagnoses or Management Options  Altered mental status, unspecified altered mental status type  Diagnosis management comments: 33 yo female recently diagnosed with [place] Palsy presenting for stuttering, headache. Patient had marked stuttering on exam such that speech was nearly incomprehensible. VSS. CT head preliminary read by me was negative for acute intracranial bleed; confirmed by radiologist. MRI w/ and w/o contrast preliminary read by me was negative for mass or any other abnormality; this was confirmed by radiologist. Patient had leukocytosis, however, she has been on steroids and has not had any fevers or other infections symptoms. No meningeal signs, so doubt acute meningitis. Doubt acute CVA with negative MRI. On reassessment, patient continued to have persistent stuttering, therefore, decision was made to admit patient for neurology evaluation. Dr. Asim Pearson accepted patient for admission.  The following meds were given in the ED:  Medications  HYDROcodone-acetaminophen (NORCO) 5-325 MG per tablet 1 tablet (1 tablet Oral Given 1/6/23 0240)  gadoteridol (PROHANCE) injection 20 mL (20 mLs IntraVENous Given 1/6/23 1430)  ketorolac (TORADOL) injection 15 mg (15 mg IntraVENous Given 1/6/23 1500)  diphenhydrAMINE (BENADRYL) injection 25 mg (25 mg IntraVENous Given 1/6/23 2037)  prochlorperazine (COMPAZINE) injection 10 mg (10 mg IntraVENous Given 1/6/23 2036)            Counseling:  I have spoken with the patient and discussed todays results, in addition to providing specific details for the plan of care and counseling regarding the diagnosis and prognosis. Their questions are answered at this time and they are agreeable with the plan of admission.    --------------------------------- ADDITIONAL PROVIDER NOTES ---------------------------------    This patient's ED course included: a personal history and physicial examination, re-evaluation prior to disposition, multiple bedside re-evaluations, and IV medications    This patient has remained hemodynamically stable during their ED course. Diagnosis:  1. Altered mental status, unspecified altered mental status type        Disposition:  Patient's disposition: Admit to telemetry  Patient's condition is stable.           Merline Petite, MD  Resident  01/06/23 1310

## 2023-01-06 NOTE — ED PROVIDER NOTES
ATTENDING PROVIDER ATTESTATION:     Tomer Ramey presented to the emergency department for evaluation of Headache and Other (Having trouble getting words out/studder for a few days, tingling in BUE for a few days ; having headaches; has bells palsy-was seen here recently )   and was initially evaluated by the Medical Resident. See Original ED Note for H&P and ED course above. I have reviewed and discussed the case, including pertinent history (medical, surgical, family and social) and exam findings with the Medical Resident assigned to Tomer Ramey. I have personally performed and/or participated in the history, exam, medical decision making, and procedures and agree with all pertinent clinical information and any additional changes or corrections are noted below in my assessment and plan. I have discussed this patient in detail with the resident, and provided the instruction and education,       I have reviewed my findings and recommendations with the assigned Medical Resident, Tomer Ramey and members of family present at the time of disposition.       I have performed a history and physical examination of this patient and directly supervised the resident caring for this patient              [address]        Pt Name: Tomer Ramey  MRN: [id number]  [place] 1995  Date of evaluation: 1/5/2023  Provider: Ronald Montalvo MD  PCP: Miguel Mixon DO  Note Started: 9:28 AM EST 1/6/23    CHIEF COMPLAINT       Chief Complaint   Patient presents with    Headache    Other     Having trouble getting words out/studder for a few days, tingling in BUE for a few days ; having headaches; has bells palsy-was seen here recently        HISTORY OF PRESENT ILLNESS: 1 or more Elements     Limitations to history : None    Tomer Ramey is a 32 y.o. female who presents patient presents for evaluation of difficulty speaking she said for over the last 2 days she has had difficulty speaking. She reports getting speech and trouble getting words out. She says she has stuttering. History is difficult to obtain as she has intermittent stuttering scanning speech. She also reports has had tingling in both upper extremities for a few days. She also reports having generalized headaches. She reports she was seen recently diagnosed with Bell's palsy and started on antivirals as well as steroids. She denies fever or chills. No trauma. No chest pain or shortness of breath. No neck pain. No neck manipulation. No unilateral weakness in the arms or legs. She denies any vision changes. Headache was not worsening of life. Nursing Notes were all reviewed and agreed with or any disagreements were addressed in the HPI. REVIEW OF SYSTEMS :           Positives and Pertinent negatives as per HPI.      SURGICAL HISTORY     Past Surgical History:   Procedure Laterality Date    ANKLE SURGERY Right      SECTION      [facility]  2017    UPPER GASTROINTESTINAL ENDOSCOPY N/A 2022    EGD BIOPSY performed by Nia Henry MD at [facility]       Previous Medications    GABAPENTIN (NEURONTIN) 600 MG TABLET    take 1 tablet by mouth twice a day    METHOCARBAMOL (ROBAXIN) 500 MG TABLET    take 1 tablet by mouth three times a day if needed    NORETHINDRONE-ETHINYL ESTRADIOL (LOESTRIN FE ) 1-20 MG-MCG PER TABLET    Take 1 tablet by mouth daily    ONDANSETRON (ZOFRAN-ODT) 4 MG DISINTEGRATING TABLET    Take 1 tablet by mouth every 8 hours as needed for Nausea or Vomiting    PROCHLORPERAZINE (COMPAZINE) 10 MG TABLET    Take 1 tablet by mouth every 6 hours as needed (headache or nausea)    TIZANIDINE (ZANAFLEX) 4 MG TABLET    take 1 tablet by mouth three times a day if needed for SPASM(S)    VITAMIN D (ERGOCALCIFEROL) 1.25 MG (19177 UT) CAPS CAPSULE    take 1 capsule by mouth every week ALLERGIES     Patient has no known allergies. FAMILYHISTORY     History reviewed. No pertinent family history. SOCIAL HISTORY       Social History     Tobacco Use    Smoking status: Never    Smokeless tobacco: Never   Substance Use Topics    Alcohol use: No    Drug use: No       SCREENINGS        [place] Coma Scale  Eye Opening: Spontaneous  Best Verbal Response: Oriented  Best Motor Response: Obeys commands  [place] Coma Scale Score: 15                CIWA Assessment  BP: (!) 136/99  Heart Rate: 88           PHYSICAL EXAM  1 or more Elements     ED Triage Vitals   BP Temp Temp Source Heart Rate Resp SpO2 Height Weight   01/05/23 1215 01/05/23 1200 01/05/23 1200 01/05/23 1200 01/05/23 1214 01/05/23 1200 -- 01/05/23 1214   (!) 193/126 99 °F (37.2 °C) Temporal (!) 142 18 99 %  250 lb (113.4 kg)                Constitutional/General: Alert and oriented x3  Head: Normocephalic and atraumatic  Eyes: PERRL, EOMI, conjunctiva normal, sclera non icteric. No nystagmus. ENT:  Oropharynx clear, handling secretions, no trismus, no asymmetry of the posterior oropharynx or uvular edema, left upper and lower facial droop  Neck: Supple, full ROM, no stridor, no meningeal signs  Respiratory: Lungs clear to auscultation bilaterally, no wheezes, rales, or rhonchi. Not in respiratory distress  Cardiovascular:  Regular rate. Regular rhythm. No murmurs, no gallops, no rubs. 2+ distal pulses. Equal extremity pulses. Musculoskeletal: Moves all extremities x 4. Warm and well perfused, no clubbing, no cyanosis, no edema. Capillary refill <3 seconds  Integument: skin warm and dry. No rashes. Neurologic: GCS 15, she has a scanning speech type pattern. She has left lower facial droop as well as left upper facial droop and inability to completely close left eye. She is moving all extremities both arms and both legs with normal strength. No other obvious appreciable deficits.     Psychiatric: Normal Affect            DIAGNOSTIC RESULTS   LABS:    Labs Reviewed   CBC WITH AUTO DIFFERENTIAL - Abnormal; Notable for the following components:       Result Value    WBC 24.4 (*)     Neutrophils Absolute 14.88 (*)     Lymphocytes Absolute 7.81 (*)     Monocytes Absolute 1.71 (*)     Eosinophils Absolute 0.00 (*)     All other components within normal limits    Narrative:     CALL  [employer]  H34 tel. ,  Rejected Test Name/Called to: JASMINA, 01/05/2023 14:30, by Tu Wilcox  Rejected Test Name/Called to: ANT, 01/05/2023 14:29, by Tu Wilcox   URINALYSIS - Abnormal; Notable for the following components:    Ketones, Urine TRACE (*)     Blood, Urine LARGE (*)     All other components within normal limits   MICROSCOPIC URINALYSIS - Abnormal; Notable for the following components:    RBC, UA 10-20 (*)     All other components within normal limits   COMPREHENSIVE METABOLIC PANEL    Narrative:     CALL  Gaming  H34 tel. ,  Rejected Test Name/Called to: JASMINA, 01/05/2023 14:30, by Tu Wilcox  Rejected Test Name/Called to: ANT, 01/05/2023 14:29, by Tu Wilcox   SPECIMEN REJECTION    Narrative:     CALL  Gaming  H34 tel. ,  Rejected Test Name/Called Nedra Baca, 01/05/2023 14:30, by Tu Wilcox  Rejected Test Name/Called to: ANT, 01/05/2023 14:29, by Tu Wilcox   POC PREGNANCY UR-QUAL       As interpreted by me, the above displayed labs are abnormal. All other labs obtained during this visit were within normal range or not returned as of this dictation. RADIOLOGY:   Non-plain film images such as CT, Ultrasound and MRI are read by the radiologist. Plain radiographic images are visualized and preliminarily interpreted by the ED Provider with the below findings:      Interpretation per the Radiologist below, if available at the time of this note:    MRI BRAIN W WO CONTRAST   Final Result   Unremarkable pre and post-contrast MRI of the brain. CT HEAD WO CONTRAST   Final Result   No acute intracranial abnormality.            CT HEAD WO CONTRAST    Result Date: 1/5/2023  EXAMINATION: CT OF THE HEAD WITHOUT CONTRAST  1/5/2023 2:26 pm TECHNIQUE: CT of the head was performed without the administration of intravenous contrast. Automated exposure control, iterative reconstruction, and/or weight based adjustment of the mA/kV was utilized to reduce the radiation dose to as low as reasonably achievable. COMPARISON: None. HISTORY: ORDERING SYSTEM PROVIDED HISTORY: difficulty speaking TECHNOLOGIST PROVIDED HISTORY: Has a \"code stroke\" or \"stroke alert\" been called? ->No Reason for exam:->difficulty speaking Decision Support Exception - unselect if not a suspected or confirmed emergency medical condition->Emergency Medical Condition (MA) What reading provider will be dictating this exam?->CRC FINDINGS: BRAIN/VENTRICLES: There is no acute intracranial hemorrhage, mass effect or midline shift. No abnormal extra-axial fluid collection. The gray-white differentiation is maintained without evidence of an acute infarct. There is no evidence of hydrocephalus. ORBITS: The visualized portion of the orbits demonstrate no acute abnormality. SINUSES: The visualized paranasal sinuses and mastoid air cells demonstrate no acute abnormality. SOFT TISSUES/SKULL:  No acute abnormality of the visualized skull or soft tissues. No acute intracranial abnormality. No results found. PROCEDURES   Unless otherwise noted below, none          CRITICAL CARE TIME (.cct)       PAST MEDICAL HISTORY/Chronic Conditions Affecting Care      has a past medical history of Abnormal Papanicolaou smear of cervix with positive human papilloma virus (HPV) test, Closed fracture of distal end of right fibula (01/03/2020), and Morbid obesity due to excess calories ([facility] [address]      EMERGENCY DEPARTMENT COURSE    Vitals:    Vitals:    01/05/23 1630 01/06/23 0231 01/06/23 0926 01/06/23 2000   BP: (!) 176/88 (!) 147/93 (!) 143/98 (!) 136/99   Pulse: 86 81 86 88   Resp:   16 16   Temp: 98.1 °F (36.7 °C) 98 °F (36.7 °C) 98 °F (36.7 °C) 98.9 °F (37.2 °C)   TempSrc:   Oral Oral   SpO2: 97% 99% 98% 98%   Weight:           Patient was given the following medications:  Medications   HYDROcodone-acetaminophen (NORCO) 5-325 MG per tablet 1 tablet (1 tablet Oral Given 1/6/23 0240)   gadoteridol (PROHANCE) injection 20 mL (20 mLs IntraVENous Given 1/6/23 1430)   ketorolac (TORADOL) injection 15 mg (15 mg IntraVENous Given 1/6/23 1500)   diphenhydrAMINE (BENADRYL) injection 25 mg (25 mg IntraVENous Given 1/6/23 2037)   prochlorperazine (COMPAZINE) injection 10 mg (10 mg IntraVENous Given 1/6/23 2036)           Is this patient to be included in the SEP-1 Core Measure due to severe sepsis or septic shock? No   Exclusion criteria - the patient is NOT to be included for SEP-1 Core Measure due to: Infection is not suspected        Medical Decision Making/Differential Diagnosis:    CC/HPI Summary, Social Determinants of health, Records Reviewed, DDx, testing done/not done, ED Course, Reassessment, disposition considerations/shared decision making with patient, consults, disposition:            Scanning speech, differential includes stroke, TIA,*steroid psychosis, MS, medication reaction to name a few. She is moving all other extremities. She does have left sided facial weakness which does look like a Bell's palsy although speech is unusual.  Head CT is normal.  Will order MRI with and without contrast to rule out MS, mass lesion, acute stroke. She does have a 24,000 white count on her CBC which is the only abnormality on the CBC and this could be related to steroids. She has no meningeal signs. No headache or fever. Denies neck stiffness or nuchal rigidity here. Chemistry panel reassuring. Urine without urinalysis shows slight ketones and large blood but otherwise negative for infection. MRI negative for acute pathology. Still having symptoms.  Medicine has been consulted for further admission      CONSULTS: (Who and What was discussed)  Jayesh Nair will admit      I am the Primary Clinician of Record. FINAL IMPRESSION      1. Altered mental status, unspecified altered mental status type          DISPOSITION/PLAN     DISPOSITION Admitted 01/06/2023 04:56:51 PM      PATIENT REFERRED TO:  No follow-up provider specified.     DISCHARGE MEDICATIONS:  New Prescriptions    No medications on file       DISCONTINUED MEDICATIONS:  Discontinued Medications    No medications on file              (Please note that portions of this note were completed with a voice recognition program.  Efforts were made to edit the dictations but occasionally words are mis-transcribed.)    Michelle Eason MD (electronically signed)            Radha Orta MD  01/06/23 1725

## 2023-01-07 PROBLEM — R56.9 SEIZURE-LIKE ACTIVITY (HCC): Status: ACTIVE | Noted: 2023-01-07

## 2023-01-07 LAB
CHOLESTEROL, TOTAL: 123 MG/DL (ref 0–199)
HBA1C MFR BLD: 5.5 % (ref 4–5.6)
HCT VFR BLD CALC: 43.1 % (ref 34–48)
HDLC SERPL-MCNC: 51 MG/DL
HEMOGLOBIN: 14.7 G/DL (ref 11.5–15.5)
LDL CHOLESTEROL CALCULATED: 48 MG/DL (ref 0–99)
MAGNESIUM: 2.2 MG/DL (ref 1.6–2.6)
MCH RBC QN AUTO: 30.3 PG (ref 26–35)
MCHC RBC AUTO-ENTMCNC: 34.1 % (ref 32–34.5)
MCV RBC AUTO: 88.9 FL (ref 80–99.9)
PDW BLD-RTO: 14.6 FL (ref 11.5–15)
PHOSPHORUS: 2.8 MG/DL (ref 2.5–4.5)
PLATELET # BLD: 310 E9/L (ref 130–450)
PMV BLD AUTO: 9.9 FL (ref 7–12)
PROCALCITONIN: 0.04 NG/ML (ref 0–0.08)
RBC # BLD: 4.85 E12/L (ref 3.5–5.5)
TRIGL SERPL-MCNC: 122 MG/DL (ref 0–149)
TSH SERPL DL<=0.05 MIU/L-ACNC: 4.43 UIU/ML (ref 0.27–4.2)
VLDLC SERPL CALC-MCNC: 24 MG/DL
WBC # BLD: 15 E9/L (ref 4.5–11.5)

## 2023-01-07 PROCEDURE — 84443 ASSAY THYROID STIM HORMONE: CPT

## 2023-01-07 PROCEDURE — 83036 HEMOGLOBIN GLYCOSYLATED A1C: CPT

## 2023-01-07 PROCEDURE — 85027 COMPLETE CBC AUTOMATED: CPT

## 2023-01-07 PROCEDURE — 80061 LIPID PANEL: CPT

## 2023-01-07 PROCEDURE — 36415 COLL VENOUS BLD VENIPUNCTURE: CPT

## 2023-01-07 PROCEDURE — G0378 HOSPITAL OBSERVATION PER HR: HCPCS

## 2023-01-07 PROCEDURE — 84100 ASSAY OF PHOSPHORUS: CPT

## 2023-01-07 PROCEDURE — 6370000000 HC RX 637 (ALT 250 FOR IP): Performed by: INTERNAL MEDICINE

## 2023-01-07 PROCEDURE — 6360000002 HC RX W HCPCS: Performed by: INTERNAL MEDICINE

## 2023-01-07 PROCEDURE — 1200000000 HC SEMI PRIVATE

## 2023-01-07 PROCEDURE — 83735 ASSAY OF MAGNESIUM: CPT

## 2023-01-07 PROCEDURE — 2580000003 HC RX 258: Performed by: INTERNAL MEDICINE

## 2023-01-07 PROCEDURE — 84145 PROCALCITONIN (PCT): CPT

## 2023-01-07 RX ORDER — LANOLIN ALCOHOL/MO/W.PET/CERES
3 CREAM (GRAM) TOPICAL NIGHTLY PRN
Status: DISCONTINUED | OUTPATIENT
Start: 2023-01-07 | End: 2023-01-15 | Stop reason: SDUPTHER

## 2023-01-07 RX ORDER — TIZANIDINE 4 MG/1
4 TABLET ORAL EVERY 6 HOURS PRN
Status: DISCONTINUED | OUTPATIENT
Start: 2023-01-07 | End: 2023-01-16 | Stop reason: HOSPADM

## 2023-01-07 RX ORDER — SODIUM CHLORIDE 0.9 % (FLUSH) 0.9 %
5-40 SYRINGE (ML) INJECTION PRN
Status: DISCONTINUED | OUTPATIENT
Start: 2023-01-07 | End: 2023-01-16 | Stop reason: HOSPADM

## 2023-01-07 RX ORDER — ONDANSETRON 2 MG/ML
4 INJECTION INTRAMUSCULAR; INTRAVENOUS EVERY 6 HOURS PRN
Status: DISCONTINUED | OUTPATIENT
Start: 2023-01-07 | End: 2023-01-16 | Stop reason: HOSPADM

## 2023-01-07 RX ORDER — GABAPENTIN 300 MG/1
600 CAPSULE ORAL 2 TIMES DAILY
Status: DISCONTINUED | OUTPATIENT
Start: 2023-01-07 | End: 2023-01-08

## 2023-01-07 RX ORDER — SODIUM CHLORIDE 0.9 % (FLUSH) 0.9 %
5-40 SYRINGE (ML) INJECTION EVERY 12 HOURS SCHEDULED
Status: DISCONTINUED | OUTPATIENT
Start: 2023-01-07 | End: 2023-01-16 | Stop reason: HOSPADM

## 2023-01-07 RX ORDER — ONDANSETRON 4 MG/1
4 TABLET, ORALLY DISINTEGRATING ORAL EVERY 8 HOURS PRN
Status: DISCONTINUED | OUTPATIENT
Start: 2023-01-07 | End: 2023-01-16 | Stop reason: HOSPADM

## 2023-01-07 RX ORDER — IBUPROFEN 400 MG/1
400 TABLET ORAL EVERY 6 HOURS PRN
Status: DISCONTINUED | OUTPATIENT
Start: 2023-01-07 | End: 2023-01-16 | Stop reason: HOSPADM

## 2023-01-07 RX ORDER — CALCIUM CARBONATE 200(500)MG
500 TABLET,CHEWABLE ORAL 3 TIMES DAILY PRN
Status: DISCONTINUED | OUTPATIENT
Start: 2023-01-07 | End: 2023-01-16 | Stop reason: HOSPADM

## 2023-01-07 RX ORDER — ENOXAPARIN SODIUM 100 MG/ML
30 INJECTION SUBCUTANEOUS 2 TIMES DAILY
Status: DISCONTINUED | OUTPATIENT
Start: 2023-01-07 | End: 2023-01-16 | Stop reason: HOSPADM

## 2023-01-07 RX ORDER — POLYETHYLENE GLYCOL 3350 17 G/17G
17 POWDER, FOR SOLUTION ORAL DAILY PRN
Status: DISCONTINUED | OUTPATIENT
Start: 2023-01-07 | End: 2023-01-16 | Stop reason: HOSPADM

## 2023-01-07 RX ORDER — HYDRALAZINE HYDROCHLORIDE 20 MG/ML
10 INJECTION INTRAMUSCULAR; INTRAVENOUS EVERY 6 HOURS PRN
Status: DISCONTINUED | OUTPATIENT
Start: 2023-01-07 | End: 2023-01-16 | Stop reason: HOSPADM

## 2023-01-07 RX ORDER — ACETAMINOPHEN 325 MG/1
650 TABLET ORAL EVERY 6 HOURS PRN
Status: DISCONTINUED | OUTPATIENT
Start: 2023-01-07 | End: 2023-01-09 | Stop reason: CLARIF

## 2023-01-07 RX ORDER — ACETAMINOPHEN 650 MG/1
650 SUPPOSITORY RECTAL EVERY 6 HOURS PRN
Status: DISCONTINUED | OUTPATIENT
Start: 2023-01-07 | End: 2023-01-09 | Stop reason: CLARIF

## 2023-01-07 RX ORDER — SODIUM CHLORIDE 9 MG/ML
INJECTION, SOLUTION INTRAVENOUS PRN
Status: DISCONTINUED | OUTPATIENT
Start: 2023-01-07 | End: 2023-01-09

## 2023-01-07 RX ADMIN — SODIUM CHLORIDE, PRESERVATIVE FREE 10 ML: 5 INJECTION INTRAVENOUS at 20:00

## 2023-01-07 RX ADMIN — GABAPENTIN 600 MG: 300 CAPSULE ORAL at 19:59

## 2023-01-07 RX ADMIN — TIZANIDINE 4 MG: 4 TABLET ORAL at 20:00

## 2023-01-07 RX ADMIN — IBUPROFEN 400 MG: 400 TABLET, FILM COATED ORAL at 17:29

## 2023-01-07 RX ADMIN — SODIUM CHLORIDE, PRESERVATIVE FREE 10 ML: 5 INJECTION INTRAVENOUS at 10:13

## 2023-01-07 RX ADMIN — ENOXAPARIN SODIUM 30 MG: 100 INJECTION SUBCUTANEOUS at 10:13

## 2023-01-07 RX ADMIN — ENOXAPARIN SODIUM 30 MG: 100 INJECTION SUBCUTANEOUS at 20:00

## 2023-01-07 RX ADMIN — MELATONIN 3 MG ORAL TABLET 3 MG: 3 TABLET ORAL at 19:59

## 2023-01-07 RX ADMIN — ACETAMINOPHEN 650 MG: 325 TABLET ORAL at 13:40

## 2023-01-07 ASSESSMENT — PAIN SCALES - GENERAL
PAINLEVEL_OUTOF10: 6
PAINLEVEL_OUTOF10: 9
PAINLEVEL_OUTOF10: 5
PAINLEVEL_OUTOF10: 7
PAINLEVEL_OUTOF10: 5
PAINLEVEL_OUTOF10: 8

## 2023-01-07 ASSESSMENT — PAIN DESCRIPTION - PAIN TYPE
TYPE: ACUTE PAIN
TYPE: ACUTE PAIN

## 2023-01-07 ASSESSMENT — PAIN DESCRIPTION - ORIENTATION
ORIENTATION: RIGHT;LEFT

## 2023-01-07 ASSESSMENT — PAIN - FUNCTIONAL ASSESSMENT
PAIN_FUNCTIONAL_ASSESSMENT: ACTIVITIES ARE NOT PREVENTED
PAIN_FUNCTIONAL_ASSESSMENT: PREVENTS OR INTERFERES SOME ACTIVE ACTIVITIES AND ADLS
PAIN_FUNCTIONAL_ASSESSMENT: PREVENTS OR INTERFERES SOME ACTIVE ACTIVITIES AND ADLS

## 2023-01-07 ASSESSMENT — PAIN DESCRIPTION - ONSET
ONSET: ON-GOING
ONSET: ON-GOING

## 2023-01-07 ASSESSMENT — PAIN DESCRIPTION - LOCATION
LOCATION: FACE;HEAD;EAR

## 2023-01-07 ASSESSMENT — PAIN DESCRIPTION - DESCRIPTORS
DESCRIPTORS: ACHING;THROBBING
DESCRIPTORS: ACHING;THROBBING;DISCOMFORT

## 2023-01-07 ASSESSMENT — PAIN DESCRIPTION - FREQUENCY
FREQUENCY: CONTINUOUS
FREQUENCY: CONTINUOUS

## 2023-01-07 NOTE — ED NOTES
Nurse to nurse report given to Feliciano Joe on [address] [id number].      Amy Kwon RN  01/07/23 4587

## 2023-01-07 NOTE — PROGRESS NOTES
Patient was mistakenly admitted to Dr. Yana Hopper yesterday. I spoke to Dr. Emanuel Harris with [facility], discussed case. Dr. Emanuel Harris accepted patient for admission.

## 2023-01-07 NOTE — ED NOTES
Patient reports new stuttering this morning.   Slight left sided facial droop continues, she reports tingling to bilateral upper extremities able to moved them without difficulty,  Tolerating breakfast this AM.       Feroz Wallace RN  01/07/23 2766

## 2023-01-07 NOTE — H&P
Inpatient H&P      PCP:  Win Leon DO  Admitting Physician:  Tyrel Rosen DO  Consultants:  Neuro  Chief Complaint:    Chief Complaint   Patient presents with    Headache    Other     Having trouble getting words out/studder for a few days, tingling in BUE for a few days ; having headaches; has bells palsy-was seen here recently        History of Present Illness  Rutland Mauro is a 32 y.o. female who presents to [facility] ER complaining of headache, stuttering. Rutland Mauro has a past medical history that includes obesity, was being evaluation for gastric surgery    Jumelissa Flor presents to ER with complaint of difficulty speaking. She has had stuttering that has gotten worse over the past two days that brought her into the ER. She also admits to bilateral upper extremity tingling. She has a recent history of [place] palsy that was treated with antivirals and steroids. CT head negative. MRI negative. Admitted for neurology evaluation. Patient with a leukocytosis, tachycardia, and hypertension on presentation to ER    ER Course  Upon presentation to the ER, routine labwork was performed which revealed WBC 24.4. Imaging results are as outlined below in the Imaging section of this note. Upon arrival to the ER, patient was tachycardic at 142, /126. The patient received benadryl, toradol, compazine.  in the emergency room and was admitted under the care of [address] Admission -   None in EMR    Last Echocardiogram -   None in EMR     ED TRIAGE VITALS  BP: 126/84, Temp: 98.3 °F (36.8 °C), Heart Rate: 89, Resp: 16, SpO2: 98 %    Vitals:    01/06/23 0231 01/06/23 0926 01/06/23 2000 01/07/23 0623   BP: (!) 147/93 (!) 143/98 (!) 136/99 126/84   Pulse: 81 86 88 89   Resp:  16 16 16   Temp: 98 °F (36.7 °C) 98 °F (36.7 °C) 98.9 °F (37.2 °C) 98.3 °F (36.8 °C)   TempSrc:  Oral Oral Oral   SpO2: 99% 98% 98% 98%   Weight:             Histories  Past Medical History:   Diagnosis Date    Abnormal Papanicolaou smear of cervix with positive human papilloma virus (HPV) test     Closed fracture of distal end of right fibula 2020    Morbid obesity due to excess calories [facility])      Past Surgical History:   Procedure Laterality Date    ANKLE SURGERY Right      SECTION      LEEP  2017    UPPER GASTROINTESTINAL ENDOSCOPY N/A 2022    EGD BIOPSY performed by Wendy Jarquin MD at [place] [id number] reviewed. No pertinent family history. Home Medications  Prior to Admission medications    Medication Sig Start Date End Date Taking? Authorizing Provider   prochlorperazine (COMPAZINE) 10 MG tablet Take 1 tablet by mouth every 6 hours as needed (headache or nausea) 22   Frank Vazquez MD   ondansetron (ZOFRAN-ODT) 4 MG disintegrating tablet Take 1 tablet by mouth every 8 hours as needed for Nausea or Vomiting 22   Frank Vazquez MD   methocarbamol (ROBAXIN) 500 MG tablet take 1 tablet by mouth three times a day if needed 10/12/22   Historical Provider, MD   norethindrone-ethinyl estradiol ([address] SHE[place] ) 1-20 MG-MCG per tablet Take 1 tablet by mouth daily 11/3/22   Brianne Woodard MD   vitamin D (ERGOCALCIFEROL) 1.25 MG (77888 UT) CAPS capsule take 1 capsule by mouth every week 10/18/22   Wendy Jarquin MD   tiZANidine (ZANAFLEX) 4 MG tablet take 1 tablet by mouth three times a day if needed for SPASM(S) 22   Historical Provider, MD   gabapentin (NEURONTIN) 600 MG tablet take 1 tablet by mouth twice a day 8/19/22 11/3/22  IRENA Teague       Allergies  Patient has no known allergies.     Social Hx  Social History     Socioeconomic History    Marital status: Single     Spouse name: Not on file    Number of children: Not on file    Years of education: Not on file    Highest education level: Not on file   Occupational History    Not on file   Tobacco Use    Smoking status: Never    Smokeless tobacco: Never   Substance and Sexual Activity    Alcohol use: No    Drug use: No    Sexual activity: Not on file   Other Topics Concern    Not on file   Social History Narrative    2 cups coffee daily. 1 5-hour energy drinks daily      Social Determinants of Health     Financial Resource Strain: Not on file   Food Insecurity: Not on file   Transportation Needs: Not on file   Physical Activity: Not on file   Stress: Not on file   Social Connections: Not on file   Intimate Partner Violence: Not on file   Housing Stability: Not on file       Review of Systems  All bolded are positive; please see HPI  General:  Fever, chills, diaphoresis, fatigue, malaise, night sweats, weight loss stuttering  Psychological:  Anxiety, disorientation, hallucinations. ENT:  Epistaxis, headaches, vertigo, visual changes. Cardiovascular:  Chest pain, irregular heartbeats, palpitations, paroxysmal nocturnal dyspnea. Respiratory:  Shortness of breath, coughing, sputum production, hemoptysis, wheezing, orthopnea.   Gastrointestinal:  Nausea, vomiting, diarrhea, heartburn, constipation, abdominal pain, hematemesis, hematochezia, melena, acholic stools  Genito-Urinary:  Dysuria, urgency, frequency, hematuria  Musculoskeletal:  Joint pain, joint stiffness, joint swelling, muscle pain  Neurology:  Headache, focal neurological deficits, weakness, numbness, paresthesia  Derm:  Rashes, ulcers, excoriations, bruising  Extremities:  Decreased ROM, peripheral edema, mottling    Physical Examination  Vitals:  /84   Pulse 89   Temp 98.3 °F (36.8 °C) (Oral)   Resp 16   Wt 250 lb (113.4 kg)   LMP 12/15/2022   SpO2 98%   BMI 42.91 kg/m²   General Appearance:  awake, alert, and oriented to person, place, time, and purpose; appears stated age and cooperative; no apparent distress no labored breathing stuttering speech  HEENT:  NCAT; PERRL; conjunctiva pink, sclera clear  Neck:  no adenopathy, bruit, JVD, tenderness, masses, or nodules; supple, symmetrical, trachea midline, thyroid not enlarged  Lung: clear to auscultation bilaterally; no use of accessory muscles; no rhonchi, rales, or crackles  Heart:  regular rate and regular rhythm without murmur, rub, or gallop  Abdomen:  soft, nontender, nondistended; normoactive bowel sounds; no organomegaly  Extremities:  extremities normal, atraumatic, no cyanosis or edema  Musculokeletal:  no joint swelling, no muscle tenderness. ROM normal in all joints of extremities. Neurologic:  mental status A&Ox3, thought content appropriate; CN II-XII grossly intact; sensation intact, motor strength 5/5 globally; no slurred speech    Laboratory Data  No results found for this or any previous visit (from the past 24 hour(s)). Imaging  CT HEAD WO CONTRAST    Result Date: 1/5/2023  EXAMINATION: CT OF THE HEAD WITHOUT CONTRAST  1/5/2023 2:26 pm TECHNIQUE: CT of the head was performed without the administration of intravenous contrast. Automated exposure control, iterative reconstruction, and/or weight based adjustment of the mA/kV was utilized to reduce the radiation dose to as low as reasonably achievable. COMPARISON: None. HISTORY: ORDERING SYSTEM PROVIDED HISTORY: difficulty speaking TECHNOLOGIST PROVIDED HISTORY: Has a \"code stroke\" or \"stroke alert\" been called? ->No Reason for exam:->difficulty speaking Decision Support Exception - unselect if not a suspected or confirmed emergency medical condition->Emergency Medical Condition (MA) What reading provider will be dictating this exam?->CRC FINDINGS: BRAIN/VENTRICLES: There is no acute intracranial hemorrhage, mass effect or midline shift. No abnormal extra-axial fluid collection. The gray-white differentiation is maintained without evidence of an acute infarct. There is no evidence of hydrocephalus. ORBITS: The visualized portion of the orbits demonstrate no acute abnormality. SINUSES: The visualized paranasal sinuses and mastoid air cells demonstrate no acute abnormality.  SOFT TISSUES/SKULL:  No acute abnormality of the visualized skull or soft tissues. No acute intracranial abnormality. CT HEAD WO CONTRAST    Result Date: 12/29/2022  EXAMINATION: CT OF THE HEAD WITHOUT CONTRAST  12/29/2022 11:19 am TECHNIQUE: CT of the head was performed without the administration of intravenous contrast. Automated exposure control, iterative reconstruction, and/or weight based adjustment of the mA/kV was utilized to reduce the radiation dose to as low as reasonably achievable. COMPARISON: 06/28/2021 HISTORY: ORDERING SYSTEM PROVIDED HISTORY: rihgt sided faical droop TECHNOLOGIST PROVIDED HISTORY: Has a \"code stroke\" or \"stroke alert\" been called? ->No Reason for exam:->rihgt sided faical droop Decision Support Exception - unselect if not a suspected or confirmed emergency medical condition->Emergency Medical Condition (MA) What reading provider will be dictating this exam?->CRC FINDINGS: BRAIN/VENTRICLES: There is no acute intracranial hemorrhage, mass effect or midline shift. No abnormal extra-axial fluid collection. The gray-white differentiation is maintained without evidence of an acute infarct. There is no evidence of hydrocephalus. ORBITS: The visualized portion of the orbits demonstrate no acute abnormality. SINUSES: The visualized paranasal sinuses and mastoid air cells demonstrate no acute abnormality. SOFT TISSUES/SKULL:  No acute abnormality of the visualized skull or soft tissues. No acute intracranial abnormality. MRI BRAIN W WO CONTRAST    Result Date: 1/6/2023  EXAMINATION: MRI OF THE BRAIN WITHOUT AND WITH CONTRAST  1/6/2023 1:47 pm TECHNIQUE: Multiplanar multisequence MRI of the head/brain was performed without and with the administration of intravenous contrast. COMPARISON: CT brain performed 01/05/2023.  HISTORY: ORDERING SYSTEM PROVIDED HISTORY: facial palsy, slurred speech, r/o CVA TECHNOLOGIST PROVIDED HISTORY: Reason for exam:->facial palsy, slurred speech, r/o CVA What is the sedation requirement?->None Decision Support Exception - unselect if not a suspected or confirmed emergency medical condition->Emergency Medical Condition (MA) What reading provider will be dictating this exam?->CRC FINDINGS: INTRACRANIAL STRUCTURES/VENTRICLES:  The sellar and suprasellar structures, optic chiasm, corpus callosum, pineal gland, tectum, and midline brainstem structures are unremarkable. The craniocervical junction is unremarkable. There is no acute hemorrhage, mass effect, or midline shift. There is satisfactory overall gray-white matter differentiation. The ventricular structures are symmetric and unremarkable. The infratentorial structures including the cerebellopontine angles and internal auditory canals are unremarkable. There is no abnormal restricted diffusion. There is no abnormal blooming artifact on susceptibility weighted imaging. No abnormal postcontrast enhancement. ORBITS: The visualized portion of the orbits demonstrate no acute abnormality. SINUSES: The visualized paranasal sinuses and mastoid air cells demonstrate no acute abnormality. BONES/SOFT TISSUES: The bone marrow signal intensity appears normal. The soft tissues demonstrate no acute abnormality. Unremarkable pre and post-contrast MRI of the brain. Assessment and Plan  Patient is a 32 y.o. female who presented with stuttering. The active problem list is as follows:    Stuttering speech- CT and MRI negative. Neuro consulted. EEG  Recent history of bells palsy  Leukocytosis- 24 on admission. Improved without antibiotics. Check procal  Tachycardia- resolved  Hypertension-on presentation    Routine labs in the morning. DVT prophylaxis. Please see orders for further management and care.         Jayce Royal DO    8:06 AM  1/7/2023

## 2023-01-08 ENCOUNTER — APPOINTMENT (OUTPATIENT)
Dept: CT IMAGING | Age: 28
DRG: 074 | End: 2023-01-08
Payer: COMMERCIAL

## 2023-01-08 LAB
ALBUMIN SERPL-MCNC: 3.4 G/DL (ref 3.5–5.2)
ALP BLD-CCNC: 63 U/L (ref 35–104)
ALT SERPL-CCNC: <5 U/L (ref 0–32)
ANION GAP SERPL CALCULATED.3IONS-SCNC: 9 MMOL/L (ref 7–16)
AST SERPL-CCNC: 12 U/L (ref 0–31)
BILIRUB SERPL-MCNC: 0.3 MG/DL (ref 0–1.2)
BUN BLDV-MCNC: 14 MG/DL (ref 6–20)
CALCIUM SERPL-MCNC: 9.1 MG/DL (ref 8.6–10.2)
CHLORIDE BLD-SCNC: 102 MMOL/L (ref 98–107)
CO2: 25 MMOL/L (ref 22–29)
CREAT SERPL-MCNC: 0.8 MG/DL (ref 0.5–1)
GFR SERPL CREATININE-BSD FRML MDRD: >60 ML/MIN/1.73
GLUCOSE BLD-MCNC: 96 MG/DL (ref 74–99)
HCT VFR BLD CALC: 40.5 % (ref 34–48)
HEMOGLOBIN: 13.8 G/DL (ref 11.5–15.5)
MCH RBC QN AUTO: 30.5 PG (ref 26–35)
MCHC RBC AUTO-ENTMCNC: 34.1 % (ref 32–34.5)
MCV RBC AUTO: 89.6 FL (ref 80–99.9)
PDW BLD-RTO: 14.3 FL (ref 11.5–15)
PLATELET # BLD: 299 E9/L (ref 130–450)
PMV BLD AUTO: 9.9 FL (ref 7–12)
POTASSIUM SERPL-SCNC: 4.3 MMOL/L (ref 3.5–5)
RBC # BLD: 4.52 E12/L (ref 3.5–5.5)
SODIUM BLD-SCNC: 136 MMOL/L (ref 132–146)
TOTAL PROTEIN: 6.8 G/DL (ref 6.4–8.3)
WBC # BLD: 12.8 E9/L (ref 4.5–11.5)

## 2023-01-08 PROCEDURE — 6370000000 HC RX 637 (ALT 250 FOR IP): Performed by: INTERNAL MEDICINE

## 2023-01-08 PROCEDURE — 1200000000 HC SEMI PRIVATE

## 2023-01-08 PROCEDURE — G0378 HOSPITAL OBSERVATION PER HR: HCPCS

## 2023-01-08 PROCEDURE — 2580000003 HC RX 258: Performed by: INTERNAL MEDICINE

## 2023-01-08 PROCEDURE — 99223 1ST HOSP IP/OBS HIGH 75: CPT | Performed by: PSYCHIATRY & NEUROLOGY

## 2023-01-08 PROCEDURE — 85027 COMPLETE CBC AUTOMATED: CPT

## 2023-01-08 PROCEDURE — 36415 COLL VENOUS BLD VENIPUNCTURE: CPT

## 2023-01-08 PROCEDURE — 72125 CT NECK SPINE W/O DYE: CPT

## 2023-01-08 PROCEDURE — 80053 COMPREHEN METABOLIC PANEL: CPT

## 2023-01-08 PROCEDURE — 6360000002 HC RX W HCPCS: Performed by: INTERNAL MEDICINE

## 2023-01-08 RX ADMIN — ONDANSETRON 4 MG: 2 INJECTION INTRAMUSCULAR; INTRAVENOUS at 09:01

## 2023-01-08 RX ADMIN — IBUPROFEN 400 MG: 400 TABLET, FILM COATED ORAL at 21:05

## 2023-01-08 RX ADMIN — SODIUM CHLORIDE, PRESERVATIVE FREE 10 ML: 5 INJECTION INTRAVENOUS at 21:05

## 2023-01-08 RX ADMIN — IBUPROFEN 400 MG: 400 TABLET, FILM COATED ORAL at 05:06

## 2023-01-08 RX ADMIN — ENOXAPARIN SODIUM 30 MG: 100 INJECTION SUBCUTANEOUS at 09:00

## 2023-01-08 RX ADMIN — GABAPENTIN 600 MG: 300 CAPSULE ORAL at 09:00

## 2023-01-08 RX ADMIN — IBUPROFEN 400 MG: 400 TABLET, FILM COATED ORAL at 13:30

## 2023-01-08 RX ADMIN — ACETAMINOPHEN 650 MG: 325 TABLET ORAL at 17:51

## 2023-01-08 RX ADMIN — TIZANIDINE 4 MG: 4 TABLET ORAL at 21:05

## 2023-01-08 RX ADMIN — MELATONIN 3 MG ORAL TABLET 3 MG: 3 TABLET ORAL at 21:05

## 2023-01-08 RX ADMIN — SODIUM CHLORIDE, PRESERVATIVE FREE 10 ML: 5 INJECTION INTRAVENOUS at 09:02

## 2023-01-08 RX ADMIN — ACETAMINOPHEN 650 MG: 325 TABLET ORAL at 09:01

## 2023-01-08 RX ADMIN — ENOXAPARIN SODIUM 30 MG: 100 INJECTION SUBCUTANEOUS at 21:05

## 2023-01-08 RX ADMIN — TIZANIDINE 4 MG: 4 TABLET ORAL at 13:43

## 2023-01-08 ASSESSMENT — PAIN DESCRIPTION - PAIN TYPE
TYPE: ACUTE PAIN

## 2023-01-08 ASSESSMENT — PAIN DESCRIPTION - LOCATION
LOCATION: NECK;JAW;HEAD
LOCATION: NECK;JAW;HEAD
LOCATION: HEAD;FACE;EAR

## 2023-01-08 ASSESSMENT — PAIN SCALES - GENERAL
PAINLEVEL_OUTOF10: 7
PAINLEVEL_OUTOF10: 8
PAINLEVEL_OUTOF10: 8
PAINLEVEL_OUTOF10: 7
PAINLEVEL_OUTOF10: 8
PAINLEVEL_OUTOF10: 10
PAINLEVEL_OUTOF10: 10

## 2023-01-08 ASSESSMENT — PAIN DESCRIPTION - DESCRIPTORS
DESCRIPTORS: ACHING;DISCOMFORT;THROBBING
DESCRIPTORS: DISCOMFORT;THROBBING
DESCRIPTORS: ACHING;DISCOMFORT

## 2023-01-08 ASSESSMENT — PAIN DESCRIPTION - ORIENTATION
ORIENTATION: RIGHT;LEFT
ORIENTATION: RIGHT;LEFT;ANTERIOR
ORIENTATION: RIGHT

## 2023-01-08 ASSESSMENT — PAIN - FUNCTIONAL ASSESSMENT
PAIN_FUNCTIONAL_ASSESSMENT: ACTIVITIES ARE NOT PREVENTED
PAIN_FUNCTIONAL_ASSESSMENT: ACTIVITIES ARE NOT PREVENTED

## 2023-01-08 ASSESSMENT — PAIN DESCRIPTION - FREQUENCY
FREQUENCY: CONTINUOUS
FREQUENCY: CONTINUOUS

## 2023-01-08 ASSESSMENT — PAIN DESCRIPTION - ONSET
ONSET: ON-GOING
ONSET: ON-GOING

## 2023-01-08 NOTE — PROGRESS NOTES
Hospitalist Progress Note      Synopsis: Patient admitted for stuttering speech     Patient presented to the ED with complaints of difficulty speaking. She reports stuttering speech that has been progressively worse over the past 2 days. She also endorses bilateral upper extremity tingling. She has a recent diagnosis of Bell's palsy which was treated with antivirals and steroids. Imaging negative in ED. She was admitted for further neurological evaluation. Awaiting neurology consult. Hospital day 0     Subjective:  Stable overnight. No issues reported. Patient seen and examined Lying in bed. No complaints. No change in symptoms. Records reviewed. Temp (24hrs), Av.6 °F (36.4 °C), Min:97.5 °F (36.4 °C), Max:97.7 °F (36.5 °C)    DIET: ADULT DIET; Regular  CODE: Full Code    Intake/Output Summary (Last 24 hours) at 2023 0985  Last data filed at 2023 0508  Gross per 24 hour   Intake 840 ml   Output --   Net 840 ml       Review of Systems: All bolded are positive; please see HPI  General:  Fever, chills, diaphoresis, fatigue, malaise, night sweats, weight loss  Psychological:  Anxiety, disorientation, hallucinations. ENT:  Epistaxis, headaches, vertigo, visual changes. Cardiovascular:  Chest pain, irregular heartbeats, palpitations, paroxysmal nocturnal dyspnea. Respiratory:  Shortness of breath, coughing, sputum production, hemoptysis, wheezing, orthopnea.   Gastrointestinal:  Nausea, vomiting, diarrhea, heartburn, constipation, abdominal pain, hematemesis, hematochezia, melena, acholic stools  Genito-Urinary:  Dysuria, urgency, frequency, hematuria  Musculoskeletal:  Joint pain, joint stiffness, joint swelling, muscle pain  Neurology:  Headache, focal neurological deficits, weakness, numbness, paresthesia, stuttered speech   Derm:  Rashes, ulcers, excoriations, bruising  Extremities:  Decreased ROM, peripheral edema, mottling    Objective:    /84   Pulse 81   Temp 97.6 °F (36.4 °C) (Oral)   Resp 17   Ht 5' 4\" (1.626 m)   Wt 246 lb 9.6 oz (111.9 kg)   LMP 12/15/2022   SpO2 95%   BMI 42.33 kg/m²     General appearance: Obese young female in no apparent distress, appears stated age and cooperative. HEENT: Conjunctivae/corneas clear. Mucous membranes moist.  Neck: Supple. No JVD. Respiratory:  Clear to auscultation bilaterally. Normal respiratory effort. Cardiovascular:  RRR. S1, S2 without MRG. PV: Pulses palpable. No edema. Abdomen: Soft, non-tender, non-distended, obese. +BS  Musculoskeletal: No obvious deformities. Skin: Normal skin color. No rashes or lesions. Good turgor. Neurologic:  Grossly non-focal. Awake, alert, following commands. Stuttering speech. Psychiatric: Alert and oriented, thought content appropriate, normal insight and judgement    Medications:  REVIEWED DAILY    Infusion Medications    sodium chloride       Scheduled Medications    sodium chloride flush  5-40 mL IntraVENous 2 times per day    enoxaparin  30 mg SubCUTAneous BID    gabapentin  600 mg Oral BID     PRN Meds: sodium chloride flush, sodium chloride, ondansetron **OR** ondansetron, polyethylene glycol, acetaminophen **OR** acetaminophen, calcium carbonate, melatonin, hydrALAZINE, ibuprofen, tiZANidine    Labs:     Recent Labs     01/05/23  1308 01/07/23  0845 01/08/23  0433   WBC 24.4* 15.0* 12.8*   HGB 14.5 14.7 13.8   HCT 43.6 43.1 40.5    310 299       Recent Labs     01/05/23  1308 01/07/23  0845 01/08/23  0433     --  136   K 4.3  --  4.3     --  102   CO2 24  --  25   BUN 13  --  14   CREATININE 0.7  --  0.8   CALCIUM 9.6  --  9.1   PHOS  --  2.8  --        Recent Labs     01/05/23  1308 01/08/23  0433   PROT 7.6 6.8   ALKPHOS 65 63   ALT 5 <5   AST 19 12   BILITOT <0.2 0.3       No results for input(s): INR in the last 72 hours. No results for input(s): Charisma Comment in the last 72 hours.     Chronic labs:    Lab Results   Component Value Date    CHOL 123 01/07/2023    TRIG 122 01/07/2023    HDL 51 01/07/2023    LDLCALC 48 01/07/2023    TSH 4.430 (H) 01/07/2023    INR 1.1 12/29/2022    LABA1C 5.5 01/07/2023       Radiology: REVIEWED DAILY    Assessment:  Stuttering speech  BUE paresthesias  Hx of bells palsey w/ L facial droop   Leukocytosis likely 2/2 recent steroid use   Tachycardia - resolved  Elevated BP - improved  Obesity, Body mass index is 42.33 kg/m². Plan:  Awaiting neurology c/s  Awaiting EEG  Neuro checks  Check cervical CT given BUE complaints  Stop Neurontin - pt has not been prescribed OP since 10/22. DVT Prophylaxis [x] Lovenox  []  Heparin [] DOAC [] PCDs [] Ambulation    GI Prophylaxis [] PPI  [] H2 Blocker   [] Carafate  [x] Diet/Tube Feeds   Level of care [x] Med/Surg  [] Intermediate  []  ICU   Diet ADULT DIET; Regular    Family contact [x]  N/A    [] At bedside  [] Phone call     Discharge Plan: Home pending neurology c/s    +++++++++++++++++++++++++++++++++++++++++++++++++  Jamia Arcos C/ Edgardo Hirsch [address] [place] [address]  +++++++++++++++++++++++++++++++++++++++++++++++++  NOTE: This report was transcribed using voice recognition software. Every effort was made to ensure accuracy; however, inadvertent computerized transcription errors may be present.

## 2023-01-08 NOTE — CONSULTS
NEUROLOGY CONSULT NOTE      Requesting Physician: Burt Blackwell DO    Reason for Consult:  Evaluate for stuttering, tremors    History of Present Illness:  Rosy Ross is a 32 y.o. female  with h/o morbid obesity who was admitted to [facility] on 2023 with presentation of difficulty speaking over the prior 2 days. Patient reporting difficulty getting her words out with stuttering quality speech there is also report of tingling in both upper extremities over the past few days before admission with generalized headaches. Patient with no prior history of problems with speech but now has developed acute onset of stuttering as a speech that does not seem to relent. She states that she had Bell's palsy on both sides 3 weeks ago with left side worse than the right. She was treated with Valtrex and prednisone at that time but states that he has not noticed much improvement. She still has weakness on the left more so than the right side of the face. She is able to close her eyes but has difficulty keeping her mind closed particularly on the left side. Patient also indicated that she has been having tinnitus in her ears as well as ear pain on the left greater than right side. Facial swelling was present with initial symptoms per her report. No report of any other associated symptoms. Patient evaluated CT scan of the head that showed no acute intracranial abnormalities. MRI of the brain was also performed showing no acute abnormalities either. Since hospitalization, there has been no reported improvement.         Past Medical History:        Diagnosis Date    Abnormal Papanicolaou smear of cervix with positive human papilloma virus (HPV) test     Closed fracture of distal end of right fibula 2020    Morbid obesity due to excess calories [facility])            Procedure Laterality Date    ANKLE SURGERY Right      SECTION      LEE  2017    UPPER GASTROINTESTINAL ENDOSCOPY N/A 5/13/2022    EGD BIOPSY performed by Ptaricia Hurley MD at [address] History:  Social History     Tobacco Use   Smoking Status Never   Smokeless Tobacco Never     Social History     Substance and Sexual Activity   Alcohol Use No     Social History     Substance and Sexual Activity   Drug Use No     Single    Family History:   History reviewed. No pertinent family history. Review of Systems:  All systems reviewed are negative except what is mentioned in history of present illness. Allergies:    No Known Allergies     Current Medications:   sodium chloride flush 0.9 % injection 5-40 mL, 2 times per day  sodium chloride flush 0.9 % injection 5-40 mL, PRN  0.9 % sodium chloride infusion, PRN  enoxaparin Sodium (LOVENOX) injection 30 mg, BID  ondansetron (ZOFRAN-ODT) disintegrating tablet 4 mg, Q8H PRN   Or  ondansetron (ZOFRAN) injection 4 mg, Q6H PRN  polyethylene glycol (GLYCOLAX) packet 17 g, Daily PRN  acetaminophen (TYLENOL) tablet 650 mg, Q6H PRN   Or  acetaminophen (TYLENOL) suppository 650 mg, Q6H PRN  calcium carbonate (TUMS) chewable tablet 500 mg, TID PRN  melatonin tablet 3 mg, Nightly PRN  hydrALAZINE (APRESOLINE) injection 10 mg, Q6H PRN  ibuprofen (ADVIL;MOTRIN) tablet 400 mg, Q6H PRN  tiZANidine (ZANAFLEX) tablet 4 mg, Q6H PRN       Physical Exam:  /84   Pulse 81   Temp 97.6 °F (36.4 °C) (Oral)   Resp 17   Ht 5' 4\" (1.626 m)   Wt 246 lb 9.6 oz (111.9 kg)   LMP 12/15/2022   SpO2 95%   BMI 42.33 kg/m²  I Body mass index is 42.33 kg/m². I   Wt Readings from Last 1 Encounters:   01/07/23 246 lb 9.6 oz (111.9 kg)          HEENT: Normocephalic, atraumatic, no lesions or abnormalities noted. Neck:  supple with full ROM; no masses, nodes or bruits; no cervical tenderness on palpation.      Lungs:  clear to auscultation  bilaterally     CV: RRR without gallops or murmurs     Extremities: no c/c/e          Neurologic Exam     Mental Status:  Patient was alert, responsive, oriented, appropriate, answering questions, and following commands. stuttering speech with effort present with almost every word. Intact sensorium and cognition. Cranial Nerves: Pupils were equal round and reactive to light and accommodation;    Visual fields were full on confrontation;  Horizontal diplopia with midline and lateral gaze;  Decreased excursion of left eye when looking laterally with mild decrease in the right medial gaze when looking to the left; no nystagmus; Intact facial sensation to temp, pinprick, and light touch; Bifacial weakness with significant insulin decreased nasolabial fold on the left versus right side with inability to keep her left eye closed against resistance; She did better on the right side but it was possible to open her right eye;  Smile was asymmetric but incomplete excursion of the lips on the right side consistent with her mild weakness; Hearing was intact; Patino was midline;    Normal palatal elevation with midline uvula  Trapezius strength of 5/5. Tongue was midline with no atrophy or fasciculations  Motor Exam:  Strength was 5/5 throughout; normal tone and bulk; no atrophy or fasiculations noted. Sensory Exam:  Normal sensation to light touch, vibration, pin-prick, and temperature; No sensory extinction. Cerebella Exam:  Intact finger-nose-finger, rapidly alternating movements, fine motor movements, and heel-down-shin maneuvers; No cerebellar rebound or drift; No tremors. Gait:  Gait was not tested.    Reflexes: normal and symmetric bilaterally; Babinski is negative    Labs:    CBC:   Recent Labs     01/07/23  0845 01/08/23 0433   WBC 15.0* 12.8*   HGB 14.7 13.8    299   MCV 88.9 89.6   MCH 30.3 30.5   MCHC 34.1 34.1   RDW 14.6 14.3     CMP:  Recent Labs     01/08/23 0433      K 4.3      CO2 25   BUN 14   CREATININE 0.8   LABGLOM >60   GLUCOSE 96   CALCIUM 9.1     Liver:   Recent Labs     01/08/23 0433   AST 12   ALT <5 ALKPHOS 63   PROT 6.8   LABALBU 3.4*   BILITOT 0.3     INR: No results for input(s): PROTIME, INR in the last 72 hours. ToxicologyNo results for input(s): PHENYTOIN, CARBTOT, PHENOBARB, VALPROATE, LAMOTRIG in the last 72 hours. Invalid input(s):  KEPPRA  No results for input(s): AMPMETHURSCR, BARBTQTU, BDZQTU, CANNABQUANT, COCMETQTU, OPIAU, PCPQUANT in the last 72 hours. Radiology:  CT HEAD WO CONTRAST    Result Date: 1/5/2023  EXAMINATION: CT OF THE HEAD WITHOUT CONTRAST  1/5/2023 2:26 pm TECHNIQUE: CT of the head was performed without the administration of intravenous contrast. Automated exposure control, iterative reconstruction, and/or weight based adjustment of the mA/kV was utilized to reduce the radiation dose to as low as reasonably achievable. COMPARISON: None. HISTORY: ORDERING SYSTEM PROVIDED HISTORY: difficulty speaking TECHNOLOGIST PROVIDED HISTORY: Has a \"code stroke\" or \"stroke alert\" been called? ->No Reason for exam:->difficulty speaking Decision Support Exception - unselect if not a suspected or confirmed emergency medical condition->Emergency Medical Condition (MA) What reading provider will be dictating this exam?->CRC FINDINGS: BRAIN/VENTRICLES: There is no acute intracranial hemorrhage, mass effect or midline shift. No abnormal extra-axial fluid collection. The gray-white differentiation is maintained without evidence of an acute infarct. There is no evidence of hydrocephalus. ORBITS: The visualized portion of the orbits demonstrate no acute abnormality. SINUSES: The visualized paranasal sinuses and mastoid air cells demonstrate no acute abnormality. SOFT TISSUES/SKULL:  No acute abnormality of the visualized skull or soft tissues. No acute intracranial abnormality.      CT HEAD WO CONTRAST    Result Date: 12/29/2022  EXAMINATION: CT OF THE HEAD WITHOUT CONTRAST  12/29/2022 11:19 am TECHNIQUE: CT of the head was performed without the administration of intravenous contrast. Automated exposure control, iterative reconstruction, and/or weight based adjustment of the mA/kV was utilized to reduce the radiation dose to as low as reasonably achievable. COMPARISON: 06/28/2021 HISTORY: ORDERING SYSTEM PROVIDED HISTORY: rit sided faical droop TECHNOLOGIST PROVIDED HISTORY: Has a \"code stroke\" or \"stroke alert\" been called? ->No Reason for exam:->rihgt sided faical droop Decision Support Exception - unselect if not a suspected or confirmed emergency medical condition->Emergency Medical Condition (MA) What reading provider will be dictating this exam?->CRC FINDINGS: BRAIN/VENTRICLES: There is no acute intracranial hemorrhage, mass effect or midline shift. No abnormal extra-axial fluid collection. The gray-white differentiation is maintained without evidence of an acute infarct. There is no evidence of hydrocephalus. ORBITS: The visualized portion of the orbits demonstrate no acute abnormality. SINUSES: The visualized paranasal sinuses and mastoid air cells demonstrate no acute abnormality. SOFT TISSUES/SKULL:  No acute abnormality of the visualized skull or soft tissues. No acute intracranial abnormality. MRI BRAIN W WO CONTRAST    Result Date: 1/6/2023  EXAMINATION: MRI OF THE BRAIN WITHOUT AND WITH CONTRAST  1/6/2023 1:47 pm TECHNIQUE: Multiplanar multisequence MRI of the head/brain was performed without and with the administration of intravenous contrast. COMPARISON: CT brain performed 01/05/2023.  HISTORY: ORDERING SYSTEM PROVIDED HISTORY: facial palsy, slurred speech, r/o CVA TECHNOLOGIST PROVIDED HISTORY: Reason for exam:->facial palsy, slurred speech, r/o CVA What is the sedation requirement?->None Decision Support Exception - unselect if not a suspected or confirmed emergency medical condition->Emergency Medical Condition (MA) What reading provider will be dictating this exam?->CRC FINDINGS: INTRACRANIAL STRUCTURES/VENTRICLES:  The sellar and suprasellar structures, optic chiasm, corpus callosum, pineal gland, tectum, and midline brainstem structures are unremarkable. The craniocervical junction is unremarkable. There is no acute hemorrhage, mass effect, or midline shift. There is satisfactory overall gray-white matter differentiation. The ventricular structures are symmetric and unremarkable. The infratentorial structures including the cerebellopontine angles and internal auditory canals are unremarkable. There is no abnormal restricted diffusion. There is no abnormal blooming artifact on susceptibility weighted imaging. No abnormal postcontrast enhancement. ORBITS: The visualized portion of the orbits demonstrate no acute abnormality. SINUSES: The visualized paranasal sinuses and mastoid air cells demonstrate no acute abnormality. BONES/SOFT TISSUES: The bone marrow signal intensity appears normal. The soft tissues demonstrate no acute abnormality. Unremarkable pre and post-contrast MRI of the brain. The patient's records from referring provider and available information in the EHR was reviewed. Impression:  Stuttering speech:  no clear etiology. Asymmetric bilateral Bell's palsy with left worse than the right with associated facial swelling, ear pain, and tinnitus that did not significantly resolve with a course of Valtrex and prednisone. Patient reports MRI of the brain was completed for evaluation which did not show any significant abnormalities. Horizontal Diplopia: weakness in left lateral gaze in left eye with some right medial rectus weakness as well on left lateral gaze. Hypothyroidism with elevated TSH. Principal Problem:    AMS (altered mental status)  Active Problems:    Seizure-like activity (HCC)  Resolved Problems:    * No resolved hospital problems. *      Recommendations:                                            ENT consultation for ear pain and tinnitus and Bell's palsy  Consider for a repeat course of steroids.   Labs: ESR, CRP, ELIZABETH, ferritin, Lyme titers, hemoglobin A1c  Further on follow-up      It was my pleasure to evaluate Sari Primrose today. Please call with questions.       Electronically signed by Nia Bowman MD on 1/8/2023 at 6:21 PM

## 2023-01-08 NOTE — PLAN OF CARE
Problem: Pain  Goal: Verbalizes/displays adequate comfort level or baseline comfort level  1/7/2023 2338 by Esperanza Barr RN  Outcome: Progressing  1/7/2023 1135 by Basilia Nolasco RN  Outcome: Progressing     Problem: Safety - Adult  Goal: Free from fall injury  1/7/2023 2338 by Esperanza Barr RN  Outcome: Progressing  1/7/2023 1135 by Basilia Nolasco RN  Outcome: Progressing

## 2023-01-09 ENCOUNTER — APPOINTMENT (OUTPATIENT)
Dept: NEUROLOGY | Age: 28
DRG: 074 | End: 2023-01-09
Payer: COMMERCIAL

## 2023-01-09 ENCOUNTER — APPOINTMENT (OUTPATIENT)
Dept: MRI IMAGING | Age: 28
DRG: 074 | End: 2023-01-09
Payer: COMMERCIAL

## 2023-01-09 LAB
ALBUMIN SERPL-MCNC: 3.2 G/DL (ref 3.5–5.2)
ALP BLD-CCNC: 58 U/L (ref 35–104)
ALT SERPL-CCNC: <5 U/L (ref 0–32)
ANION GAP SERPL CALCULATED.3IONS-SCNC: 12 MMOL/L (ref 7–16)
AST SERPL-CCNC: 14 U/L (ref 0–31)
BILIRUB SERPL-MCNC: 0.2 MG/DL (ref 0–1.2)
BUN BLDV-MCNC: 14 MG/DL (ref 6–20)
C-REACTIVE PROTEIN: 1.8 MG/DL (ref 0–0.4)
CALCIUM SERPL-MCNC: 9.2 MG/DL (ref 8.6–10.2)
CHLORIDE BLD-SCNC: 103 MMOL/L (ref 98–107)
CO2: 21 MMOL/L (ref 22–29)
CREAT SERPL-MCNC: 0.9 MG/DL (ref 0.5–1)
FERRITIN: 187 NG/ML
GFR SERPL CREATININE-BSD FRML MDRD: >60 ML/MIN/1.73
GLUCOSE BLD-MCNC: 102 MG/DL (ref 74–99)
HCT VFR BLD CALC: 39.3 % (ref 34–48)
HEMOGLOBIN: 12.7 G/DL (ref 11.5–15.5)
HOMOCYSTEINE: 5.5 UMOL/L (ref 0–15)
MCH RBC QN AUTO: 30.4 PG (ref 26–35)
MCHC RBC AUTO-ENTMCNC: 32.3 % (ref 32–34.5)
MCV RBC AUTO: 94 FL (ref 80–99.9)
PDW BLD-RTO: 14.2 FL (ref 11.5–15)
PLATELET # BLD: 236 E9/L (ref 130–450)
PMV BLD AUTO: 10.4 FL (ref 7–12)
POTASSIUM SERPL-SCNC: 4.6 MMOL/L (ref 3.5–5)
RBC # BLD: 4.18 E12/L (ref 3.5–5.5)
SEDIMENTATION RATE, ERYTHROCYTE: 47 MM/HR (ref 0–20)
SODIUM BLD-SCNC: 136 MMOL/L (ref 132–146)
TOTAL PROTEIN: 6.5 G/DL (ref 6.4–8.3)
WBC # BLD: 12 E9/L (ref 4.5–11.5)

## 2023-01-09 PROCEDURE — 72156 MRI NECK SPINE W/O & W/DYE: CPT

## 2023-01-09 PROCEDURE — 6370000000 HC RX 637 (ALT 250 FOR IP): Performed by: NURSE PRACTITIONER

## 2023-01-09 PROCEDURE — 83519 RIA NONANTIBODY: CPT

## 2023-01-09 PROCEDURE — 2580000003 HC RX 258

## 2023-01-09 PROCEDURE — 6370000000 HC RX 637 (ALT 250 FOR IP): Performed by: INTERNAL MEDICINE

## 2023-01-09 PROCEDURE — 36415 COLL VENOUS BLD VENIPUNCTURE: CPT

## 2023-01-09 PROCEDURE — 83516 IMMUNOASSAY NONANTIBODY: CPT

## 2023-01-09 PROCEDURE — 86140 C-REACTIVE PROTEIN: CPT

## 2023-01-09 PROCEDURE — 82728 ASSAY OF FERRITIN: CPT

## 2023-01-09 PROCEDURE — 99232 SBSQ HOSP IP/OBS MODERATE 35: CPT

## 2023-01-09 PROCEDURE — 80053 COMPREHEN METABOLIC PANEL: CPT

## 2023-01-09 PROCEDURE — 83090 ASSAY OF HOMOCYSTEINE: CPT

## 2023-01-09 PROCEDURE — 6360000004 HC RX CONTRAST MEDICATION: Performed by: RADIOLOGY

## 2023-01-09 PROCEDURE — A9579 GAD-BASE MR CONTRAST NOS,1ML: HCPCS | Performed by: RADIOLOGY

## 2023-01-09 PROCEDURE — 70544 MR ANGIOGRAPHY HEAD W/O DYE: CPT

## 2023-01-09 PROCEDURE — 85651 RBC SED RATE NONAUTOMATED: CPT

## 2023-01-09 PROCEDURE — 86618 LYME DISEASE ANTIBODY: CPT

## 2023-01-09 PROCEDURE — 6360000002 HC RX W HCPCS: Performed by: INTERNAL MEDICINE

## 2023-01-09 PROCEDURE — 95816 EEG AWAKE AND DROWSY: CPT

## 2023-01-09 PROCEDURE — 86788 WEST NILE VIRUS AB IGM: CPT

## 2023-01-09 PROCEDURE — 86789 WEST NILE VIRUS ANTIBODY: CPT

## 2023-01-09 PROCEDURE — 85027 COMPLETE CBC AUTOMATED: CPT

## 2023-01-09 PROCEDURE — G0378 HOSPITAL OBSERVATION PER HR: HCPCS

## 2023-01-09 PROCEDURE — 1200000000 HC SEMI PRIVATE

## 2023-01-09 PROCEDURE — 95819 EEG AWAKE AND ASLEEP: CPT | Performed by: PSYCHIATRY & NEUROLOGY

## 2023-01-09 PROCEDURE — 2580000003 HC RX 258: Performed by: INTERNAL MEDICINE

## 2023-01-09 RX ORDER — GABAPENTIN 300 MG/1
600 CAPSULE ORAL 2 TIMES DAILY
Status: DISCONTINUED | OUTPATIENT
Start: 2023-01-09 | End: 2023-01-15

## 2023-01-09 RX ORDER — LORAZEPAM 2 MG/ML
1 INJECTION INTRAMUSCULAR
Status: ACTIVE | OUTPATIENT
Start: 2023-01-09 | End: 2023-01-10

## 2023-01-09 RX ORDER — SODIUM CHLORIDE 9 MG/ML
INJECTION, SOLUTION INTRAVENOUS PRN
Status: DISCONTINUED | OUTPATIENT
Start: 2023-01-09 | End: 2023-01-16 | Stop reason: HOSPADM

## 2023-01-09 RX ORDER — SODIUM CHLORIDE 0.9 % (FLUSH) 0.9 %
5-40 SYRINGE (ML) INJECTION EVERY 12 HOURS SCHEDULED
Status: DISCONTINUED | OUTPATIENT
Start: 2023-01-09 | End: 2023-01-16 | Stop reason: HOSPADM

## 2023-01-09 RX ORDER — ACETAMINOPHEN 325 MG/1
650 TABLET ORAL EVERY 6 HOURS PRN
Status: DISCONTINUED | OUTPATIENT
Start: 2023-01-09 | End: 2023-01-16 | Stop reason: HOSPADM

## 2023-01-09 RX ORDER — SODIUM CHLORIDE 0.9 % (FLUSH) 0.9 %
5-40 SYRINGE (ML) INJECTION PRN
Status: DISCONTINUED | OUTPATIENT
Start: 2023-01-09 | End: 2023-01-16 | Stop reason: HOSPADM

## 2023-01-09 RX ORDER — ACETAMINOPHEN 650 MG/1
650 SUPPOSITORY RECTAL EVERY 6 HOURS PRN
Status: DISCONTINUED | OUTPATIENT
Start: 2023-01-09 | End: 2023-01-16 | Stop reason: HOSPADM

## 2023-01-09 RX ADMIN — IBUPROFEN 400 MG: 400 TABLET, FILM COATED ORAL at 12:41

## 2023-01-09 RX ADMIN — IBUPROFEN 400 MG: 400 TABLET, FILM COATED ORAL at 21:21

## 2023-01-09 RX ADMIN — TIZANIDINE 4 MG: 4 TABLET ORAL at 21:21

## 2023-01-09 RX ADMIN — SODIUM CHLORIDE, PRESERVATIVE FREE 10 ML: 5 INJECTION INTRAVENOUS at 21:22

## 2023-01-09 RX ADMIN — ACETAMINOPHEN 650 MG: 325 TABLET ORAL at 02:31

## 2023-01-09 RX ADMIN — MELATONIN 3 MG ORAL TABLET 3 MG: 3 TABLET ORAL at 21:21

## 2023-01-09 RX ADMIN — SODIUM CHLORIDE, PRESERVATIVE FREE 10 ML: 5 INJECTION INTRAVENOUS at 08:28

## 2023-01-09 RX ADMIN — GABAPENTIN 600 MG: 300 CAPSULE ORAL at 21:21

## 2023-01-09 RX ADMIN — ENOXAPARIN SODIUM 30 MG: 100 INJECTION SUBCUTANEOUS at 08:28

## 2023-01-09 RX ADMIN — GADOTERIDOL 20 ML: 279.3 INJECTION, SOLUTION INTRAVENOUS at 20:05

## 2023-01-09 ASSESSMENT — PAIN DESCRIPTION - ORIENTATION: ORIENTATION: LEFT;RIGHT

## 2023-01-09 ASSESSMENT — PAIN SCALES - GENERAL
PAINLEVEL_OUTOF10: 8
PAINLEVEL_OUTOF10: 10

## 2023-01-09 ASSESSMENT — PAIN DESCRIPTION - LOCATION: LOCATION: FACE;HEAD;EAR

## 2023-01-09 ASSESSMENT — PAIN - FUNCTIONAL ASSESSMENT: PAIN_FUNCTIONAL_ASSESSMENT: PREVENTS OR INTERFERES SOME ACTIVE ACTIVITIES AND ADLS

## 2023-01-09 NOTE — PLAN OF CARE
Problem: Pain  Goal: Verbalizes/displays adequate comfort level or baseline comfort level  1/8/2023 2345 by Sharon Menchaca RN  Outcome: Progressing  1/8/2023 1647 by Girma Schulz RN  Outcome: Progressing     Problem: Safety - Adult  Goal: Free from fall injury  1/8/2023 2345 by Sharon Menchaca RN  Outcome: Progressing  1/8/2023 1647 by Girma Schulz RN  Outcome: Progressing

## 2023-01-09 NOTE — PROGRESS NOTES
Thelma Cee is a 32 y.o. right handed female     Neurology following for stuttering and tremors    PMH of obesity      Assessment:     Asymmetric bilateral Bell's palsy   Associated with facial swelling, ear pain, and tendinitis which did not resolve with Valtrex and prednisone  Left sided face is worse than the right side of face  MRI brain and CT were unremarkable  Associated with possible Lyme disease, work-up is pending    Stuttering speech  Etiology unknown    Plan:     Lyme titers pending  Acetylcholine receptor antibody, ELIZABETH, and MTHFR Mutation pending  Lumbar puncture   MRI cervical spine  MRV head without contrast  Neurology to follow      Subjective:     Patient presented to the ER on 1/5/2023 after experiencing stuttering and decreased vision for a few days. She was diagnosed with Bell's palsy on 12/29 and was given a prescription for prednisone and Valtrex. She said her Bell's palsy was worse on the left side compared to the right. She was also experiencing bilateral upper extremity numbness and tingling along with tinnitus. Her MRI brain and CT head were both unremarkable. Patient sitting up in bed awake, alert, and oriented x4. She continues to experience stuttering speech and bilateral upper extremity numbness, and pain to the needle sensation from her fingers to the back of her neck. She also continues to experience bilateral Bell's palsy. She is unable to close her left eye and her right eye is experiencing  ptosis. She has a left labial fold droop. She continues to have experienced tinnitus, pain in her ears and double vision with the objects are [id number]. I explained lumbar puncture and she is agreeable.         No chest pain or palpitations  No coughing or wheezing    No vertigo, lightheadedness or loss of consciousness  No falls, tripping or stumbling  No incontinence of bowels or bladder  No itching or bruising appreciated    ROS otherwise negative      Objective: /82   Pulse 85   Temp 97 °F (36.1 °C) (Temporal)   Resp 17   Ht 5' 4\" (1.626 m)   Wt 246 lb 9.6 oz (111.9 kg)   LMP 12/15/2022   SpO2 99%   BMI 42.33 kg/m²       General appearance: alert, appears stated age, cooperative and no distress  Head: normocephalic, without obvious abnormality, atraumatic  Eyes: conjunctivae/corneas clear  Neck: no adenopathy, supple, symmetrical, trachea midline and thyroid not enlarged, symmetric, no tenderness/mass/nodules  Lungs: Regular respirations on room air  Heart: No chest pain or palpitations  Abdomen: soft, non-tender; bowel sounds normal; no masses,  no organomegaly  Extremities:  normal, atraumatic, no cyanosis or edema  Pulses: 2+ and symmetric  Skin: color, texture, turgor normal---no rashes or lesions      Mental Status: Alert, oriented, thought content appropriate, alertness: alert, orientation: time, date, person, place, affect: normal     Appropriate attention/concentration  Intact fundus of knowledge  Repetition intact  Intact memories      Speech/Language:Stuttering speech      Cranial Nerves:  I: smell NA   II: visual acuity  NA   II: visual fields Double vision   II: pupils KEIKO   III,VII: ptosis None   III,IV,VI: extraocular muscles  Right ptosis, unable to close her left eye   V: mastication Normal   V: facial light touch sensation  Normal   V,VII: corneal reflex  Present   VII: facial muscle function - upper  Right eye ptosis   VII: facial muscle function - lower Right labial fold droop   VIII: hearing Normal   IX: soft palate elevation  Normal   IX,X: gag reflex Present   XI: trapezius strength     XI: sternocleidomastoid strength    XI: neck extension strength     XII: tongue strength  Normal     Motor:  5/5 throughout  Normal bulk and tone  No drift   No abnormal movements    Sensory:  LT and PP normal  Vibration normal    Coordination:   FN, FFM and GABRIEL normal  HS normal    Gait:  Normal    DTR:   2+ throughout    No Babinskis  No Phelps's    No other pathological reflexes    Laboratory/Radiology:     CBC with Differential:    Lab Results   Component Value Date/Time    WBC 12.0 01/09/2023 04:49 AM    RBC 4.18 01/09/2023 04:49 AM    HGB 12.7 01/09/2023 04:49 AM    HCT 39.3 01/09/2023 04:49 AM     01/09/2023 04:49 AM    MCV 94.0 01/09/2023 04:49 AM    MCH 30.4 01/09/2023 04:49 AM    MCHC 32.3 01/09/2023 04:49 AM    RDW 14.2 01/09/2023 04:49 AM    NRBC 0.0 03/13/2019 05:55 AM    LYMPHOPCT 32.2 01/05/2023 01:08 PM    MONOPCT 6.9 01/05/2023 01:08 PM    MYELOPCT 0.9 03/13/2019 05:55 AM    BASOPCT 0.2 01/05/2023 01:08 PM    MONOSABS 1.71 01/05/2023 01:08 PM    LYMPHSABS 7.81 01/05/2023 01:08 PM    EOSABS 0.00 01/05/2023 01:08 PM    BASOSABS 0.00 01/05/2023 01:08 PM     CMP:    Lab Results   Component Value Date/Time     01/09/2023 04:49 AM    K 4.6 01/09/2023 04:49 AM    K 4.3 11/07/2020 06:00 PM     01/09/2023 04:49 AM    CO2 21 01/09/2023 04:49 AM    BUN 14 01/09/2023 04:49 AM    CREATININE 0.9 01/09/2023 04:49 AM    GFRAA >60 06/28/2021 03:34 PM    LABGLOM >60 01/09/2023 04:49 AM    GLUCOSE 102 01/09/2023 04:49 AM    PROT 6.5 01/09/2023 04:49 AM    LABALBU 3.2 01/09/2023 04:49 AM    CALCIUM 9.2 01/09/2023 04:49 AM    BILITOT 0.2 01/09/2023 04:49 AM    ALKPHOS 58 01/09/2023 04:49 AM    AST 14 01/09/2023 04:49 AM    ALT <5 01/09/2023 04:49 AM     HgBA1c:    Lab Results   Component Value Date/Time    LABA1C 5.5 01/07/2023 08:45 AM     FLP:    Lab Results   Component Value Date/Time    TRIG 122 01/07/2023 08:45 AM    HDL 51 01/07/2023 08:45 AM    LDLCALC 48 01/07/2023 08:45 AM    LABVLDL 24 01/07/2023 08:45 AM     TSH:    Lab Results   Component Value Date/Time    TSH 4.430 01/07/2023 08:45 AM      Latest Reference Range & Units 1/9/23 04:49   CRP 0.0 - 0.4 mg/dL 1.8 (H)   (H): Data is abnormally high     Latest Reference Range & Units 1/9/23 04:49   Homocysteine 0.0 - 15.0 umol/L 5.5      Latest Reference Range & Units 1/9/23 04:49   Sed Rate 0 - 20 mm/Hr 47 (H)   (H): Data is abnormally high     Latest Reference Range & Units 1/9/23 04:49   Ferritin ng/mL 187     EEG  Clinical Interpretation: This was a normal study during waking, drowsiness, and sleep. No seizures or epileptiform discharges were noted during this study. CT head  Negative for acute abnormalities    CT cervical spine  No acute fractures. Asymmetric disc bulge at C6-C7 to the right with  effacement of thecal sac and lateral recess. Consider MRI for better  assessment.     MRI brain  Negative for acute abnormalities      All labs and imaging studies reviewed independently today         TJ Urias - CNP  2:35 PM  1/9/2023

## 2023-01-09 NOTE — PROGRESS NOTES
Hospitalist Progress Note      Synopsis: Patient admitted for stuttering speech     Patient presented to the ED with complaints of difficulty speaking. She reports stuttering speech that has been progressively worse over the past 2 days. She also endorses bilateral upper extremity tingling. She has a recent diagnosis of Bell's palsy which was treated with antivirals and steroids. Imaging negative in ED. She was admitted for further neurological evaluation. Neurology following. EEG unremarkable. Hospital day 0     Subjective:  Stable overnight. No issues reported. Patient seen and examined. Ambulating around room. 5yo son and mother at bedside. Reports no improvement. Records reviewed. Temp (24hrs), Av °F (36.1 °C), Min:97 °F (36.1 °C), Max:97 °F (36.1 °C)    DIET: ADULT DIET; Regular  CODE: Full Code    Intake/Output Summary (Last 24 hours) at 2023 0965  Last data filed at 2023 0505  Gross per 24 hour   Intake 1000 ml   Output --   Net 1000 ml         Review of Systems: All bolded are positive; please see HPI  General:  Fever, chills, diaphoresis, fatigue, malaise, night sweats, weight loss  Psychological:  Anxiety, disorientation, hallucinations. ENT:  Epistaxis, headaches, vertigo, visual changes. Cardiovascular:  Chest pain, irregular heartbeats, palpitations, paroxysmal nocturnal dyspnea. Respiratory:  Shortness of breath, coughing, sputum production, hemoptysis, wheezing, orthopnea.   Gastrointestinal:  Nausea, vomiting, diarrhea, heartburn, constipation, abdominal pain, hematemesis, hematochezia, melena, acholic stools  Genito-Urinary:  Dysuria, urgency, frequency, hematuria  Musculoskeletal:  Joint pain, joint stiffness, joint swelling, muscle pain  Neurology:  Headache, focal neurological deficits, weakness, numbness, paresthesia, stuttered speech   Derm:  Rashes, ulcers, excoriations, bruising  Extremities:  Decreased ROM, peripheral edema, mottling    Objective:    /82   Pulse 85   Temp 97 °F (36.1 °C) (Temporal)   Resp 17   Ht 5' 4\" (1.626 m)   Wt 246 lb 9.6 oz (111.9 kg)   LMP 12/15/2022   SpO2 99%   BMI 42.33 kg/m²     General appearance: Obese young female in no apparent distress, appears stated age and cooperative.  HEENT: Conjunctivae/corneas clear. Mucous membranes moist.  Neck: Supple. No JVD.  Respiratory:  Clear to auscultation bilaterally.  Normal respiratory effort.   Cardiovascular:  RRR. S1, S2 without MRG.  PV: Pulses palpable. No edema.   Abdomen: Soft, non-tender, non-distended, obese. +BS  Musculoskeletal: No obvious deformities.   Skin: Normal skin color.  No rashes or lesions. Good turgor.   Neurologic:  Grossly non-focal. Awake, alert, following commands. Stuttering speech.  Psychiatric: Alert and oriented, thought content appropriate, normal insight and judgement    Medications:  REVIEWED DAILY    Infusion Medications    sodium chloride       Scheduled Medications    sodium chloride flush  5-40 mL IntraVENous 2 times per day    enoxaparin  30 mg SubCUTAneous BID     PRN Meds: LORazepam, sodium chloride flush, sodium chloride, ondansetron **OR** ondansetron, polyethylene glycol, acetaminophen **OR** acetaminophen, calcium carbonate, melatonin, hydrALAZINE, ibuprofen, tiZANidine    Labs:     Recent Labs     01/07/23  0845 01/08/23 0433 01/09/23 0449   WBC 15.0* 12.8* 12.0*   HGB 14.7 13.8 12.7   HCT 43.1 40.5 39.3    299 236         Recent Labs     01/07/23  0845 01/08/23  0433 01/09/23  0449   NA  --  136 136   K  --  4.3 4.6   CL  --  102 103   CO2  --  25 21*   BUN  --  14 14   CREATININE  --  0.8 0.9   CALCIUM  --  9.1 9.2   PHOS 2.8  --   --          Recent Labs     01/08/23  0433 01/09/23  0449   PROT 6.8 6.5   ALKPHOS 63 58   ALT <5 <5   AST 12 14   BILITOT 0.3 0.2         No results for input(s): INR in the last 72 hours.    No results for input(s): CKTOTAL, TROPONINI in the last 72 hours.    Chronic  labs:    Lab Results   Component Value Date    CHOL 123 01/07/2023    TRIG 122 01/07/2023    HDL 51 01/07/2023    LDLCALC 48 01/07/2023    TSH 4.430 (H) 01/07/2023    INR 1.1 12/29/2022    LABA1C 5.5 01/07/2023       Radiology: REVIEWED DAILY    Assessment:  Stuttering speech  BUE paresthesias  Bilateral Bell's Palsy   Horizontal diplopia   Leukocytosis likely 2/2 recent steroid use - improving   Hypothyroidism  Tachycardia - resolved  Elevated BP - improved  Obesity, Body mass index is 42.33 kg/m². Plan:  Neurology following - labs pending  EEG unremarkable  Neuro checks  MRI cervical spine     DVT Prophylaxis [x] Lovenox  []  Heparin [] DOAC [] PCDs [] Ambulation    GI Prophylaxis [] PPI  [] H2 Blocker   [] Carafate  [x] Diet/Tube Feeds   Level of care [x] Med/Surg  [] Intermediate  []  ICU   Diet ADULT DIET; Regular    Family contact [x]  N/A    [] At bedside  [] Phone call     Discharge Plan: Home pending final neurology recs     +++++++++++++++++++++++++++++++++++++++++++++++++  Yesenia Nugent [address]  +++++++++++++++++++++++++++++++++++++++++++++++++  NOTE: This report was transcribed using voice recognition software. Every effort was made to ensure accuracy; however, inadvertent computerized transcription errors may be present.

## 2023-01-09 NOTE — PROCEDURES
Need MRI screening form filled out/updated with today's date before MRI can be scheduled. Thank you.

## 2023-01-09 NOTE — PROCEDURES
[address] Report    MRN: [id number]   PATIENT NAME: Jeremy Ly   DATE OF REPORT: 2023    DATE OF SERVICE: 2023    PHYSICIAN NAME: Bennett Lucas DO  Referring Physician: Dr Russell Jones      Patient's : 1995   Patient's Age: 32 y.o. Gender: female     PROCEDURE: Routine EEG with video      Clinical Interpretation: This was a normal study during waking, drowsiness, and sleep. No seizures or epileptiform discharges were noted during this study. ____________________________  Electronically signed by: Bennett Lucas DO, 2023 1:35 PM      Patient Clinical Information   Reason for Study: Patient undergoing evaluation for spells of speech difficulty  Patient State: Awake  Primary neurological diagnosis: Spells of uncertain etiology   Primary indication for monitoring: Characterization of spells    Pertinent Medications and Treatments    Lorazepam     melatonin     tizanidine       Sedatives administered: No  Intubated: No  Pharmacological paralytic: No    Reporting Period  Start of Study: 130, 2023   End of Study:  133, 2023       EEG Description  Digital video and scalp EEG monitoring was performed using the standard protocol for this laboratory. Scalp electrodes were applied in the international 10/20 system. Multiple digital montage arrangements were utilized for evaluation. EKG and video were recorded. Background:      Occipital rhythm (posterior dominant rhythm or PDR): Present   Frequency: 9.5 Hz  Voltage: Medium   Organization: good   Reactivity to eye opening/closure: good    Drowsiness: Present - normal  Sleep: Stage 2 present - normal    Technical and Activation Procedures:  Hyperventilation: Not done        Photic stimulation: Done - physiologic driving noted        Reactivity to stimulation: Yes    Abnormalities:    I. Seizures? No    II. Rhythmic or Periodic Patterns? No    III. Other Abnormalities? No

## 2023-01-09 NOTE — PROCEDURES
Per the radiologist, lovenox must be help 24 hours prior to a lumbar puncture and there must be current PT INR results.

## 2023-01-10 ENCOUNTER — APPOINTMENT (OUTPATIENT)
Dept: GENERAL RADIOLOGY | Age: 28
DRG: 074 | End: 2023-01-10
Payer: COMMERCIAL

## 2023-01-10 PROBLEM — R51.9 HEADACHE: Status: ACTIVE | Noted: 2023-01-10

## 2023-01-10 LAB
ALBUMIN SERPL-MCNC: 3.3 G/DL (ref 3.5–5.2)
ALP BLD-CCNC: 54 U/L (ref 35–104)
ALT SERPL-CCNC: <5 U/L (ref 0–32)
ANION GAP SERPL CALCULATED.3IONS-SCNC: 13 MMOL/L (ref 7–16)
ANTI-NUCLEAR ANTIBODY (ANA): NEGATIVE
APPEARANCE CSF: CLEAR
APPEARANCE CSF: CLEAR
AST SERPL-CCNC: 10 U/L (ref 0–31)
BILIRUB SERPL-MCNC: 0.2 MG/DL (ref 0–1.2)
BUN BLDV-MCNC: 16 MG/DL (ref 6–20)
CALCIUM SERPL-MCNC: 9 MG/DL (ref 8.6–10.2)
CHLORIDE BLD-SCNC: 103 MMOL/L (ref 98–107)
CO2: 22 MMOL/L (ref 22–29)
COLOR CSF: COLORLESS
COLOR CSF: COLORLESS
CREAT SERPL-MCNC: 0.8 MG/DL (ref 0.5–1)
FOLATE: 9.1 NG/ML (ref 4.8–24.2)
GFR SERPL CREATININE-BSD FRML MDRD: >60 ML/MIN/1.73
GLUCOSE BLD-MCNC: 92 MG/DL (ref 74–99)
GLUCOSE, CSF: 68 MG/DL (ref 40–70)
GRAM STAIN ORDERABLE: NORMAL
HCG(URINE) PREGNANCY TEST: NEGATIVE
HCT VFR BLD CALC: 39.2 % (ref 34–48)
HEMOGLOBIN: 12.7 G/DL (ref 11.5–15.5)
IGA: 140 MG/DL (ref 70–400)
INR BLD: 0.9
MCH RBC QN AUTO: 30.8 PG (ref 26–35)
MCHC RBC AUTO-ENTMCNC: 32.4 % (ref 32–34.5)
MCV RBC AUTO: 94.9 FL (ref 80–99.9)
MONOCYTE, CSF: 100 % (ref 10–70)
MONOCYTE, CSF: 100 % (ref 10–70)
NEUTROPHILS, CSF: 0 % (ref 0–10)
NEUTROPHILS, CSF: 0 % (ref 0–10)
PDW BLD-RTO: 14.5 FL (ref 11.5–15)
PLATELET # BLD: 214 E9/L (ref 130–450)
PMV BLD AUTO: 10.3 FL (ref 7–12)
POTASSIUM SERPL-SCNC: 4.4 MMOL/L (ref 3.5–5)
PROTEIN CSF: 24 MG/DL (ref 15–40)
PROTHROMBIN TIME: 10.1 SEC (ref 9.3–12.4)
RBC # BLD: 4.13 E12/L (ref 3.5–5.5)
RBC CSF: <2000 /UL
RBC CSF: <2000 /UL
SODIUM BLD-SCNC: 138 MMOL/L (ref 132–146)
TOTAL PROTEIN: 6.3 G/DL (ref 6.4–8.3)
TUBE NUMBER CSF: ABNORMAL
TUBE NUMBER CSF: ABNORMAL
VITAMIN B-12: 363 PG/ML (ref 211–946)
WBC # BLD: 10.8 E9/L (ref 4.5–11.5)
WBC CSF: 3 /UL (ref 0–2)
WBC CSF: <3 /UL (ref 0–2)

## 2023-01-10 PROCEDURE — 86593 SYPHILIS TEST NON-TREP QUANT: CPT

## 2023-01-10 PROCEDURE — 87205 SMEAR GRAM STAIN: CPT

## 2023-01-10 PROCEDURE — 6370000000 HC RX 637 (ALT 250 FOR IP): Performed by: INTERNAL MEDICINE

## 2023-01-10 PROCEDURE — 84157 ASSAY OF PROTEIN OTHER: CPT

## 2023-01-10 PROCEDURE — 4A03X5D MEASUREMENT OF ARTERIAL FLOW, INTRACRANIAL, EXTERNAL APPROACH: ICD-10-PCS | Performed by: RADIOLOGY

## 2023-01-10 PROCEDURE — 6370000000 HC RX 637 (ALT 250 FOR IP): Performed by: FAMILY MEDICINE

## 2023-01-10 PROCEDURE — 36415 COLL VENOUS BLD VENIPUNCTURE: CPT

## 2023-01-10 PROCEDURE — 6370000000 HC RX 637 (ALT 250 FOR IP): Performed by: NURSE PRACTITIONER

## 2023-01-10 PROCEDURE — 87070 CULTURE OTHR SPECIMN AEROBIC: CPT

## 2023-01-10 PROCEDURE — 2580000003 HC RX 258: Performed by: INTERNAL MEDICINE

## 2023-01-10 PROCEDURE — 85027 COMPLETE CBC AUTOMATED: CPT

## 2023-01-10 PROCEDURE — 6360000002 HC RX W HCPCS

## 2023-01-10 PROCEDURE — 86592 SYPHILIS TEST NON-TREP QUAL: CPT

## 2023-01-10 PROCEDURE — 83516 IMMUNOASSAY NONANTIBODY: CPT

## 2023-01-10 PROCEDURE — B01B1ZZ FLUOROSCOPY OF SPINAL CORD USING LOW OSMOLAR CONTRAST: ICD-10-PCS | Performed by: RADIOLOGY

## 2023-01-10 PROCEDURE — 82607 VITAMIN B-12: CPT

## 2023-01-10 PROCEDURE — 99232 SBSQ HOSP IP/OBS MODERATE 35: CPT

## 2023-01-10 PROCEDURE — 82164 ANGIOTENSIN I ENZYME TEST: CPT

## 2023-01-10 PROCEDURE — 009U3ZX DRAINAGE OF SPINAL CANAL, PERCUTANEOUS APPROACH, DIAGNOSTIC: ICD-10-PCS | Performed by: RADIOLOGY

## 2023-01-10 PROCEDURE — 2580000003 HC RX 258

## 2023-01-10 PROCEDURE — 81025 URINE PREGNANCY TEST: CPT

## 2023-01-10 PROCEDURE — 6360000002 HC RX W HCPCS: Performed by: INTERNAL MEDICINE

## 2023-01-10 PROCEDURE — 6370000000 HC RX 637 (ALT 250 FOR IP)

## 2023-01-10 PROCEDURE — 82945 GLUCOSE OTHER FLUID: CPT

## 2023-01-10 PROCEDURE — 89051 BODY FLUID CELL COUNT: CPT

## 2023-01-10 PROCEDURE — 82746 ASSAY OF FOLIC ACID SERUM: CPT

## 2023-01-10 PROCEDURE — 80053 COMPREHEN METABOLIC PANEL: CPT

## 2023-01-10 PROCEDURE — 71046 X-RAY EXAM CHEST 2 VIEWS: CPT

## 2023-01-10 PROCEDURE — 2709999900 FL LUMBAR PUNCTURE DIAG

## 2023-01-10 PROCEDURE — 87529 HSV DNA AMP PROBE: CPT

## 2023-01-10 PROCEDURE — 85610 PROTHROMBIN TIME: CPT

## 2023-01-10 PROCEDURE — 1200000000 HC SEMI PRIVATE

## 2023-01-10 PROCEDURE — 82784 ASSAY IGA/IGD/IGG/IGM EACH: CPT

## 2023-01-10 PROCEDURE — 88108 CYTOPATH CONCENTRATE TECH: CPT

## 2023-01-10 PROCEDURE — 86038 ANTINUCLEAR ANTIBODIES: CPT

## 2023-01-10 RX ORDER — PROCHLORPERAZINE EDISYLATE 5 MG/ML
10 INJECTION INTRAMUSCULAR; INTRAVENOUS EVERY 8 HOURS SCHEDULED
Status: DISPENSED | OUTPATIENT
Start: 2023-01-10 | End: 2023-01-11

## 2023-01-10 RX ORDER — KETOROLAC TROMETHAMINE 30 MG/ML
30 INJECTION, SOLUTION INTRAMUSCULAR; INTRAVENOUS EVERY 8 HOURS SCHEDULED
Status: COMPLETED | OUTPATIENT
Start: 2023-01-10 | End: 2023-01-11

## 2023-01-10 RX ORDER — LORAZEPAM 0.5 MG/1
0.5 TABLET ORAL ONCE
Status: COMPLETED | OUTPATIENT
Start: 2023-01-10 | End: 2023-01-10

## 2023-01-10 RX ORDER — DIVALPROEX SODIUM 500 MG/1
500 TABLET, EXTENDED RELEASE ORAL EVERY 8 HOURS SCHEDULED
Status: COMPLETED | OUTPATIENT
Start: 2023-01-10 | End: 2023-01-11

## 2023-01-10 RX ADMIN — SODIUM CHLORIDE, PRESERVATIVE FREE 10 ML: 5 INJECTION INTRAVENOUS at 21:54

## 2023-01-10 RX ADMIN — PROCHLORPERAZINE EDISYLATE 10 MG: 5 INJECTION INTRAMUSCULAR; INTRAVENOUS at 14:28

## 2023-01-10 RX ADMIN — SODIUM CHLORIDE, PRESERVATIVE FREE 10 ML: 5 INJECTION INTRAVENOUS at 21:53

## 2023-01-10 RX ADMIN — Medication 5 DROP: at 21:53

## 2023-01-10 RX ADMIN — DIVALPROEX SODIUM 500 MG: 500 TABLET, EXTENDED RELEASE ORAL at 17:46

## 2023-01-10 RX ADMIN — GABAPENTIN 600 MG: 300 CAPSULE ORAL at 21:51

## 2023-01-10 RX ADMIN — IBUPROFEN 400 MG: 400 TABLET, FILM COATED ORAL at 06:40

## 2023-01-10 RX ADMIN — TIZANIDINE 4 MG: 4 TABLET ORAL at 09:49

## 2023-01-10 RX ADMIN — MELATONIN 3 MG ORAL TABLET 3 MG: 3 TABLET ORAL at 21:51

## 2023-01-10 RX ADMIN — GABAPENTIN 600 MG: 300 CAPSULE ORAL at 07:40

## 2023-01-10 RX ADMIN — ENOXAPARIN SODIUM 30 MG: 100 INJECTION SUBCUTANEOUS at 21:52

## 2023-01-10 RX ADMIN — DIVALPROEX SODIUM 500 MG: 500 TABLET, EXTENDED RELEASE ORAL at 21:52

## 2023-01-10 RX ADMIN — IBUPROFEN 400 MG: 400 TABLET, FILM COATED ORAL at 14:28

## 2023-01-10 RX ADMIN — KETOROLAC TROMETHAMINE 30 MG: 30 INJECTION, SOLUTION INTRAMUSCULAR; INTRAVENOUS at 14:28

## 2023-01-10 RX ADMIN — SODIUM CHLORIDE, PRESERVATIVE FREE 10 ML: 5 INJECTION INTRAVENOUS at 07:41

## 2023-01-10 RX ADMIN — KETOROLAC TROMETHAMINE 30 MG: 30 INJECTION, SOLUTION INTRAMUSCULAR; INTRAVENOUS at 21:53

## 2023-01-10 RX ADMIN — LORAZEPAM 0.5 MG: 0.5 TABLET ORAL at 21:51

## 2023-01-10 ASSESSMENT — PAIN SCALES - GENERAL: PAINLEVEL_OUTOF10: 8

## 2023-01-10 ASSESSMENT — PAIN DESCRIPTION - LOCATION: LOCATION: HEAD;FACE;EAR

## 2023-01-10 NOTE — PROGRESS NOTES
Hospitalist Progress Note      Synopsis:   Patient presented to the ED with complaints of difficulty speaking. She reports stuttering speech that has been progressively worse over the past 2 days. She also endorses bilateral upper extremity tingling. She has a recent diagnosis of Bell's palsy which was treated with antivirals and steroids. Imaging negative in ED. She was admitted for further neurological evaluation. Neurology following. EEG unremarkable. Hospital day 0     Subjective:  Patient seen and examined at bedside, patient states she has increased numbness tingling in her bilateral upper extremities. No other complaints, denies injury or recent fall. Stable overnight. No issues reported. Patient seen and examined  Records reviewed. Temp (24hrs), Av.8 °F (36.6 °C), Min:97.8 °F (36.6 °C), Max:97.8 °F (36.6 °C)    DIET: ADULT DIET; Regular  CODE: Full Code  No intake or output data in the 24 hours ending 01/10/23 1223    Review of Systems: All bolded are positive; please see HPI  General:  Fever, chills, diaphoresis, fatigue, malaise, night sweats, weight loss  Psychological:  Anxiety, disorientation, hallucinations. ENT:  Epistaxis, headaches, vertigo, visual changes. Cardiovascular:  Chest pain, irregular heartbeats, palpitations, paroxysmal nocturnal dyspnea. Respiratory:  Shortness of breath, coughing, sputum production, hemoptysis, wheezing, orthopnea.   Gastrointestinal:  Nausea, vomiting, diarrhea, heartburn, constipation, abdominal pain, hematemesis, hematochezia, melena, acholic stools  Genito-Urinary:  Dysuria, urgency, frequency, hematuria  Musculoskeletal:  Joint pain, joint stiffness, joint swelling, muscle pain  Neurology:  Headache, focal neurological deficits, weakness, numbness, paresthesia  Derm:  Rashes, ulcers, excoriations, bruising  Extremities:  Decreased ROM, peripheral edema, mottling    Objective:    /77   Pulse 86   Temp 97.8 °F (36.6 °C) Resp 17   Ht 5' 4\" (1.626 m)   Wt 246 lb 9.6 oz (111.9 kg)   LMP 12/15/2022   SpO2 99%   BMI 42.33 kg/m²     General appearance: No apparent distress, appears stated age and cooperative. HEENT: Conjunctivae/corneas clear. Mucous membranes moist.  Neck: Supple. No JVD. Respiratory:  Clear to auscultation bilaterally. Normal respiratory effort. Cardiovascular:  RRR. S1, S2 without MRG. PV: Pulses palpable. No edema. Abdomen: Soft, non-tender, non-distended. +BS  Musculoskeletal: No obvious deformities. Skin: Normal skin color. No rashes or lesions. Good turgor. Neurologic:  Grossly non-focal. Awake, alert, following commands. Psychiatric: Alert and oriented, thought content appropriate, normal insight and judgement    Medications:  REVIEWED DAILY    Infusion Medications    sodium chloride       Scheduled Medications    gabapentin  600 mg Oral BID    sodium chloride flush  5-40 mL IntraVENous 2 times per day    sodium chloride flush  5-40 mL IntraVENous 2 times per day    [Held by provider] enoxaparin  30 mg SubCUTAneous BID     PRN Meds: acetaminophen **OR** acetaminophen, sodium chloride flush, sodium chloride, sodium chloride flush, ondansetron **OR** ondansetron, polyethylene glycol, calcium carbonate, melatonin, hydrALAZINE, ibuprofen, tiZANidine    Labs:     Recent Labs     01/08/23  0433 01/09/23  0449 01/10/23  0628   WBC 12.8* 12.0* 10.8   HGB 13.8 12.7 12.7   HCT 40.5 39.3 39.2    236 214       Recent Labs     01/08/23  0433 01/09/23  0449 01/10/23  0628    136 138   K 4.3 4.6 4.4    103 103   CO2 25 21* 22   BUN 14 14 16   CREATININE 0.8 0.9 0.8   CALCIUM 9.1 9.2 9.0       Recent Labs     01/08/23  0433 01/09/23  0449 01/10/23  0628   PROT 6.8 6.5 6.3*   ALKPHOS 63 58 54   ALT <5 <5 <5   AST 12 14 10   BILITOT 0.3 0.2 0.2       Recent Labs     01/10/23  0628   INR 0.9       No results for input(s): Eton Pace in the last 72 hours.     Chronic labs:    Lab Results Component Value Date    CHOL 123 01/07/2023    TRIG 122 01/07/2023    HDL 51 01/07/2023    LDLCALC 48 01/07/2023    TSH 4.430 (H) 01/07/2023    INR 0.9 01/10/2023    LABA1C 5.5 01/07/2023       Radiology: REVIEWED DAILY    Assessment:  Stuttering speech  BUE paresthesias  Bilateral Bell's Palsy   Horizontal diplopia   Leukocytosis likely 2/2 recent steroid use - improving   Hypothyroidism  Tachycardia - resolved  Elevated BP - improved  Obesity, Body mass index is 42.33 kg/m². Plan:  Neurology following - labs pending  EEG unremarkable  Neuro checks  MRI cervical spine unremarkable  MRV unremarkable      DVT Prophylaxis [x] Lovenox  []  Heparin [] DOAC [] PCDs [] Ambulation    GI Prophylaxis [] PPI  [] H2 Blocker   [] Carafate  [x] Diet/Tube Feeds   Level of care [x] Med/Surg  [] Intermediate  []  ICU   Diet ADULT DIET; Regular    Family contact [x]  N/A    [] At bedside  [] Phone call   Disposition Patient requires continued admission further evaluation and recommendation from neurology   MDM [] Low    [x] Moderate  []   High       Discharge Plan: To home pending clinical improvement    +++++++++++++++++++++++++++++++++++++++++++++++++  CHOCO Prescott/ Edgardo [address]  +++++++++++++++++++++++++++++++++++++++++++++++++  NOTE: This report was transcribed using voice recognition software. Every effort was made to ensure accuracy; however, inadvertent computerized transcription errors may be present.

## 2023-01-10 NOTE — CARE COORDINATION
Presented to [address] [place] [address] complaining of headache, stuttering. .CT and MRI negative. Neuro consulted. EEG. Met with patient to discuss role/transition of care. She continues to stutter. She lives in 2 story home with her boyfriend and kids. Her PCP is Dr Ad Nichole. And uses [facility] side/  No DME or HHC. Plan is home ans family will transport. For LP today. cm/sw to follow. Electronically signed by Ken Carter RN on 1/10/2023 at 9:30 AM

## 2023-01-10 NOTE — PROGRESS NOTES
Chantal Wade is a 32 y.o. right handed female     Neurology following for stuttering and tremors    PMH of obesity      Assessment:     Asymmetric bilateral Bell's palsy   Associated with facial swelling, ear pain, and tinnitus which did not resolve with Valtrex and prednisone  Left sided face is worse than the right side of face  MRI brain and CT were unremarkable  Associated with possible Lyme disease, work-up is pending    Stuttering speech  Etiology unknown    Plan:     Lyme titers pending  Acetylcholine receptor antibody, ELIZABETH, and MTHFR Mutation pending  Compazine 10 mg, Toradol 30 mg, and Depakote 500 mg every 8 hours x 3 doses  Add on Ganglioside panel on CSF  B12 and folate, ACE, IgA  EMG 1 arm and 1 leg  CXR to evaluate for Hilar lymphadenopathy  CTA Neck CTA Head CTV Head to evaluate for venus thrombosis  Will start IVIg after the IgA and CTV resulted      Subjective:     Patient presented to the ER on 1/5/2023 after experiencing stuttering and decreased vision for a few days. She was diagnosed with Bell's palsy on 12/29 and was given a prescription for prednisone and Valtrex. She said her Bell's palsy was worse on the left side compared to the right. She was also experiencing bilateral upper extremity numbness and tingling along with tinnitus. Her MRI brain and CT head were both unremarkable. Patient sitting at the side of the bed eating lunch. She just woke up from a nap after her lumbar puncture. She continues to experience stuttering speech, tingling and burning and pins-and-needles in her hands and arms along with double vision where objects are [id number]. She says she has new burning sensation in the base of her neck which radiates to her bilateral shoulders. She is also complaining of a headache which is a 10/10 on the pain scale which is a bandlike pressure around her entire head.   She did say she has been experiencing this headache for 4 weeks but she did not mention the headache yesterday. Tylenol and ibuprofen have not touched her headache pain. She also continues to experience tinnitus and pain in her ears.         No chest pain or palpitations  No coughing or wheezing    No vertigo, lightheadedness or loss of consciousness  No falls, tripping or stumbling  No incontinence of bowels or bladder  No itching or bruising appreciated    ROS otherwise negative      Objective:       /77   Pulse 86   Temp 97.8 °F (36.6 °C)   Resp 17   Ht 5' 4\" (1.626 m)   Wt 246 lb 9.6 oz (111.9 kg)   LMP 12/15/2022   SpO2 99%   BMI 42.33 kg/m²       General appearance: alert, appears stated age, cooperative and no distress  Head: normocephalic, without obvious abnormality, atraumatic  Eyes: conjunctivae/corneas clear  Neck: no adenopathy, supple, symmetrical, trachea midline and thyroid not enlarged, symmetric, no tenderness/mass/nodules  Lungs: Regular respirations on room air  Heart: No chest pain or palpitations  Abdomen: soft, non-tender; bowel sounds normal; no masses,  no organomegaly  Extremities:  normal, atraumatic, no cyanosis or edema  Pulses: 2+ and symmetric  Skin: color, texture, turgor normal---no rashes or lesions      Mental Status: Alert, oriented, thought content appropriate, alertness: alert, orientation: time, date, person, place, affect: normal     Appropriate attention/concentration  Intact fundus of knowledge  Intact memories      Speech/Language:Stuttering speech      Cranial Nerves:  I: smell NA   II: visual acuity  NA   II: visual fields Double vision   II: pupils KEIKO   III,VII: ptosis None   III,IV,VI: extraocular muscles  Right ptosis, unable to close her left eye completely   V: mastication Normal   V: facial light touch sensation  Normal   V,VII: corneal reflex  Present   VII: facial muscle function - upper  Right eye ptosis   VII: facial muscle function - lower Left labial fold droop   VIII: hearing Normal   IX: soft palate elevation  Normal   IX,X: gag reflex Present   XI: trapezius strength     XI: sternocleidomastoid strength    XI: neck extension strength     XII: tongue strength  Normal     Motor:  5/5 throughout  Normal bulk and tone  No drift   No abnormal movements    Sensory:  LT and PP normal  Vibration normal    Coordination:   FN, FFM and GABRIEL normal  HS normal    Gait:  Normal    DTR:   1 upper extremities  0 lower extremities    No Babinskis  No Phelps's    No other pathological reflexes    Laboratory/Radiology:     CBC with Differential:    Lab Results   Component Value Date/Time    WBC 10.8 01/10/2023 06:28 AM    RBC 4.13 01/10/2023 06:28 AM    HGB 12.7 01/10/2023 06:28 AM    HCT 39.2 01/10/2023 06:28 AM     01/10/2023 06:28 AM    MCV 94.9 01/10/2023 06:28 AM    MCH 30.8 01/10/2023 06:28 AM    MCHC 32.4 01/10/2023 06:28 AM    RDW 14.5 01/10/2023 06:28 AM    NRBC 0.0 03/13/2019 05:55 AM    LYMPHOPCT 32.2 01/05/2023 01:08 PM    MONOPCT 6.9 01/05/2023 01:08 PM    MYELOPCT 0.9 03/13/2019 05:55 AM    BASOPCT 0.2 01/05/2023 01:08 PM    MONOSABS 1.71 01/05/2023 01:08 PM    LYMPHSABS 7.81 01/05/2023 01:08 PM    EOSABS 0.00 01/05/2023 01:08 PM    BASOSABS 0.00 01/05/2023 01:08 PM     CMP:    Lab Results   Component Value Date/Time     01/10/2023 06:28 AM    K 4.4 01/10/2023 06:28 AM    K 4.3 11/07/2020 06:00 PM     01/10/2023 06:28 AM    CO2 22 01/10/2023 06:28 AM    BUN 16 01/10/2023 06:28 AM    CREATININE 0.8 01/10/2023 06:28 AM    GFRAA >60 06/28/2021 03:34 PM    LABGLOM >60 01/10/2023 06:28 AM    GLUCOSE 92 01/10/2023 06:28 AM    PROT 6.3 01/10/2023 06:28 AM    LABALBU 3.3 01/10/2023 06:28 AM    CALCIUM 9.0 01/10/2023 06:28 AM    BILITOT 0.2 01/10/2023 06:28 AM    ALKPHOS 54 01/10/2023 06:28 AM    AST 10 01/10/2023 06:28 AM    ALT <5 01/10/2023 06:28 AM     HgBA1c:    Lab Results   Component Value Date/Time    LABA1C 5.5 01/07/2023 08:45 AM     FLP:    Lab Results   Component Value Date/Time    TRIG 122 01/07/2023 08:45 AM    HDL 51 01/07/2023 08:45 AM    LDLCALC 48 01/07/2023 08:45 AM    LABVLDL 24 01/07/2023 08:45 AM     TSH:    Lab Results   Component Value Date/Time    TSH 4.430 01/07/2023 08:45 AM      Latest Reference Range & Units 1/9/23 04:49   CRP 0.0 - 0.4 mg/dL 1.8 (H)   (H): Data is abnormally high     Latest Reference Range & Units 1/9/23 04:49   Homocysteine 0.0 - 15.0 umol/L 5.5      Latest Reference Range & Units 1/9/23 04:49   Sed Rate 0 - 20 mm/Hr 47 (H)   (H): Data is abnormally high     Latest Reference Range & Units 1/9/23 04:49   Ferritin ng/mL 187        Latest Reference Range & Units 1/10/23 10:38   Appearance, CSF Clear   Clear  Clear  Clear   Glucose, CSF 40 - 70 mg/dL 68   Protein, CSF 15 - 40 mg/dL 24   RBC, CSF /uL  /uL <2000  <2000   WBC, CSF 0 - 2 /uL  0 - 2 /uL <3  3 (H)   Neutrophils, CSF 0 - 10 %  0 - 10 % 0  0   Monocytes, CSF 10 - 70 %  10 - 70 % 100 (H)  100 (H)   Color, CSF  Colorless  Colorless   Tube Number + CELL CT + DIFF-CSF  Tube 4  Tube 1   (H): Data is abnormally high      EEG  Clinical Interpretation: This was a normal study during waking, drowsiness, and sleep. No seizures or epileptiform discharges were noted during this study. CT head  Negative for acute abnormalities    CT cervical spine  No acute fractures. Asymmetric disc bulge at C6-C7 to the right with  effacement of thecal sac and lateral recess. Consider MRI for better  assessment. MRI brain  Negative for acute abnormalities    EEG  Clinical Interpretation: This was a normal study during waking, drowsiness, and sleep. No seizures or epileptiform discharges were noted during this study.         All labs and imaging studies reviewed independently today         TJ Matthew CNP  1:38 PM  1/10/2023

## 2023-01-11 ENCOUNTER — APPOINTMENT (OUTPATIENT)
Dept: CT IMAGING | Age: 28
DRG: 074 | End: 2023-01-11
Payer: COMMERCIAL

## 2023-01-11 ENCOUNTER — APPOINTMENT (OUTPATIENT)
Dept: NEUROLOGY | Age: 28
DRG: 074 | End: 2023-01-11
Payer: COMMERCIAL

## 2023-01-11 LAB
ALBUMIN SERPL-MCNC: 3.4 G/DL (ref 3.5–5.2)
ALP BLD-CCNC: 54 U/L (ref 35–104)
ALT SERPL-CCNC: <5 U/L (ref 0–32)
ANION GAP SERPL CALCULATED.3IONS-SCNC: 10 MMOL/L (ref 7–16)
AST SERPL-CCNC: 11 U/L (ref 0–31)
BASOPHILS ABSOLUTE: 0.04 E9/L (ref 0–0.2)
BASOPHILS RELATIVE PERCENT: 0.4 % (ref 0–2)
BILIRUB SERPL-MCNC: 0.4 MG/DL (ref 0–1.2)
BUN BLDV-MCNC: 14 MG/DL (ref 6–20)
CALCIUM SERPL-MCNC: 8.8 MG/DL (ref 8.6–10.2)
CHLORIDE BLD-SCNC: 105 MMOL/L (ref 98–107)
CO2: 23 MMOL/L (ref 22–29)
CREAT SERPL-MCNC: 0.8 MG/DL (ref 0.5–1)
EOSINOPHILS ABSOLUTE: 0.08 E9/L (ref 0.05–0.5)
EOSINOPHILS RELATIVE PERCENT: 0.8 % (ref 0–6)
GFR SERPL CREATININE-BSD FRML MDRD: >60 ML/MIN/1.73
GLUCOSE BLD-MCNC: 89 MG/DL (ref 74–99)
HCT VFR BLD CALC: 39.4 % (ref 34–48)
HEMOGLOBIN: 12.8 G/DL (ref 11.5–15.5)
IMMATURE GRANULOCYTES #: 0.06 E9/L
IMMATURE GRANULOCYTES %: 0.6 % (ref 0–5)
LYMPHOCYTES ABSOLUTE: 2.26 E9/L (ref 1.5–4)
LYMPHOCYTES RELATIVE PERCENT: 22.8 % (ref 20–42)
MCH RBC QN AUTO: 30.5 PG (ref 26–35)
MCHC RBC AUTO-ENTMCNC: 32.5 % (ref 32–34.5)
MCV RBC AUTO: 94 FL (ref 80–99.9)
MONOCYTES ABSOLUTE: 1.2 E9/L (ref 0.1–0.95)
MONOCYTES RELATIVE PERCENT: 12.1 % (ref 2–12)
NEUTROPHILS ABSOLUTE: 6.27 E9/L (ref 1.8–7.3)
NEUTROPHILS RELATIVE PERCENT: 63.3 % (ref 43–80)
PDW BLD-RTO: 14.2 FL (ref 11.5–15)
PLATELET # BLD: 228 E9/L (ref 130–450)
PMV BLD AUTO: 10 FL (ref 7–12)
POTASSIUM SERPL-SCNC: 4.2 MMOL/L (ref 3.5–5)
RBC # BLD: 4.19 E12/L (ref 3.5–5.5)
SODIUM BLD-SCNC: 138 MMOL/L (ref 132–146)
TOTAL PROTEIN: 6.4 G/DL (ref 6.4–8.3)
WBC # BLD: 9.9 E9/L (ref 4.5–11.5)

## 2023-01-11 PROCEDURE — 36415 COLL VENOUS BLD VENIPUNCTURE: CPT

## 2023-01-11 PROCEDURE — 2580000003 HC RX 258: Performed by: INTERNAL MEDICINE

## 2023-01-11 PROCEDURE — 6370000000 HC RX 637 (ALT 250 FOR IP)

## 2023-01-11 PROCEDURE — 70496 CT ANGIOGRAPHY HEAD: CPT

## 2023-01-11 PROCEDURE — 95912 NRV CNDJ TEST 11-12 STUDIES: CPT | Performed by: PHYSICAL MEDICINE & REHABILITATION

## 2023-01-11 PROCEDURE — 2580000003 HC RX 258

## 2023-01-11 PROCEDURE — 95912 NRV CNDJ TEST 11-12 STUDIES: CPT

## 2023-01-11 PROCEDURE — 95885 MUSC TST DONE W/NERV TST LIM: CPT | Performed by: PHYSICAL MEDICINE & REHABILITATION

## 2023-01-11 PROCEDURE — 6370000000 HC RX 637 (ALT 250 FOR IP): Performed by: NURSE PRACTITIONER

## 2023-01-11 PROCEDURE — 6360000004 HC RX CONTRAST MEDICATION: Performed by: RADIOLOGY

## 2023-01-11 PROCEDURE — 6360000002 HC RX W HCPCS

## 2023-01-11 PROCEDURE — 6370000000 HC RX 637 (ALT 250 FOR IP): Performed by: INTERNAL MEDICINE

## 2023-01-11 PROCEDURE — 99232 SBSQ HOSP IP/OBS MODERATE 35: CPT

## 2023-01-11 PROCEDURE — 70498 CT ANGIOGRAPHY NECK: CPT

## 2023-01-11 PROCEDURE — 1200000000 HC SEMI PRIVATE

## 2023-01-11 PROCEDURE — 80053 COMPREHEN METABOLIC PANEL: CPT

## 2023-01-11 PROCEDURE — 6360000002 HC RX W HCPCS: Performed by: INTERNAL MEDICINE

## 2023-01-11 PROCEDURE — 95886 MUSC TEST DONE W/N TEST COMP: CPT

## 2023-01-11 PROCEDURE — 95886 MUSC TEST DONE W/N TEST COMP: CPT | Performed by: PHYSICAL MEDICINE & REHABILITATION

## 2023-01-11 PROCEDURE — 85025 COMPLETE CBC W/AUTO DIFF WBC: CPT

## 2023-01-11 RX ORDER — SODIUM CHLORIDE 0.9 % (FLUSH) 0.9 %
10 SYRINGE (ML) INJECTION
Status: ACTIVE | OUTPATIENT
Start: 2023-01-11 | End: 2023-01-12

## 2023-01-11 RX ORDER — LANOLIN ALCOHOL/MO/W.PET/CERES
1000 CREAM (GRAM) TOPICAL DAILY
Status: DISCONTINUED | OUTPATIENT
Start: 2023-01-11 | End: 2023-01-16 | Stop reason: HOSPADM

## 2023-01-11 RX ADMIN — Medication 5 DROP: at 10:13

## 2023-01-11 RX ADMIN — SODIUM CHLORIDE, PRESERVATIVE FREE 10 ML: 5 INJECTION INTRAVENOUS at 22:21

## 2023-01-11 RX ADMIN — PROCHLORPERAZINE EDISYLATE 10 MG: 5 INJECTION INTRAMUSCULAR; INTRAVENOUS at 05:55

## 2023-01-11 RX ADMIN — ENOXAPARIN SODIUM 30 MG: 100 INJECTION SUBCUTANEOUS at 10:12

## 2023-01-11 RX ADMIN — DIVALPROEX SODIUM 500 MG: 500 TABLET, EXTENDED RELEASE ORAL at 10:13

## 2023-01-11 RX ADMIN — ACETAMINOPHEN 650 MG: 325 TABLET ORAL at 21:54

## 2023-01-11 RX ADMIN — IOPAMIDOL 75 ML: 755 INJECTION, SOLUTION INTRAVENOUS at 11:20

## 2023-01-11 RX ADMIN — MELATONIN 3 MG ORAL TABLET 3 MG: 3 TABLET ORAL at 22:00

## 2023-01-11 RX ADMIN — GABAPENTIN 600 MG: 300 CAPSULE ORAL at 10:12

## 2023-01-11 RX ADMIN — Medication 5 DROP: at 21:57

## 2023-01-11 RX ADMIN — KETOROLAC TROMETHAMINE 30 MG: 30 INJECTION, SOLUTION INTRAMUSCULAR; INTRAVENOUS at 05:54

## 2023-01-11 RX ADMIN — GABAPENTIN 600 MG: 300 CAPSULE ORAL at 21:54

## 2023-01-11 RX ADMIN — CYANOCOBALAMIN TAB 1000 MCG 1000 MCG: 1000 TAB at 10:13

## 2023-01-11 RX ADMIN — SODIUM CHLORIDE, PRESERVATIVE FREE 10 ML: 5 INJECTION INTRAVENOUS at 10:13

## 2023-01-11 RX ADMIN — IBUPROFEN 400 MG: 400 TABLET, FILM COATED ORAL at 13:23

## 2023-01-11 ASSESSMENT — PAIN DESCRIPTION - LOCATION
LOCATION: HEAD
LOCATION: HEAD
LOCATION: FACE;HEAD;NECK

## 2023-01-11 ASSESSMENT — PAIN SCALES - GENERAL
PAINLEVEL_OUTOF10: 8
PAINLEVEL_OUTOF10: 8
PAINLEVEL_OUTOF10: 9
PAINLEVEL_OUTOF10: 0

## 2023-01-11 NOTE — PROGRESS NOTES
CT on hold. Waiting for pt to get new iv. Pt requires a 20g or larger in [facility], [place], operated by [facility] or below for ct exam.  Please call [phone] when pt has line.

## 2023-01-11 NOTE — PROGRESS NOTES
Hospitalist Progress Note      Synopsis:   Patient presented to the ED with complaints of difficulty speaking. She reports stuttering speech that has been progressively worse over the past 2 days. She also endorses bilateral upper extremity tingling. She has a recent diagnosis of Bell's palsy which was treated with antivirals and steroids. Imaging negative in ED. She was admitted for further neurological evaluation. Neurology following. EEG unremarkable. Lab work still pending, patient started on IVIG. Hospital day 1     Subjective:  Stable overnight. No issues reported. Patient seen and examined  Records reviewed. Temp (24hrs), Av °F (36.7 °C), Min:97.9 °F (36.6 °C), Max:98 °F (36.7 °C)    DIET: ADULT DIET; Regular  CODE: Full Code  No intake or output data in the 24 hours ending 23 1338        Objective:    /73   Pulse 96   Temp 97.9 °F (36.6 °C) (Oral)   Resp 17   Ht 5' 4\" (1.626 m)   Wt 246 lb 9.6 oz (111.9 kg)   LMP 12/15/2022   SpO2 99%   BMI 42.33 kg/m²     General appearance: No apparent distress, appears stated age and cooperative. HEENT: Conjunctivae/corneas clear. Mucous membranes moist.  Neck: Supple. No JVD. Respiratory:  Clear to auscultation bilaterally. Normal respiratory effort. Cardiovascular:  RRR. S1, S2 without MRG. PV: Pulses palpable. No edema. Abdomen: Soft, non-tender, non-distended. +BS  Musculoskeletal: No obvious deformities. Skin: Normal skin color. No rashes or lesions. Good turgor. Neurologic:  Grossly non-focal. Awake, alert, following commands.    Psychiatric: Alert and oriented, thought content appropriate, normal insight and judgement    Medications:  REVIEWED DAILY    Infusion Medications    sodium chloride       Scheduled Medications    vitamin B-12  1,000 mcg Oral Daily    prochlorperazine  10 mg IntraVENous 3 times per day    carbamide peroxide  5 drop Both Ears BID    gabapentin  600 mg Oral BID    sodium chloride flush  5-40 mL IntraVENous 2 times per day    sodium chloride flush  5-40 mL IntraVENous 2 times per day    enoxaparin  30 mg SubCUTAneous BID     PRN Meds: sodium chloride flush, acetaminophen **OR** acetaminophen, sodium chloride flush, sodium chloride, sodium chloride flush, ondansetron **OR** ondansetron, polyethylene glycol, calcium carbonate, melatonin, hydrALAZINE, ibuprofen, tiZANidine    Labs:     Recent Labs     01/09/23  0449 01/10/23  0628 01/11/23  0855   WBC 12.0* 10.8 9.9   HGB 12.7 12.7 12.8   HCT 39.3 39.2 39.4    214 228       Recent Labs     01/09/23  0449 01/10/23  0628 01/11/23  0855    138 138   K 4.6 4.4 4.2    103 105   CO2 21* 22 23   BUN 14 16 14   CREATININE 0.9 0.8 0.8   CALCIUM 9.2 9.0 8.8       Recent Labs     01/09/23  0449 01/10/23  0628 01/11/23  0855   PROT 6.5 6.3* 6.4   ALKPHOS 58 54 54   ALT <5 <5 <5   AST 14 10 11   BILITOT 0.2 0.2 0.4       Recent Labs     01/10/23  0628   INR 0.9       No results for input(s): Payam Cavazos in the last 72 hours. Chronic labs:    Lab Results   Component Value Date    CHOL 123 01/07/2023    TRIG 122 01/07/2023    HDL 51 01/07/2023    LDLCALC 48 01/07/2023    TSH 4.430 (H) 01/07/2023    INR 0.9 01/10/2023    LABA1C 5.5 01/07/2023       Radiology: REVIEWED DAILY    Assessment:  Stuttering speech  BUE paresthesias  Bilateral Bell's Palsy   Horizontal diplopia   Leukocytosis likely 2/2 recent steroid use - improving   Hypothyroidism  Tachycardia - resolved  Elevated BP - improved  Obesity, Body mass index is 42.33 kg/m²  Plan:  Neurology following - labs pending  EEG unremarkable  Neuro checks  MRI cervical spine unremarkable  MRV unremarkable      DVT Prophylaxis [x] Lovenox  []  Heparin [] DOAC [] PCDs [] Ambulation    GI Prophylaxis [] PPI  [] H2 Blocker   [] Carafate  [x] Diet/Tube Feeds   Level of care [] Med/Surg  [] Intermediate  []  ICU   Diet ADULT DIET;  Regular    Family contact [x]  N/A    [] At bedside  [] Phone call   Disposition Patient requires continued admission further recommendations from neurology   MDM [] Low    [x] Moderate  []   High       Discharge Plan: To home pending clinical improvement    +++++++++++++++++++++++++++++++++++++++++++++++++  CHOCO Serrato/ Edgardo [address]  +++++++++++++++++++++++++++++++++++++++++++++++++  NOTE: This report was transcribed using voice recognition software. Every effort was made to ensure accuracy; however, inadvertent computerized transcription errors may be present.

## 2023-01-11 NOTE — PROGRESS NOTES
Mena Shrestha is a 32 y.o. right handed female     Neurology following for stuttering and tremors    PMH of obesity      Assessment:     Asymmetric bilateral Bell's palsy   Associated with facial swelling, ear pain, and tinnitus which did not resolve with Valtrex and prednisone  Left sided face is worse than the right side of face  MRI brain and CT were unremarkable  Associated with possible Lyme disease, work-up is pending    Stuttering speech  Etiology unknown    Plan:     Lyme titers pending  Acetylcholine receptor antibody and MTHFR Mutation pending  Add on Ganglioside panel on CSF  ACE pending  EMG 1 arm and 1 leg  CTA Neck CTA Head CTV Head to evaluate for venus thrombosis  Will start IVIg after the IgA and CTV resulted      Subjective:     Patient presented to the ER on 1/5/2023 after experiencing stuttering and decreased vision for a few days. She was diagnosed with Bell's palsy on 12/29 and was given a prescription for prednisone and Valtrex. She said her Bell's palsy was worse on the left side compared to the right. She was also experiencing bilateral upper extremity numbness and tingling along with tinnitus. Her MRI brain and CT head were both unremarkable. Patient lying in bed with family at the bedside. Her stuttering speech is improving she continues to experience double vision, bilateral facial palsy, and bilateral hand, arm, shoulder, and neck burning with pins-and-needles. Her headache improved after the headache cocktail that was administered, but it is still at 7/10. Chest x-ray is negative for abnormalities in her CTAs are pending. EMG and other labs are still pending.         No chest pain or palpitations  No coughing or wheezing    No vertigo, lightheadedness or loss of consciousness  No falls, tripping or stumbling  No incontinence of bowels or bladder  No itching or bruising appreciated    ROS otherwise negative      Objective:       /73   Pulse 96   Temp 97.9 °F (36.6 °C) (Oral)   Resp 17   Ht 5' 4\" (1.626 m)   Wt 246 lb 9.6 oz (111.9 kg)   LMP 12/15/2022   SpO2 99%   BMI 42.33 kg/m²       General appearance: alert, appears stated age, cooperative and no distress  Head: normocephalic, without obvious abnormality, atraumatic  Eyes: conjunctivae/corneas clear  Neck: no adenopathy, supple, symmetrical, trachea midline and thyroid not enlarged, symmetric, no tenderness/mass/nodules  Lungs: Regular respirations on room air  Heart: No chest pain or palpitations  Abdomen: soft, non-tender; bowel sounds normal; no masses,  no organomegaly  Extremities:  normal, atraumatic, no cyanosis or edema  Pulses: 2+ and symmetric  Skin: color, texture, turgor normal---no rashes or lesions      Mental Status: Alert, oriented, thought content appropriate, alertness: alert, orientation: time, date, person, place, affect: normal     Appropriate attention/concentration  Intact fundus of knowledge  Intact memories      Speech/Language:Stuttering speech      Cranial Nerves:  I: smell NA   II: visual acuity  NA   II: visual fields Double vision   II: pupils KEIKO   III,VII: ptosis None   III,IV,VI: extraocular muscles  Right ptosis, unable to close her left eye completely   V: mastication Normal   V: facial light touch sensation  Normal   V,VII: corneal reflex  Present   VII: facial muscle function - upper  Right eye ptosis   VII: facial muscle function - lower Left labial fold droop   VIII: hearing Normal   IX: soft palate elevation  Normal   IX,X: gag reflex Present   XI: trapezius strength     XI: sternocleidomastoid strength    XI: neck extension strength     XII: tongue strength  Normal     Motor:  5/5 throughout  Normal bulk and tone  No drift   No abnormal movements    Sensory:  LT and PP normal  Vibration normal    Coordination:   FN, FFM and GABRIEL normal  HS normal    Gait:  Normal    DTR:   1 upper extremities  1 lower extremities    No Babinskis  No Phelps's    No other pathological reflexes    Laboratory/Radiology:     CBC with Differential:    Lab Results   Component Value Date/Time    WBC 9.9 01/11/2023 08:55 AM    RBC 4.19 01/11/2023 08:55 AM    HGB 12.8 01/11/2023 08:55 AM    HCT 39.4 01/11/2023 08:55 AM     01/11/2023 08:55 AM    MCV 94.0 01/11/2023 08:55 AM    MCH 30.5 01/11/2023 08:55 AM    MCHC 32.5 01/11/2023 08:55 AM    RDW 14.2 01/11/2023 08:55 AM    NRBC 0.0 03/13/2019 05:55 AM    LYMPHOPCT 22.8 01/11/2023 08:55 AM    MONOPCT 12.1 01/11/2023 08:55 AM    MYELOPCT 0.9 03/13/2019 05:55 AM    BASOPCT 0.4 01/11/2023 08:55 AM    MONOSABS 1.20 01/11/2023 08:55 AM    LYMPHSABS 2.26 01/11/2023 08:55 AM    EOSABS 0.08 01/11/2023 08:55 AM    BASOSABS 0.04 01/11/2023 08:55 AM     CMP:    Lab Results   Component Value Date/Time     01/11/2023 08:55 AM    K 4.2 01/11/2023 08:55 AM    K 4.3 11/07/2020 06:00 PM     01/11/2023 08:55 AM    CO2 23 01/11/2023 08:55 AM    BUN 14 01/11/2023 08:55 AM    CREATININE 0.8 01/11/2023 08:55 AM    GFRAA >60 06/28/2021 03:34 PM    LABGLOM >60 01/11/2023 08:55 AM    GLUCOSE 89 01/11/2023 08:55 AM    PROT 6.4 01/11/2023 08:55 AM    LABALBU 3.4 01/11/2023 08:55 AM    CALCIUM 8.8 01/11/2023 08:55 AM    BILITOT 0.4 01/11/2023 08:55 AM    ALKPHOS 54 01/11/2023 08:55 AM    AST 11 01/11/2023 08:55 AM    ALT <5 01/11/2023 08:55 AM     HgBA1c:    Lab Results   Component Value Date/Time    LABA1C 5.5 01/07/2023 08:45 AM     FLP:    Lab Results   Component Value Date/Time    TRIG 122 01/07/2023 08:45 AM    HDL 51 01/07/2023 08:45 AM    LDLCALC 48 01/07/2023 08:45 AM    LABVLDL 24 01/07/2023 08:45 AM     TSH:    Lab Results   Component Value Date/Time    TSH 4.430 01/07/2023 08:45 AM      Latest Reference Range & Units 1/9/23 04:49   CRP 0.0 - 0.4 mg/dL 1.8 (H)   (H): Data is abnormally high     Latest Reference Range & Units 1/9/23 04:49   Homocysteine 0.0 - 15.0 umol/L 5.5      Latest Reference Range & Units 1/9/23 04:49   Sed Rate 0 - 20 mm/Hr 47 (H) (H): Data is abnormally high     Latest Reference Range & Units 1/9/23 04:49   Ferritin ng/mL 187        Latest Reference Range & Units 1/10/23 10:38   Appearance, CSF Clear   Clear  Clear  Clear   Glucose, CSF 40 - 70 mg/dL 68   Protein, CSF 15 - 40 mg/dL 24   RBC, CSF /uL  /uL <2000  <2000   WBC, CSF 0 - 2 /uL  0 - 2 /uL <3  3 (H)   Neutrophils, CSF 0 - 10 %  0 - 10 % 0  0   Monocytes, CSF 10 - 70 %  10 - 70 % 100 (H)  100 (H)   Color, CSF  Colorless  Colorless   Tube Number + CELL CT + DIFF-CSF  Tube 4  Tube 1   (H): Data is abnormally high     Latest Reference Range & Units 1/10/23 16:57   IgA 70 - 400 mg/dL 140      Latest Reference Range & Units 1/10/23 16:57   Vitamin B-12 211 - 946 pg/mL 363      Latest Reference Range & Units 1/10/23 16:57   FOLATE, FOLAT 4.8 - 24.2 ng/mL 9.1      Latest Reference Range & Units 1/10/23 06:28   ELIZABETH NEGATIVE  NEGATIVE     EEG  Clinical Interpretation: This was a normal study during waking, drowsiness, and sleep. No seizures or epileptiform discharges were noted during this study. CT head  Negative for acute abnormalities    CT cervical spine  No acute fractures. Asymmetric disc bulge at C6-C7 to the right with  effacement of thecal sac and lateral recess. Consider MRI for better  assessment. MRI brain  Negative for acute abnormalities    EEG  Clinical Interpretation: This was a normal study during waking, drowsiness, and sleep. No seizures or epileptiform discharges were noted during this study.      Chest x-ray  Unremarkable      All labs and imaging studies reviewed independently today         TJ Dozier CNP  11:31 AM  1/11/2023

## 2023-01-11 NOTE — CARE COORDINATION
Per notes- presented to the ED with complaints of difficulty speaking. She reports stuttering speech that has been progressively worse over the past 2 days. She also endorses bilateral upper extremity tingling. She has a recent diagnosis of Bell's palsy which was treated with antivirals and steroids. Imaging negative in ED. She was admitted for further neurological evaluation. Neurology following.-possible Lyme disease, work-up is pending  EEG unremarkable. Had lumbar puncture yesterday per neurology Will start IVIg after the IgA and CTV resulted. CTA  head and neck ordered. Plan is home and family will transport.  Electronically signed by Andriy Rosen RN on 1/11/2023 at 10:51 AM

## 2023-01-11 NOTE — PROCEDURES
[facility]  [address] [place] [address], [address] [place] [address]  Phone: [phone]  Fax: [phone]      Referring Provider: No ref. provider found  Primary Care Physician: Emil Limon DO  Patient Name: Brooke Rene  Patient YOB: 1995  Gender: female  BMI: Body mass index is 42.33 kg/m². Blood pressure 120/73, pulse 96, temperature 97.9 °F (36.6 °C), temperature source Oral, resp. rate 17, height 5' 4\" (1.626 m), weight 246 lb 9.6 oz (111.9 kg), last menstrual period 12/15/2022, SpO2 99 %, unknown if currently breastfeeding. 1/11/2023    Description of clinical problem:   CC: paresthesias     Pain No, Numbness/tingling  Yes; Weakness  No       Brief physical exam:   Sensory deficit No; Weakness No; Atrophy  No    Motor NCS      Nerve / Sites Lat. Amplitude Distance Velocity Temp. Amp. 1-2    ms mV cm m/s °C %   R Median - APB      Wrist 3.39 12.5 8  32 100      Elbow 7.14 12.4 22 59 32 99.5   R Ulnar - ADM      Wrist 2.66 8.3 8  32 100      B. Elbow 6.25 7.7 20 56 32 88.6      A. Elbow 8.44 7.7 10 46 32 80.5   R Peroneal - EDB      Ankle 4.38 8.2 8  31 100      Pop fossa 11.98 6.5 36 47 31 79.6   R Tibial - AH      Ankle 4.06 14.5 8  31.1 100      Pop fossa 12.81 11.5 39 45 31.1 79.3       Sensory NCS      Nerve / Sites Peak Lat PP Amp Distance Velocity Temp.     ms µV cm m/s °C   R Median - Digit II (Antidromic)      Mid Palm 1.77 92.3 7 56 32      Wrist 3.07 75.6 14 58 32   R Ulnar - Digit V (Antidromic)      Wrist 3.33 19.2 14 54 32   R Radial - Anatomical snuff box (Forearm)      Forearm 2.34 19.8 10 62 32   R Superficial peroneal - Ankle      Lat leg 2.66 18.5 10 53 31   R Sural - Ankle (Calf)      Calf 4.11 11.8 14 41 31.1       F  Wave      Nerve F Lat M Lat F-M Lat    ms ms ms   R Median - APB 25.5 3.4 22.1   R Ulnar - ADM 30.1 2.9 27.2   R Peroneal - EDB 46.3 4.2 42.1   R Tibial - AH 52.4 5.2 47.2       H Reflex      Nerve Lat Hmax    ms   R Tibial - Soleus 27.7   L Tibial - Soleus NR       EMG         EMG Summary Table     Spontaneous MUAP Recruitment   Muscle IA Fib PSW Fasc H.F. Amp Dur. PPP Pattern   R. Tibialis anterior N None None None None N N N N   R. Extensor hallucis longus N None None None None N N N N   R. Gastrocnemius (Medial head) N None None None None N N N N   R. Vastus medialis N None None None None N N N N   R. Biceps brachii N None None None None N N N N   R. Triceps brachii N None None None None N N N N   R. Pronator teres N None None None None N N N N   R. First dorsal interosseous N None None None None N N N N   R. Abductor pollicis brevis N None None None None N N N N   R. Cervical paraspinals (low) N None None None None       R. Cervical paraspinals (mid) N None None None None       R. Lumbar paraspinals (mid) N None None None None       R. Lumbar paraspinals (low) N None None None None       R. Flexor carpi ulnaris N None None None None N N N N       Study Limitations:  obesity     Summary of Findings:    1. Sensory nerve conduction studies of all nerves tested showed normal peak latencies, normal SNAP amplitudes, and normal conduction velocities. 2. Motor nerve conduction studies of all nerves tested showed normal distal latencies, normal CMAP amplitudes, and normal conduction velocities. 3. F-wave studies of all nerves tested revealed normal latencies. 4. The left tibial nerve H-reflex response was absent. The right response was normal.      5. EMG was performed with monopolar needle in multiple muscles outlined above, including the cervical and lumbar paraspinals. All muscles tested revealed normal insertional activity, without evidence of abnormal spontaneous activity. All MUAP's were of normal amplitude and duration with a full recruitment pattern. No increase in polyphasic potentials was noted. Diagnostic Interpretation:      This study was essentially normal.     1. There is no electrodiagnostic evidence for any peripheral nerve mononeuropathy, plexopathy, myopathy or large fiber peripheral polyneuropathy. 2. Left tibial H-reflex was absent which is non specific but could suggest an S1 radiculopathy. This was not further investigated with needle exam as she is asymptomatic in the legs. Previous Study: none       Follow up EMG is recommended in 6-8 weeks if symptoms persist or worsen. Technologist: Rupert Jackman     Physician: Yasmeen Jackson DO, [facility] [place]   Board Certified Physical Medicine and Rehabilitation      Nerve conduction studies and electromyography were performed according to our laboratory policies and procedures which can be provided upon request. All abnormal values are identified in the table. Laboratory normal values can also be provided upon request.       Cc: No ref.  provider found  Dg Pisano DO

## 2023-01-12 LAB
ALBUMIN SERPL-MCNC: 3.4 G/DL (ref 3.5–5.2)
ALP BLD-CCNC: 52 U/L (ref 35–104)
ALT SERPL-CCNC: <5 U/L (ref 0–32)
ANION GAP SERPL CALCULATED.3IONS-SCNC: 9 MMOL/L (ref 7–16)
AST SERPL-CCNC: 11 U/L (ref 0–31)
BASOPHILS ABSOLUTE: 0.04 E9/L (ref 0–0.2)
BASOPHILS RELATIVE PERCENT: 0.4 % (ref 0–2)
BILIRUB SERPL-MCNC: 0.3 MG/DL (ref 0–1.2)
BUN BLDV-MCNC: 13 MG/DL (ref 6–20)
CALCIUM SERPL-MCNC: 8.8 MG/DL (ref 8.6–10.2)
CHLORIDE BLD-SCNC: 106 MMOL/L (ref 98–107)
CO2: 23 MMOL/L (ref 22–29)
CREAT SERPL-MCNC: 0.7 MG/DL (ref 0.5–1)
EOSINOPHILS ABSOLUTE: 0.09 E9/L (ref 0.05–0.5)
EOSINOPHILS RELATIVE PERCENT: 0.8 % (ref 0–6)
GFR SERPL CREATININE-BSD FRML MDRD: >60 ML/MIN/1.73
GLUCOSE BLD-MCNC: 109 MG/DL (ref 74–99)
HCT VFR BLD CALC: 37.2 % (ref 34–48)
HEMOGLOBIN: 12.4 G/DL (ref 11.5–15.5)
IMMATURE GRANULOCYTES #: 0.07 E9/L
IMMATURE GRANULOCYTES %: 0.6 % (ref 0–5)
LYME, EIA: 0.71 LIV (ref 0–1.2)
LYME, EIA: 0.71 LIV (ref 0–1.2)
LYMPHOCYTES ABSOLUTE: 2.74 E9/L (ref 1.5–4)
LYMPHOCYTES RELATIVE PERCENT: 25.4 % (ref 20–42)
MCH RBC QN AUTO: 31.1 PG (ref 26–35)
MCHC RBC AUTO-ENTMCNC: 33.3 % (ref 32–34.5)
MCV RBC AUTO: 93.2 FL (ref 80–99.9)
MONOCYTES ABSOLUTE: 1.35 E9/L (ref 0.1–0.95)
MONOCYTES RELATIVE PERCENT: 12.5 % (ref 2–12)
NEUTROPHILS ABSOLUTE: 6.48 E9/L (ref 1.8–7.3)
NEUTROPHILS RELATIVE PERCENT: 60.3 % (ref 43–80)
PDW BLD-RTO: 14.2 FL (ref 11.5–15)
PLATELET # BLD: 221 E9/L (ref 130–450)
PMV BLD AUTO: 10.2 FL (ref 7–12)
POTASSIUM SERPL-SCNC: 4.2 MMOL/L (ref 3.5–5)
RBC # BLD: 3.99 E12/L (ref 3.5–5.5)
SODIUM BLD-SCNC: 138 MMOL/L (ref 132–146)
TOTAL PROTEIN: 6.3 G/DL (ref 6.4–8.3)
WBC # BLD: 10.8 E9/L (ref 4.5–11.5)

## 2023-01-12 PROCEDURE — 6370000000 HC RX 637 (ALT 250 FOR IP): Performed by: NURSE PRACTITIONER

## 2023-01-12 PROCEDURE — 1200000000 HC SEMI PRIVATE

## 2023-01-12 PROCEDURE — 6370000000 HC RX 637 (ALT 250 FOR IP): Performed by: FAMILY MEDICINE

## 2023-01-12 PROCEDURE — 6370000000 HC RX 637 (ALT 250 FOR IP)

## 2023-01-12 PROCEDURE — 80053 COMPREHEN METABOLIC PANEL: CPT

## 2023-01-12 PROCEDURE — 6370000000 HC RX 637 (ALT 250 FOR IP): Performed by: INTERNAL MEDICINE

## 2023-01-12 PROCEDURE — 85025 COMPLETE CBC W/AUTO DIFF WBC: CPT

## 2023-01-12 PROCEDURE — 2580000003 HC RX 258: Performed by: INTERNAL MEDICINE

## 2023-01-12 PROCEDURE — 99232 SBSQ HOSP IP/OBS MODERATE 35: CPT

## 2023-01-12 PROCEDURE — 36415 COLL VENOUS BLD VENIPUNCTURE: CPT

## 2023-01-12 PROCEDURE — 6360000002 HC RX W HCPCS: Performed by: INTERNAL MEDICINE

## 2023-01-12 PROCEDURE — 2580000003 HC RX 258

## 2023-01-12 PROCEDURE — 6360000002 HC RX W HCPCS

## 2023-01-12 RX ORDER — ALPRAZOLAM 0.25 MG/1
0.25 TABLET ORAL NIGHTLY PRN
Status: DISCONTINUED | OUTPATIENT
Start: 2023-01-12 | End: 2023-01-16 | Stop reason: HOSPADM

## 2023-01-12 RX ORDER — HYDROCODONE BITARTRATE AND ACETAMINOPHEN 5; 325 MG/1; MG/1
1 TABLET ORAL EVERY 6 HOURS PRN
Status: DISCONTINUED | OUTPATIENT
Start: 2023-01-12 | End: 2023-01-16 | Stop reason: HOSPADM

## 2023-01-12 RX ADMIN — SODIUM CHLORIDE, PRESERVATIVE FREE 10 ML: 5 INJECTION INTRAVENOUS at 11:02

## 2023-01-12 RX ADMIN — ALPRAZOLAM 0.25 MG: 0.25 TABLET ORAL at 20:56

## 2023-01-12 RX ADMIN — CYANOCOBALAMIN TAB 1000 MCG 1000 MCG: 1000 TAB at 07:51

## 2023-01-12 RX ADMIN — ENOXAPARIN SODIUM 30 MG: 100 INJECTION SUBCUTANEOUS at 07:50

## 2023-01-12 RX ADMIN — HYDROCODONE BITARTRATE AND ACETAMINOPHEN 1 TABLET: 5; 325 TABLET ORAL at 20:50

## 2023-01-12 RX ADMIN — IMMUNE GLOBULIN (HUMAN) 20 G: 10 INJECTION INTRAVENOUS; SUBCUTANEOUS at 13:18

## 2023-01-12 RX ADMIN — IBUPROFEN 400 MG: 400 TABLET, FILM COATED ORAL at 16:18

## 2023-01-12 RX ADMIN — IBUPROFEN 400 MG: 400 TABLET, FILM COATED ORAL at 07:51

## 2023-01-12 RX ADMIN — SODIUM CHLORIDE, PRESERVATIVE FREE 10 ML: 5 INJECTION INTRAVENOUS at 20:51

## 2023-01-12 RX ADMIN — ACETAMINOPHEN 650 MG: 325 TABLET ORAL at 16:17

## 2023-01-12 RX ADMIN — SODIUM CHLORIDE, PRESERVATIVE FREE 10 ML: 5 INJECTION INTRAVENOUS at 07:52

## 2023-01-12 RX ADMIN — ACETAMINOPHEN 650 MG: 325 TABLET ORAL at 07:51

## 2023-01-12 RX ADMIN — MELATONIN 3 MG ORAL TABLET 3 MG: 3 TABLET ORAL at 20:50

## 2023-01-12 RX ADMIN — GABAPENTIN 600 MG: 300 CAPSULE ORAL at 07:51

## 2023-01-12 RX ADMIN — IMMUNE GLOBULIN (HUMAN) 20 G: 10 INJECTION INTRAVENOUS; SUBCUTANEOUS at 12:07

## 2023-01-12 RX ADMIN — IMMUNE GLOBULIN (HUMAN) 10 G: 10 INJECTION INTRAVENOUS; SUBCUTANEOUS at 10:51

## 2023-01-12 RX ADMIN — GABAPENTIN 600 MG: 300 CAPSULE ORAL at 20:50

## 2023-01-12 RX ADMIN — Medication 5 DROP: at 07:52

## 2023-01-12 RX ADMIN — Medication 5 DROP: at 21:02

## 2023-01-12 ASSESSMENT — PAIN DESCRIPTION - DESCRIPTORS
DESCRIPTORS: ACHING;THROBBING
DESCRIPTORS: ACHING;DISCOMFORT;THROBBING
DESCRIPTORS: ACHING
DESCRIPTORS: ACHING;THROBBING

## 2023-01-12 ASSESSMENT — PAIN SCALES - GENERAL
PAINLEVEL_OUTOF10: 7
PAINLEVEL_OUTOF10: 7
PAINLEVEL_OUTOF10: 0
PAINLEVEL_OUTOF10: 7
PAINLEVEL_OUTOF10: 8

## 2023-01-12 ASSESSMENT — PAIN - FUNCTIONAL ASSESSMENT: PAIN_FUNCTIONAL_ASSESSMENT: ACTIVITIES ARE NOT PREVENTED

## 2023-01-12 ASSESSMENT — PAIN DESCRIPTION - LOCATION
LOCATION: HEAD
LOCATION: HEAD;BACK;NECK
LOCATION: HEAD;NECK;BACK
LOCATION: HEAD;NECK;BACK

## 2023-01-12 ASSESSMENT — PAIN DESCRIPTION - ORIENTATION
ORIENTATION: MID
ORIENTATION: MID
ORIENTATION: UPPER

## 2023-01-12 NOTE — PROGRESS NOTES
Jennifer Vann is a 32 y.o. 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 Multifocal bulbar neuropathies      Plan:     Lyme titers still pending  Acetylcholine receptor antibody and MTHFR Mutation pending  Ganglioside panel to check for [place]leton Gale syndrome pending  ACE pending  Will start IVIg  x 5 days  Neurology will follow      Subjective:     Patient presented to the ER on 1/5/2023 after experiencing stuttering and decreased vision for a few days. She was diagnosed with Bell's palsy on 12/29 and was given a prescription for prednisone and Valtrex. She said her Bell's palsy was worse on the left side compared to the right. She was also experiencing bilateral upper extremity numbness and tingling along with tinnitus. Her MRI brain and CT head were both unremarkable. Patient sitting up in bed with her mom at the bedside. Her speech is clear as her stuttering has resolved. She continues to experience double vision, bilateral facial palsy and bilateral hand, arm, shoulder, and neck burning with pins-and-needles. She continues to experience a headache which is 8/10. I will try [facility] to see if this takes her headache away. Her CTAs of her head were all unremarkable. I will start I VIG today for a total of 5 days.       No chest pain or palpitations  No coughing or wheezing    No vertigo, lightheadedness or loss of consciousness  No falls, tripping or stumbling  No incontinence of bowels or bladder  No itching or bruising appreciated    ROS otherwise negative      Objective:       BP (!) 144/88   Pulse (!) 106   Temp 98.3 °F (36.8 °C) (Oral)   Resp 16   Ht 5' 4\" (1.626 m)   Wt 246 lb 9.6 oz (111.9 kg)   LMP 12/15/2022 SpO2 98%   BMI 42.33 kg/m²       General appearance: alert, appears stated age, cooperative and no distress  Head: normocephalic, without obvious abnormality, atraumatic  Eyes: conjunctivae/corneas clear  Neck: no adenopathy, supple, symmetrical, trachea midline and thyroid not enlarged, symmetric, no tenderness/mass/nodules  Lungs: Regular respirations on room air  Heart: No chest pain or palpitations  Abdomen: soft, non-tender; bowel sounds normal; no masses,  no organomegaly  Extremities:  normal, atraumatic, no cyanosis or edema  Pulses: 2+ and symmetric  Skin: color, texture, turgor normal---no rashes or lesions      Mental Status: Alert, oriented, thought content appropriate, alertness: alert, orientation: time, date, person, place, affect: normal     Appropriate attention/concentration  Intact fundus of knowledge  Intact memories      Speech/Language:Clear      Cranial Nerves:  I: smell NA   II: visual acuity  NA   II: visual fields Double vision   II: pupils KEIKO   III,VII: ptosis None   III,IV,VI: extraocular muscles  Right ptosis, unable to close her left eye completely   V: mastication Normal   V: facial light touch sensation  Normal   V,VII: corneal reflex  Present   VII: facial muscle function - upper  Right eye ptosis   VII: facial muscle function - lower Right labial fold droop   VIII: hearing Normal   IX: soft palate elevation  Normal   IX,X: gag reflex Present   XI: trapezius strength     XI: sternocleidomastoid strength    XI: neck extension strength     XII: tongue strength  Normal     Motor:  5/5 throughout  Normal bulk and tone  No drift   No abnormal movements    Sensory:  LT and PP normal  Vibration normal    Coordination:   FN, FFM and GABRIEL normal  HS normal    Gait:  Normal    DTR:   1 upper extremities  1 lower extremities    No Babinskis  No Phelps's    No other pathological reflexes    Laboratory/Radiology:     CBC with Differential:    Lab Results   Component Value Date/Time    WBC 10.8 01/12/2023 04:40 AM    RBC 3.99 01/12/2023 04:40 AM    HGB 12.4 01/12/2023 04:40 AM    HCT 37.2 01/12/2023 04:40 AM     01/12/2023 04:40 AM    MCV 93.2 01/12/2023 04:40 AM    MCH 31.1 01/12/2023 04:40 AM    MCHC 33.3 01/12/2023 04:40 AM    RDW 14.2 01/12/2023 04:40 AM    NRBC 0.0 03/13/2019 05:55 AM    LYMPHOPCT 25.4 01/12/2023 04:40 AM    MONOPCT 12.5 01/12/2023 04:40 AM    MYELOPCT 0.9 03/13/2019 05:55 AM    BASOPCT 0.4 01/12/2023 04:40 AM    MONOSABS 1.35 01/12/2023 04:40 AM    LYMPHSABS 2.74 01/12/2023 04:40 AM    EOSABS 0.09 01/12/2023 04:40 AM    BASOSABS 0.04 01/12/2023 04:40 AM     CMP:    Lab Results   Component Value Date/Time     01/12/2023 04:40 AM    K 4.2 01/12/2023 04:40 AM    K 4.3 11/07/2020 06:00 PM     01/12/2023 04:40 AM    CO2 23 01/12/2023 04:40 AM    BUN 13 01/12/2023 04:40 AM    CREATININE 0.7 01/12/2023 04:40 AM    GFRAA >60 06/28/2021 03:34 PM    LABGLOM >60 01/12/2023 04:40 AM    GLUCOSE 109 01/12/2023 04:40 AM    PROT 6.3 01/12/2023 04:40 AM    LABALBU 3.4 01/12/2023 04:40 AM    CALCIUM 8.8 01/12/2023 04:40 AM    BILITOT 0.3 01/12/2023 04:40 AM    ALKPHOS 52 01/12/2023 04:40 AM    AST 11 01/12/2023 04:40 AM    ALT <5 01/12/2023 04:40 AM     HgBA1c:    Lab Results   Component Value Date/Time    LABA1C 5.5 01/07/2023 08:45 AM     FLP:    Lab Results   Component Value Date/Time    TRIG 122 01/07/2023 08:45 AM    HDL 51 01/07/2023 08:45 AM    LDLCALC 48 01/07/2023 08:45 AM    LABVLDL 24 01/07/2023 08:45 AM     TSH:    Lab Results   Component Value Date/Time    TSH 4.430 01/07/2023 08:45 AM      Latest Reference Range & Units 1/9/23 04:49   CRP 0.0 - 0.4 mg/dL 1.8 (H)   (H): Data is abnormally high     Latest Reference Range & Units 1/9/23 04:49   Homocysteine 0.0 - 15.0 umol/L 5.5      Latest Reference Range & Units 1/9/23 04:49   Sed Rate 0 - 20 mm/Hr 47 (H)   (H): Data is abnormally high     Latest Reference Range & Units 1/9/23 04:49   Ferritin ng/mL 187        Latest Reference Range & Units 1/10/23 10:38   Appearance, CSF Clear   Clear  Clear  Clear   Glucose, CSF 40 - 70 mg/dL 68   Protein, CSF 15 - 40 mg/dL 24   RBC, CSF /uL  /uL <2000  <2000   WBC, CSF 0 - 2 /uL  0 - 2 /uL <3  3 (H)   Neutrophils, CSF 0 - 10 %  0 - 10 % 0  0   Monocytes, CSF 10 - 70 %  10 - 70 % 100 (H)  100 (H)   Color, CSF  Colorless  Colorless   Tube Number + CELL CT + DIFF-CSF  Tube 4  Tube 1   (H): Data is abnormally high     Latest Reference Range & Units 1/10/23 16:57   IgA 70 - 400 mg/dL 140      Latest Reference Range & Units 1/10/23 16:57   Vitamin B-12 211 - 946 pg/mL 363      Latest Reference Range & Units 1/10/23 16:57   FOLATE, FOLAT 4.8 - 24.2 ng/mL 9.1      Latest Reference Range & Units 1/10/23 06:28   ELIZABETH NEGATIVE  NEGATIVE     EEG  Clinical Interpretation: This was a normal study during waking, drowsiness, and sleep. No seizures or epileptiform discharges were noted during this study. CT head  Negative for acute abnormalities    CT cervical spine  No acute fractures. Asymmetric disc bulge at C6-C7 to the right with  effacement of thecal sac and lateral recess. Consider MRI for better  assessment. MRI brain  Negative for acute abnormalities    EEG  Clinical Interpretation: This was a normal study during waking, drowsiness, and sleep. No seizures or epileptiform discharges were noted during this study. Chest x-ray  Unremarkable    CTA neck with contrast  Unremarkable CTA of the neck. No evidence of intracranial deep vein thrombosis. 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 CTV head  Unremarkable CTA of the neck. No evidence of intracranial deep vein thrombosis. The left transverse sinus  and left internal jugular vein are hypoplastic    EMG  Diagnostic Interpretation:     This study was essentially normal.   1. There is no electrodiagnostic evidence for any peripheral nerve mononeuropathy, plexopathy, myopathy or large fiber peripheral polyneuropathy. 2. Left tibial H-reflex was absent which is non specific but could suggest an S1 radiculopathy. This was not further investigated with needle exam as she is asymptomatic in the legs. Previous Study: none        Follow up EMG is recommended in 6-8 weeks if symptoms persist or worsen.    All labs and imaging studies reviewed independently today         TJ Lanier CNP  11:49 AM  1/12/2023

## 2023-01-12 NOTE — PLAN OF CARE
Problem: Pain  Goal: Verbalizes/displays adequate comfort level or baseline comfort level  1/12/2023 1056 by Adriel Long RN  Outcome: Progressing  1/12/2023 0150 by Elizabeth Call RN  Outcome: Progressing     Problem: Safety - Adult  Goal: Free from fall injury  1/12/2023 1056 by Adriel Long RN  Outcome: Progressing  1/12/2023 0150 by Elizabeth Call RN  Outcome: Progressing

## 2023-01-12 NOTE — PROGRESS NOTES
Hospitalist Progress Note      Synopsis:  Patient presented to the ED with complaints of difficulty speaking. She reports stuttering speech that has been progressively worse over the past 2 days. She also endorses bilateral upper extremity tingling. She has a recent diagnosis of Bell's palsy which was treated with antivirals and steroids. Imaging negative in ED. She was admitted for further neurological evaluation. Neurology following. EEG unremarkable. Lab work still pending, patient started on IVIG. Hospital day 2     Subjective:  Patient seen and examined at bedside, speech is markedly improved. Patient still complains of burning back pain and weakness in her bilateral arms. Stable overnight. No issues reported. Patient seen and examined  Records reviewed. Temp (24hrs), Av.3 °F (36.8 °C), Min:98 °F (36.7 °C), Max:98.7 °F (37.1 °C)    DIET: ADULT DIET; Regular  CODE: Full Code  No intake or output data in the 24 hours ending 23 1411    Review of Systems: All bolded are positive; please see HPI  General:  Fever, chills, diaphoresis, fatigue, malaise, night sweats, weight loss  Psychological:  Anxiety, disorientation, hallucinations. ENT:  Epistaxis, headaches, vertigo, visual changes. Cardiovascular:  Chest pain, irregular heartbeats, palpitations, paroxysmal nocturnal dyspnea. Respiratory:  Shortness of breath, coughing, sputum production, hemoptysis, wheezing, orthopnea.   Gastrointestinal:  Nausea, vomiting, diarrhea, heartburn, constipation, abdominal pain, hematemesis, hematochezia, melena, acholic stools  Genito-Urinary:  Dysuria, urgency, frequency, hematuria  Musculoskeletal:  Joint pain, joint stiffness, joint swelling, muscle pain  Neurology:  Headache, focal neurological deficits, weakness, numbness, paresthesia  Derm:  Rashes, ulcers, excoriations, bruising  Extremities:  Decreased ROM, peripheral edema, mottling    Objective:    /75   Pulse 97   Temp 98.3 °F (36.8 °C) (Oral)   Resp 16   Ht 5' 4\" (1.626 m)   Wt 246 lb 9.6 oz (111.9 kg)   LMP 12/15/2022   SpO2 97%   BMI 42.33 kg/m²     General appearance: No apparent distress, appears stated age and cooperative. HEENT: Conjunctivae/corneas clear. Mucous membranes moist.  Neck: Supple. No JVD. Respiratory:  Clear to auscultation bilaterally. Normal respiratory effort. Cardiovascular:  RRR. S1, S2 without MRG. PV: Pulses palpable. No edema. Abdomen: Soft, non-tender, non-distended. +BS  Musculoskeletal: No obvious deformities. Skin: Normal skin color. No rashes or lesions. Good turgor. Neurologic:  Grossly non-focal. Awake, alert, following commands.    Psychiatric: Alert and oriented, thought content appropriate, normal insight and judgement    Medications:  REVIEWED DAILY    Infusion Medications    sodium chloride       Scheduled Medications    Immune Globulin (Human)  50 g IntraVENous Daily    vitamin B-12  1,000 mcg Oral Daily    carbamide peroxide  5 drop Both Ears BID    gabapentin  600 mg Oral BID    sodium chloride flush  5-40 mL IntraVENous 2 times per day    sodium chloride flush  5-40 mL IntraVENous 2 times per day    enoxaparin  30 mg SubCUTAneous BID     PRN Meds: [START ON 1/13/2023] Rimegepant Sulfate, acetaminophen **OR** acetaminophen, sodium chloride flush, sodium chloride, sodium chloride flush, ondansetron **OR** ondansetron, polyethylene glycol, calcium carbonate, melatonin, hydrALAZINE, ibuprofen, tiZANidine    Labs:     Recent Labs     01/10/23  0628 01/11/23  0855 01/12/23  0440   WBC 10.8 9.9 10.8   HGB 12.7 12.8 12.4   HCT 39.2 39.4 37.2    228 221       Recent Labs     01/10/23  0628 01/11/23  0855 01/12/23  0440    138 138   K 4.4 4.2 4.2    105 106   CO2 22 23 23   BUN 16 14 13   CREATININE 0.8 0.8 0.7   CALCIUM 9.0 8.8 8.8       Recent Labs     01/10/23  0628 01/11/23  0855 01/12/23  0440   PROT 6.3* 6.4 6.3*   ALKPHOS 54 54 52   ALT <5 <5 <5   AST 10 11 11   BILITOT 0.2 0.4 0.3       Recent Labs     01/10/23  0628   INR 0.9       No results for input(s): [place] Dec in the last 72 hours. Chronic labs:    Lab Results   Component Value Date    CHOL 123 01/07/2023    TRIG 122 01/07/2023    HDL 51 01/07/2023    LDLCALC 48 01/07/2023    TSH 4.430 (H) 01/07/2023    INR 0.9 01/10/2023    LABA1C 5.5 01/07/2023       Radiology: REVIEWED DAILY    Assessment:  Stuttering speech  BUE paresthesias  Bilateral Bell's Palsy   Horizontal diplopia   Leukocytosis likely 2/2 recent steroid use - improving   Hypothyroidism  Tachycardia - resolved  Elevated BP - improved  Obesity, Body mass index is 42.33 kg/m²  Plan:  Neurology following - labs pending  EEG unremarkable  Neuro checks  MRI cervical spine unremarkable  MRV unremarkable  Continue IV IVIG      DVT Prophylaxis [x] Lovenox  []  Heparin [] DOAC [] PCDs [] Ambulation    GI Prophylaxis [] PPI  [] H2 Blocker   [] Carafate  [x] Diet/Tube Feeds   Level of care [] Med/Surg  [x] Intermediate  []  ICU   Diet ADULT DIET; Regular    Family contact [x]  N/A    [] At bedside  [] Phone call   Disposition Patient requires continued admission further recommendations from neurology   MDM [] Low    [x] Moderate  []   High       Discharge Plan: To home pending clinical improvement    +++++++++++++++++++++++++++++++++++++++++++++++++  CHOCO Hancock/ Edgardo Hirsch [address] [place] [address]  +++++++++++++++++++++++++++++++++++++++++++++++++  NOTE: This report was transcribed using voice recognition software. Every effort was made to ensure accuracy; however, inadvertent computerized transcription errors may be present.

## 2023-01-12 NOTE — CARE COORDINATION
Social Work/Case Management Transition of Care Planning ([place] [phone]): Per neurology, patient's speech is clear as her stuttering has resolved. She continues to experience double vision, bilateral facial palsy and bilateral hand, arm, shoulder, and neck burning with pins-and-needles. She continues to experience a headache which is 8/10. Nurtec will be started to see if this takes her headache away. CTAs of her head were all unremarkable. Plan is for I VIG for a total of 5 days. Lumbar puncture was done yesterday. Lyme disease workup is pending. Discharge plan is to home with no needs. Family to transport. CM/SW will follow.   KEIRA Woodson  1/12/2023

## 2023-01-13 LAB
ACETYLCHOLINE BINDING ANTIBODY: 0.1 NMOL/L (ref 0–0.4)
ACETYLCHOLINE BLOCKING AB: 0 % (ref 0–26)
ALBUMIN SERPL-MCNC: 3.2 G/DL (ref 3.5–5.2)
ALP BLD-CCNC: 51 U/L (ref 35–104)
ALT SERPL-CCNC: <5 U/L (ref 0–32)
ANGIOTENSIN CONVERTING ENZYME: 27 U/L (ref 16–85)
ANION GAP SERPL CALCULATED.3IONS-SCNC: 11 MMOL/L (ref 7–16)
AST SERPL-CCNC: 11 U/L (ref 0–31)
BASOPHILS ABSOLUTE: 0.03 E9/L (ref 0–0.2)
BASOPHILS RELATIVE PERCENT: 0.5 % (ref 0–2)
BILIRUB SERPL-MCNC: 0.3 MG/DL (ref 0–1.2)
BUN BLDV-MCNC: 13 MG/DL (ref 6–20)
CALCIUM SERPL-MCNC: 8.8 MG/DL (ref 8.6–10.2)
CHLORIDE BLD-SCNC: 105 MMOL/L (ref 98–107)
CO2: 23 MMOL/L (ref 22–29)
CREAT SERPL-MCNC: 0.8 MG/DL (ref 0.5–1)
EOSINOPHILS ABSOLUTE: 0.04 E9/L (ref 0.05–0.5)
EOSINOPHILS RELATIVE PERCENT: 0.6 % (ref 0–6)
GFR SERPL CREATININE-BSD FRML MDRD: >60 ML/MIN/1.73
GLUCOSE BLD-MCNC: 92 MG/DL (ref 74–99)
HCT VFR BLD CALC: 39 % (ref 34–48)
HEMOGLOBIN: 12.7 G/DL (ref 11.5–15.5)
IMMATURE GRANULOCYTES #: 0.03 E9/L
IMMATURE GRANULOCYTES %: 0.5 % (ref 0–5)
LYMPHOCYTES ABSOLUTE: 1.63 E9/L (ref 1.5–4)
LYMPHOCYTES RELATIVE PERCENT: 25.3 % (ref 20–42)
Lab: NORMAL
MCH RBC QN AUTO: 30.8 PG (ref 26–35)
MCHC RBC AUTO-ENTMCNC: 32.6 % (ref 32–34.5)
MCV RBC AUTO: 94.4 FL (ref 80–99.9)
MONOCYTES ABSOLUTE: 0.96 E9/L (ref 0.1–0.95)
MONOCYTES RELATIVE PERCENT: 14.9 % (ref 2–12)
NEUTROPHILS ABSOLUTE: 3.74 E9/L (ref 1.8–7.3)
NEUTROPHILS RELATIVE PERCENT: 58.2 % (ref 43–80)
PDW BLD-RTO: 13.8 FL (ref 11.5–15)
PLATELET # BLD: 209 E9/L (ref 130–450)
PMV BLD AUTO: 10 FL (ref 7–12)
POTASSIUM SERPL-SCNC: 4.3 MMOL/L (ref 3.5–5)
RBC # BLD: 4.13 E12/L (ref 3.5–5.5)
REPORT: NORMAL
SODIUM BLD-SCNC: 139 MMOL/L (ref 132–146)
THIS TEST SENT TO: NORMAL
TOTAL PROTEIN: 7.3 G/DL (ref 6.4–8.3)
WBC # BLD: 6.4 E9/L (ref 4.5–11.5)

## 2023-01-13 PROCEDURE — 80053 COMPREHEN METABOLIC PANEL: CPT

## 2023-01-13 PROCEDURE — 99232 SBSQ HOSP IP/OBS MODERATE 35: CPT

## 2023-01-13 PROCEDURE — 1200000000 HC SEMI PRIVATE

## 2023-01-13 PROCEDURE — 6360000002 HC RX W HCPCS

## 2023-01-13 PROCEDURE — 2580000003 HC RX 258: Performed by: INTERNAL MEDICINE

## 2023-01-13 PROCEDURE — 6370000000 HC RX 637 (ALT 250 FOR IP)

## 2023-01-13 PROCEDURE — 6370000000 HC RX 637 (ALT 250 FOR IP): Performed by: NURSE PRACTITIONER

## 2023-01-13 PROCEDURE — 6370000000 HC RX 637 (ALT 250 FOR IP): Performed by: INTERNAL MEDICINE

## 2023-01-13 PROCEDURE — 85025 COMPLETE CBC W/AUTO DIFF WBC: CPT

## 2023-01-13 PROCEDURE — 2580000003 HC RX 258

## 2023-01-13 PROCEDURE — 6370000000 HC RX 637 (ALT 250 FOR IP): Performed by: FAMILY MEDICINE

## 2023-01-13 PROCEDURE — 36415 COLL VENOUS BLD VENIPUNCTURE: CPT

## 2023-01-13 RX ADMIN — MELATONIN 3 MG ORAL TABLET 3 MG: 3 TABLET ORAL at 21:27

## 2023-01-13 RX ADMIN — Medication 5 DROP: at 11:34

## 2023-01-13 RX ADMIN — SODIUM CHLORIDE, PRESERVATIVE FREE 10 ML: 5 INJECTION INTRAVENOUS at 11:30

## 2023-01-13 RX ADMIN — HYDROCODONE BITARTRATE AND ACETAMINOPHEN 1 TABLET: 5; 325 TABLET ORAL at 21:27

## 2023-01-13 RX ADMIN — IMMUNE GLOBULIN (HUMAN) 50 G: 10 INJECTION INTRAVENOUS; SUBCUTANEOUS at 13:20

## 2023-01-13 RX ADMIN — ALPRAZOLAM 0.25 MG: 0.25 TABLET ORAL at 21:27

## 2023-01-13 RX ADMIN — HYDROCODONE BITARTRATE AND ACETAMINOPHEN 1 TABLET: 5; 325 TABLET ORAL at 06:23

## 2023-01-13 RX ADMIN — GABAPENTIN 600 MG: 300 CAPSULE ORAL at 08:55

## 2023-01-13 RX ADMIN — SODIUM CHLORIDE, PRESERVATIVE FREE 10 ML: 5 INJECTION INTRAVENOUS at 08:55

## 2023-01-13 RX ADMIN — Medication 5 DROP: at 21:28

## 2023-01-13 RX ADMIN — CYANOCOBALAMIN TAB 1000 MCG 1000 MCG: 1000 TAB at 08:55

## 2023-01-13 RX ADMIN — IBUPROFEN 400 MG: 400 TABLET, FILM COATED ORAL at 14:41

## 2023-01-13 RX ADMIN — GABAPENTIN 600 MG: 300 CAPSULE ORAL at 21:27

## 2023-01-13 ASSESSMENT — PAIN DESCRIPTION - DESCRIPTORS
DESCRIPTORS: ACHING;THROBBING
DESCRIPTORS: ACHING
DESCRIPTORS: ACHING
DESCRIPTORS: ACHING;SPASM

## 2023-01-13 ASSESSMENT — PAIN DESCRIPTION - ORIENTATION
ORIENTATION: MID
ORIENTATION: MID
ORIENTATION: RIGHT;LEFT

## 2023-01-13 ASSESSMENT — PAIN SCALES - GENERAL
PAINLEVEL_OUTOF10: 8
PAINLEVEL_OUTOF10: 10
PAINLEVEL_OUTOF10: 7

## 2023-01-13 ASSESSMENT — PAIN DESCRIPTION - LOCATION
LOCATION: HEAD;NECK;BACK
LOCATION: HEAD
LOCATION: BACK
LOCATION: GENERALIZED

## 2023-01-13 ASSESSMENT — PAIN DESCRIPTION - ONSET: ONSET: ON-GOING

## 2023-01-13 ASSESSMENT — PAIN DESCRIPTION - FREQUENCY: FREQUENCY: CONTINUOUS

## 2023-01-13 NOTE — PROGRESS NOTES
Sid Velásquez is a 32 y.o. 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 Multifocal bulbar neuropathies      Plan:     Acetylcholine receptor antibody and MTHFR Mutation still pending  Ganglioside panel to check for Genevia Borrow syndrome still pending  ACE still pending  Continue IVIg  x 3 more days  Neurology will follow  Repeat EMG in 6 weeks      Subjective:     Patient presented to the ER on 1/5/2023 after experiencing stuttering and decreased vision for a few days. She was diagnosed with Bell's palsy on 12/29 and was given a prescription for prednisone and Valtrex. She said her Bell's palsy was worse on the left side compared to the right. She was also experiencing bilateral upper extremity numbness and tingling along with tinnitus. Her MRI brain and CT head were both unremarkable. Patient awake and alert, sitting at the side of the bed eating breakfast.  Her speech remains clear. She continues to experience ptosis but looks like this is resolving. She continues to experience double vision. Her right eye continues to have a disconjugate gaze when looking upward. Today is day 2/5 of her IVIG infusion. There is no family at the bedside.     No chest pain or palpitations  No coughing or wheezing    No vertigo, lightheadedness or loss of consciousness  No falls, tripping or stumbling  No incontinence of bowels or bladder  No itching or bruising appreciated    ROS otherwise negative      Objective:       BP (!) 141/86   Pulse (!) 101   Temp 98.6 °F (37 °C) (Oral)   Resp 18   Ht 5' 4\" (1.626 m)   Wt 246 lb 9.6 oz (111.9 kg)   LMP 12/15/2022   SpO2 99%   BMI 42.33 kg/m²       General appearance: alert, appears stated age, cooperative and no distress  Head: normocephalic, without obvious abnormality, atraumatic  Eyes: conjunctivae/corneas clear  Neck: no adenopathy, supple, symmetrical, trachea midline and thyroid not enlarged, symmetric, no tenderness/mass/nodules  Lungs: Regular respirations on room air  Heart: No chest pain or palpitations  Abdomen: soft, non-tender; bowel sounds normal; no masses,  no organomegaly  Extremities:  normal, atraumatic, no cyanosis or edema  Pulses: 2+ and symmetric  Skin: color, texture, turgor normal---no rashes or lesions      Mental Status: Alert, oriented, thought content appropriate, alertness: alert, orientation: time, date, person, place, affect: normal     Appropriate attention/concentration  Intact fundus of knowledge  Intact memories      Speech/Language:Clear      Cranial Nerves:  I: smell NA   II: visual acuity  NA   II: visual fields Double vision   II: pupils KEIKO   III,VII: ptosis Right eye ptosis   III,IV,VI: extraocular muscles  Right ptosis, unable to close her left eye completely intermittently, disconjugate gaze in her right eye while looking upward   V: mastication Normal   V: facial light touch sensation  Normal   V,VII: corneal reflex  Present   VII: facial muscle function - upper  Right eye ptosis   VII: facial muscle function - lower Right labial fold droop   VIII: hearing Normal   IX: soft palate elevation  Normal   IX,X: gag reflex Present   XI: trapezius strength     XI: sternocleidomastoid strength    XI: neck extension strength     XII: tongue strength  Normal     Motor:  5/5 throughout  Normal bulk and tone  No drift   No abnormal movements    Sensory:  LT and PP normal  Vibration normal    Coordination:   FN, FFM and GABRIEL normal  HS normal    Gait:  Normal    DTR:   1 upper extremities  1 lower extremities    No Babinskis  No Phelps's    No other pathological reflexes    Laboratory/Radiology:     CBC with Differential:    Lab Results   Component Value Date/Time    WBC 6.4 01/13/2023 06:31 AM    RBC 4.13 01/13/2023 06:31 AM    HGB 12.7 01/13/2023 06:31 AM    HCT 39.0 01/13/2023 06:31 AM     01/13/2023 06:31 AM    MCV 94.4 01/13/2023 06:31 AM    MCH 30.8 01/13/2023 06:31 AM    MCHC 32.6 01/13/2023 06:31 AM    RDW 13.8 01/13/2023 06:31 AM    NRBC 0.0 03/13/2019 05:55 AM    LYMPHOPCT 25.3 01/13/2023 06:31 AM    MONOPCT 14.9 01/13/2023 06:31 AM    MYELOPCT 0.9 03/13/2019 05:55 AM    BASOPCT 0.5 01/13/2023 06:31 AM    MONOSABS 0.96 01/13/2023 06:31 AM    LYMPHSABS 1.63 01/13/2023 06:31 AM    EOSABS 0.04 01/13/2023 06:31 AM    BASOSABS 0.03 01/13/2023 06:31 AM     CMP:    Lab Results   Component Value Date/Time     01/13/2023 06:31 AM    K 4.3 01/13/2023 06:31 AM    K 4.3 11/07/2020 06:00 PM     01/13/2023 06:31 AM    CO2 23 01/13/2023 06:31 AM    BUN 13 01/13/2023 06:31 AM    CREATININE 0.8 01/13/2023 06:31 AM    GFRAA >60 06/28/2021 03:34 PM    LABGLOM >60 01/13/2023 06:31 AM    GLUCOSE 92 01/13/2023 06:31 AM    PROT 7.3 01/13/2023 06:31 AM    LABALBU 3.2 01/13/2023 06:31 AM    CALCIUM 8.8 01/13/2023 06:31 AM    BILITOT 0.3 01/13/2023 06:31 AM    ALKPHOS 51 01/13/2023 06:31 AM    AST 11 01/13/2023 06:31 AM    ALT <5 01/13/2023 06:31 AM     HgBA1c:    Lab Results   Component Value Date/Time    LABA1C 5.5 01/07/2023 08:45 AM     FLP:    Lab Results   Component Value Date/Time    TRIG 122 01/07/2023 08:45 AM    HDL 51 01/07/2023 08:45 AM    LDLCALC 48 01/07/2023 08:45 AM    LABVLDL 24 01/07/2023 08:45 AM     TSH:    Lab Results   Component Value Date/Time    TSH 4.430 01/07/2023 08:45 AM      Latest Reference Range & Units 1/9/23 04:49   CRP 0.0 - 0.4 mg/dL 1.8 (H)   (H): Data is abnormally high     Latest Reference Range & Units 1/9/23 04:49   Homocysteine 0.0 - 15.0 umol/L 5.5      Latest Reference Range & Units 1/9/23 04:49   Sed Rate 0 - 20 mm/Hr 47 (H)   (H): Data is abnormally high     Latest Reference Range & Units 1/9/23 04:49   Ferritin ng/mL 187        Latest Reference Range & Units 1/10/23 10:38   Appearance, CSF Clear   Clear  Clear  Clear   Glucose, CSF 40 - 70 mg/dL 68   Protein, CSF 15 - 40 mg/dL 24   RBC, CSF /uL  /uL <2000  <2000   WBC, CSF 0 - 2 /uL  0 - 2 /uL <3  3 (H)   Neutrophils, CSF 0 - 10 %  0 - 10 % 0  0   Monocytes, CSF 10 - 70 %  10 - 70 % 100 (H)  100 (H)   Color, CSF  Colorless  Colorless   Tube Number + CELL CT + DIFF-CSF  Tube 4  Tube 1   (H): Data is abnormally high     Latest Reference Range & Units 1/10/23 16:57   IgA 70 - 400 mg/dL 140      Latest Reference Range & Units 1/10/23 16:57   Vitamin B-12 211 - 946 pg/mL 363      Latest Reference Range & Units 1/10/23 16:57   FOLATE, FOLAT 4.8 - 24.2 ng/mL 9.1      Latest Reference Range & Units 1/10/23 06:28   ELIZABETH NEGATIVE  NEGATIVE      Latest Reference Range & Units 1/9/23 16:01   Lyme, EIA 0.00 - 1.20 ANNA  0.00 - 1.20 ANNA 0.71  0.71       EEG  Clinical Interpretation: This was a normal study during waking, drowsiness, and sleep. No seizures or epileptiform discharges were noted during this study. CT head  Negative for acute abnormalities    CT cervical spine  No acute fractures. Asymmetric disc bulge at C6-C7 to the right with  effacement of thecal sac and lateral recess. Consider MRI for better  assessment. MRI brain  Negative for acute abnormalities    EEG  Clinical Interpretation: This was a normal study during waking, drowsiness, and sleep. No seizures or epileptiform discharges were noted during this study. Chest x-ray  Unremarkable    CTA neck with contrast  Unremarkable CTA of the neck. No evidence of intracranial deep vein thrombosis. 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 CTV head  Unremarkable CTA of the neck. No evidence of intracranial deep vein thrombosis. The left transverse sinus  and left internal jugular vein are hypoplastic    EMG  Diagnostic Interpretation:     This study was essentially normal.   1. There is no electrodiagnostic evidence for any peripheral nerve mononeuropathy, plexopathy, myopathy or large fiber peripheral polyneuropathy. 2. Left tibial H-reflex was absent which is non specific but could suggest an S1 radiculopathy. This was not further investigated with needle exam as she is asymptomatic in the legs. Previous Study: none        Follow up EMG is recommended in 6-8 weeks if symptoms persist or worsen.    All labs and imaging studies reviewed independently today         TJ Jacobo CNP  11:56 AM  1/13/2023

## 2023-01-13 NOTE — CARE COORDINATION
Plan is home with boyfriend when medically ready. Today is day 2 of 5 for IV IG. Neurology is following. Patent's family will transport her home at time of discharge.   Penny Leung RN   [phone]

## 2023-01-13 NOTE — PROGRESS NOTES
Hospitalist Progress Note      Synopsis:   Patient presented to the ED with complaints of difficulty speaking. She reports stuttering speech that has been progressively worse over the past 2 days. She also endorses bilateral upper extremity tingling. She has a recent diagnosis of Bell's palsy which was treated with antivirals and steroids. Imaging negative in ED. She was admitted for further neurological evaluation. Neurology following. EEG unremarkable. Lab work still pending, patient started on IVIG. Hospital day 3     Subjective:  Stable overnight. No issues reported. Patient seen and examined  Records reviewed. Temp (24hrs), Av.2 °F (36.8 °C), Min:97.8 °F (36.6 °C), Max:98.6 °F (37 °C)    DIET: ADULT DIET; Regular  CODE: Full Code    Intake/Output Summary (Last 24 hours) at 2023 1252  Last data filed at 2023 1600  Gross per 24 hour   Intake 240 ml   Output --   Net 240 ml           Objective:    BP (!) 141/86   Pulse (!) 101   Temp 98.6 °F (37 °C) (Oral)   Resp 18   Ht 5' 4\" (1.626 m)   Wt 246 lb 9.6 oz (111.9 kg)   LMP 12/15/2022   SpO2 99%   BMI 42.33 kg/m²     General appearance: No apparent distress, appears stated age and cooperative. HEENT: Conjunctivae/corneas clear. Mucous membranes moist.  Neck: Supple. No JVD. Respiratory:  Clear to auscultation bilaterally. Normal respiratory effort. Cardiovascular:  RRR. S1, S2 without MRG. PV: Pulses palpable. No edema. Abdomen: Soft, non-tender, non-distended. +BS  Musculoskeletal: No obvious deformities. Skin: Normal skin color. No rashes or lesions. Good turgor. Neurologic:  Grossly non-focal. Awake, alert, following commands.    Psychiatric: Alert and oriented, thought content appropriate, normal insight and judgement    Medications:  REVIEWED DAILY    Infusion Medications    sodium chloride       Scheduled Medications    Immune Globulin (Human)  50 g IntraVENous Daily    vitamin B-12  1,000 mcg Oral Daily carbamide peroxide  5 drop Both Ears BID    gabapentin  600 mg Oral BID    sodium chloride flush  5-40 mL IntraVENous 2 times per day    sodium chloride flush  5-40 mL IntraVENous 2 times per day    enoxaparin  30 mg SubCUTAneous BID     PRN Meds: ALPRAZolam, HYDROcodone 5 mg - acetaminophen, acetaminophen **OR** acetaminophen, sodium chloride flush, sodium chloride, sodium chloride flush, ondansetron **OR** ondansetron, polyethylene glycol, calcium carbonate, melatonin, hydrALAZINE, ibuprofen, tiZANidine    Labs:     Recent Labs     01/11/23  0855 01/12/23  0440 01/13/23  0631   WBC 9.9 10.8 6.4   HGB 12.8 12.4 12.7   HCT 39.4 37.2 39.0    221 209       Recent Labs     01/11/23  0855 01/12/23  0440 01/13/23  0631    138 139   K 4.2 4.2 4.3    106 105   CO2 23 23 23   BUN 14 13 13   CREATININE 0.8 0.7 0.8   CALCIUM 8.8 8.8 8.8       Recent Labs     01/11/23  0855 01/12/23  0440 01/13/23  0631   PROT 6.4 6.3* 7.3   ALKPHOS 54 52 51   ALT <5 <5 <5   AST 11 11 11   BILITOT 0.4 0.3 0.3       No results for input(s): INR in the last 72 hours. No results for input(s): Verlena Pb in the last 72 hours.     Chronic labs:    Lab Results   Component Value Date    CHOL 123 01/07/2023    TRIG 122 01/07/2023    HDL 51 01/07/2023    LDLCALC 48 01/07/2023    TSH 4.430 (H) 01/07/2023    INR 0.9 01/10/2023    LABA1C 5.5 01/07/2023       Radiology: REVIEWED DAILY    Assessment:  Stuttering speech  BUE paresthesias  Bilateral Bell's Palsy   Horizontal diplopia   Leukocytosis likely 2/2 recent steroid use - improving   Hypothyroidism  Tachycardia - resolved  Elevated BP - improved  Obesity, Body mass index is 42.33 kg/m²  Plan:  Neurology following - labs pending  EEG unremarkable  Neuro checks  MRI cervical spine unremarkable  MRV unremarkable  Continue IV IVIG      DVT Prophylaxis [x] Lovenox  []  Heparin [] DOAC [] PCDs [] Ambulation    GI Prophylaxis [] PPI  [] H2 Blocker   [] Carafate  [] Diet/Tube Feeds   Level of care [] Med/Surg  [x] Intermediate  []  ICU   Diet ADULT DIET; Regular    Family contact [x]  N/A    [] At bedside  [] Phone call   Disposition Patient requires continued admission day 3 of 5 of IVIG   MDM [] Low    [x] Moderate  []   High       Discharge Plan: To home when IV IVIG complete    +++++++++++++++++++++++++++++++++++++++++++++++++  CHOCO Cristina/ Edgardo Fishman[address]  +++++++++++++++++++++++++++++++++++++++++++++++++  NOTE: This report was transcribed using voice recognition software. Every effort was made to ensure accuracy; however, inadvertent computerized transcription errors may be present.

## 2023-01-14 LAB
ALBUMIN SERPL-MCNC: 2.8 G/DL (ref 3.5–5.2)
ALP BLD-CCNC: 48 U/L (ref 35–104)
ALT SERPL-CCNC: 5 U/L (ref 0–32)
ANION GAP SERPL CALCULATED.3IONS-SCNC: 11 MMOL/L (ref 7–16)
AST SERPL-CCNC: 16 U/L (ref 0–31)
BILIRUB SERPL-MCNC: 0.6 MG/DL (ref 0–1.2)
BUN BLDV-MCNC: 11 MG/DL (ref 6–20)
CALCIUM SERPL-MCNC: 8.3 MG/DL (ref 8.6–10.2)
CHLORIDE BLD-SCNC: 104 MMOL/L (ref 98–107)
CO2: 21 MMOL/L (ref 22–29)
CREAT SERPL-MCNC: 0.8 MG/DL (ref 0.5–1)
CSF CULTURE: NORMAL
GFR SERPL CREATININE-BSD FRML MDRD: >60 ML/MIN/1.73
GLUCOSE BLD-MCNC: 123 MG/DL (ref 74–99)
GRAM STAIN RESULT: NORMAL
POTASSIUM SERPL-SCNC: 4.1 MMOL/L (ref 3.5–5)
REASON FOR REJECTION: NORMAL
REJECTED TEST: NORMAL
SODIUM BLD-SCNC: 136 MMOL/L (ref 132–146)
TOTAL PROTEIN: 7.8 G/DL (ref 6.4–8.3)
VDRL CSF SCREEN: NON REACTIVE

## 2023-01-14 PROCEDURE — 2580000003 HC RX 258: Performed by: INTERNAL MEDICINE

## 2023-01-14 PROCEDURE — 6370000000 HC RX 637 (ALT 250 FOR IP)

## 2023-01-14 PROCEDURE — 6360000002 HC RX W HCPCS

## 2023-01-14 PROCEDURE — 2580000003 HC RX 258

## 2023-01-14 PROCEDURE — 6370000000 HC RX 637 (ALT 250 FOR IP): Performed by: INTERNAL MEDICINE

## 2023-01-14 PROCEDURE — 36415 COLL VENOUS BLD VENIPUNCTURE: CPT

## 2023-01-14 PROCEDURE — 6370000000 HC RX 637 (ALT 250 FOR IP): Performed by: NURSE PRACTITIONER

## 2023-01-14 PROCEDURE — 1200000000 HC SEMI PRIVATE

## 2023-01-14 PROCEDURE — 6370000000 HC RX 637 (ALT 250 FOR IP): Performed by: FAMILY MEDICINE

## 2023-01-14 PROCEDURE — 80053 COMPREHEN METABOLIC PANEL: CPT

## 2023-01-14 RX ORDER — DIPHENHYDRAMINE HCL 25 MG
25 TABLET ORAL EVERY 6 HOURS PRN
Status: DISCONTINUED | OUTPATIENT
Start: 2023-01-14 | End: 2023-01-16 | Stop reason: HOSPADM

## 2023-01-14 RX ADMIN — IBUPROFEN 400 MG: 400 TABLET, FILM COATED ORAL at 13:29

## 2023-01-14 RX ADMIN — Medication 5 DROP: at 09:26

## 2023-01-14 RX ADMIN — SODIUM CHLORIDE, PRESERVATIVE FREE 10 ML: 5 INJECTION INTRAVENOUS at 20:17

## 2023-01-14 RX ADMIN — IMMUNE GLOBULIN (HUMAN) 50 G: 10 INJECTION INTRAVENOUS; SUBCUTANEOUS at 09:51

## 2023-01-14 RX ADMIN — MELATONIN 3 MG ORAL TABLET 3 MG: 3 TABLET ORAL at 21:37

## 2023-01-14 RX ADMIN — GABAPENTIN 600 MG: 300 CAPSULE ORAL at 09:26

## 2023-01-14 RX ADMIN — SODIUM CHLORIDE, PRESERVATIVE FREE 10 ML: 5 INJECTION INTRAVENOUS at 09:26

## 2023-01-14 RX ADMIN — SODIUM CHLORIDE, PRESERVATIVE FREE 10 ML: 5 INJECTION INTRAVENOUS at 09:27

## 2023-01-14 RX ADMIN — HYDROCODONE BITARTRATE AND ACETAMINOPHEN 1 TABLET: 5; 325 TABLET ORAL at 09:35

## 2023-01-14 RX ADMIN — GABAPENTIN 600 MG: 300 CAPSULE ORAL at 20:16

## 2023-01-14 RX ADMIN — ALPRAZOLAM 0.25 MG: 0.25 TABLET ORAL at 21:37

## 2023-01-14 RX ADMIN — CYANOCOBALAMIN TAB 1000 MCG 1000 MCG: 1000 TAB at 09:26

## 2023-01-14 RX ADMIN — HYDROCODONE BITARTRATE AND ACETAMINOPHEN 1 TABLET: 5; 325 TABLET ORAL at 21:37

## 2023-01-14 ASSESSMENT — PAIN DESCRIPTION - DESCRIPTORS
DESCRIPTORS: ACHING;DISCOMFORT
DESCRIPTORS: CRAMPING;THROBBING
DESCRIPTORS: ACHING;DISCOMFORT;CRAMPING

## 2023-01-14 ASSESSMENT — PAIN - FUNCTIONAL ASSESSMENT: PAIN_FUNCTIONAL_ASSESSMENT: ACTIVITIES ARE NOT PREVENTED

## 2023-01-14 ASSESSMENT — PAIN SCALES - GENERAL
PAINLEVEL_OUTOF10: 5
PAINLEVEL_OUTOF10: 8
PAINLEVEL_OUTOF10: 8

## 2023-01-14 ASSESSMENT — PAIN DESCRIPTION - ORIENTATION: ORIENTATION: RIGHT;LEFT

## 2023-01-14 ASSESSMENT — PAIN DESCRIPTION - LOCATION
LOCATION: BACK;HEAD
LOCATION: LEG;ARM;BACK

## 2023-01-14 NOTE — PLAN OF CARE
Spoke with patient at bedside. Receiving day 3 of IVIG today. No new complaints- feels the same as yesterday. Still c/o headache and burning sensations in the extremities. On gabapentin 600 mg BID- does help moderately. Labs remain pending.      Will continue to follow

## 2023-01-14 NOTE — PROGRESS NOTES
Hospitalist Progress Note      Synopsis:   Patient presented to the ED with complaints of difficulty speaking. She reports stuttering speech that has been progressively worse over the past 2 days. She also endorses bilateral upper extremity tingling. She has a recent diagnosis of Bell's palsy which was treated with antivirals and steroids. Imaging negative in ED. She was admitted for further neurological evaluation. Neurology following. EEG unremarkable. Lab work still pending, patient started on IVIG. Hospital day 4     Subjective:  Patient seen and examined at bedside, patient still with complaints of numbness tingling and bilateral upper extremities, states started has improved significantly. Stable overnight. No issues reported. Patient seen and examined  Records reviewed. Temp (24hrs), Av.3 °F (36.8 °C), Min:97.9 °F (36.6 °C), Max:98.7 °F (37.1 °C)    DIET: ADULT DIET; Regular  CODE: Full Code  No intake or output data in the 24 hours ending 23 1124      Objective:    BP (!) 137/93   Pulse 93   Temp 98.2 °F (36.8 °C) (Oral)   Resp 18   Ht 5' 4\" (1.626 m)   Wt 246 lb 9.6 oz (111.9 kg)   LMP 12/15/2022   SpO2 99%   BMI 42.33 kg/m²     General appearance: No apparent distress, appears stated age and cooperative. HEENT: Conjunctivae/corneas clear. Mucous membranes moist.  Neck: Supple. No JVD. Respiratory:  Clear to auscultation bilaterally. Normal respiratory effort. Cardiovascular:  RRR. S1, S2 without MRG. PV: Pulses palpable. No edema. Abdomen: Soft, non-tender, non-distended. +BS  Musculoskeletal: No obvious deformities. Skin: Normal skin color. No rashes or lesions. Good turgor. Neurologic:  Grossly non-focal. Awake, alert, following commands.    Psychiatric: Alert and oriented, thought content appropriate, normal insight and judgement    Medications:  REVIEWED DAILY    Infusion Medications    sodium chloride       Scheduled Medications    vitamin B-12 1,000 mcg Oral Daily    gabapentin  600 mg Oral BID    sodium chloride flush  5-40 mL IntraVENous 2 times per day    sodium chloride flush  5-40 mL IntraVENous 2 times per day    enoxaparin  30 mg SubCUTAneous BID     PRN Meds: diphenhydrAMINE, ALPRAZolam, HYDROcodone 5 mg - acetaminophen, acetaminophen **OR** acetaminophen, sodium chloride flush, sodium chloride, sodium chloride flush, ondansetron **OR** ondansetron, polyethylene glycol, calcium carbonate, melatonin, hydrALAZINE, ibuprofen, tiZANidine    Labs:     Recent Labs     01/12/23 0440 01/13/23  0631   WBC 10.8 6.4   HGB 12.4 12.7   HCT 37.2 39.0    209       Recent Labs     01/12/23 0440 01/13/23  0631 01/14/23  0849    139 136   K 4.2 4.3 4.1    105 104   CO2 23 23 21*   BUN 13 13 11   CREATININE 0.7 0.8 0.8   CALCIUM 8.8 8.8 8.3*       Recent Labs     01/12/23 0440 01/13/23  0631 01/14/23  0849   PROT 6.3* 7.3 7.8   ALKPHOS 52 51 48   ALT <5 <5 5   AST 11 11 16   BILITOT 0.3 0.3 0.6       No results for input(s): INR in the last 72 hours. No results for input(s): Mitul Hug in the last 72 hours.     Chronic labs:    Lab Results   Component Value Date    CHOL 123 01/07/2023    TRIG 122 01/07/2023    HDL 51 01/07/2023    LDLCALC 48 01/07/2023    TSH 4.430 (H) 01/07/2023    INR 0.9 01/10/2023    LABA1C 5.5 01/07/2023       Radiology: REVIEWED DAILY    Assessment:  Stuttering speech  BUE paresthesias  Bilateral Bell's Palsy   Horizontal diplopia   Leukocytosis likely 2/2 recent steroid use - improving   Hypothyroidism  Tachycardia - resolved  Elevated BP - improved  Obesity, Body mass index is 42.33 kg/m²  Plan:  Neurology following - labs pending  EEG unremarkable  Neuro checks  MRI cervical spine unremarkable  MRV unremarkable  Continue IV IVIG         DVT Prophylaxis [x] Lovenox  []  Heparin [] DOAC [] PCDs [] Ambulation    GI Prophylaxis [] PPI  [] H2 Blocker   [] Carafate  [x] Diet/Tube Feeds   Level of care [] Med/Surg [x] Intermediate  []  ICU   Diet ADULT DIET; Regular    Family contact [x]  N/A    [] At bedside  [] Phone call   Disposition Patient requires continued admission on day 4 of 5 of IVIG   MDM [] Low    [x] Moderate  []   High       Discharge Plan: To home with clinical improvement    +++++++++++++++++++++++++++++++++++++++++++++++++  TJ Kelly Physician - [address] [place] [address]  +++++++++++++++++++++++++++++++++++++++++++++++++  NOTE: This report was transcribed using voice recognition software. Every effort was made to ensure accuracy; however, inadvertent computerized transcription errors may be present.

## 2023-01-15 LAB
ALBUMIN SERPL-MCNC: 3.1 G/DL (ref 3.5–5.2)
ALP BLD-CCNC: 48 U/L (ref 35–104)
ALT SERPL-CCNC: <5 U/L (ref 0–32)
ANION GAP SERPL CALCULATED.3IONS-SCNC: 11 MMOL/L (ref 7–16)
AST SERPL-CCNC: 14 U/L (ref 0–31)
BASOPHILS ABSOLUTE: 0.05 E9/L (ref 0–0.2)
BASOPHILS RELATIVE PERCENT: 0.6 % (ref 0–2)
BILIRUB SERPL-MCNC: 1 MG/DL (ref 0–1.2)
BUN BLDV-MCNC: 12 MG/DL (ref 6–20)
CALCIUM SERPL-MCNC: 8.3 MG/DL (ref 8.6–10.2)
CHLORIDE BLD-SCNC: 106 MMOL/L (ref 98–107)
CO2: 22 MMOL/L (ref 22–29)
CREAT SERPL-MCNC: 0.8 MG/DL (ref 0.5–1)
EOSINOPHILS ABSOLUTE: 0.08 E9/L (ref 0.05–0.5)
EOSINOPHILS RELATIVE PERCENT: 1 % (ref 0–6)
GFR SERPL CREATININE-BSD FRML MDRD: >60 ML/MIN/1.73
GLUCOSE BLD-MCNC: 107 MG/DL (ref 74–99)
HCT VFR BLD CALC: 29.7 % (ref 34–48)
HEMOGLOBIN: 10.4 G/DL (ref 11.5–15.5)
IMMATURE GRANULOCYTES #: 0.06 E9/L
IMMATURE GRANULOCYTES %: 0.8 % (ref 0–5)
LYMPHOCYTES ABSOLUTE: 1.87 E9/L (ref 1.5–4)
LYMPHOCYTES RELATIVE PERCENT: 24.1 % (ref 20–42)
MCH RBC QN AUTO: 32.8 PG (ref 26–35)
MCHC RBC AUTO-ENTMCNC: 35 % (ref 32–34.5)
MCV RBC AUTO: 93.7 FL (ref 80–99.9)
MONOCYTES ABSOLUTE: 1.4 E9/L (ref 0.1–0.95)
MONOCYTES RELATIVE PERCENT: 18 % (ref 2–12)
NEUTROPHILS ABSOLUTE: 4.31 E9/L (ref 1.8–7.3)
NEUTROPHILS RELATIVE PERCENT: 55.5 % (ref 43–80)
PDW BLD-RTO: 15.4 FL (ref 11.5–15)
PLATELET # BLD: 204 E9/L (ref 130–450)
PMV BLD AUTO: 10.1 FL (ref 7–12)
POTASSIUM SERPL-SCNC: 4 MMOL/L (ref 3.5–5)
RBC # BLD: 3.17 E12/L (ref 3.5–5.5)
SODIUM BLD-SCNC: 139 MMOL/L (ref 132–146)
TOTAL PROTEIN: 8.3 G/DL (ref 6.4–8.3)
WBC # BLD: 7.8 E9/L (ref 4.5–11.5)

## 2023-01-15 PROCEDURE — 6370000000 HC RX 637 (ALT 250 FOR IP): Performed by: NURSE PRACTITIONER

## 2023-01-15 PROCEDURE — 6370000000 HC RX 637 (ALT 250 FOR IP): Performed by: PHYSICIAN ASSISTANT

## 2023-01-15 PROCEDURE — 6360000002 HC RX W HCPCS

## 2023-01-15 PROCEDURE — 36415 COLL VENOUS BLD VENIPUNCTURE: CPT

## 2023-01-15 PROCEDURE — 6360000002 HC RX W HCPCS: Performed by: INTERNAL MEDICINE

## 2023-01-15 PROCEDURE — 85025 COMPLETE CBC W/AUTO DIFF WBC: CPT

## 2023-01-15 PROCEDURE — 83921 ORGANIC ACID SINGLE QUANT: CPT

## 2023-01-15 PROCEDURE — 6370000000 HC RX 637 (ALT 250 FOR IP)

## 2023-01-15 PROCEDURE — 6370000000 HC RX 637 (ALT 250 FOR IP): Performed by: FAMILY MEDICINE

## 2023-01-15 PROCEDURE — 2580000003 HC RX 258

## 2023-01-15 PROCEDURE — 80053 COMPREHEN METABOLIC PANEL: CPT

## 2023-01-15 PROCEDURE — 99232 SBSQ HOSP IP/OBS MODERATE 35: CPT | Performed by: PHYSICIAN ASSISTANT

## 2023-01-15 PROCEDURE — 1200000000 HC SEMI PRIVATE

## 2023-01-15 PROCEDURE — 6370000000 HC RX 637 (ALT 250 FOR IP): Performed by: INTERNAL MEDICINE

## 2023-01-15 RX ORDER — DIVALPROEX SODIUM 125 MG/1
125 CAPSULE, COATED PELLETS ORAL EVERY 12 HOURS SCHEDULED
Status: DISCONTINUED | OUTPATIENT
Start: 2023-01-15 | End: 2023-01-16 | Stop reason: HOSPADM

## 2023-01-15 RX ORDER — AMLODIPINE BESYLATE 5 MG/1
5 TABLET ORAL DAILY
Status: DISCONTINUED | OUTPATIENT
Start: 2023-01-15 | End: 2023-01-16 | Stop reason: HOSPADM

## 2023-01-15 RX ORDER — LANOLIN ALCOHOL/MO/W.PET/CERES
3 CREAM (GRAM) TOPICAL NIGHTLY PRN
Status: DISCONTINUED | OUTPATIENT
Start: 2023-01-15 | End: 2023-01-16 | Stop reason: HOSPADM

## 2023-01-15 RX ORDER — GABAPENTIN 300 MG/1
600 CAPSULE ORAL 3 TIMES DAILY
Status: DISCONTINUED | OUTPATIENT
Start: 2023-01-15 | End: 2023-01-16 | Stop reason: HOSPADM

## 2023-01-15 RX ADMIN — IBUPROFEN 400 MG: 400 TABLET, FILM COATED ORAL at 13:40

## 2023-01-15 RX ADMIN — HYDROCODONE BITARTRATE AND ACETAMINOPHEN 1 TABLET: 5; 325 TABLET ORAL at 08:14

## 2023-01-15 RX ADMIN — AMLODIPINE BESYLATE 5 MG: 5 TABLET ORAL at 18:59

## 2023-01-15 RX ADMIN — IMMUNE GLOBULIN (HUMAN) 50 G: 10 INJECTION INTRAVENOUS; SUBCUTANEOUS at 08:52

## 2023-01-15 RX ADMIN — ONDANSETRON 4 MG: 2 INJECTION INTRAMUSCULAR; INTRAVENOUS at 08:15

## 2023-01-15 RX ADMIN — DIVALPROEX SODIUM 125 MG: 125 CAPSULE ORAL at 21:11

## 2023-01-15 RX ADMIN — GABAPENTIN 600 MG: 300 CAPSULE ORAL at 08:14

## 2023-01-15 RX ADMIN — SODIUM CHLORIDE, PRESERVATIVE FREE 10 ML: 5 INJECTION INTRAVENOUS at 21:14

## 2023-01-15 RX ADMIN — HYDROCODONE BITARTRATE AND ACETAMINOPHEN 1 TABLET: 5; 325 TABLET ORAL at 21:11

## 2023-01-15 RX ADMIN — SODIUM CHLORIDE, PRESERVATIVE FREE 10 ML: 5 INJECTION INTRAVENOUS at 08:15

## 2023-01-15 RX ADMIN — ALPRAZOLAM 0.25 MG: 0.25 TABLET ORAL at 21:11

## 2023-01-15 RX ADMIN — CYANOCOBALAMIN TAB 1000 MCG 1000 MCG: 1000 TAB at 08:14

## 2023-01-15 RX ADMIN — MELATONIN 3 MG ORAL TABLET 3 MG: 3 TABLET ORAL at 21:11

## 2023-01-15 RX ADMIN — ONDANSETRON 4 MG: 2 INJECTION INTRAMUSCULAR; INTRAVENOUS at 01:11

## 2023-01-15 RX ADMIN — GABAPENTIN 600 MG: 300 CAPSULE ORAL at 21:11

## 2023-01-15 RX ADMIN — GABAPENTIN 600 MG: 300 CAPSULE ORAL at 13:36

## 2023-01-15 ASSESSMENT — PAIN DESCRIPTION - LOCATION
LOCATION: HEAD;ARM;LEG
LOCATION: HEAD
LOCATION: HEAD;LEG;ARM

## 2023-01-15 ASSESSMENT — PAIN DESCRIPTION - ORIENTATION: ORIENTATION: RIGHT;LEFT

## 2023-01-15 ASSESSMENT — PAIN DESCRIPTION - DESCRIPTORS
DESCRIPTORS: ACHING;DISCOMFORT
DESCRIPTORS: ACHING;DISCOMFORT
DESCRIPTORS: ACHING;CRAMPING;DISCOMFORT

## 2023-01-15 ASSESSMENT — PAIN SCALES - GENERAL
PAINLEVEL_OUTOF10: 9
PAINLEVEL_OUTOF10: 8
PAINLEVEL_OUTOF10: 8

## 2023-01-15 NOTE — PROGRESS NOTES
Hospitalist Progress Note      Synopsis:   Patient presented to the ED with complaints of difficulty speaking. She reports stuttering speech that has been progressively worse over the past 2 days. She also endorses bilateral upper extremity tingling. She has a recent diagnosis of Bell's palsy which was treated with antivirals and steroids. Imaging negative in ED. She was admitted for further neurological evaluation. Neurology following. EEG unremarkable. Lab work still pending, patient started on IVIG. Hospital day 5     Subjective:  Stable overnight. No issues reported. Patient seen and examined  Records reviewed. Temp (24hrs), Av.1 °F (36.7 °C), Min:98 °F (36.7 °C), Max:98.2 °F (36.8 °C)    DIET: ADULT DIET; Regular  CODE: Full Code    Intake/Output Summary (Last 24 hours) at 1/15/2023 1324  Last data filed at 2023 1605  Gross per 24 hour   Intake 480 ml   Output --   Net 480 ml           Objective:    /76   Pulse (!) 107   Temp 98 °F (36.7 °C) (Oral)   Resp 18   Ht 5' 4\" (1.626 m)   Wt 246 lb 9.6 oz (111.9 kg)   SpO2 99%   BMI 42.33 kg/m²     General appearance: No apparent distress, appears stated age and cooperative. HEENT: Conjunctivae/corneas clear. Mucous membranes moist.  Neck: Supple. No JVD. Respiratory:  Clear to auscultation bilaterally. Normal respiratory effort. Cardiovascular:  RRR. S1, S2 without MRG. PV: Pulses palpable. No edema. Abdomen: Soft, non-tender, non-distended. +BS  Musculoskeletal: No obvious deformities. Skin: Normal skin color. No rashes or lesions. Good turgor. Neurologic:  Grossly non-focal. Awake, alert, following commands.    Psychiatric: Alert and oriented, thought content appropriate, normal insight and judgement    Medications:  REVIEWED DAILY    Infusion Medications    sodium chloride       Scheduled Medications    Immune Globulin (Human)  50 g IntraVENous Daily    gabapentin  600 mg Oral TID    vitamin B-12  1,000 mcg Oral Daily    sodium chloride flush  5-40 mL IntraVENous 2 times per day    sodium chloride flush  5-40 mL IntraVENous 2 times per day    enoxaparin  30 mg SubCUTAneous BID     PRN Meds: Rimegepant Sulfate, diphenhydrAMINE, ALPRAZolam, HYDROcodone 5 mg - acetaminophen, acetaminophen **OR** acetaminophen, sodium chloride flush, sodium chloride, sodium chloride flush, ondansetron **OR** ondansetron, polyethylene glycol, calcium carbonate, melatonin, hydrALAZINE, ibuprofen, tiZANidine    Labs:     Recent Labs     01/13/23 0631 01/15/23  0613   WBC 6.4 7.8   HGB 12.7 10.4*   HCT 39.0 29.7*    204       Recent Labs     01/13/23 0631 01/14/23  0849 01/15/23  0613    136 139   K 4.3 4.1 4.0    104 106   CO2 23 21* 22   BUN 13 11 12   CREATININE 0.8 0.8 0.8   CALCIUM 8.8 8.3* 8.3*       Recent Labs     01/13/23 0631 01/14/23  0849 01/15/23  0613   PROT 7.3 7.8 8.3   ALKPHOS 51 48 48   ALT <5 5 <5   AST 11 16 14   BILITOT 0.3 0.6 1.0       No results for input(s): INR in the last 72 hours. No results for input(s): Mitul Hug in the last 72 hours.     Chronic labs:    Lab Results   Component Value Date    CHOL 123 01/07/2023    TRIG 122 01/07/2023    HDL 51 01/07/2023    LDLCALC 48 01/07/2023    TSH 4.430 (H) 01/07/2023    INR 0.9 01/10/2023    LABA1C 5.5 01/07/2023       Radiology: REVIEWED DAILY    Assessment:  Stuttering speech  BUE paresthesias  Bilateral Bell's Palsy   Horizontal diplopia   Leukocytosis likely 2/2 recent steroid use - improving   Hypothyroidism  Tachycardia - resolved  Elevated BP - improved  Obesity, Body mass index is 42.33 kg/m²  Plan:  Neurology following - labs pending  EEG unremarkable  Neuro checks  MRI cervical spine unremarkable  MRV unremarkable  Continue IV IVIG      DVT Prophylaxis [x] Lovenox  []  Heparin [] DOAC [] PCDs [] Ambulation    GI Prophylaxis [] PPI  [] H2 Blocker   [] Carafate  [x] Diet/Tube Feeds   Level of care [] Med/Surg  [x] Intermediate  [] ICU   Diet ADULT DIET; Regular    Family contact [x]  N/A    [] At bedside  [] Phone call   Disposition Patient requires continued admission on day 5 of IV IgG   MDM [] Low    [x] Moderate  []   High       Discharge Plan: Likely can discharge tomorrow with completion of IV IVIG    +++++++++++++++++++++++++++++++++++++++++++++++++  Severo Sar, C/ dEgardo [address]  +++++++++++++++++++++++++++++++++++++++++++++++++  NOTE: This report was transcribed using voice recognition software. Every effort was made to ensure accuracy; however, inadvertent computerized transcription errors may be present.

## 2023-01-15 NOTE — PROGRESS NOTES
Willa Felix is a 32 y.o. 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 I do question some psychogenic overlay as well. Multifocal cranial neuropathies      Plan:     Ganglioside panel to check for Iraheta Eliazar syndrome pending  MMA pending   Day 4 of IVIG today   Consider repeat EMG in a couple weeks   Continue B12 supplementation   Increase gabapentin to 600 mg TID   Nurtec prn headache       Subjective:     Patient presented to the ER on 1/5/2023 after experiencing stuttering and decreased vision for a few days. She was diagnosed with Bell's palsy on 12/29 and was given a prescription for prednisone and Valtrex. She said her Bell's palsy was worse on the left side compared to the right. She was also experiencing bilateral upper extremity numbness and tingling along with tinnitus. Her MRI brain and CT head were both unremarkable. Patient sitting up in bed with her mom at the bedside. Her speech is clear as her stuttering has resolved. She continues to experience double vision, bilateral facial palsy and bilateral hand, arm, shoulder, and neck burning with pins-and-needles. She continues to experience a headache which is 8/10. I will try [facility] to see if this takes her headache away. Her CTAs of her head were all unremarkable. I will start I VIG today for a total of 5 days. Labs are still pending- gaglioside ab panel pending      MMA pending     Resulted labs    CRP 1.8   ESR 47   B12 363, folate 9.1   ELIZABETH negative   ACE normal    Lyme negative     Reports continue diplopia, burning sensations in the extremities and headache. Nurtec worked well for her headache last night. Today she reports feeling \"out of it\".      No chest pain or palpitations  No coughing or wheezing    No vertigo, lightheadedness or loss of consciousness  No falls, tripping or stumbling  No incontinence of bowels or bladder  No itching or bruising appreciated    ROS otherwise negative      Objective:       /76   Pulse (!) 107   Temp 98 °F (36.7 °C) (Oral)   Resp 18   Ht 5' 4\" (1.626 m)   Wt 246 lb 9.6 oz (111.9 kg)   SpO2 99%   BMI 42.33 kg/m²       General appearance: alert, appears stated age, cooperative and no distress  Head: normocephalic, without obvious abnormality, atraumatic  Eyes: conjunctivae/corneas clear  Neck: no adenopathy, supple, symmetrical, trachea midline and thyroid not enlarged, symmetric, no tenderness/mass/nodules  Lungs: Regular respirations on room air  Extremities:  normal, atraumatic, no cyanosis or edema  Skin: color, texture, turgor normal---no rashes or lesions      Mental Status: Alert, oriented, thought content appropriate, alertness: alert, orientation: time, date, person, place, affect: normal     Appropriate attention/concentration  Intact fundus of knowledge  Intact memories    Speech/Language:Clear      Cranial Nerves:  I: smell    II: visual acuity     II: visual fields Diplopia    II: pupils KEIKO   III,VII: ptosis + R eye    III,IV,VI: extraocular muscles  Slight disconjugate gaze    V: mastication Normal   V: facial light touch sensation  Normal   V,VII: corneal reflex  Present   VII: facial muscle function - upper  Unable to close L eye completely    VII: facial muscle function - lower Right labial fold droop   VIII: hearing Normal   IX: soft palate elevation  Normal   IX,X: gag reflex Present   XI: trapezius strength     XI: sternocleidomastoid strength    XI: neck extension strength     XII: tongue strength  Normal     Motor:  5/5 throughout  Normal bulk and tone  No drift   No abnormal movements    Sensory:  LT \"uncomfortable\" in all limbs   Vibration normal    Coordination:   FN, FFM and GABRIEL normal  HS normal    Gait:  Normal    DTR:   0 BUE   1 R quad, absent L, absent ankles     No Babinskis  No Phelps's    No other pathological reflexes    Laboratory/Radiology:     CBC with Differential:    Lab Results   Component Value Date/Time    WBC 7.8 01/15/2023 06:13 AM    RBC 3.17 01/15/2023 06:13 AM    HGB 10.4 01/15/2023 06:13 AM    HCT 29.7 01/15/2023 06:13 AM     01/15/2023 06:13 AM    MCV 93.7 01/15/2023 06:13 AM    MCH 32.8 01/15/2023 06:13 AM    MCHC 35.0 01/15/2023 06:13 AM    RDW 15.4 01/15/2023 06:13 AM    NRBC 0.0 03/13/2019 05:55 AM    LYMPHOPCT 24.1 01/15/2023 06:13 AM    MONOPCT 18.0 01/15/2023 06:13 AM    MYELOPCT 0.9 03/13/2019 05:55 AM    BASOPCT 0.6 01/15/2023 06:13 AM    MONOSABS 1.40 01/15/2023 06:13 AM    LYMPHSABS 1.87 01/15/2023 06:13 AM    EOSABS 0.08 01/15/2023 06:13 AM    BASOSABS 0.05 01/15/2023 06:13 AM     CMP:    Lab Results   Component Value Date/Time     01/15/2023 06:13 AM    K 4.0 01/15/2023 06:13 AM    K 4.3 11/07/2020 06:00 PM     01/15/2023 06:13 AM    CO2 22 01/15/2023 06:13 AM    BUN 12 01/15/2023 06:13 AM    CREATININE 0.8 01/15/2023 06:13 AM    GFRAA >60 06/28/2021 03:34 PM    LABGLOM >60 01/15/2023 06:13 AM    GLUCOSE 107 01/15/2023 06:13 AM    PROT 8.3 01/15/2023 06:13 AM    LABALBU 3.1 01/15/2023 06:13 AM    CALCIUM 8.3 01/15/2023 06:13 AM    BILITOT 1.0 01/15/2023 06:13 AM    ALKPHOS 48 01/15/2023 06:13 AM    AST 14 01/15/2023 06:13 AM    ALT <5 01/15/2023 06:13 AM     HgBA1c:    Lab Results   Component Value Date/Time    LABA1C 5.5 01/07/2023 08:45 AM     FLP:    Lab Results   Component Value Date/Time    TRIG 122 01/07/2023 08:45 AM    HDL 51 01/07/2023 08:45 AM    LDLCALC 48 01/07/2023 08:45 AM    LABVLDL 24 01/07/2023 08:45 AM     TSH:    Lab Results   Component Value Date/Time    TSH 4.430 01/07/2023 08:45 AM      Latest Reference Range & Units 1/9/23 04:49   CRP 0.0 - 0.4 mg/dL 1.8 (H)   (H): Data is abnormally high     Latest Reference Range & Units 1/9/23 04:49   Homocysteine 0.0 - 15.0 umol/L 5.5      Latest Reference Range & Units 1/9/23 04:49   Sed Rate 0 - 20 mm/Hr 47 (H)   (H): Data is abnormally high     Latest Reference Range & Units 1/9/23 04:49   Ferritin ng/mL 187        Latest Reference Range & Units 1/10/23 10:38   Appearance, CSF Clear   Clear  Clear  Clear   Glucose, CSF 40 - 70 mg/dL 68   Protein, CSF 15 - 40 mg/dL 24   RBC, CSF /uL  /uL <2000  <2000   WBC, CSF 0 - 2 /uL  0 - 2 /uL <3  3 (H)   Neutrophils, CSF 0 - 10 %  0 - 10 % 0  0   Monocytes, CSF 10 - 70 %  10 - 70 % 100 (H)  100 (H)   Color, CSF  Colorless  Colorless   Tube Number + CELL CT + DIFF-CSF  Tube 4  Tube 1   (H): Data is abnormally high     Latest Reference Range & Units 1/10/23 16:57   IgA 70 - 400 mg/dL 140      Latest Reference Range & Units 1/10/23 16:57   Vitamin B-12 211 - 946 pg/mL 363      Latest Reference Range & Units 1/10/23 16:57   FOLATE, FOLAT 4.8 - 24.2 ng/mL 9.1      Latest Reference Range & Units 1/10/23 06:28   ELIZABETH NEGATIVE  NEGATIVE     EEG  Clinical Interpretation: This was a normal study during waking, drowsiness, and sleep. No seizures or epileptiform discharges were noted during this study. CT head  Negative for acute abnormalities    CT cervical spine  No acute fractures. Asymmetric disc bulge at C6-C7 to the right with  effacement of thecal sac and lateral recess. Consider MRI for better  assessment. MRI brain  Negative for acute abnormalities    EEG  Clinical Interpretation: This was a normal study during waking, drowsiness, and sleep. No seizures or epileptiform discharges were noted during this study. Chest x-ray  Unremarkable    CTA neck with contrast  Unremarkable CTA of the neck. No evidence of intracranial deep vein thrombosis. 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 CTV head  Unremarkable CTA of the neck. No evidence of intracranial deep vein thrombosis.   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   1. There is no electrodiagnostic evidence for any peripheral nerve mononeuropathy, plexopathy, myopathy or large fiber peripheral polyneuropathy. 2. Left tibial H-reflex was absent which is non specific but could suggest an S1 radiculopathy. This was not further investigated with needle exam as she is asymptomatic in the legs. Previous Study: none        Follow up EMG is recommended in 6-8 weeks if symptoms persist or worsen.    All labs and imaging studies reviewed independently today     Halley Negro  11:31 AM  1/15/2023

## 2023-01-15 NOTE — PROGRESS NOTES
Patient requesting Nurtec for a headache. Medication is in patient bin, but med is not listed on patient's MAR. Confirmed med verification with pharmacy. Contacted Dr. Hollie Hu via [facility] for administration orders. Order placed and medication administered without incident.

## 2023-01-16 VITALS
HEIGHT: 64 IN | WEIGHT: 246.6 LBS | DIASTOLIC BLOOD PRESSURE: 76 MMHG | SYSTOLIC BLOOD PRESSURE: 130 MMHG | HEART RATE: 76 BPM | BODY MASS INDEX: 42.1 KG/M2 | RESPIRATION RATE: 18 BRPM | TEMPERATURE: 98.3 F | OXYGEN SATURATION: 99 %

## 2023-01-16 LAB
ALBUMIN SERPL-MCNC: 3 G/DL (ref 3.5–5.2)
ALP BLD-CCNC: 51 U/L (ref 35–104)
ALT SERPL-CCNC: 6 U/L (ref 0–32)
ANION GAP SERPL CALCULATED.3IONS-SCNC: 11 MMOL/L (ref 7–16)
ANISOCYTOSIS: ABNORMAL
AST SERPL-CCNC: 21 U/L (ref 0–31)
BASOPHILS ABSOLUTE: 0.08 E9/L (ref 0–0.2)
BASOPHILS RELATIVE PERCENT: 0.7 % (ref 0–2)
BILIRUB SERPL-MCNC: 1.7 MG/DL (ref 0–1.2)
BUN BLDV-MCNC: 13 MG/DL (ref 6–20)
CALCIUM SERPL-MCNC: 8.6 MG/DL (ref 8.6–10.2)
CHLORIDE BLD-SCNC: 104 MMOL/L (ref 98–107)
CO2: 22 MMOL/L (ref 22–29)
CREAT SERPL-MCNC: 0.8 MG/DL (ref 0.5–1)
EOSINOPHILS ABSOLUTE: 0.12 E9/L (ref 0.05–0.5)
EOSINOPHILS RELATIVE PERCENT: 1.1 % (ref 0–6)
GFR SERPL CREATININE-BSD FRML MDRD: >60 ML/MIN/1.73
GLUCOSE BLD-MCNC: 93 MG/DL (ref 74–99)
HCT VFR BLD CALC: 28 % (ref 34–48)
HEMOGLOBIN: 9.9 G/DL (ref 11.5–15.5)
IMMATURE GRANULOCYTES #: 0.1 E9/L
IMMATURE GRANULOCYTES %: 0.9 % (ref 0–5)
LYMPHOCYTES ABSOLUTE: 2.37 E9/L (ref 1.5–4)
LYMPHOCYTES RELATIVE PERCENT: 21.5 % (ref 20–42)
Lab: NORMAL
MCH RBC QN AUTO: 31.6 PG (ref 26–35)
MCHC RBC AUTO-ENTMCNC: 35.4 % (ref 32–34.5)
MCV RBC AUTO: 89.5 FL (ref 80–99.9)
MONOCYTES ABSOLUTE: 1.66 E9/L (ref 0.1–0.95)
MONOCYTES RELATIVE PERCENT: 15 % (ref 2–12)
NEUTROPHILS ABSOLUTE: 6.7 E9/L (ref 1.8–7.3)
NEUTROPHILS RELATIVE PERCENT: 60.8 % (ref 43–80)
PDW BLD-RTO: 15.1 FL (ref 11.5–15)
PLATELET # BLD: 229 E9/L (ref 130–450)
PMV BLD AUTO: 9.6 FL (ref 7–12)
POIKILOCYTES: ABNORMAL
POLYCHROMASIA: ABNORMAL
POTASSIUM SERPL-SCNC: 4.2 MMOL/L (ref 3.5–5)
RBC # BLD: 3.13 E12/L (ref 3.5–5.5)
REPORT: NORMAL
ROULEAUX: ABNORMAL
SODIUM BLD-SCNC: 137 MMOL/L (ref 132–146)
STOMATOCYTES: ABNORMAL
THIS TEST SENT TO: NORMAL
TOTAL PROTEIN: 9.3 G/DL (ref 6.4–8.3)
WBC # BLD: 11 E9/L (ref 4.5–11.5)

## 2023-01-16 PROCEDURE — 6370000000 HC RX 637 (ALT 250 FOR IP): Performed by: INTERNAL MEDICINE

## 2023-01-16 PROCEDURE — 2580000003 HC RX 258: Performed by: INTERNAL MEDICINE

## 2023-01-16 PROCEDURE — 85025 COMPLETE CBC W/AUTO DIFF WBC: CPT

## 2023-01-16 PROCEDURE — 6360000002 HC RX W HCPCS

## 2023-01-16 PROCEDURE — 6370000000 HC RX 637 (ALT 250 FOR IP)

## 2023-01-16 PROCEDURE — 80053 COMPREHEN METABOLIC PANEL: CPT

## 2023-01-16 PROCEDURE — 6360000002 HC RX W HCPCS: Performed by: INTERNAL MEDICINE

## 2023-01-16 PROCEDURE — 6370000000 HC RX 637 (ALT 250 FOR IP): Performed by: PHYSICIAN ASSISTANT

## 2023-01-16 PROCEDURE — 36415 COLL VENOUS BLD VENIPUNCTURE: CPT

## 2023-01-16 PROCEDURE — 2580000003 HC RX 258

## 2023-01-16 PROCEDURE — 99232 SBSQ HOSP IP/OBS MODERATE 35: CPT

## 2023-01-16 RX ORDER — ALPRAZOLAM 0.25 MG/1
0.25 TABLET ORAL NIGHTLY PRN
Qty: 3 TABLET | Refills: 0 | Status: SHIPPED | OUTPATIENT
Start: 2023-01-16 | End: 2023-01-16 | Stop reason: HOSPADM

## 2023-01-16 RX ORDER — LANOLIN ALCOHOL/MO/W.PET/CERES
3 CREAM (GRAM) TOPICAL NIGHTLY PRN
Qty: 7 TABLET | Refills: 0 | Status: SHIPPED | OUTPATIENT
Start: 2023-01-16

## 2023-01-16 RX ORDER — ERYTHROMYCIN 5 MG/G
OINTMENT OPHTHALMIC EVERY 6 HOURS
Qty: 3.5 G | Refills: 0 | Status: SHIPPED | OUTPATIENT
Start: 2023-01-16 | End: 2023-01-23

## 2023-01-16 RX ADMIN — IMMUNE GLOBULIN (HUMAN) 50 G: 10 INJECTION INTRAVENOUS; SUBCUTANEOUS at 09:32

## 2023-01-16 RX ADMIN — IBUPROFEN 400 MG: 400 TABLET, FILM COATED ORAL at 03:06

## 2023-01-16 RX ADMIN — SODIUM CHLORIDE, PRESERVATIVE FREE 10 ML: 5 INJECTION INTRAVENOUS at 09:09

## 2023-01-16 RX ADMIN — GABAPENTIN 600 MG: 300 CAPSULE ORAL at 09:08

## 2023-01-16 RX ADMIN — CYANOCOBALAMIN TAB 1000 MCG 1000 MCG: 1000 TAB at 09:10

## 2023-01-16 RX ADMIN — ENOXAPARIN SODIUM 30 MG: 100 INJECTION SUBCUTANEOUS at 09:08

## 2023-01-16 RX ADMIN — AMLODIPINE BESYLATE 5 MG: 5 TABLET ORAL at 09:06

## 2023-01-16 RX ADMIN — DIVALPROEX SODIUM 125 MG: 125 CAPSULE ORAL at 09:07

## 2023-01-16 RX ADMIN — GABAPENTIN 600 MG: 300 CAPSULE ORAL at 13:37

## 2023-01-16 ASSESSMENT — PAIN SCALES - GENERAL
PAINLEVEL_OUTOF10: 8
PAINLEVEL_OUTOF10: 8

## 2023-01-16 ASSESSMENT — PAIN DESCRIPTION - FREQUENCY: FREQUENCY: CONTINUOUS

## 2023-01-16 ASSESSMENT — PAIN - FUNCTIONAL ASSESSMENT: PAIN_FUNCTIONAL_ASSESSMENT: ACTIVITIES ARE NOT PREVENTED

## 2023-01-16 ASSESSMENT — PAIN DESCRIPTION - ONSET: ONSET: ON-GOING

## 2023-01-16 ASSESSMENT — PAIN DESCRIPTION - ORIENTATION: ORIENTATION: RIGHT;LEFT

## 2023-01-16 ASSESSMENT — PAIN DESCRIPTION - DESCRIPTORS: DESCRIPTORS: ACHING;DISCOMFORT

## 2023-01-16 ASSESSMENT — PAIN DESCRIPTION - LOCATION: LOCATION: HEAD;GENERALIZED

## 2023-01-16 NOTE — PROGRESS NOTES
Sabra Moffett is a 32 y.o. 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 Upon exam her bilateral eye ptosis has resolved but she still complains of diplopia, tingling, burning, and pins-and-needles in her bilateral upper extremities. I do question some psychogenic overlay as well. Multifocal cranial neuropathies      Plan:     Ganglioside panel to check for Delsie Delay syndrome pending  MMA pending   Day 5 of IVIG today   Consider repeat EMG in a  6-8 weeks   Continue B12 supplementation   Continue gabapentin to 600 mg TID   [facility] prn headache   Neurology to sign off call if needed  Follow-up with neurology after discharge      Subjective:     Patient presented to the ER on 1/5/2023 after experiencing stuttering and decreased vision for a few days. She was diagnosed with Bell's palsy on 12/29 and was given a prescription for prednisone and Valtrex. She said her Bell's palsy was worse on the left side compared to the right. She was also experiencing bilateral upper extremity numbness and tingling along with tinnitus. Her MRI brain and CT head were both unremarkable. Patient sitting up in bed awake and alert. She continues to experience burning sensations in her upper extremities and the back of her neck, numbness and pins-and-needles sensation in her bilateral upper extremities. She also complains of continued diplopia and blurred vision. She says she feels anxious. She also continues to experience a headache, but she says the [facility] does take her headache away for a few hours.     Labs are still pending- gaglioside ab panel pending      MMA pending     Resulted labs    CRP 1.8   ESR 47   B12 363, folate 9.1   ELIZABETH negative   ACE normal    Lyme negative         No chest pain or palpitations  No coughing or wheezing    No vertigo, lightheadedness or loss of consciousness  No falls, tripping or stumbling  No incontinence of bowels or bladder  No itching or bruising appreciated    ROS otherwise negative      Objective:       /76   Pulse 76   Temp 98.3 °F (36.8 °C) (Oral)   Resp 18   Ht 5' 4\" (1.626 m)   Wt 246 lb 9.6 oz (111.9 kg)   SpO2 99%   BMI 42.33 kg/m²       General appearance: alert, appears stated age, cooperative and no distress  Head: normocephalic, without obvious abnormality, atraumatic  Eyes: conjunctivae/corneas clear  Neck: no adenopathy, supple, symmetrical, trachea midline and thyroid not enlarged, symmetric, no tenderness/mass/nodules  Lungs: Regular respirations on room air  Extremities:  normal, atraumatic, no cyanosis or edema  Skin: color, texture, turgor normal---no rashes or lesions      Mental Status: Alert, oriented, thought content appropriate, alertness: alert, orientation: time, date, person, place, affect: normal     Appropriate attention/concentration  Intact fundus of knowledge  Intact memories    Speech/Language:Clear      Cranial Nerves:  I: smell    II: visual acuity     II: visual fields Diplopia    II: pupils KEIKO   III,VII: ptosis 9   III,IV,VI: extraocular muscles  No nystagmus   V: mastication Normal   V: facial light touch sensation  Normal   V,VII: corneal reflex  Present   VII: facial muscle function - upper  Now able to close left eye all the way   VII: facial muscle function - lower Right labial fold droop has resolved   VIII: hearing Normal   IX: soft palate elevation  Normal   IX,X: gag reflex Present   XI: trapezius strength     XI: sternocleidomastoid strength    XI: neck extension strength     XII: tongue strength  Normal     Motor:  5/5 throughout  Normal bulk and tone  No drift   No abnormal movements    Sensory:  LT \"uncomfortable\" in all limbs   Vibration normal    Coordination:   FN, FFM and GABRIEL normal  HS normal    Gait:  Normal    DTR:   0 BUE   1+ bilateral lower extremities    No Babinskis  No Phelps's    No other pathological reflexes    Laboratory/Radiology:     CBC with Differential:    Lab Results   Component Value Date/Time    WBC 11.0 01/16/2023 06:29 AM    RBC 3.13 01/16/2023 06:29 AM    HGB 9.9 01/16/2023 06:29 AM    HCT 28.0 01/16/2023 06:29 AM     01/16/2023 06:29 AM    MCV 89.5 01/16/2023 06:29 AM    MCH 31.6 01/16/2023 06:29 AM    MCHC 35.4 01/16/2023 06:29 AM    RDW 15.1 01/16/2023 06:29 AM    NRBC 0.0 03/13/2019 05:55 AM    LYMPHOPCT 21.5 01/16/2023 06:29 AM    MONOPCT 15.0 01/16/2023 06:29 AM    MYELOPCT 0.9 03/13/2019 05:55 AM    BASOPCT 0.7 01/16/2023 06:29 AM    MONOSABS 1.66 01/16/2023 06:29 AM    LYMPHSABS 2.37 01/16/2023 06:29 AM    EOSABS 0.12 01/16/2023 06:29 AM    BASOSABS 0.08 01/16/2023 06:29 AM     CMP:    Lab Results   Component Value Date/Time     01/16/2023 06:29 AM    K 4.2 01/16/2023 06:29 AM    K 4.3 11/07/2020 06:00 PM     01/16/2023 06:29 AM    CO2 22 01/16/2023 06:29 AM    BUN 13 01/16/2023 06:29 AM    CREATININE 0.8 01/16/2023 06:29 AM    GFRAA >60 06/28/2021 03:34 PM    LABGLOM >60 01/16/2023 06:29 AM    GLUCOSE 93 01/16/2023 06:29 AM    PROT 9.3 01/16/2023 06:29 AM    LABALBU 3.0 01/16/2023 06:29 AM    CALCIUM 8.6 01/16/2023 06:29 AM    BILITOT 1.7 01/16/2023 06:29 AM    ALKPHOS 51 01/16/2023 06:29 AM    AST 21 01/16/2023 06:29 AM    ALT 6 01/16/2023 06:29 AM     HgBA1c:    Lab Results   Component Value Date/Time    LABA1C 5.5 01/07/2023 08:45 AM     FLP:    Lab Results   Component Value Date/Time    TRIG 122 01/07/2023 08:45 AM    HDL 51 01/07/2023 08:45 AM    LDLCALC 48 01/07/2023 08:45 AM    LABVLDL 24 01/07/2023 08:45 AM     TSH:    Lab Results   Component Value Date/Time    TSH 4.430 01/07/2023 08:45 AM      Latest Reference Range & Units 1/9/23 04:49   CRP 0.0 - 0.4 mg/dL 1.8 (H)   (H): Data is abnormally high     Latest Reference Range & Units 1/9/23 04:49   Homocysteine 0.0 - 15.0 umol/L 5.5      Latest Reference Range & Units 1/9/23 04:49   Sed Rate 0 - 20 mm/Hr 47 (H)   (H): Data is abnormally high     Latest Reference Range & Units 1/9/23 04:49   Ferritin ng/mL 187        Latest Reference Range & Units 1/10/23 10:38   Appearance, CSF Clear   Clear  Clear  Clear   Glucose, CSF 40 - 70 mg/dL 68   Protein, CSF 15 - 40 mg/dL 24   RBC, CSF /uL  /uL <2000  <2000   WBC, CSF 0 - 2 /uL  0 - 2 /uL <3  3 (H)   Neutrophils, CSF 0 - 10 %  0 - 10 % 0  0   Monocytes, CSF 10 - 70 %  10 - 70 % 100 (H)  100 (H)   Color, CSF  Colorless  Colorless   Tube Number + CELL CT + DIFF-CSF  Tube 4  Tube 1   (H): Data is abnormally high     Latest Reference Range & Units 1/10/23 16:57   IgA 70 - 400 mg/dL 140      Latest Reference Range & Units 1/10/23 16:57   Vitamin B-12 211 - 946 pg/mL 363      Latest Reference Range & Units 1/10/23 16:57   FOLATE, FOLAT 4.8 - 24.2 ng/mL 9.1      Latest Reference Range & Units 1/10/23 06:28   ELIZABETH NEGATIVE  NEGATIVE     EEG  Clinical Interpretation: This was a normal study during waking, drowsiness, and sleep. No seizures or epileptiform discharges were noted during this study. CT head  Negative for acute abnormalities    CT cervical spine  No acute fractures. Asymmetric disc bulge at C6-C7 to the right with  effacement of thecal sac and lateral recess. Consider MRI for better  assessment. MRI brain  Negative for acute abnormalities    EEG  Clinical Interpretation: This was a normal study during waking, drowsiness, and sleep. No seizures or epileptiform discharges were noted during this study. Chest x-ray  Unremarkable    CTA neck with contrast  Unremarkable CTA of the neck. No evidence of intracranial deep vein thrombosis. 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 CTV head  Unremarkable CTA of the neck.   No evidence of intracranial deep vein thrombosis. 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   1. There is no electrodiagnostic evidence for any peripheral nerve mononeuropathy, plexopathy, myopathy or large fiber peripheral polyneuropathy. 2. Left tibial H-reflex was absent which is non specific but could suggest an S1 radiculopathy. This was not further investigated with needle exam as she is asymptomatic in the legs. Previous Study: none        Follow up EMG is recommended in 6-8 weeks if symptoms persist or worsen.    All labs and imaging studies reviewed independently today     TJ Lanier CNP  11:57 AM  1/16/2023

## 2023-01-16 NOTE — PROGRESS NOTES
Upon assessment, patient expressing concern over \"not feeling like herself\". States she was hallucinating earlier in the day and it scared her. Provided emotional support to patient and assured her she was safe. Patient has flat affect. HS medication administered as ordered. Also administered prn norco, xanax & melatonin as patient requested. Jonatan Rosenberg NP notified via [facility].

## 2023-01-16 NOTE — PROGRESS NOTES
Patient has on telemetry monitor and has been having HRs in the 140s. Dr. Peyton Qiu informed via [address] [place] [address].

## 2023-01-16 NOTE — CARE COORDINATION
Social Work/Case Management Transition of Care Planning (Nilo Sabiha [place] [phone]): Per report, patient is on day 5 of 5 of IV Immune Globulin. Neurology is following. Patient is reporting hallucinations, anxiety, and fear. Depakote sprinkles and melatonin were started. Discharge plan is to home with no needs. Family will transport. CM/SW will follow. KEIRA Locke  1/16/2023    Update:  Discharge order noted. Met with patient at bedside. She indicated her mother will provide transport home. No needs identified.   KEIRA Locke  1/16/2023

## 2023-01-16 NOTE — PROGRESS NOTES
Patient expressing feelings of anxiousness and fear. Emotional support provided and reassurance given.

## 2023-01-16 NOTE — DISCHARGE SUMMARY
Hospitalist Discharge Summary    Patient ID: Dhruv Ashraf   Patient : 1995  Patient's PCP: Genia Chacko DO    Admit Date: 2023   Admitting Physician: Kvng Mccallum DO    Discharge Date:  2023   Discharge Physician: TJ Vargas CNP   Discharge Condition: Stable  Discharge Disposition: [facility] course in brief:  (Please refer to daily progress notes for a comprehensive review of the hospitalization by requesting medical records)  Patient presented to the ED with complaints of difficulty speaking. She reports stuttering speech that has been progressively worse over the past 2 days. She also endorses bilateral upper extremity tingling. She has a recent diagnosis of Bell's palsy which was treated with antivirals and steroids. Imaging negative in ED. She was admitted for further neurological evaluation. Hospital course significant for anxiety and possible delirium last day of stay, denied thoughts of suicidal/homicidal ideation. Advised pt to follow up with behavioral health outpt and referral made. Neurology following. EEG unremarkable. Lab work still pending, patient started on IVIG. Finished up 5-day course of IVIG, neurology signed off, patient stable for discharge from medical perspective and is to follow-up outpatient with neurology in 2 weeks to go over still pending lab work. Consults:   IP CONSULT TO INTERNAL MEDICINE  IP CONSULT TO NEUROLOGY    Discharge Diagnoses:  Stuttering speech  BUE paresthesias  Bilateral Bell's Palsy   Horizontal diplopia   Leukocytosis likely 2/2 recent steroid use - improving   Hypothyroidism  Tachycardia - resolved  Elevated BP - improved  Obesity, Body mass index is 42.33 kg/m²      Discharge Instructions / Follow up:  Patient is to follow-up with PCP, neurology outpatient and outpatient behavioral health.   Future Appointments   Date Time Provider Alfonzo Ramires   2023 10:00 AM Jocelin Roy MD [facility]       The patient's condition is stable. At this time the patient is without objective evidence of an acute process requiring continuing hospitalization or inpatient management. They are stable for discharge with outpatient follow-up. I have spoken with the patient and discussed the results of the current hospitalization, in addition to providing specific details for the plan of care and counseling regarding the diagnosis and prognosis. The plan has been discussed in detail and they are aware of the specific conditions for emergent return, as well as the importance of follow-up. Their questions are answered at this time and they are agreeable with the plan for discharge to home. Continued appropriate risk factor modification of blood pressure, diabetes and serum lipids will remain essential to reducing risk of future atherosclerotic development    Activity: activity as tolerated    Physical exam:  General appearance: No apparent distress, appears stated age and cooperative. HEENT: Normal cephalic, atraumatic without obvious deformity. Pupils equal, round, and reactive to light. Extra ocular muscles intact. Conjunctivae/corneas clear. Neck: Supple, with full range of motion. No jugular venous distention. Trachea midline. Respiratory:  Clear to auscultation bilaterally. No apparent distress. Cardiovascular:  Regular rate and rhythm. S1, S2 without murmurs, rubs, or gallops. PV: Brisk capillary refill. +2 pedal and radial pulses bilaterally. No clubbing, cyanosis, edema of bilateral lower extremities. Abdomen: Soft, non-tender, non-distended. +BS  Musculoskeletal: No obvious deformities or erythematous or edematous joints. Skin: Normal skin color. No rashes or lesions. Neurologic:  Neurovascularly intact without any focal sensory/motor deficits.  Cranial nerves: II-XII intact, grossly non-focal.  Psychiatric: Alert and oriented, thought content appropriate, normal insight    Significant labs:  CBC:   Recent Labs     01/15/23  0613 01/16/23  0629   WBC 7.8 11.0   RBC 3.17* 3.13*   HGB 10.4* 9.9*   HCT 29.7* 28.0*   MCV 93.7 89.5   RDW 15.4* 15.1*    229     BMP:   Recent Labs     01/14/23  0849 01/15/23  0613 01/16/23  0629    139 137   K 4.1 4.0 4.2    106 104   CO2 21* 22 22   BUN 11 12 13   CREATININE 0.8 0.8 0.8     LFT:  Recent Labs     01/14/23  0849 01/15/23  0613 01/16/23  0629   PROT 7.8 8.3 9.3*   ALKPHOS 48 48 51   ALT 5 <5 6   AST 16 14 21   BILITOT 0.6 1.0 1.7*     PT/INR: No results for input(s): INR, APTT in the last 72 hours. BNP: No results for input(s): BNP in the last 72 hours. Hgb A1C:   Lab Results   Component Value Date    LABA1C 5.5 01/07/2023     Folate and B12:   Lab Results   Component Value Date    JRSFIKIO62 841 01/10/2023   ,   Lab Results   Component Value Date    FOLATE 9.1 01/10/2023     Thyroid Studies:   Lab Results   Component Value Date    TSH 4.430 (H) 01/07/2023       Urinalysis:    Lab Results   Component Value Date/Time    NITRU Negative 01/05/2023 01:08 PM    [address] NONE 01/05/2023 01:08 PM    BACTERIA NONE SEEN 01/05/2023 01:08 PM    RBCUA 10-20 01/05/2023 01:08 PM    BLOODU LARGE 01/05/2023 01:08 PM    SPECGRAV 1.020 01/05/2023 01:08 PM    GLUCOSEU Negative 01/05/2023 01:08 PM       Imaging:  XR CHEST (2 VW)    Result Date: 1/10/2023  EXAMINATION: TWO XRAY VIEWS OF THE CHEST 1/10/2023 3:24 pm COMPARISON: None. HISTORY: ORDERING SYSTEM PROVIDED HISTORY: eval for hilar lymphadenopathy. Multiple cranial neuropathies, evaluating for sarcoid TECHNOLOGIST PROVIDED HISTORY: Reason for exam:->eval for hilar lymphadenopathy. Multiple cranial neuropathies, evaluating for sarcoid What reading provider will be dictating this exam?->CRC FINDINGS: The lungs are without acute focal process. There is no effusion or pneumothorax. The cardiomediastinal silhouette is without acute process.  The osseous structures are without acute process. No acute process. CT HEAD WO CONTRAST    Result Date: 1/5/2023  EXAMINATION: CT OF THE HEAD WITHOUT CONTRAST  1/5/2023 2:26 pm TECHNIQUE: CT of the head was performed without the administration of intravenous contrast. Automated exposure control, iterative reconstruction, and/or weight based adjustment of the mA/kV was utilized to reduce the radiation dose to as low as reasonably achievable. COMPARISON: None. HISTORY: ORDERING SYSTEM PROVIDED HISTORY: difficulty speaking TECHNOLOGIST PROVIDED HISTORY: Has a \"code stroke\" or \"stroke alert\" been called? ->No Reason for exam:->difficulty speaking Decision Support Exception - unselect if not a suspected or confirmed emergency medical condition->Emergency Medical Condition (MA) What reading provider will be dictating this exam?->CRC FINDINGS: BRAIN/VENTRICLES: There is no acute intracranial hemorrhage, mass effect or midline shift. No abnormal extra-axial fluid collection. The gray-white differentiation is maintained without evidence of an acute infarct. There is no evidence of hydrocephalus. ORBITS: The visualized portion of the orbits demonstrate no acute abnormality. SINUSES: The visualized paranasal sinuses and mastoid air cells demonstrate no acute abnormality. SOFT TISSUES/SKULL:  No acute abnormality of the visualized skull or soft tissues. No acute intracranial abnormality. CT HEAD WO CONTRAST    Result Date: 12/29/2022  EXAMINATION: CT OF THE HEAD WITHOUT CONTRAST  12/29/2022 11:19 am TECHNIQUE: CT of the head was performed without the administration of intravenous contrast. Automated exposure control, iterative reconstruction, and/or weight based adjustment of the mA/kV was utilized to reduce the radiation dose to as low as reasonably achievable. COMPARISON: 06/28/2021 HISTORY: ORDERING SYSTEM PROVIDED HISTORY: [facility] faical droop TECHNOLOGIST PROVIDED HISTORY: Has a \"code stroke\" or \"stroke alert\" been called? ->No Reason for exam:->rihgt sided faical droop Decision Support Exception - unselect if not a suspected or confirmed emergency medical condition->Emergency Medical Condition (MA) What reading provider will be dictating this exam?->CRC FINDINGS: BRAIN/VENTRICLES: There is no acute intracranial hemorrhage, mass effect or midline shift. No abnormal extra-axial fluid collection. The gray-white differentiation is maintained without evidence of an acute infarct. There is no evidence of hydrocephalus. ORBITS: The visualized portion of the orbits demonstrate no acute abnormality. SINUSES: The visualized paranasal sinuses and mastoid air cells demonstrate no acute abnormality. SOFT TISSUES/SKULL:  No acute abnormality of the visualized skull or soft tissues. No acute intracranial abnormality. CT CERVICAL SPINE WO CONTRAST    Result Date: 1/8/2023  EXAMINATION: CT OF THE CERVICAL SPINE WITHOUT CONTRAST 1/8/2023 3:55 pm TECHNIQUE: CT of the cervical spine was performed without the administration of intravenous contrast. Multiplanar reformatted images are provided for review. Automated exposure control, iterative reconstruction, and/or weight based adjustment of the mA/kV was utilized to reduce the radiation dose to as low as reasonably achievable. COMPARISON: None. HISTORY: ORDERING SYSTEM PROVIDED HISTORY: BUE paresthesias, r/o cervical stenosis TECHNOLOGIST PROVIDED HISTORY: Reason for exam:->BUE paresthesias, r/o cervical stenosis What reading provider will be dictating this exam?->CRC FINDINGS: BONES/ALIGNMENT: There is no acute fracture or traumatic malalignment. DEGENERATIVE CHANGES: Mild degenerative changes are noted at C6-C7 with asymmetric disc bulge to the right side with effacement of the thecal sac, lateral recess and right neural foramina. Afsaneh Money SOFT TISSUES: There is no prevertebral soft tissue swelling. No acute fractures. Asymmetric disc bulge at C6-C7 to the right with effacement of thecal sac and lateral recess. Consider MRI for better assessment. MRI CERVICAL SPINE W WO CONTRAST    Result Date: 1/9/2023  EXAMINATION: MRI OF THE CERVICAL SPINE WITHOUT AND WITH CONTRAST; MRV OF THE HEAD WITHOUT CONTRAST  1/9/2023 3:59 pm: TECHNIQUE: Multiplanar multisequence MRI of the cervical spine was performed without and with the administration of intravenous contrast.; Multiplanar multisequence MRV of the head was performed without the administration of intravenous contrast. COMPARISON: CT cervical spine 01/08/2010, MRI brain 01/06/2023 HISTORY: ORDERING SYSTEM PROVIDED HISTORY: Abnormal CT c spine TECHNOLOGIST PROVIDED HISTORY: Reason for exam:-> abnormal CT c spine What is the sedation requirement?-> none What reading provider will be dictating this exam?->CRC FINDINGS: MRI CERVICAL SPINE: Bones: Vertebral heights and alignment are maintained. No suspicious marrow edema is seen. Paraspinal: No paraspinal soft tissue abnormality is seen. Intracranial: Unremarkable appearance of the visualized intracranial structures. Epidural space: No evidence of epidural collection. Cord: No evidence of cervical spinal cord signal abnormality. No abnormal enhancement. Disc Spaces: C2-3: No significant spinal or neuroforaminal stenosis are seen. C3-4: No significant spinal or neuroforaminal stenosis are seen. C4-5: No significant spinal or neuroforaminal stenosis are seen. Tiny disc osteophyte complex. C5-6: No significant spinal or neuroforaminal stenosis are seen. Small disc osteophyte complex. C6-7: No significant spinal or neuroforaminal stenosis are seen. Tiny disc osteophyte complex. C7-T1: No significant spinal or neuroforaminal stenosis are seen. MRV HEAD No occlusion of the superior sagittal sinus, straight sinus, vein of Cosme, internal cerebral veins, torcula, transverse sinuses, sigmoid sinuses, or visualized internal jugular veins. Asymmetry of the transverse sinuses is a normal variant. CERVICAL SPINE: No fracture.  No significant spinal or neuroforaminal stenosis are seen. MRV HEAD: No cerebral venous sinus thrombosis. RECOMMENDATIONS: Unavailable     CTA NECK W CONTRAST    Result Date: 1/11/2023  EXAMINATION: CTV OF THE HEAD WITH CONTRAST; CTA OF THE NECK 1/11/2023 11:16 am TECHNIQUE: CT venogram of the head/brain was performed with the administration of intravenous contrast. Multiplanar reformatted images are provided for review. MIP images are provided for review. Automated exposure control, iterative reconstruction, and/or weight based adjustment of the mA/kV was utilized to reduce the radiation dose to as low as reasonably achievable.; CTA of the neck was performed with the administration of intravenous contrast. Multiplanar reformatted images are provided for review. MIP images are provided for review. Stenosis of the internal carotid arteries measured using NASCET criteria. Automated exposure control, iterative reconstruction, and/or weight based adjustment of the mA/kV was utilized to reduce the radiation dose to as low as reasonably achievable. COMPARISON: None. HISTORY: ORDERING SYSTEM PROVIDED HISTORY: multiple cranial neuropathies, rule out cerebral venous thrombosis TECHNOLOGIST PROVIDED HISTORY: Reason for exam:->multiple cranial neuropathies, rule out cerebral venous thrombosis What reading provider will be dictating this exam?->CRC; ORDERING SYSTEM PROVIDED HISTORY: eval for CVT, please include venous phase to eval left jugular vein TECHNOLOGIST PROVIDED HISTORY: Reason for exam:->eval for CVT, please include venous phase to eval left jugular vein Has a \"code stroke\" or \"stroke alert\" been called? ->No What reading provider will be dictating this exam?->CRC FINDINGS: AORTIC ARCH/ARCH VESSELS: No dissection or arterial injury. No significant stenosis of the brachiocephalic or subclavian arteries. CAROTID ARTERIES: No dissection, arterial injury, or hemodynamically significant stenosis by NASCET criteria.  VERTEBRAL ARTERIES: No dissection, arterial injury, or significant stenosis. SOFT TISSUES: The lung apices are clear. No cervical or superior mediastinal lymphadenopathy. The larynx and pharynx are unremarkable. No acute abnormality of the salivary and thyroid glands. There is a probable mildly complicated cyst within the medial aspect right breast measuring 1.7 cm. BONES: No acute osseous abnormality. The anterior and posterior cerebral arterial circulations are unremarkable in appearance. CT VENOGRAM: The superior sagittal sinus, inferior sagittal sinus, straight sinus, transverse sinuses, and sigmoid sinuses are patent. No venous sinus thrombosis or significant stenosis. The left transverse sinus and left internal jugular vein are hypoplastic. Unremarkable CTA of the neck. No evidence of intracranial deep vein thrombosis. The left transverse sinus and left internal jugular vein are hypoplastic. MRV HEAD WO CONTRAST    Result Date: 1/9/2023  EXAMINATION: MRI OF THE CERVICAL SPINE WITHOUT AND WITH CONTRAST; MRV OF THE HEAD WITHOUT CONTRAST  1/9/2023 3:59 pm: TECHNIQUE: Multiplanar multisequence MRI of the cervical spine was performed without and with the administration of intravenous contrast.; Multiplanar multisequence MRV of the head was performed without the administration of intravenous contrast. COMPARISON: CT cervical spine 01/08/2010, MRI brain 01/06/2023 HISTORY: ORDERING SYSTEM PROVIDED HISTORY: Abnormal CT c spine TECHNOLOGIST PROVIDED HISTORY: Reason for exam:-> abnormal CT c spine What is the sedation requirement?-> none What reading provider will be dictating this exam?->CRC FINDINGS: MRI CERVICAL SPINE: Bones: Vertebral heights and alignment are maintained. No suspicious marrow edema is seen. Paraspinal: No paraspinal soft tissue abnormality is seen. Intracranial: Unremarkable appearance of the visualized intracranial structures. Epidural space: No evidence of epidural collection. Cord:  No evidence of cervical spinal cord signal abnormality. No abnormal enhancement. Disc Spaces: C2-3: No significant spinal or neuroforaminal stenosis are seen. C3-4: No significant spinal or neuroforaminal stenosis are seen. C4-5: No significant spinal or neuroforaminal stenosis are seen. Tiny disc osteophyte complex. C5-6: No significant spinal or neuroforaminal stenosis are seen. Small disc osteophyte complex. C6-7: No significant spinal or neuroforaminal stenosis are seen. Tiny disc osteophyte complex. C7-T1: No significant spinal or neuroforaminal stenosis are seen. MRV HEAD No occlusion of the superior sagittal sinus, straight sinus, vein of Cosme, internal cerebral veins, torcula, transverse sinuses, sigmoid sinuses, or visualized internal jugular veins. Asymmetry of the transverse sinuses is a normal variant. CERVICAL SPINE: No fracture. No significant spinal or neuroforaminal stenosis are seen. MRV HEAD: No cerebral venous sinus thrombosis. RECOMMENDATIONS: Unavailable     MRI BRAIN W WO CONTRAST    Result Date: 1/6/2023  EXAMINATION: MRI OF THE BRAIN WITHOUT AND WITH CONTRAST  1/6/2023 1:47 pm TECHNIQUE: Multiplanar multisequence MRI of the head/brain was performed without and with the administration of intravenous contrast. COMPARISON: CT brain performed 01/05/2023. HISTORY: ORDERING SYSTEM PROVIDED HISTORY: facial palsy, slurred speech, r/o CVA TECHNOLOGIST PROVIDED HISTORY: Reason for exam:->facial palsy, slurred speech, r/o CVA What is the sedation requirement?->None Decision Support Exception - unselect if not a suspected or confirmed emergency medical condition->Emergency Medical Condition (MA) What reading provider will be dictating this exam?->CRC FINDINGS: INTRACRANIAL STRUCTURES/VENTRICLES:  The sellar and suprasellar structures, optic chiasm, corpus callosum, pineal gland, tectum, and midline brainstem structures are unremarkable. The craniocervical junction is unremarkable.  There is no acute hemorrhage, mass effect, or midline shift. There is satisfactory overall gray-white matter differentiation. The ventricular structures are symmetric and unremarkable. The infratentorial structures including the cerebellopontine angles and internal auditory canals are unremarkable. There is no abnormal restricted diffusion. There is no abnormal blooming artifact on susceptibility weighted imaging. No abnormal postcontrast enhancement. ORBITS: The visualized portion of the orbits demonstrate no acute abnormality. SINUSES: The visualized paranasal sinuses and mastoid air cells demonstrate no acute abnormality. BONES/SOFT TISSUES: The bone marrow signal intensity appears normal. The soft tissues demonstrate no acute abnormality. Unremarkable pre and post-contrast MRI of the brain. FL LUMBAR PUNCTURE DIAG    Result Date: 1/10/2023  EXAMINATION: FLUOROSCOPIC GUIDED LUMBAR PUNCTURE 1/10/2023 10:32 am HISTORY: ORDERING SYSTEM PROVIDED HISTORY: bilateral facial palsy TECHNOLOGIST PROVIDED HISTORY: Reason for exam:->bilateral facial palsy What reading provider will be dictating this exam?->CRC FLUOROSCOPY DOSE AND TYPE OR TIME AND EXPOSURES: Fluoroscopy time 0.6 minutes. Air kerma 39 mGy PROCEDURE: :  Osvaldo Crowell MD Informed consent was obtained after the risks and benefits of the procedure were discussed with the patient and all questions were answered fully. [place] protocol was observed and a standard timeout was performed. The patient was positioned prone and the back was prepped and draped in the normal sterile fashion. 1% lidocaine was used for local anesthesia. The subarachnoid space was accessed with a 20-gauge 6 \"spinal needle at the L2/L3 level. Free flow of clear CSF was noted. Approximately 16 ml of clear, colorless CSF was removed and sent for analysis. The stylet was reinserted, spinal needle was removed and brief pressure was applied at the puncture site.  There were no immediate complications and the patient tolerated the procedure well. Successful fluoroscopic-guided lumbar puncture. CTA VENOUS HEAD W WO CONTRAST    Result Date: 1/11/2023  EXAMINATION: CTV OF THE HEAD WITH CONTRAST; CTA OF THE NECK 1/11/2023 11:16 am TECHNIQUE: CT venogram of the head/brain was performed with the administration of intravenous contrast. Multiplanar reformatted images are provided for review. MIP images are provided for review. Automated exposure control, iterative reconstruction, and/or weight based adjustment of the mA/kV was utilized to reduce the radiation dose to as low as reasonably achievable.; CTA of the neck was performed with the administration of intravenous contrast. Multiplanar reformatted images are provided for review. MIP images are provided for review. Stenosis of the internal carotid arteries measured using NASCET criteria. Automated exposure control, iterative reconstruction, and/or weight based adjustment of the mA/kV was utilized to reduce the radiation dose to as low as reasonably achievable. COMPARISON: None. HISTORY: ORDERING SYSTEM PROVIDED HISTORY: multiple cranial neuropathies, rule out cerebral venous thrombosis TECHNOLOGIST PROVIDED HISTORY: Reason for exam:->multiple cranial neuropathies, rule out cerebral venous thrombosis What reading provider will be dictating this exam?->CRC; ORDERING SYSTEM PROVIDED HISTORY: eval for CVT, please include venous phase to eval left jugular vein TECHNOLOGIST PROVIDED HISTORY: Reason for exam:->eval for CVT, please include venous phase to eval left jugular vein Has a \"code stroke\" or \"stroke alert\" been called? ->No What reading provider will be dictating this exam?->CRC FINDINGS: AORTIC ARCH/ARCH VESSELS: No dissection or arterial injury. No significant stenosis of the brachiocephalic or subclavian arteries. CAROTID ARTERIES: No dissection, arterial injury, or hemodynamically significant stenosis by NASCET criteria.  VERTEBRAL ARTERIES: No dissection, arterial injury, or significant stenosis. SOFT TISSUES: The lung apices are clear. No cervical or superior mediastinal lymphadenopathy. The larynx and pharynx are unremarkable. No acute abnormality of the salivary and thyroid glands. There is a probable mildly complicated cyst within the medial aspect right breast measuring 1.7 cm. BONES: No acute osseous abnormality. The anterior and posterior cerebral arterial circulations are unremarkable in appearance. CT VENOGRAM: The superior sagittal sinus, inferior sagittal sinus, straight sinus, transverse sinuses, and sigmoid sinuses are patent. No venous sinus thrombosis or significant stenosis. The left transverse sinus and left internal jugular vein are hypoplastic. Unremarkable CTA of the neck. No evidence of intracranial deep vein thrombosis. The left transverse sinus and left internal jugular vein are hypoplastic.        Discharge Medications:      Medication List        START taking these medications      cyanocobalamin 1000 MCG tablet  Take 1 tablet by mouth daily  Start taking on: January 17, 2023     melatonin 3 MG Tabs tablet  Take 1 tablet by mouth nightly as needed (sleep)     Rimegepant Sulfate 75 MG Tbdp  Take 75 mg by mouth daily as needed (For migraine treatment)            CONTINUE taking these medications      erythromycin 5 MG/GM ophthalmic ointment  Commonly known as: ROMYCIN  Place into the left eye every 6 hours for 7 days     gabapentin 600 MG tablet  Commonly known as: NEURONTIN  take 1 tablet by mouth twice a day     methocarbamol 500 MG tablet  Commonly known as: ROBAXIN     norethindrone-ethinyl estradiol 1-20 MG-MCG per tablet  Commonly known as: Loestrin Fe 1/20  Take 1 tablet by mouth daily     ondansetron 4 MG disintegrating tablet  Commonly known as: ZOFRAN-ODT  Take 1 tablet by mouth every 8 hours as needed for Nausea or Vomiting     prochlorperazine 10 MG tablet  Commonly known as: COMPAZINE  Take 1 tablet by mouth every 6 hours as needed (headache or nausea)     tiZANidine 4 MG tablet  Commonly known as: ZANAFLEX     vitamin D 1.25 MG (56151 UT) Caps capsule  Commonly known as: ERGOCALCIFEROL  take 1 capsule by mouth every week               Where to Get Your Medications        These medications were sent to [facility] [address] [address]      Phone: [phone]   cyanocobalamin 1000 MCG tablet  erythromycin 5 MG/GM ophthalmic ointment  melatonin 3 MG Tabs tablet  Rimegepant Sulfate 75 MG Tbdp         Time Spent on discharge is more than 45 minutes in the examination, evaluation, counseling and review of medications and discharge plan.    +++++++++++++++++++++++++++++++++++++++++++++++++  TJ Orona/ Edgardo Fishman01 [address]  +++++++++++++++++++++++++++++++++++++++++++++++++  NOTE: This report was transcribed using voice recognition software. Every effort was made to ensure accuracy; however, inadvertent computerized transcription errors may be present.

## 2023-01-17 NOTE — ADT AUTH CERT
Neurology [address] - Care Day 11 (1/16/2023) by Briana Doherty RN       Review Status Review Entered   Completed 1/16/2023 1555       Created By   Briana Doherty RN      Criteria Review      Care Day: 11 Care Date: 1/16/2023 Level of Care: Inpatient Floor    Guideline Day 3    Level Of Care    ( ) * Activity level acceptable    1/16/2023 3:55 PM EST by Radha Nielson w/ assist as ordered    (X) Floor to discharge    1/16/2023 3:55 PM EST by Larissa Candie      m/s floor    Clinical Status    (X) * Mental status at baseline or stable and appropriate for next level of care    1/16/2023 3:55 PM EST by Larissa Candie      alert & oriented    ( ) * Neurologic deficits absent or stable and appropriate for next level of care    1/16/2023 3:55 PM EST by Larissa Candie      continues to experience burning sensations in her upper extremities and the back of her neck, numbness and pins-and-needles sensation in her bilateral upper extremities    (X) * Motor deficits absent or acceptable for next level of care    (X) * Seizures absent or managed    1/16/2023 3:55 PM EST by Larissa Candie      none noted    (X) * No infection, or status acceptable    (X) * Isolation not needed, or status acceptable    (X) * Respiratory status acceptable    1/16/2023 3:55 PM EST by Larissa Candie      99% on ra  RR: 18    ( ) * Pain and nausea absent or adequately managed    1/16/2023 3:55 PM EST by Larissa Candie      Pain score  8    ( ) * General Discharge Criteria met    Interventions    (X) * Intake acceptable    1/16/2023 3:55 PM EST by Monty Black. diet    ( ) * No inpatient interventions needed    1/16/2023 3:55 PM EST by Larissa Candie      Neuro checks q4       Definitions for Care Day 11    No infection, or status acceptable    (X) No infection, or status acceptable, as indicated by  1 or more  of the following  (1) (2)    (3) (4):       (X) No infection present       Isolation not needed, or status acceptable    (X) Isolation not needed, or status acceptable, as indicated by  1 or more  of the following      (1) (2) (3):       (X) Isolation not needed, or performable at next level of care       Respiratory status acceptable    (X) Respiratory status acceptable, as indicated by  ALL  of the following  (1) (2):       (X) Patient breathing comfortably (or near baseline) and able to protect airway (eg, able to handle       secretions)       (X) Tachypnea absent       (X) Oxygen saturation greater than 90%, near baseline, or measurement not indicated for condition       (X) Supplemental oxygen or respiratory treatments not needed or are performable at next level       of care       (X) Airflow measurements greater than 60% of predicted, at stable baseline, or measurement not       indicated for condition       (X) Partial pressure of carbon dioxide and pH normal, at stable baseline, or measurement not indicated       for condition       Tachypnea absent    (X) Tachypnea absent, as indicated by respiratory rate of  1 or more  of the following  (1) (2):       (X) Less than or equal to 18 breaths per minute in adult or child 15years of age or older       Intake acceptable    (X) Intake acceptable, as indicated by  1 or more  of the following  (1):       (X) Oral hydration, medications, and diet       * Milestone   Additional Notes   DATE:  1/16/23  m/s floor         PERTINENT UPDATES:   Pt. continues to experience burning sensations in her upper extremities and the back of her neck, numbness and pins-and-needles sensation in her bilateral upper extremities. She also complains of continued diplopia and blurred vision         VITALS:   T: 98.3   FL: 76   BP: 130/80         ABNL/PERTINENT LABS/RADIOLOGY/DIAGNOSTIC STUDIES:   Protein   9.3 H   Albumin   3 L   Cruz    1.7 H      RBC    3.13 L   H&H    9.9/28 L         PHYSICAL EXAM:   Respiratory:  Clear to auscultation bilaterally. No apparent distress. Cardiovascular:  Regular rate and rhythm. S1, S2 without murmurs, rubs, or gallops. PV: Brisk capillary refill. +2 pedal and radial pulses bilaterally. No clubbing, cyanosis, edema of bilateral lower extremities. Abdomen: Soft, non-tender, non-distended. +BS   Musculoskeletal: No obvious deformities or erythematous or edematous joints. MD CONSULTS/ASSESSMENT AND PLAN:   NEUROLOGY:   A & P:   still complains of diplopia, tingling, burning, and pins-and-needles in her bilateral upper extremities. -Day 5 of IVIG today    -Consider repeat EMG in a  6-8 weeks    -Continue gabapentin to 600 mg TID    -Neurology to sign off call if needed   -Follow-up with neurology after discharge         IM:   Assessment:   -Stuttering speech   -BUE paresthesias   -Bilateral Bell's Palsy    -Horizontal diplopia    follow-up outpatient with neurology in 2 weeks          MEDICATIONS:   Depakote 125mg BID PO   Lovenox 30mg BID SC   Gabapentin 600mg TID PO   IGIV 50g Daily IV   Ibuprofen 400mg q6 PRN PO x1         ORDERS:   Seizure prec, Neuro checks q4. Neurology [address] - Care Day 9 (1/14/2023) by Ana Mendez RN       Review Status Review Entered   Completed 1/16/2023 1538       Created By   Ana Mendez RN      Criteria Review      Care Day: 9 Care Date: 1/14/2023 Level of Care: Inpatient Floor    Guideline Day 3    Level Of Care    ( ) * Activity level acceptable    ( ) Floor to discharge    1/16/2023 3:36 PM EST by Mainor Walter      m/s floor    Clinical Status    (X) * Mental status at baseline or stable and appropriate for next level of care    ( ) * Neurologic deficits absent or stable and appropriate for next level of care    1/16/2023 3:38 PM EST by Mainor Walter      + numbness tingling and bilateral upper ext.     (X) * Motor deficits absent or acceptable for next level of care    (X) * Seizures absent or managed    1/16/2023 3:38 PM EST by Mainor Walter      none noted    (X) * No infection, or status acceptable    (X) * Isolation not needed, or status acceptable    ( ) * Respiratory status acceptable    1/16/2023 3:38 PM EST by Lis Chaudhry      99% on ra   RR: 20    (X) * Pain and nausea absent or adequately managed    1/16/2023 3:38 PM EST by Lis Chaudhry      Pain score  8    ( ) * General Discharge Criteria met    Interventions    (X) * Intake acceptable    1/16/2023 3:36 PM EST by Estrellita Aj diet    ( ) * No inpatient interventions needed    1/16/2023 3:36 PM EST by Lis Chaudhry      Neuro checks q4       Definitions for Care Day 9    No infection, or status acceptable    (X) No infection, or status acceptable, as indicated by  1 or more  of the following  (1) (2)    (3) (4):       (X) No infection present       Isolation not needed, or status acceptable    (X) Isolation not needed, or status acceptable, as indicated by  1 or more  of the following      (1) (2) (3):       (X) Isolation not needed, or performable at next level of care       Respiratory status acceptable    ( ) Respiratory status acceptable, as indicated by  ALL  of the following  (1) (2):       (X) Patient breathing comfortably (or near baseline) and able to protect airway (eg, able to handle       secretions)       (X) Oxygen saturation greater than 90%, near baseline, or measurement not indicated for condition       (X) Supplemental oxygen or respiratory treatments not needed or are performable at next level       of care       (X) Airflow measurements greater than 60% of predicted, at stable baseline, or measurement not       indicated for condition       (X) Partial pressure of carbon dioxide and pH normal, at stable baseline, or measurement not indicated       for condition       Pain and nausea absent or adequately managed    (X) Pain and nausea absent or adequately managed, as indicated by  1 or more  of the following     (1) (2) (3) (4) (5):       (X) Minimal discomfort on oral medications       Intake acceptable    (X) Intake acceptable, as indicated by  1 or more  of the following  (1):       (X) Oral hydration, medications, and diet       * Milestone   Additional Notes   DATE:  1/14/23  m/s floor         PERTINENT UPDATES:   Patient still with complaints of numbness tingling and bilateral upper extremities. Patient is tachycardic. VITALS:   T: 98.2   CT: 109 ! BP: 144/84 ! ABNL/PERTINENT LABS/RADIOLOGY/DIAGNOSTIC STUDIES:   CO2   21 L      GLuc   123 H      Ca       8.3 L   Albumin  2.8 L         PHYSICAL EXAM:   Respiratory:  Clear to auscultation bilaterally. Normal respiratory effort. Cardiovascular:  RRR. S1, S2 without MRG. PV: Pulses palpable. No edema. Abdomen: Soft, non-tender, non-distended. +KAELA PAUL CONSULTS/ASSESSMENT AND PLAN:   IM:   Assessment:   -Stuttering speech   -BUE paresthesias   -Bilateral Bell's Palsy    -Horizontal diplopia       Plan:   -Neuro checks   -on day 4 of 5 of IVIG         MEDICATIONS:   Norco 1tab. q 6 PRN PO x2   Advil 400mg q6 PRN PO x1   Xanax 0.25mg nightly PRN PO x1   IGIV 50g Daily IV         ORDERS:   Seizure prec, Neuro checks q4.         Neurology [address] Day 7 (1/12/2023) by Trinidad Funez RN       Review Status Review Entered   Completed 1/16/2023 1531       Created By   Trinidad Funez RN      Criteria Review      Care Day: 7 Care Date: 1/12/2023 Level of Care: Inpatient Floor    Guideline Day 3    Level Of Care    ( ) * Activity level acceptable    1/16/2023 3:29 PM EST by Carmen Warren w/ assist as ordered    ( ) Floor to discharge    1/16/2023 3:29 PM EST by Julisa Riddle      /s floor    Clinical Status    (X) * Mental status at baseline or stable and appropriate for next level of care    1/16/2023 3:29 PM EST by Julisa Riddle      alert & oriented    ( ) * Neurologic deficits absent or stable and appropriate for next level of care    1/16/2023 3:29 PM EST by Jose Campbell Susan Das to experience double vision, bilateral facial palsy    (X) * Motor deficits absent or acceptable for next level of care    (X) * Seizures absent or managed    1/16/2023 3:29 PM EST by Vlad Deleon      none noted    (X) * No infection, or status acceptable    1/16/2023 3:29 PM EST by Vlad Deleon      none noted    (X) * Isolation not needed, or status acceptable    (X) * Respiratory status acceptable    1/16/2023 3:31 PM EST by Daron Fu: 98  RR: 18  [place]: 112 ! BP: 140/93 !   99% on ra    (X) * Pain and nausea absent or adequately managed    1/16/2023 3:31 PM EST by Vlad Deleon      Pain  score  7    ( ) * General Discharge Criteria met    Interventions    (X) * Intake acceptable    1/16/2023 3:31 PM EST by Slade leonard    ( ) * No inpatient interventions needed    1/16/2023 3:29 PM EST by Vlad Deleon      Neuro checks q4       Definitions for Care Day 7    No infection, or status acceptable    (X) No infection, or status acceptable, as indicated by  1 or more  of the following  (1) (2)    (3) (4):       (X) No infection present       Isolation not needed, or status acceptable    (X) Isolation not needed, or status acceptable, as indicated by  1 or more  of the following      (1) (2) (3):       (X) Isolation not needed, or performable at next level of care       Respiratory status acceptable    (X) Respiratory status acceptable, as indicated by  ALL  of the following  (1) (2):       (X) Patient breathing comfortably (or near baseline) and able to protect airway (eg, able to handle       secretions)       (X) Tachypnea absent       (X) Oxygen saturation greater than 90%, near baseline, or measurement not indicated for condition       (X) Supplemental oxygen or respiratory treatments not needed or are performable at next level       of care       (X) Airflow measurements greater than 60% of predicted, at stable baseline, or measurement not indicated for condition       (X) Partial pressure of carbon dioxide and pH normal, at stable baseline, or measurement not indicated       for condition       Tachypnea absent    (X) Tachypnea absent, as indicated by respiratory rate of  1 or more  of the following  (1) (2):       (X) Less than or equal to 18 breaths per minute in adult or child 15years of age or older       Pain and nausea absent or adequately managed    (X) Pain and nausea absent or adequately managed, as indicated by  1 or more  of the following     (1) (2) (3) (4) (5):       (X) Minimal discomfort on oral medications       (X) Pain and nausea managed on regimen performable at next level of care       Intake acceptable    (X) Intake acceptable, as indicated by  1 or more  of the following  (1):       (X) Oral hydration, medications, and diet       * Milestone   Additional Notes   DATE:  1/12/23  m/s floor         PERTINENT UPDATES:   Pt. continues to experience double vision, bilateral facial palsy. Continues to experience a headache which is 8/10. Pt. still complains of burning back pain and weakness in her bilateral arms. ABNL/PERTINENT LABS/RADIOLOGY/DIAGNOSTIC STUDIES:   Gluc    109 H      Protein   6.3 L   Albumin   3.4 L          PHYSICAL EXAM:   Respiratory:  Clear to auscultation bilaterally. Normal respiratory effort. Cardiovascular:  RRR. S1, S2 without MRG. PV: Pulses palpable. No edema. Abdomen: Soft, non-tender, non-distended.  +BS         MD CONSULTS/ASSESSMENT AND PLAN:   NEUROLOGY:   Assessment:   Multifocal bulbar neuropathies       Plan:   -Lyme titers still pending   -Ganglioside panel to check for Kofi Kings syndrome pending   -Will start IVIg  x 5 days   -Neurology will follow         IM:   Assessment:   -Stuttering speech   -BUE paresthesias   -Bilateral Bell's Palsy    -Horizontal diplopia    Plan:   -Neurology following - labs pending   -Neuro checks   -Continue IV IVIG         MEDICATIONS: Lovenox 30mg BID SC   IGIV 50g Daily IV   Tylenol 650mg q6 PRN PO x2   Xanax 0.25mg nightly PRN PO x1   Norco 1tab. q 6 PRN PO x1 x2   Advil 400mg q6 PRN PO x2 x1 x1         ORDERS:   Seizure prec, Neuro checks q4. PT/OT/SLP/CM ASSESSMENT OR NOTES:   CM:   She continues to experience double vision, bilateral facial palsy and bilateral hand, arm, shoulder, and neck burning with pins-and-needles. She continues to experience a headache which is 8/10. Nurte will be started to see if this takes her headache away. CTAs of her head were all unremarkable. Plan is for I VIG for a total of 5 days. Lumbar puncture was done yesterday.   Lyme disease workup is pending

## 2023-01-19 ENCOUNTER — HOSPITAL ENCOUNTER (INPATIENT)
Age: 28
LOS: 2 days | Discharge: HOME | DRG: 760 | End: 2023-01-22
Attending: EMERGENCY MEDICINE | Admitting: INTERNAL MEDICINE
Payer: COMMERCIAL

## 2023-01-19 ENCOUNTER — APPOINTMENT (OUTPATIENT)
Dept: CT IMAGING | Age: 28
DRG: 760 | End: 2023-01-19
Payer: COMMERCIAL

## 2023-01-19 ENCOUNTER — APPOINTMENT (OUTPATIENT)
Dept: ULTRASOUND IMAGING | Age: 28
DRG: 760 | End: 2023-01-19
Payer: COMMERCIAL

## 2023-01-19 ENCOUNTER — APPOINTMENT (OUTPATIENT)
Dept: GENERAL RADIOLOGY | Age: 28
DRG: 760 | End: 2023-01-19
Payer: COMMERCIAL

## 2023-01-19 DIAGNOSIS — R31.9 HEMATURIA, UNSPECIFIED TYPE: ICD-10-CM

## 2023-01-19 DIAGNOSIS — N93.8 DYSFUNCTIONAL UTERINE BLEEDING: ICD-10-CM

## 2023-01-19 DIAGNOSIS — D64.9 ANEMIA, UNSPECIFIED TYPE: Primary | ICD-10-CM

## 2023-01-19 DIAGNOSIS — R03.0 ELEVATED BLOOD PRESSURE READING: ICD-10-CM

## 2023-01-19 DIAGNOSIS — R00.0 TACHYCARDIA: ICD-10-CM

## 2023-01-19 LAB
ALBUMIN SERPL-MCNC: 3.3 G/DL (ref 3.5–5.2)
ALP BLD-CCNC: 48 U/L (ref 35–104)
ALT SERPL-CCNC: 12 U/L (ref 0–32)
ANION GAP SERPL CALCULATED.3IONS-SCNC: 12 MMOL/L (ref 7–16)
ANISOCYTOSIS: ABNORMAL
AST SERPL-CCNC: 56 U/L (ref 0–31)
BACTERIA: ABNORMAL /HPF
BASOPHILS ABSOLUTE: 0.06 E9/L (ref 0–0.2)
BASOPHILS RELATIVE PERCENT: 0.6 % (ref 0–2)
BILIRUB SERPL-MCNC: 1.8 MG/DL (ref 0–1.2)
BILIRUBIN URINE: ABNORMAL
BLOOD, URINE: ABNORMAL
BUN BLDV-MCNC: 13 MG/DL (ref 6–20)
CALCIUM SERPL-MCNC: 8.7 MG/DL (ref 8.6–10.2)
CHLORIDE BLD-SCNC: 101 MMOL/L (ref 98–107)
CHP ED QC CHECK: NORMAL
CLARITY: ABNORMAL
CO2: 22 MMOL/L (ref 22–29)
COLOR: ABNORMAL
CREAT SERPL-MCNC: 0.7 MG/DL (ref 0.5–1)
EKG ATRIAL RATE: 121 BPM
EKG P AXIS: 71 DEGREES
EKG P-R INTERVAL: 144 MS
EKG Q-T INTERVAL: 314 MS
EKG QRS DURATION: 84 MS
EKG QTC CALCULATION (BAZETT): 445 MS
EKG R AXIS: 63 DEGREES
EKG T AXIS: 24 DEGREES
EKG VENTRICULAR RATE: 121 BPM
EOSINOPHILS ABSOLUTE: 0.05 E9/L (ref 0.05–0.5)
EOSINOPHILS RELATIVE PERCENT: 0.5 % (ref 0–6)
EPITHELIAL CELLS, UA: ABNORMAL /HPF
GFR SERPL CREATININE-BSD FRML MDRD: >60 ML/MIN/1.73
GLUCOSE BLD-MCNC: 102 MG/DL (ref 74–99)
GLUCOSE URINE: NEGATIVE MG/DL
HCG(URINE) PREGNANCY TEST: NEGATIVE
HCT VFR BLD CALC: 21.9 % (ref 34–48)
HEMOCCULT STL QL: NORMAL
HEMOGLOBIN: 7.7 G/DL (ref 11.5–15.5)
HYPOCHROMIA: ABNORMAL
IMMATURE GRANULOCYTES #: 0.18 E9/L
IMMATURE GRANULOCYTES %: 1.8 % (ref 0–5)
INFLUENZA A: NOT DETECTED
INFLUENZA B: NOT DETECTED
INR BLD: 1.1
KETONES, URINE: ABNORMAL MG/DL
LEUKOCYTE ESTERASE, URINE: NEGATIVE
LYMPHOCYTES ABSOLUTE: 2.24 E9/L (ref 1.5–4)
LYMPHOCYTES RELATIVE PERCENT: 21.8 % (ref 20–42)
MAGNESIUM: 1.7 MG/DL (ref 1.6–2.6)
MCH RBC QN AUTO: 33.3 PG (ref 26–35)
MCHC RBC AUTO-ENTMCNC: 35.2 % (ref 32–34.5)
MCV RBC AUTO: 94.8 FL (ref 80–99.9)
MONOCYTES ABSOLUTE: 1.43 E9/L (ref 0.1–0.95)
MONOCYTES RELATIVE PERCENT: 13.9 % (ref 2–12)
NEUTROPHILS ABSOLUTE: 6.3 E9/L (ref 1.8–7.3)
NEUTROPHILS RELATIVE PERCENT: 61.4 % (ref 43–80)
NITRITE, URINE: NEGATIVE
PDW BLD-RTO: 20.6 FL (ref 11.5–15)
PH UA: 5.5 (ref 5–9)
PLATELET # BLD: 241 E9/L (ref 130–450)
PMV BLD AUTO: 10 FL (ref 7–12)
POIKILOCYTES: ABNORMAL
POLYCHROMASIA: ABNORMAL
POTASSIUM SERPL-SCNC: 4.2 MMOL/L (ref 3.5–5)
PROTEIN UA: ABNORMAL MG/DL
PROTHROMBIN TIME: 12 SEC (ref 9.3–12.4)
RBC # BLD: 2.31 E12/L (ref 3.5–5.5)
RBC UA: ABNORMAL /HPF (ref 0–2)
REASON FOR REJECTION: NORMAL
REASON FOR REJECTION: NORMAL
REJECTED TEST: NORMAL
REJECTED TEST: NORMAL
ROULEAUX: ABNORMAL
SARS-COV-2 RNA, RT PCR: NOT DETECTED
SODIUM BLD-SCNC: 135 MMOL/L (ref 132–146)
SPECIFIC GRAVITY UA: 1.02 (ref 1–1.03)
TOTAL PROTEIN: 9.5 G/DL (ref 6.4–8.3)
TROPONIN, HIGH SENSITIVITY: 12 NG/L (ref 0–9)
UROBILINOGEN, URINE: 2 E.U./DL
WBC # BLD: 10.3 E9/L (ref 4.5–11.5)
WBC UA: ABNORMAL /HPF (ref 0–5)

## 2023-01-19 PROCEDURE — 93970 EXTREMITY STUDY: CPT

## 2023-01-19 PROCEDURE — 84484 ASSAY OF TROPONIN QUANT: CPT

## 2023-01-19 PROCEDURE — 96376 TX/PRO/DX INJ SAME DRUG ADON: CPT

## 2023-01-19 PROCEDURE — 93010 ELECTROCARDIOGRAM REPORT: CPT | Performed by: INTERNAL MEDICINE

## 2023-01-19 PROCEDURE — 99285 EMERGENCY DEPT VISIT HI MDM: CPT

## 2023-01-19 PROCEDURE — 83735 ASSAY OF MAGNESIUM: CPT

## 2023-01-19 PROCEDURE — 36415 COLL VENOUS BLD VENIPUNCTURE: CPT

## 2023-01-19 PROCEDURE — 2580000003 HC RX 258: Performed by: EMERGENCY MEDICINE

## 2023-01-19 PROCEDURE — 80053 COMPREHEN METABOLIC PANEL: CPT

## 2023-01-19 PROCEDURE — 71045 X-RAY EXAM CHEST 1 VIEW: CPT

## 2023-01-19 PROCEDURE — 81025 URINE PREGNANCY TEST: CPT

## 2023-01-19 PROCEDURE — 85610 PROTHROMBIN TIME: CPT

## 2023-01-19 PROCEDURE — 96374 THER/PROPH/DIAG INJ IV PUSH: CPT

## 2023-01-19 PROCEDURE — 74177 CT ABD & PELVIS W/CONTRAST: CPT

## 2023-01-19 PROCEDURE — 71275 CT ANGIOGRAPHY CHEST: CPT

## 2023-01-19 PROCEDURE — 96361 HYDRATE IV INFUSION ADD-ON: CPT

## 2023-01-19 PROCEDURE — 85025 COMPLETE CBC W/AUTO DIFF WBC: CPT

## 2023-01-19 PROCEDURE — 81001 URINALYSIS AUTO W/SCOPE: CPT

## 2023-01-19 PROCEDURE — 6360000002 HC RX W HCPCS: Performed by: EMERGENCY MEDICINE

## 2023-01-19 PROCEDURE — 6360000004 HC RX CONTRAST MEDICATION: Performed by: RADIOLOGY

## 2023-01-19 PROCEDURE — 87636 SARSCOV2 & INF A&B AMP PRB: CPT

## 2023-01-19 PROCEDURE — 93005 ELECTROCARDIOGRAM TRACING: CPT | Performed by: EMERGENCY MEDICINE

## 2023-01-19 PROCEDURE — 87040 BLOOD CULTURE FOR BACTERIA: CPT

## 2023-01-19 PROCEDURE — 96375 TX/PRO/DX INJ NEW DRUG ADDON: CPT

## 2023-01-19 RX ORDER — PROCHLORPERAZINE EDISYLATE 5 MG/ML
10 INJECTION INTRAMUSCULAR; INTRAVENOUS ONCE
Status: COMPLETED | OUTPATIENT
Start: 2023-01-19 | End: 2023-01-19

## 2023-01-19 RX ORDER — 0.9 % SODIUM CHLORIDE 0.9 %
1000 INTRAVENOUS SOLUTION INTRAVENOUS ONCE
Status: COMPLETED | OUTPATIENT
Start: 2023-01-19 | End: 2023-01-19

## 2023-01-19 RX ORDER — ONDANSETRON 2 MG/ML
4 INJECTION INTRAMUSCULAR; INTRAVENOUS ONCE
Status: COMPLETED | OUTPATIENT
Start: 2023-01-19 | End: 2023-01-19

## 2023-01-19 RX ORDER — DIPHENHYDRAMINE HYDROCHLORIDE 50 MG/ML
50 INJECTION INTRAMUSCULAR; INTRAVENOUS ONCE
Status: COMPLETED | OUTPATIENT
Start: 2023-01-19 | End: 2023-01-19

## 2023-01-19 RX ORDER — HYDRALAZINE HYDROCHLORIDE 20 MG/ML
10 INJECTION INTRAMUSCULAR; INTRAVENOUS ONCE
Status: DISCONTINUED | OUTPATIENT
Start: 2023-01-19 | End: 2023-01-19

## 2023-01-19 RX ADMIN — DIPHENHYDRAMINE HYDROCHLORIDE 50 MG: 50 INJECTION, SOLUTION INTRAMUSCULAR; INTRAVENOUS at 16:53

## 2023-01-19 RX ADMIN — IOPAMIDOL 75 ML: 755 INJECTION, SOLUTION INTRAVENOUS at 16:07

## 2023-01-19 RX ADMIN — SODIUM CHLORIDE 1000 ML: 9 INJECTION, SOLUTION INTRAVENOUS at 17:03

## 2023-01-19 RX ADMIN — ONDANSETRON 4 MG: 2 INJECTION INTRAMUSCULAR; INTRAVENOUS at 16:53

## 2023-01-19 RX ADMIN — ONDANSETRON 4 MG: 2 INJECTION INTRAMUSCULAR; INTRAVENOUS at 22:38

## 2023-01-19 RX ADMIN — PROCHLORPERAZINE EDISYLATE 10 MG: 5 INJECTION INTRAMUSCULAR; INTRAVENOUS at 16:53

## 2023-01-19 ASSESSMENT — PAIN DESCRIPTION - LOCATION
LOCATION: HEAD
LOCATION: CHEST

## 2023-01-19 ASSESSMENT — PAIN SCALES - GENERAL
PAINLEVEL_OUTOF10: 10
PAINLEVEL_OUTOF10: 7

## 2023-01-19 ASSESSMENT — PAIN DESCRIPTION - FREQUENCY
FREQUENCY: CONTINUOUS
FREQUENCY: CONTINUOUS

## 2023-01-19 ASSESSMENT — PAIN - FUNCTIONAL ASSESSMENT
PAIN_FUNCTIONAL_ASSESSMENT: 0-10
PAIN_FUNCTIONAL_ASSESSMENT: 0-10

## 2023-01-19 ASSESSMENT — PAIN DESCRIPTION - ONSET
ONSET: ON-GOING
ONSET: SUDDEN

## 2023-01-19 ASSESSMENT — PAIN DESCRIPTION - PAIN TYPE
TYPE: ACUTE PAIN
TYPE: ACUTE PAIN

## 2023-01-19 ASSESSMENT — PAIN DESCRIPTION - DESCRIPTORS
DESCRIPTORS: HEAVINESS
DESCRIPTORS: PRESSURE;TIGHTNESS

## 2023-01-19 ASSESSMENT — PAIN DESCRIPTION - ORIENTATION: ORIENTATION: MID

## 2023-01-19 NOTE — ED PROVIDER NOTES
[address] ENCOUNTER        Pt Name: Warren Fields  MRN: [id number]  [place] 1995  Date of evaluation: 1/19/2023  Provider: Dariel Gaxiola DO  PCP: Neli Navarro DO  Note Started: 12:57 PM EST 1/19/23    CHIEF COMPLAINT       Chief Complaint   Patient presents with    Tachycardia     Just discharged from hospital on Monday. Was in for neurology consult. Fast heart rate started on Monday. +SOB, headache, and chest pain. Chest pain for a \"few days. \"  Headache for 5 weeks. HISTORY OF PRESENT ILLNESS: 1 or more Elements   History From: patient    Limitations to history : None    Warren Fields is a 32 y.o. female who presents with substernal chest pain, shortness of breath and tachycardia/palpitations beginning last week. Orts the pain is described as pressure and is constant and nonradiating. She advises it is worse with exertion. Patient was just discharged from the hospital 3 days ago, Monday, for neurological changes in setting of bilateral Bell's palsy, worse on the left. Per medical record she underwent CAT scans, MRI brain, MRI cervical spine, MRV brain laboratory work and lumbar puncture all of which were nondiagnostic for her symptoms. She states she has had Bell's palsy for 6 weeks and then started developing slurred speech, headache, bilateral arm tingling and bilateral diplopia. She also reports chronic bilateral lower extremity edema, worse on right, since she had an ankle fracture remotely. she was treated with prednisone, acyclovir and IVIG. She reports prior to discharge she started feeling the above symptoms and was given medication for high blood pressure while there but is not currently on any blood pressure medication. Has an otherwise unremarkable past medical history. The complaint has been constant, moderate in severity.   Patient denies fever/chills, sore throat, cough, congestion, visual disturbances, focal paresthesias, focal weakness, abdominal pain, nausea, vomiting, diarrhea, constipation, dysuria, hematuria, trauma, neck or back pain, rash or other complaints. Per medical record, she was treated with IV hydralazine with resolution of high blood pressure and not discharged with HTN meds. She was intermittently tachycardic during her admission. Nursing Notes were all reviewed and agreed with or any disagreements were addressed in the HPI. REVIEW OF SYSTEMS :           Positives and Pertinent negatives as per HPI.      SURGICAL HISTORY     Past Surgical History:   Procedure Laterality Date    ANKLE SURGERY Right      SECTION      LEE  2017    UPPER GASTROINTESTINAL ENDOSCOPY N/A 2022    EGD BIOPSY performed by Josephine Alvarenga MD at [facility]. 192       Previous Medications    ERYTHROMYCIN (ROMYCIN) 5 MG/GM OPHTHALMIC OINTMENT    Place into the left eye every 6 hours for 7 days    GABAPENTIN (NEURONTIN) 600 MG TABLET    take 1 tablet by mouth twice a day    MELATONIN 3 MG TABS TABLET    Take 1 tablet by mouth nightly as needed (sleep)    METHOCARBAMOL (ROBAXIN) 500 MG TABLET    take 1 tablet by mouth three times a day if needed    NORETHINDRONE-ETHINYL ESTRADIOL (LOESTRIN FE ) 1-20 MG-MCG PER TABLET    Take 1 tablet by mouth daily    ONDANSETRON (ZOFRAN-ODT) 4 MG DISINTEGRATING TABLET    Take 1 tablet by mouth every 8 hours as needed for Nausea or Vomiting    PROCHLORPERAZINE (COMPAZINE) 10 MG TABLET    Take 1 tablet by mouth every 6 hours as needed (headache or nausea)    RIMEGEPANT SULFATE 75 MG TBDP    Take 75 mg by mouth daily as needed (For migraine treatment)    TIZANIDINE (ZANAFLEX) 4 MG TABLET    take 1 tablet by mouth three times a day if needed for SPASM(S)    VITAMIN B-12 1000 MCG TABLET    Take 1 tablet by mouth daily    VITAMIN D (ERGOCALCIFEROL) 1.25 MG (65071 UT) CAPS CAPSULE    take 1 capsule by mouth every week       ALLERGIES     Patient has no known allergies. FAMILYHISTORY     History reviewed. No pertinent family history. SOCIAL HISTORY       Social History     Tobacco Use    Smoking status: Never    Smokeless tobacco: Never   Substance Use Topics    Alcohol use: No    Drug use: No       SCREENINGS        Kaylyn Coma Scale  Eye Opening: Spontaneous  Best Verbal Response: Oriented  Best Motor Response: Obeys commands  [place] Coma Scale Score: 15                CIWA Assessment  BP: (!) 144/86  Heart Rate: (!) 114           PHYSICAL EXAM  1 or more Elements     ED Triage Vitals   BP Temp Temp Source Heart Rate Resp SpO2 Height Weight   01/19/23 1154 01/19/23 1154 01/19/23 1154 01/19/23 1154 01/19/23 1154 01/19/23 1154 01/19/23 1203 01/19/23 1203   (!) 177/114 98.3 °F (36.8 °C) Oral (!) 132 24 100 % 5' 4\" (1.626 m) 250 lb (113.4 kg)            ---------------------------------------PHYSICAL EXAM--------------------------------------  Constitutional:  Well developed, well nourished, obese, no acute distress, non-toxic appearance   Eyes:  PERRL, conjunctiva normal, EOMI  HENT:  Atraumatic, external ears normal, nose normal, oropharynx moist. Neck- normal range of motion, no nuchal rigidity   Respiratory:  No respiratory distress, normal breath sounds, no rales, no wheezing   Cardiovascular:  Tachycardic rate, normal rhythm, no murmurs, no gallops, no rubs. Radial and DP pulses 2+ bilaterally. Apartments are soft. She is warm and well perfused. Cap refill less than 3 seconds. GI:  Soft, nondistended, normal bowel sounds, nontender, no organomegaly, no mass, no rebound, no guarding   :  No costovertebral angle tenderness   Musculoskeletal:  No edema, no tenderness, no deformities. Back- no tenderness  Integument:  Well hydrated, no rash. Adequate perfusion.    Lymphatic:  No cervical lymphadenopathy noted   Neurologic:  Alert & oriented x 3, CN 2-12 normal except bilateral CN7 weak (worse on left), no focal deficits noted.      Psychiatric:  Speech and behavior appropriate     Guiac: hemoccult negative soft light brown stool          DIAGNOSTIC RESULTS   LABS:  Results for orders placed or performed during the hospital encounter of 01/19/23   COVID-19 & Influenza Combo    Specimen: Nasopharyngeal Swab   Result Value Ref Range    SARS-CoV-2 RNA, RT PCR NOT DETECTED NOT DETECTED    INFLUENZA A NOT DETECTED NOT DETECTED    INFLUENZA B NOT DETECTED NOT DETECTED   CBC with Auto Differential   Result Value Ref Range    WBC 10.3 4.5 - 11.5 E9/L    RBC 2.31 (L) 3.50 - 5.50 E12/L    Hemoglobin 7.7 (L) 11.5 - 15.5 g/dL    Hematocrit 21.9 (L) 34.0 - 48.0 %    MCV 94.8 80.0 - 99.9 fL    MCH 33.3 26.0 - 35.0 pg    MCHC 35.2 (H) 32.0 - 34.5 %    RDW 20.6 (H) 11.5 - 15.0 fL    Platelets 919 201 - 113 E9/L    MPV 10.0 7.0 - 12.0 fL    Neutrophils % 61.4 43.0 - 80.0 %    Immature Granulocytes % 1.8 0.0 - 5.0 %    Lymphocytes % 21.8 20.0 - 42.0 %    Monocytes % 13.9 (H) 2.0 - 12.0 %    Eosinophils % 0.5 0.0 - 6.0 %    Basophils % 0.6 0.0 - 2.0 %    Neutrophils Absolute 6.30 1.80 - 7.30 E9/L    Immature Granulocytes # 0.18 E9/L    Lymphocytes Absolute 2.24 1.50 - 4.00 E9/L    Monocytes Absolute 1.43 (H) 0.10 - 0.95 E9/L    Eosinophils Absolute 0.05 0.05 - 0.50 E9/L    Basophils Absolute 0.06 0.00 - 0.20 E9/L    Anisocytosis 3+     Polychromasia 2+     Hypochromia 1+     Poikilocytes 1+     Rouleaux 1+    Urinalysis with Microscopic   Result Value Ref Range    Color, UA DARK YELLOW (A) Straw/Yellow    Clarity, UA CLOUDY (A) Clear    Glucose, Ur Negative Negative mg/dL    Bilirubin Urine SMALL (A) Negative    Ketones, Urine TRACE (A) Negative mg/dL    Specific Gravity, UA 1.025 1.005 - 1.030    Blood, Urine MODERATE (A) Negative    pH, UA 5.5 5.0 - 9.0    Protein, UA TRACE Negative mg/dL    Urobilinogen, Urine 2.0 (A) <2.0 E.U./dL    Nitrite, Urine Negative Negative    Leukocyte Esterase, Urine Negative Negative    WBC, UA 1-3 0 - 5 /HPF    RBC, UA 1-3 0 - 2 /HPF    Epithelial Cells, UA MODERATE /HPF    Bacteria, UA MODERATE (A) None Seen /HPF   Pregnancy, Urine   Result Value Ref Range    HCG(Urine) Pregnancy Test NEGATIVE NEGATIVE   Protime-INR   Result Value Ref Range    Protime 12.0 9.3 - 12.4 sec    INR 1.1    SPECIMEN REJECTION   Result Value Ref Range    Rejected Test dimer cmp mg tr     Reason for Rejection see below    Comprehensive Metabolic Panel   Result Value Ref Range    Sodium 135 132 - 146 mmol/L    Potassium 4.2 3.5 - 5.0 mmol/L    Chloride 101 98 - 107 mmol/L    CO2 22 22 - 29 mmol/L    Anion Gap 12 7 - 16 mmol/L    Glucose 102 (H) 74 - 99 mg/dL    BUN 13 6 - 20 mg/dL    Creatinine 0.7 0.5 - 1.0 mg/dL    Est, Glom Filt Rate >60 >=60 mL/min/1.73    Calcium 8.7 8.6 - 10.2 mg/dL    Total Protein 9.5 (H) 6.4 - 8.3 g/dL    Albumin 3.3 (L) 3.5 - 5.2 g/dL    Total Bilirubin 1.8 (H) 0.0 - 1.2 mg/dL    Alkaline Phosphatase 48 35 - 104 U/L    ALT 12 0 - 32 U/L    AST 56 (H) 0 - 31 U/L   Magnesium   Result Value Ref Range    Magnesium 1.7 1.6 - 2.6 mg/dL   Troponin   Result Value Ref Range    Troponin, High Sensitivity 12 (H) 0 - 9 ng/L   SPECIMEN REJECTION   Result Value Ref Range    Rejected Test ptt     Reason for Rejection see below    POCT occult blood stool   Result Value Ref Range    Occult Blood Fecal n     QC OK? y    EKG 12 Lead   Result Value Ref Range    Ventricular Rate 121 BPM    Atrial Rate 121 BPM    P-R Interval 144 ms    QRS Duration 84 ms    Q-T Interval 314 ms    QTc Calculation (Bazett) 445 ms    P Axis 71 degrees    R Axis 63 degrees    T Axis 24 degrees       As interpreted by me, the above displayed labs are abnormal. All other labs obtained during this visit were within normal range or not returned as of this dictation.         EKG #1 Interpretation  Interpreted by emergency department physician, Abhinav George,   Time: 12:13 PM EDT  Rhythm: sinus tachycardia  Rate: 121  Axis: normal  Conduction: normal  ST Segments: no acute change  T Waves: non specific changes  Clinical Impression: non-specific EKG and sinus tachycardia  Comparison to prior EKG: stable as compared to patient's most recent EKG except rate      RADIOLOGY:   Non-plain film images such as CT, Ultrasound and MRI are read by the radiologist. Plain radiographic images are visualized and preliminarily interpreted by the ED Provider with the below findings:    CXR: clear lungs    Interpretation per the Radiologist below, if available at the time of this note:    CT ABDOMEN PELVIS W IV CONTRAST Additional Contrast? None   Final Result   Unremarkable exam.  Specifically, no evidence of splenomegaly. No evidence of acute inflammatory process. CTA PULMONARY W CONTRAST   Final Result   No evidence of pulmonary embolism or acute pulmonary abnormality. RECOMMENDATIONS:   Unavailable         US DUP LOWER EXTREMITIES BILATERAL VENOUS   Final Result   No evidence of DVT in either lower extremity. XR CHEST PORTABLE   Final Result   No acute process. No results found. No results found. PROCEDURES   Unless otherwise noted below, none          CRITICAL CARE TIME (.cct)   none    PAST MEDICAL HISTORY/Chronic Conditions Affecting Care      has a past medical history of Abnormal Papanicolaou smear of cervix with positive human papilloma virus (HPV) test, Closed fracture of distal end of right fibula (01/03/2020), and Morbid obesity due to excess calories ([facility] [address]      EMERGENCY DEPARTMENT COURSE    Vitals:    Vitals:    01/19/23 1154 01/19/23 1203 01/19/23 1229 01/19/23 1359   BP: (!) 177/114   (!) 144/86   Pulse: (!) 132  (!) 116 (!) 114   Resp: 24   18   Temp: 98.3 °F (36.8 °C)      TempSrc: Oral      SpO2: 100%  100% 100%   Weight:  250 lb (113.4 kg)     Height:  5' 4\" (1.626 m)         Patient was given the following medications:  Medications   0.9 % sodium chloride bolus (has no administration in time range)   iopamidol (ISOVUE-370) 76 % injection 75 mL (75 mLs IntraVENous Given 1/19/23 1607)   ondansetron (ZOFRAN) injection 4 mg (4 mg IntraVENous Given 1/19/23 1653)   prochlorperazine (COMPAZINE) injection 10 mg (10 mg IntraVENous Given 1/19/23 1653)   diphenhydrAMINE (BENADRYL) injection 50 mg (50 mg IntraVENous Given 1/19/23 1653)             Is this patient to be included in the SEP-1 Core Measure due to severe sepsis or septic shock? No Exclusion criteria - the patient is NOT to be included for SEP-1 Core Measure due to: Infection is not suspected        Medical Decision Making/Differential Diagnosis:    CC/HPI Summary, Social Determinants of health, Records Reviewed, DDx, testing done/not done, ED Course, Reassessment, disposition considerations/shared decision making with patient, consults, disposition:            MDM:   Patient presents with shortness of breath, worse with exertion, chest pain and palpitations with tachycardia. She is persistently in sinus tachycardia here between 101 20s. Blood pressure normal to high reading. Hemoglobin noted to be today down to 7.7 from 12.8 with no obvious source of bleeding. She denies heavy menstrual periods and her last menstrual was 3 days and light. She denies gross hematuria though her urinalysis does show some blood in her urine. She denies melena, GI bleeding or hematemesis and her stool guaiac exam today is negative with soft light brown stool. She denies hemoptysis and the CTA is negative for pulmonary embolism or lung mass at this time. CT abdomen/pelvis negative for acute findings or splenomegaly. She will need to be admitted for further work-up of her symptomatic anemia and for potential blood transfusion. Patient understands and agrees with this plan, shared decision making was used today. She would also like something for nausea and her persistent headache that she has had for at least 6 weeks.   As per medical records she has had extensive imaging of her head done in the last week including MRI brain, MRV brain to rule out venous sinus thrombus, CT head, CT cervical spine, MRI cervical spine and lumbar puncture. Differential diagnosis includes: Knee, DVT, coronary artery event, pneumonia, pericardial effusion, CHF, viral syndrome, symptomatic anemia, acute renal insufficiency, to name a few. Re-Evaluations:  Time: 4:44 PM EST   Re-evaluation. Patients symptoms show no change  Repeat physical examination is not changed      CONSULTS: (Who and What was discussed)  16:34 PM EDT Spoke with Dr Manas Freeman, [facility] hospitalist, discussed case, accepts admission. See above      Counseling: The emergency provider has spoken with the patient and mother and discussed todays results, in addition to providing specific details for the plan of care and counseling regarding the diagnosis and prognosis. Questions are answered at this time and they are agreeable with the plan. I am the Primary Clinician of Record.     --------------------------------- IMPRESSION AND DISPOSITION ---------------------------------    IMPRESSION  1. Anemia, unspecified type    2. Tachycardia    3. Elevated blood pressure reading    4. Hematuria, unspecified type        DISPOSITION  Disposition: Admit to telemetry  Patient condition is stable    PATIENT REFERRED TO:  No follow-up provider specified.     DISCHARGE MEDICATIONS:  New Prescriptions    No medications on file       DISCONTINUED MEDICATIONS:  Discontinued Medications    No medications on file              (Please note that portions of this note were completed with a voice recognition program.  Efforts were made to edit the dictations but occasionally words are mis-transcribed.)    Brissa Reyes DO (electronically signed)           Brissa Reyes DO  01/19/23 9930

## 2023-01-20 PROBLEM — D64.9 ANEMIA: Status: ACTIVE | Noted: 2023-01-20

## 2023-01-20 LAB
ABO/RH: NORMAL
ABO/RH: NORMAL
ANTIBODY SCREEN: NORMAL
FERRITIN: 1270 NG/ML
HCT VFR BLD CALC: 20 % (ref 34–48)
HCT VFR BLD CALC: 21.8 % (ref 34–48)
HEMOGLOBIN: 7 G/DL (ref 11.5–15.5)
HEMOGLOBIN: 7.2 G/DL (ref 11.5–15.5)
IRON SATURATION: 83 % (ref 15–50)
IRON: 217 MCG/DL (ref 37–145)
METHYLMALONIC ACID: <0.1 UMOL/L (ref 0–0.4)
TOTAL IRON BINDING CAPACITY: 260 MCG/DL (ref 250–450)

## 2023-01-20 PROCEDURE — 36415 COLL VENOUS BLD VENIPUNCTURE: CPT

## 2023-01-20 PROCEDURE — 6370000000 HC RX 637 (ALT 250 FOR IP): Performed by: INTERNAL MEDICINE

## 2023-01-20 PROCEDURE — 2140000000 HC CCU INTERMEDIATE R&B

## 2023-01-20 PROCEDURE — 85014 HEMATOCRIT: CPT

## 2023-01-20 PROCEDURE — 85018 HEMOGLOBIN: CPT

## 2023-01-20 PROCEDURE — 86901 BLOOD TYPING SEROLOGIC RH(D): CPT

## 2023-01-20 PROCEDURE — P9016 RBC LEUKOCYTES REDUCED: HCPCS

## 2023-01-20 PROCEDURE — 86850 RBC ANTIBODY SCREEN: CPT

## 2023-01-20 PROCEDURE — 99221 1ST HOSP IP/OBS SF/LOW 40: CPT | Performed by: MIDWIFE

## 2023-01-20 PROCEDURE — 86923 COMPATIBILITY TEST ELECTRIC: CPT

## 2023-01-20 PROCEDURE — 83540 ASSAY OF IRON: CPT

## 2023-01-20 PROCEDURE — 82728 ASSAY OF FERRITIN: CPT

## 2023-01-20 PROCEDURE — 86900 BLOOD TYPING SEROLOGIC ABO: CPT

## 2023-01-20 PROCEDURE — 2580000003 HC RX 258: Performed by: INTERNAL MEDICINE

## 2023-01-20 PROCEDURE — 83550 IRON BINDING TEST: CPT

## 2023-01-20 RX ORDER — GABAPENTIN 600 MG/1
600 TABLET ORAL 2 TIMES DAILY
Status: DISCONTINUED | OUTPATIENT
Start: 2023-01-20 | End: 2023-01-20 | Stop reason: CLARIF

## 2023-01-20 RX ORDER — ONDANSETRON 4 MG/1
4 TABLET, ORALLY DISINTEGRATING ORAL EVERY 8 HOURS PRN
Status: DISCONTINUED | OUTPATIENT
Start: 2023-01-20 | End: 2023-01-22 | Stop reason: HOSPADM

## 2023-01-20 RX ORDER — ERYTHROMYCIN 5 MG/G
OINTMENT OPHTHALMIC EVERY 6 HOURS
Status: DISCONTINUED | OUTPATIENT
Start: 2023-01-20 | End: 2023-01-22 | Stop reason: HOSPADM

## 2023-01-20 RX ORDER — GABAPENTIN 300 MG/1
600 CAPSULE ORAL 2 TIMES DAILY
Status: DISCONTINUED | OUTPATIENT
Start: 2023-01-20 | End: 2023-01-22 | Stop reason: HOSPADM

## 2023-01-20 RX ORDER — ACETAMINOPHEN 325 MG/1
650 TABLET ORAL EVERY 6 HOURS PRN
Status: DISCONTINUED | OUTPATIENT
Start: 2023-01-20 | End: 2023-01-21

## 2023-01-20 RX ORDER — METHOCARBAMOL 500 MG/1
500 TABLET, FILM COATED ORAL 3 TIMES DAILY
Status: DISCONTINUED | OUTPATIENT
Start: 2023-01-20 | End: 2023-01-22 | Stop reason: HOSPADM

## 2023-01-20 RX ORDER — ACETAMINOPHEN 650 MG/1
650 SUPPOSITORY RECTAL EVERY 6 HOURS PRN
Status: DISCONTINUED | OUTPATIENT
Start: 2023-01-20 | End: 2023-01-21

## 2023-01-20 RX ORDER — OXYCODONE HYDROCHLORIDE 5 MG/1
5 TABLET ORAL EVERY 4 HOURS PRN
Status: DISCONTINUED | OUTPATIENT
Start: 2023-01-20 | End: 2023-01-22 | Stop reason: HOSPADM

## 2023-01-20 RX ORDER — LABETALOL HYDROCHLORIDE 5 MG/ML
10 INJECTION, SOLUTION INTRAVENOUS
Status: DISCONTINUED | OUTPATIENT
Start: 2023-01-20 | End: 2023-01-21

## 2023-01-20 RX ORDER — DIVALPROEX SODIUM 125 MG/1
125 CAPSULE, COATED PELLETS ORAL EVERY 12 HOURS SCHEDULED
Status: DISCONTINUED | OUTPATIENT
Start: 2023-01-20 | End: 2023-01-22 | Stop reason: HOSPADM

## 2023-01-20 RX ORDER — AMLODIPINE BESYLATE 5 MG/1
5 TABLET ORAL DAILY
Status: DISCONTINUED | OUTPATIENT
Start: 2023-01-20 | End: 2023-01-22

## 2023-01-20 RX ORDER — SODIUM CHLORIDE 0.9 % (FLUSH) 0.9 %
10 SYRINGE (ML) INJECTION EVERY 12 HOURS SCHEDULED
Status: DISCONTINUED | OUTPATIENT
Start: 2023-01-20 | End: 2023-01-22 | Stop reason: HOSPADM

## 2023-01-20 RX ORDER — LANOLIN ALCOHOL/MO/W.PET/CERES
3 CREAM (GRAM) TOPICAL NIGHTLY PRN
Status: DISCONTINUED | OUTPATIENT
Start: 2023-01-20 | End: 2023-01-21

## 2023-01-20 RX ORDER — ENOXAPARIN SODIUM 100 MG/ML
30 INJECTION SUBCUTANEOUS 2 TIMES DAILY
Status: DISCONTINUED | OUTPATIENT
Start: 2023-01-20 | End: 2023-01-22 | Stop reason: HOSPADM

## 2023-01-20 RX ORDER — ONDANSETRON 2 MG/ML
4 INJECTION INTRAMUSCULAR; INTRAVENOUS EVERY 6 HOURS PRN
Status: DISCONTINUED | OUTPATIENT
Start: 2023-01-20 | End: 2023-01-22 | Stop reason: HOSPADM

## 2023-01-20 RX ORDER — SODIUM CHLORIDE 9 MG/ML
INJECTION, SOLUTION INTRAVENOUS PRN
Status: DISCONTINUED | OUTPATIENT
Start: 2023-01-20 | End: 2023-01-22 | Stop reason: HOSPADM

## 2023-01-20 RX ORDER — SODIUM CHLORIDE 0.9 % (FLUSH) 0.9 %
10 SYRINGE (ML) INJECTION PRN
Status: DISCONTINUED | OUTPATIENT
Start: 2023-01-20 | End: 2023-01-22 | Stop reason: HOSPADM

## 2023-01-20 RX ADMIN — ERYTHROMYCIN: 5 OINTMENT OPHTHALMIC at 08:43

## 2023-01-20 RX ADMIN — GABAPENTIN 600 MG: 300 CAPSULE ORAL at 21:39

## 2023-01-20 RX ADMIN — ACETAMINOPHEN 650 MG: 325 TABLET ORAL at 05:26

## 2023-01-20 RX ADMIN — ERYTHROMYCIN: 5 OINTMENT OPHTHALMIC at 21:30

## 2023-01-20 RX ADMIN — METHOCARBAMOL 500 MG: 500 TABLET ORAL at 21:39

## 2023-01-20 RX ADMIN — ACETAMINOPHEN 650 MG: 325 TABLET ORAL at 11:36

## 2023-01-20 RX ADMIN — METHOCARBAMOL 500 MG: 500 TABLET ORAL at 16:47

## 2023-01-20 RX ADMIN — AMLODIPINE BESYLATE 5 MG: 5 TABLET ORAL at 05:26

## 2023-01-20 RX ADMIN — DIVALPROEX SODIUM 125 MG: 125 CAPSULE, COATED PELLETS ORAL at 21:39

## 2023-01-20 RX ADMIN — OXYCODONE 5 MG: 5 TABLET ORAL at 21:43

## 2023-01-20 RX ADMIN — DIVALPROEX SODIUM 125 MG: 125 CAPSULE, COATED PELLETS ORAL at 08:43

## 2023-01-20 RX ADMIN — GABAPENTIN 600 MG: 300 CAPSULE ORAL at 08:43

## 2023-01-20 RX ADMIN — OXYCODONE 5 MG: 5 TABLET ORAL at 13:43

## 2023-01-20 RX ADMIN — Medication 10 ML: at 08:44

## 2023-01-20 RX ADMIN — METHOCARBAMOL 500 MG: 500 TABLET ORAL at 08:43

## 2023-01-20 RX ADMIN — ERYTHROMYCIN: 5 OINTMENT OPHTHALMIC at 16:46

## 2023-01-20 ASSESSMENT — PAIN DESCRIPTION - FREQUENCY: FREQUENCY: CONTINUOUS

## 2023-01-20 ASSESSMENT — LIFESTYLE VARIABLES
HOW OFTEN DO YOU HAVE A DRINK CONTAINING ALCOHOL: NEVER
HOW MANY STANDARD DRINKS CONTAINING ALCOHOL DO YOU HAVE ON A TYPICAL DAY: PATIENT DOES NOT DRINK

## 2023-01-20 ASSESSMENT — PAIN SCALES - GENERAL
PAINLEVEL_OUTOF10: 8
PAINLEVEL_OUTOF10: 3
PAINLEVEL_OUTOF10: 7
PAINLEVEL_OUTOF10: 0
PAINLEVEL_OUTOF10: 6
PAINLEVEL_OUTOF10: 8

## 2023-01-20 ASSESSMENT — PAIN - FUNCTIONAL ASSESSMENT
PAIN_FUNCTIONAL_ASSESSMENT: NONE - DENIES PAIN
PAIN_FUNCTIONAL_ASSESSMENT: ACTIVITIES ARE NOT PREVENTED

## 2023-01-20 ASSESSMENT — PAIN DESCRIPTION - LOCATION
LOCATION: GENERALIZED
LOCATION: HEAD
LOCATION: GENERALIZED
LOCATION: HEAD;CHEST

## 2023-01-20 ASSESSMENT — PAIN SCALES - WONG BAKER
WONGBAKER_NUMERICALRESPONSE: 0
WONGBAKER_NUMERICALRESPONSE: 0

## 2023-01-20 ASSESSMENT — PAIN DESCRIPTION - DESCRIPTORS
DESCRIPTORS: CRUSHING;ACHING
DESCRIPTORS: ACHING
DESCRIPTORS: ACHING;PRESSURE;POUNDING
DESCRIPTORS: ACHING

## 2023-01-20 ASSESSMENT — PAIN DESCRIPTION - ORIENTATION
ORIENTATION: OTHER (COMMENT)
ORIENTATION: MID
ORIENTATION: OTHER (COMMENT)
ORIENTATION: MID

## 2023-01-20 ASSESSMENT — PAIN DESCRIPTION - ONSET
ONSET: ON-GOING
ONSET: ON-GOING

## 2023-01-20 ASSESSMENT — PAIN DESCRIPTION - PAIN TYPE: TYPE: ACUTE PAIN

## 2023-01-20 NOTE — PROGRESS NOTES
Patient heart rate increases to 150's immediately with ambulation from bed to bathroom. Patient can feel her heart rate increase, headache becomes worse and She states she feels like her legs and chest get heavy during the ambulation. Patient has concerns for her anxiety, states \" I have anxiety a lot during the day, but it is much worse at night. \" Once back in bed at rest, heart rate decreased to 110.

## 2023-01-20 NOTE — PLAN OF CARE
Problem: Discharge Planning  Goal: Discharge to home or other facility with appropriate resources  Outcome: Progressing  Flowsheets  Taken 1/20/2023 0344  Discharge to home or other facility with appropriate resources:   Identify barriers to discharge with patient and caregiver   Identify discharge learning needs (meds, wound care, etc)   Refer to discharge planning if patient needs post-hospital services based on physician order or complex needs related to functional status, cognitive ability or social support system   Arrange for needed discharge resources and transportation as appropriate   Arrange for interpreters to assist at discharge as needed  Taken 1/20/2023 0330  Discharge to home or other facility with appropriate resources: Identify barriers to discharge with patient and caregiver     Problem: Pain  Goal: Verbalizes/displays adequate comfort level or baseline comfort level  Outcome: Progressing  Flowsheets  Taken 1/20/2023 0530  Verbalizes/displays adequate comfort level or baseline comfort level: Encourage patient to monitor pain and request assistance  Taken 1/20/2023 0339  Verbalizes/displays adequate comfort level or baseline comfort level:   Encourage patient to monitor pain and request assistance   Assess pain using appropriate pain scale   Administer analgesics based on type and severity of pain and evaluate response   Implement non-pharmacological measures as appropriate and evaluate response   Consider cultural and social influences on pain and pain management   Notify Licensed Independent Practitioner if interventions unsuccessful or patient reports new pain  Taken 1/20/2023 0330  Verbalizes/displays adequate comfort level or baseline comfort level: Encourage patient to monitor pain and request assistance

## 2023-01-20 NOTE — PROGRESS NOTES
Consult Note            Date:2023        Patient Nesha Mcarthur     YOB: 1995     Age:27 y.o. Reason for Consult:  anemia, reported heavy menstrual bleeding    Chief Complaint     Chief Complaint   Patient presents with    Tachycardia     Just discharged from hospital on Monday. Was in for neurology consult. Fast heart rate started on Monday. +SOB, headache, and chest pain. Chest pain for a \"few days. \"  Headache for 5 weeks. History Obtained From   patient    History of Present Illness   32year old , non-pregnant, admitted to telemetry for tachycardia. Hgb found to be 7.7/21.9. Vitamin b12 and folate WNL on 1/10/23, ferritin WNL, normal TIBC, iron 217 and iron sat 83. Patient reports periods that are regular on COCs, last 3-5 days. Last period was light to  moderate flow, but November and December she was changing a pad every hour. Patient states pad is not saturated when she changes, she just changes it that often for comfort. Never diagnosed with Sickle Cell, etc.  Denies rectal bleeding. Does eat red meat. No other gyn problems. Past Medical History     Past Medical History:   Diagnosis Date    Abnormal Papanicolaou smear of cervix with positive human papilloma virus (HPV) test     Anemia 2023    Closed fracture of distal end of right fibula 2020    Morbid obesity due to excess calories [facility])         Past Surgical History     Past Surgical History:   Procedure Laterality Date    ANKLE SURGERY Right      SECTION      LEE  2017    UPPER GASTROINTESTINAL ENDOSCOPY N/A 2022    EGD BIOPSY performed by Christy Myers MD at [facility] 23        Medications     Prior to Admission medications    Medication Sig Start Date End Date Taking?  Authorizing Provider   vitamin B-12 1000 MCG tablet Take 1 tablet by mouth daily 23   [place] Standard, APRN - CNP   melatonin 3 MG TABS tablet Take 1 tablet by mouth nightly as needed (sleep) 1/16/23   TJ Jasso CNP   Rimegepant Sulfate 75 MG TBDP Take 75 mg by mouth daily as needed (For migraine treatment) 1/16/23   TJ Jasso CNP   erythromycin [facility]) 5 MG/GM ophthalmic ointment Place into the left eye every 6 hours for 7 days 1/16/23 1/23/23  TJ Jasso CNP   prochlorperazine (COMPAZINE) 10 MG tablet Take 1 tablet by mouth every 6 hours as needed (headache or nausea) 12/29/22   Skylar Stuart MD   ondansetron (ZOFRAN-ODT) 4 MG disintegrating tablet Take 1 tablet by mouth every 8 hours as needed for Nausea or Vomiting 12/29/22   Skylar Stuart MD   methocarbamol (ROBAXIN) 500 MG tablet take 1 tablet by mouth three times a day if needed 10/12/22   Historical Provider, MD   norethindrone-ethinyl estradiol ([address] [place] 1/20) 1-20 MG-MCG per tablet Take 1 tablet by mouth daily 11/3/22   Bryce Hooks MD   vitamin D (ERGOCALCIFEROL) 1.25 MG (41047 UT) CAPS capsule take 1 capsule by mouth every week 10/18/22   Emmie Cleary MD   tiZANidine (ZANAFLEX) 4 MG tablet take 1 tablet by mouth three times a day if needed for SPASM(S) 9/2/22   Historical Provider, MD   gabapentin (NEURONTIN) 600 MG tablet take 1 tablet by mouth twice a day 8/19/22 1/19/23  IRENA Mark        erythromycin [facility]) ophthalmic ointment, Q6H  melatonin tablet 3 mg, Nightly PRN  methocarbamol (ROBAXIN) tablet 500 mg, TID  amLODIPine (NORVASC) tablet 5 mg, Daily  divalproex (DEPAKOTE SPRINKLE) capsule 125 mg, 2 times per day  sodium chloride flush 0.9 % injection 10 mL, 2 times per day  sodium chloride flush 0.9 % injection 10 mL, PRN  0.9 % sodium chloride infusion, PRN  enoxaparin Sodium (LOVENOX) injection 30 mg, BID  ondansetron (ZOFRAN-ODT) disintegrating tablet 4 mg, Q8H PRN   Or  ondansetron (ZOFRAN) injection 4 mg, Q6H PRN  magnesium hydroxide (MILK OF MAGNESIA) 400 MG/5ML suspension 30 mL, Daily PRN  acetaminophen (TYLENOL) tablet 650 mg, Q6H PRN   Or  acetaminophen (TYLENOL) suppository 650 mg, Q6H PRN  labetalol (NORMODYNE;TRANDATE) injection 10 mg, Q3H PRN  gabapentin (NEURONTIN) capsule 600 mg, BID  oxyCODONE (ROXICODONE) immediate release tablet 5 mg, Q4H PRN        Allergies   Patient has no known allergies. Social History     Social History       Tobacco History       Smoking Status  Never      Smokeless Tobacco Use  Never              Alcohol History       Alcohol Use Status  No              Drug Use       Drug Use Status  No              Sexual Activity       Sexually Active  Not Asked                    Family History   History reviewed. No pertinent family history. Review of Systems   Tachycardia with exersion    Physical Exam   /67   Pulse (!) 114   Temp 98.2 °F (36.8 °C) (Oral)   Resp 20   Ht 5' 4\" (1.626 m)   Wt 250 lb (113.4 kg)   LMP 01/05/2023   SpO2 100%   BMI 42.91 kg/m²     CONSTITUTIONAL:  awake, alert, cooperative, no apparent distress, and appears stated age    Labs    CBC:  Recent Labs     01/19/23  1335   WBC 10.3   RBC 2.31*   HGB 7.7*   HCT 21.9*   MCV 94.8   RDW 20.6*        CHEMISTRIES:  Recent Labs     01/19/23  1513      K 4.2      CO2 22   BUN 13   CREATININE 0.7   GLUCOSE 102*   MG 1.7     PT/INR:  Recent Labs     01/19/23  1513   PROTIME 12.0   INR 1.1     APTT:No results for input(s): APTT in the last 72 hours. LIVER PROFILE:  Recent Labs     01/19/23  1513   AST 56*   ALT 12   BILITOT 1.8*   ALKPHOS 48       Imaging/Diagnostics   CT ABDOMEN PELVIS W IV CONTRAST Additional Contrast? None    Result Date: 1/19/2023  Unremarkable exam.  Specifically, no evidence of splenomegaly. No evidence of acute inflammatory process. XR CHEST PORTABLE    Result Date: 1/19/2023  No acute process. CTA PULMONARY W CONTRAST    Result Date: 1/19/2023  No evidence of pulmonary embolism or acute pulmonary abnormality.  RECOMMENDATIONS: Unavailable     US DUP LOWER EXTREMITIES BILATERAL VENOUS    Result Date: 1/19/2023  No evidence of DVT in either lower extremity. Assessment      Hospital Problems             Last Modified POA    * (Principal) Anemia 1/20/2023 Yes       Plan   1. US pelvis  2. Follow up at [facility] after discharge  3.   Discussed with Dr. Jasmyn Evans    Electronically signed by TJ Saez CNM on 1/20/23 at 1:17 PM EST

## 2023-01-20 NOTE — PROGRESS NOTES
Called OBGYN consult to Dr. Augie Jimenez. Spoke with  who stated that today is the last day he'll be in the office until 2/13. She will forward the information to the  and Dr. Bettie Kruse to review. Rcv'd call back from Dr. Peyton Bishop office. Consult need to go to In [facility]. 1030: Call placed to [facility]. Notified of new consult.

## 2023-01-20 NOTE — PLAN OF CARE
Problem: Discharge Planning  Goal: Discharge to home or other facility with appropriate resources  1/20/2023 0949 by Deborah Bernstein RN  Outcome: Progressing  1/20/2023 0535 by Latasha Boswell RN  Outcome: Progressing  Flowsheets  Taken 1/20/2023 0344  Discharge to home or other facility with appropriate resources:   Identify barriers to discharge with patient and caregiver   Identify discharge learning needs (meds, wound care, etc)   Refer to discharge planning if patient needs post-hospital services based on physician order or complex needs related to functional status, cognitive ability or social support system   Arrange for needed discharge resources and transportation as appropriate   Arrange for interpreters to assist at discharge as needed  Taken 1/20/2023 0330  Discharge to home or other facility with appropriate resources: Identify barriers to discharge with patient and caregiver     Problem: Pain  Goal: Verbalizes/displays adequate comfort level or baseline comfort level  1/20/2023 0949 by Deborah Bernstein RN  Outcome: Progressing  1/20/2023 0535 by Latasha Boswell RN  Outcome: Progressing  Flowsheets  Taken 1/20/2023 0530  Verbalizes/displays adequate comfort level or baseline comfort level: Encourage patient to monitor pain and request assistance  Taken 1/20/2023 0339  Verbalizes/displays adequate comfort level or baseline comfort level:   Encourage patient to monitor pain and request assistance   Assess pain using appropriate pain scale   Administer analgesics based on type and severity of pain and evaluate response   Implement non-pharmacological measures as appropriate and evaluate response   Consider cultural and social influences on pain and pain management   Notify Licensed Independent Practitioner if interventions unsuccessful or patient reports new pain  Taken 1/20/2023 0330  Verbalizes/displays adequate comfort level or baseline comfort level: Encourage patient to monitor pain and request  assistance

## 2023-01-20 NOTE — CONSULTS
NEUROLOGY CONSULT NOTE      Requesting Physician:  Colleen Russell MD    Reason for Consult:  Evaluate for headache, LP result    History of Present Illness:  Rahel Be is a 32 y.o. female  with h/o anemia, morbid obesity, and distal right fibular fracture who was admitted to [facility] on 1/19/2023 with presentation of tachycardia, chest pain, shortness of breath. Onset of symptoms was a week prior to presentation. Pain is described as pressure and is constant and non-radiating in quality that is worse with exertion. He was recent hospitalization for bilateral Bell's palsy with discharge about 3 days prior to presentation. She had a history of Bell's palsy 6 weeks prior and began developing slurred speech, headache, bilateral diplopia, and paresthesias in bilateral arms. There is also report of bilateral leg swelling of unclear etiology. Patient was treated with prednisone, acyclovir, and IVIG. Denies any shortness of breath, vision change, fever, chills, cough, gait/balance disturbance, or vision change. There is no specific precipitating event or factors. She denies any recent trauma. She did develop increased blood pressure during hospitalization with treatment with IV hydralazine. However, she was not discharged on blood pressure medications. Presenting blood pressure was 177/114 with heart rate of 132. She continues to be tachycardic and hypertensive. Patient is known to me from prior consultation on 1/8/2023 when she was seen for intractable stuttering and tremors over 2 to 3-day. At that time she also was experiencing bilateral upper extremity paresthesias with her bilateral Bell's palsy and horizontal diplopia. She was tachycardic on presentation but this resolved along with her elevated blood pressure. EEG study was done and was unremarkable. She was treated with IVIG for 5 days during this hospitalization and subsequently discharged neurology follow-up.   Patient did have MRI of brain and cervical spine done during this hospitalization were both negative for any significant abnormalities. LP also performed with no significant abnormalities found. Patient denies any problems with stuttering speech, diplopia, or significant facial weakness. Patient report problems with evolving anemia with last hospitalization. She states that after leaving the hospital earlier in the week she noticed a significant decrease in energy level. Her hemoglobin and hematocrit had been trending down. When she presented to to the emergency room in [facility] on 2023 it was noted that. Hemoglobin hematocrit have continued to trend downward. Hemoglobin on 2023 was 7.7 with prior value on 2023 of 9.9. Patient now reporting very poor energy level. She is also complaining of neck pain and headache pain. She denies any current problems with speech or swallow. There has been no report of any weakness, numbness, tingling, vertigo, vision change, or significant mental status change. Past Medical History:        Diagnosis Date    Abnormal Papanicolaou smear of cervix with positive human papilloma virus (HPV) test     Anemia 2023    Closed fracture of distal end of right fibula 2020    Morbid obesity due to excess calories [facility])            Procedure Laterality Date    ANKLE SURGERY Right      SECTION      LEE  2017    UPPER GASTROINTESTINAL ENDOSCOPY N/A 2022    EGD BIOPSY performed by Jaron Corrigan MD at [address] History:  Social History     Tobacco Use   Smoking Status Never   Smokeless Tobacco Never     Social History     Substance and Sexual Activity   Alcohol Use No     Social History     Substance and Sexual Activity   Drug Use No     Single    Family History:   History reviewed. No pertinent family history. Review of Systems:  All systems reviewed are negative except what is mentioned in history of present illness. Allergies:    No Known Allergies     Current Medications:   erythromycin (ROMYCIN) ophthalmic ointment, Q6H  melatonin tablet 3 mg, Nightly PRN  methocarbamol (ROBAXIN) tablet 500 mg, TID  amLODIPine (NORVASC) tablet 5 mg, Daily  divalproex (DEPAKOTE SPRINKLE) capsule 125 mg, 2 times per day  sodium chloride flush 0.9 % injection 10 mL, 2 times per day  sodium chloride flush 0.9 % injection 10 mL, PRN  0.9 % sodium chloride infusion, PRN  [Held by provider] enoxaparin Sodium (LOVENOX) injection 30 mg, BID  ondansetron (ZOFRAN-ODT) disintegrating tablet 4 mg, Q8H PRN   Or  ondansetron (ZOFRAN) injection 4 mg, Q6H PRN  magnesium hydroxide (MILK OF MAGNESIA) 400 MG/5ML suspension 30 mL, Daily PRN  acetaminophen (TYLENOL) tablet 650 mg, Q6H PRN   Or  acetaminophen (TYLENOL) suppository 650 mg, Q6H PRN  labetalol (NORMODYNE;TRANDATE) injection 10 mg, Q3H PRN  gabapentin (NEURONTIN) capsule 600 mg, BID  oxyCODONE (ROXICODONE) immediate release tablet 5 mg, Q4H PRN       Physical Exam:  BP (!) 155/79   Pulse (!) 116   Temp 98.3 °F (36.8 °C) (Oral)   Resp 20   Ht 5' 4\" (1.626 m)   Wt 250 lb (113.4 kg)   LMP 01/05/2023   SpO2 100%   BMI 42.91 kg/m²  I Body mass index is 42.91 kg/m². I   Wt Readings from Last 1 Encounters:   01/19/23 250 lb (113.4 kg)          HEENT: Normocephalic, atraumatic, no lesions or abnormalities noted. Neck:  supple with full ROM; crepitus with head movement; no masses, nodes or bruits; + cervical tenderness on palpation. Lungs:  clear to auscultation  bilaterally     CV: RRR without gallops or murmurs     Extremities: no c/c/e           Neurologic Exam   Mental Status:  Patient was alert, responsive, oriented, appropriate, answering questions, and following commands. Speech was fluent with normal sensorium and cognition. Cranial Nerves: Pupils were equal round and reactive to light and accommodation;    Visual fields were full on confrontation;     Extraocular movements were intact; no nystagmus; Intact facial sensation to temp, pinprick, and light touch; Symmetric facial movements with good lip and eye closure bilaterally; Hearing was intact; Patino was midline;    Normal palatal elevation with midline uvula  Trapezius strength of 5/5. Tongue was midline with no atrophy or fasciculations  Motor Exam:  Strength was 5/5 throughout; normal tone and bulk; no atrophy or fasiculations noted. Sensory Exam:  Normal sensation to light touch, pin-prick, vibration, and temperature; No sensory extinction. Cerebella Exam:  Intact finger-nose-finger, rapidly alternating movements, fine motor movements, and heel-down-shin maneuvers; No cerebellar rebound or drift; No tremors or abnormal movements. Gait:  Gait was not tested. Reflexes: normal and symmetric bilaterally; Babinski is negative    Labs:    CBC:   Recent Labs     01/19/23  1335 01/20/23  1221   WBC 10.3  --    HGB 7.7* 7.0*     --    MCV 94.8  --    MCH 33.3  --    MCHC 35.2*  --    RDW 20.6*  --      CMP:  Recent Labs     01/19/23  1513      K 4.2      CO2 22   BUN 13   CREATININE 0.7   LABGLOM >60   GLUCOSE 102*   CALCIUM 8.7     Liver:   Recent Labs     01/19/23  1513   AST 56*   ALT 12   ALKPHOS 48   PROT 9.5*   LABALBU 3.3*   BILITOT 1.8*     INR:   Recent Labs     01/19/23  1513   PROTIME 12.0   INR 1.1       ToxicologyNo results for input(s): PHENYTOIN, CARBTOT, PHENOBARB, VALPROATE, LAMOTRIG in the last 72 hours. Invalid input(s):  KEPPRA  No results for input(s): AMPMETHURSCR, BARBTQTU, BDZQTU, CANNABQUANT, COCMETQTU, OPIAU, PCPQUANT in the last 72 hours. Radiology:  XR CHEST (2 VW)    Result Date: 1/10/2023  EXAMINATION: TWO XRAY VIEWS OF THE CHEST 1/10/2023 3:24 pm COMPARISON: None. HISTORY: ORDERING SYSTEM PROVIDED HISTORY: eval for hilar lymphadenopathy.  Multiple cranial neuropathies, evaluating for sarcoid TECHNOLOGIST PROVIDED HISTORY: Reason for exam:->eval for hilar lymphadenopathy. Multiple cranial neuropathies, evaluating for sarcoid What reading provider will be dictating this exam?->CRC FINDINGS: The lungs are without acute focal process. There is no effusion or pneumothorax. The cardiomediastinal silhouette is without acute process. The osseous structures are without acute process. No acute process. CT HEAD WO CONTRAST    Result Date: 1/5/2023  EXAMINATION: CT OF THE HEAD WITHOUT CONTRAST  1/5/2023 2:26 pm TECHNIQUE: CT of the head was performed without the administration of intravenous contrast. Automated exposure control, iterative reconstruction, and/or weight based adjustment of the mA/kV was utilized to reduce the radiation dose to as low as reasonably achievable. COMPARISON: None. HISTORY: ORDERING SYSTEM PROVIDED HISTORY: difficulty speaking TECHNOLOGIST PROVIDED HISTORY: Has a \"code stroke\" or \"stroke alert\" been called? ->No Reason for exam:->difficulty speaking Decision Support Exception - unselect if not a suspected or confirmed emergency medical condition->Emergency Medical Condition (MA) What reading provider will be dictating this exam?->CRC FINDINGS: BRAIN/VENTRICLES: There is no acute intracranial hemorrhage, mass effect or midline shift. No abnormal extra-axial fluid collection. The gray-white differentiation is maintained without evidence of an acute infarct. There is no evidence of hydrocephalus. ORBITS: The visualized portion of the orbits demonstrate no acute abnormality. SINUSES: The visualized paranasal sinuses and mastoid air cells demonstrate no acute abnormality. SOFT TISSUES/SKULL:  No acute abnormality of the visualized skull or soft tissues. No acute intracranial abnormality.      CT HEAD WO CONTRAST    Result Date: 12/29/2022  EXAMINATION: CT OF THE HEAD WITHOUT CONTRAST  12/29/2022 11:19 am TECHNIQUE: CT of the head was performed without the administration of intravenous contrast. Automated exposure control, iterative reconstruction, and/or weight based adjustment of the mA/kV was utilized to reduce the radiation dose to as low as reasonably achievable. COMPARISON: 06/28/2021 HISTORY: ORDERING SYSTEM PROVIDED HISTORY: rihgt sided faical droop TECHNOLOGIST PROVIDED HISTORY: Has a \"code stroke\" or \"stroke alert\" been called? ->No Reason for exam:->rihgt sided faical droop Decision Support Exception - unselect if not a suspected or confirmed emergency medical condition->Emergency Medical Condition (MA) What reading provider will be dictating this exam?->CRC FINDINGS: BRAIN/VENTRICLES: There is no acute intracranial hemorrhage, mass effect or midline shift. No abnormal extra-axial fluid collection. The gray-white differentiation is maintained without evidence of an acute infarct. There is no evidence of hydrocephalus. ORBITS: The visualized portion of the orbits demonstrate no acute abnormality. SINUSES: The visualized paranasal sinuses and mastoid air cells demonstrate no acute abnormality. SOFT TISSUES/SKULL:  No acute abnormality of the visualized skull or soft tissues. No acute intracranial abnormality. CT CERVICAL SPINE WO CONTRAST    Result Date: 1/8/2023  EXAMINATION: CT OF THE CERVICAL SPINE WITHOUT CONTRAST 1/8/2023 3:55 pm TECHNIQUE: CT of the cervical spine was performed without the administration of intravenous contrast. Multiplanar reformatted images are provided for review. Automated exposure control, iterative reconstruction, and/or weight based adjustment of the mA/kV was utilized to reduce the radiation dose to as low as reasonably achievable. COMPARISON: None. HISTORY: ORDERING SYSTEM PROVIDED HISTORY: BUE paresthesias, r/o cervical stenosis TECHNOLOGIST PROVIDED HISTORY: Reason for exam:->BUE paresthesias, r/o cervical stenosis What reading provider will be dictating this exam?->CRC FINDINGS: BONES/ALIGNMENT: There is no acute fracture or traumatic malalignment.  DEGENERATIVE CHANGES: Mild degenerative changes are noted at C6-C7 with asymmetric disc bulge to the right side with effacement of the thecal sac, lateral recess and right neural foramina. Sherryle Moder SOFT TISSUES: There is no prevertebral soft tissue swelling. No acute fractures. Asymmetric disc bulge at C6-C7 to the right with effacement of thecal sac and lateral recess. Consider MRI for better assessment. MRI CERVICAL SPINE W WO CONTRAST    Result Date: 1/9/2023  EXAMINATION: MRI OF THE CERVICAL SPINE WITHOUT AND WITH CONTRAST; MRV OF THE HEAD WITHOUT CONTRAST  1/9/2023 3:59 pm: TECHNIQUE: Multiplanar multisequence MRI of the cervical spine was performed without and with the administration of intravenous contrast.; Multiplanar multisequence MRV of the head was performed without the administration of intravenous contrast. COMPARISON: CT cervical spine 01/08/2010, MRI brain 01/06/2023 HISTORY: ORDERING SYSTEM PROVIDED HISTORY: Abnormal CT c spine TECHNOLOGIST PROVIDED HISTORY: Reason for exam:-> abnormal CT c spine What is the sedation requirement?-> none What reading provider will be dictating this exam?->CRC FINDINGS: MRI CERVICAL SPINE: Bones: Vertebral heights and alignment are maintained. No suspicious marrow edema is seen. Paraspinal: No paraspinal soft tissue abnormality is seen. Intracranial: Unremarkable appearance of the visualized intracranial structures. Epidural space: No evidence of epidural collection. Cord: No evidence of cervical spinal cord signal abnormality. No abnormal enhancement. Disc Spaces: C2-3: No significant spinal or neuroforaminal stenosis are seen. C3-4: No significant spinal or neuroforaminal stenosis are seen. C4-5: No significant spinal or neuroforaminal stenosis are seen. Tiny disc osteophyte complex. C5-6: No significant spinal or neuroforaminal stenosis are seen. Small disc osteophyte complex. C6-7: No significant spinal or neuroforaminal stenosis are seen. Tiny disc osteophyte complex.  C7-T1: No significant spinal or neuroforaminal stenosis are seen. MRV HEAD No occlusion of the superior sagittal sinus, straight sinus, vein of Cosme, internal cerebral veins, torcula, transverse sinuses, sigmoid sinuses, or visualized internal jugular veins. Asymmetry of the transverse sinuses is a normal variant. CERVICAL SPINE: No fracture. No significant spinal or neuroforaminal stenosis are seen. MRV HEAD: No cerebral venous sinus thrombosis. RECOMMENDATIONS: Unavailable     CT ABDOMEN PELVIS W IV CONTRAST Additional Contrast? None    Result Date: 1/19/2023  EXAMINATION: CT OF THE ABDOMEN AND PELVIS WITH CONTRAST 1/19/2023 4:01 pm TECHNIQUE: CT of the abdomen and pelvis was performed with the administration of intravenous contrast. Multiplanar reformatted images are provided for review. Automated exposure control, iterative reconstruction, and/or weight based adjustment of the mA/kV was utilized to reduce the radiation dose to as low as reasonably achievable. COMPARISON: August 15, 2019 HISTORY: ORDERING SYSTEM PROVIDED HISTORY: anemia unknown cause TECHNOLOGIST PROVIDED HISTORY: Reason for exam:->anemia unknown cause Additional Contrast?->None Decision Support Exception - unselect if not a suspected or confirmed emergency medical condition->Emergency Medical Condition (MA) FINDINGS: Lower Chest: No infiltrates or pleural effusion. Organs: No focal liver lesions. Gallbladder is present with no calcified stones. Spleen is not enlarged. Pancreas and adrenal glands appear unremarkable. There is symmetric enhancement of the kidneys with no hydronephrosis. GI/Bowel: No obstructing or constricting lesions. Appendix is normal. Pelvis: Bladder is suboptimally distended with pseudo thickening of the wall. The uterus is present. There is no significant free fluid. Peritoneum/Retroperitoneum: No free air or significant adenopathy. The ureters are normal in course and caliber bilaterally. Bones/Soft Tissues: Unremarkable.      Unremarkable exam.  Specifically, no evidence of splenomegaly. No evidence of acute inflammatory process. XR CHEST PORTABLE    Result Date: 1/19/2023  EXAMINATION: ONE XRAY VIEW OF THE CHEST 1/19/2023 1:06 pm COMPARISON: 01/10/2023 HISTORY: ORDERING SYSTEM PROVIDED HISTORY: tachycardia, pain TECHNOLOGIST PROVIDED HISTORY: Reason for exam:->tachycardia, pain FINDINGS: The lungs are without acute focal process. There is no effusion or pneumothorax. The cardiomediastinal silhouette is without acute process. The osseous structures are without acute process. No acute process. CTA NECK W CONTRAST    Result Date: 1/11/2023  EXAMINATION: CTV OF THE HEAD WITH CONTRAST; CTA OF THE NECK 1/11/2023 11:16 am TECHNIQUE: CT venogram of the head/brain was performed with the administration of intravenous contrast. Multiplanar reformatted images are provided for review. MIP images are provided for review. Automated exposure control, iterative reconstruction, and/or weight based adjustment of the mA/kV was utilized to reduce the radiation dose to as low as reasonably achievable.; CTA of the neck was performed with the administration of intravenous contrast. Multiplanar reformatted images are provided for review. MIP images are provided for review. Stenosis of the internal carotid arteries measured using NASCET criteria. Automated exposure control, iterative reconstruction, and/or weight based adjustment of the mA/kV was utilized to reduce the radiation dose to as low as reasonably achievable. COMPARISON: None.  HISTORY: ORDERING SYSTEM PROVIDED HISTORY: multiple cranial neuropathies, rule out cerebral venous thrombosis TECHNOLOGIST PROVIDED HISTORY: Reason for exam:->multiple cranial neuropathies, rule out cerebral venous thrombosis What reading provider will be dictating this exam?->CRC; ORDERING SYSTEM PROVIDED HISTORY: eval for CVT, please include venous phase to eval left jugular vein TECHNOLOGIST PROVIDED HISTORY: Reason for exam:->eval for CVT, please include venous phase to eval left jugular vein Has a \"code stroke\" or \"stroke alert\" been called? ->No What reading provider will be dictating this exam?->CRC FINDINGS: AORTIC ARCH/ARCH VESSELS: No dissection or arterial injury. No significant stenosis of the brachiocephalic or subclavian arteries. CAROTID ARTERIES: No dissection, arterial injury, or hemodynamically significant stenosis by NASCET criteria. VERTEBRAL ARTERIES: No dissection, arterial injury, or significant stenosis. SOFT TISSUES: The lung apices are clear. No cervical or superior mediastinal lymphadenopathy. The larynx and pharynx are unremarkable. No acute abnormality of the salivary and thyroid glands. There is a probable mildly complicated cyst within the medial aspect right breast measuring 1.7 cm. BONES: No acute osseous abnormality. The anterior and posterior cerebral arterial circulations are unremarkable in appearance. CT VENOGRAM: The superior sagittal sinus, inferior sagittal sinus, straight sinus, transverse sinuses, and sigmoid sinuses are patent. No venous sinus thrombosis or significant stenosis. The left transverse sinus and left internal jugular vein are hypoplastic. Unremarkable CTA of the neck. No evidence of intracranial deep vein thrombosis. The left transverse sinus and left internal jugular vein are hypoplastic. CTA PULMONARY W CONTRAST    Result Date: 1/19/2023  EXAMINATION: CTA OF THE CHEST 1/19/2023 3:53 pm TECHNIQUE: CTA of the chest was performed after the administration of intravenous contrast.  Multiplanar reformatted images are provided for review. MIP images are provided for review. Automated exposure control, iterative reconstruction, and/or weight based adjustment of the mA/kV was utilized to reduce the radiation dose to as low as reasonably achievable. COMPARISON: None.  HISTORY: ORDERING SYSTEM PROVIDED HISTORY: rule out PE TECHNOLOGIST PROVIDED HISTORY: Reason for exam:->rule out PE Decision Support Exception - unselect if not a suspected or confirmed emergency medical condition->Emergency Medical Condition (MA) FINDINGS: Pulmonary Arteries: Pulmonary arteries are adequately opacified for evaluation. No evidence of intraluminal filling defect to suggest pulmonary embolism. Main pulmonary artery is normal in caliber. Mediastinum: No evidence of mediastinal lymphadenopathy. The heart and pericardium demonstrate no acute abnormality. There is no acute abnormality of the thoracic aorta. Lungs/pleura: The lungs are without acute process. No focal consolidation or pulmonary edema. No evidence of pleural effusion or pneumothorax. Upper Abdomen: Limited images of the upper abdomen are unremarkable. Soft Tissues/Bones: No acute bone or soft tissue abnormality. No evidence of pulmonary embolism or acute pulmonary abnormality. RECOMMENDATIONS: Unavailable     MRV HEAD WO CONTRAST    Result Date: 1/9/2023  EXAMINATION: MRI OF THE CERVICAL SPINE WITHOUT AND WITH CONTRAST; MRV OF THE HEAD WITHOUT CONTRAST  1/9/2023 3:59 pm: TECHNIQUE: Multiplanar multisequence MRI of the cervical spine was performed without and with the administration of intravenous contrast.; Multiplanar multisequence MRV of the head was performed without the administration of intravenous contrast. COMPARISON: CT cervical spine 01/08/2010, MRI brain 01/06/2023 HISTORY: ORDERING SYSTEM PROVIDED HISTORY: Abnormal CT c spine TECHNOLOGIST PROVIDED HISTORY: Reason for exam:-> abnormal CT c spine What is the sedation requirement?-> none What reading provider will be dictating this exam?->CRC FINDINGS: MRI CERVICAL SPINE: Bones: Vertebral heights and alignment are maintained. No suspicious marrow edema is seen. Paraspinal: No paraspinal soft tissue abnormality is seen. Intracranial: Unremarkable appearance of the visualized intracranial structures. Epidural space: No evidence of epidural collection.  Cord: No evidence of cervical spinal cord signal abnormality. No abnormal enhancement. Disc Spaces: C2-3: No significant spinal or neuroforaminal stenosis are seen. C3-4: No significant spinal or neuroforaminal stenosis are seen. C4-5: No significant spinal or neuroforaminal stenosis are seen. Tiny disc osteophyte complex. C5-6: No significant spinal or neuroforaminal stenosis are seen. Small disc osteophyte complex. C6-7: No significant spinal or neuroforaminal stenosis are seen. Tiny disc osteophyte complex. C7-T1: No significant spinal or neuroforaminal stenosis are seen. MRV HEAD No occlusion of the superior sagittal sinus, straight sinus, vein of Cosme, internal cerebral veins, torcula, transverse sinuses, sigmoid sinuses, or visualized internal jugular veins. Asymmetry of the transverse sinuses is a normal variant. CERVICAL SPINE: No fracture. No significant spinal or neuroforaminal stenosis are seen. MRV HEAD: No cerebral venous sinus thrombosis. RECOMMENDATIONS: Unavailable     MRI BRAIN W WO CONTRAST    Result Date: 1/6/2023  EXAMINATION: MRI OF THE BRAIN WITHOUT AND WITH CONTRAST  1/6/2023 1:47 pm TECHNIQUE: Multiplanar multisequence MRI of the head/brain was performed without and with the administration of intravenous contrast. COMPARISON: CT brain performed 01/05/2023. HISTORY: ORDERING SYSTEM PROVIDED HISTORY: facial palsy, slurred speech, r/o CVA TECHNOLOGIST PROVIDED HISTORY: Reason for exam:->facial palsy, slurred speech, r/o CVA What is the sedation requirement?->None Decision Support Exception - unselect if not a suspected or confirmed emergency medical condition->Emergency Medical Condition (MA) What reading provider will be dictating this exam?->CRC FINDINGS: INTRACRANIAL STRUCTURES/VENTRICLES:  The sellar and suprasellar structures, optic chiasm, corpus callosum, pineal gland, tectum, and midline brainstem structures are unremarkable. The craniocervical junction is unremarkable.  There is no acute hemorrhage, mass effect, or midline shift. There is satisfactory overall gray-white matter differentiation. The ventricular structures are symmetric and unremarkable. The infratentorial structures including the cerebellopontine angles and internal auditory canals are unremarkable. There is no abnormal restricted diffusion. There is no abnormal blooming artifact on susceptibility weighted imaging. No abnormal postcontrast enhancement. ORBITS: The visualized portion of the orbits demonstrate no acute abnormality. SINUSES: The visualized paranasal sinuses and mastoid air cells demonstrate no acute abnormality. BONES/SOFT TISSUES: The bone marrow signal intensity appears normal. The soft tissues demonstrate no acute abnormality. Unremarkable pre and post-contrast MRI of the brain. FL LUMBAR PUNCTURE DIAG    Result Date: 1/10/2023  EXAMINATION: FLUOROSCOPIC GUIDED LUMBAR PUNCTURE 1/10/2023 10:32 am HISTORY: ORDERING SYSTEM PROVIDED HISTORY: bilateral facial palsy TECHNOLOGIST PROVIDED HISTORY: Reason for exam:->bilateral facial palsy What reading provider will be dictating this exam?->CRC FLUOROSCOPY DOSE AND TYPE OR TIME AND EXPOSURES: Fluoroscopy time 0.6 minutes. Air kerma 39 mGy PROCEDURE: :  Kar Verma MD Informed consent was obtained after the risks and benefits of the procedure were discussed with the patient and all questions were answered fully. [place] protocol was observed and a standard timeout was performed. The patient was positioned prone and the back was prepped and draped in the normal sterile fashion. 1% lidocaine was used for local anesthesia. The subarachnoid space was accessed with a 20-gauge 6 \"spinal needle at the L2/L3 level. Free flow of clear CSF was noted. Approximately 16 ml of clear, colorless CSF was removed and sent for analysis. The stylet was reinserted, spinal needle was removed and brief pressure was applied at the puncture site.  There were no immediate complications and the patient tolerated the procedure well.     Successful fluoroscopic-guided lumbar puncture.     US DUP LOWER EXTREMITIES BILATERAL VENOUS    Result Date: 1/19/2023  EXAMINATION: DUPLEX VENOUS ULTRASOUND OF THE BILATERAL LOWER EXTREMITIES1/19/2023 2:39 pm TECHNIQUE: Duplex ultrasound using B-mode/gray scaled imaging, Doppler spectral analysis and color flow Doppler was obtained of the deep venous structures of the lower bilateral extremities. COMPARISON: None. HISTORY: ORDERING SYSTEM PROVIDED HISTORY: edema TECHNOLOGIST PROVIDED HISTORY: Reason for exam:->edema What reading provider will be dictating this exam?->CRC FINDINGS: The visualized veins of the bilateral lower extremities are patent and free of echogenic thrombus. The veins demonstrate good compressibility with normal color flow study and spectral analysis.     No evidence of DVT in either lower extremity.     CTA VENOUS HEAD W WO CONTRAST    Result Date: 1/11/2023  EXAMINATION: CTV OF THE HEAD WITH CONTRAST; CTA OF THE NECK 1/11/2023 11:16 am TECHNIQUE: CT venogram of the head/brain was performed with the administration of intravenous contrast. Multiplanar reformatted images are provided for review. MIP images are provided for review. Automated exposure control, iterative reconstruction, and/or weight based adjustment of the mA/kV was utilized to reduce the radiation dose to as low as reasonably achievable.; CTA of the neck was performed with the administration of intravenous contrast. Multiplanar reformatted images are provided for review.  MIP images are provided for review. Stenosis of the internal carotid arteries measured using NASCET criteria. Automated exposure control, iterative reconstruction, and/or weight based adjustment of the mA/kV was utilized to reduce the radiation dose to as low as reasonably achievable. COMPARISON: None. HISTORY: ORDERING SYSTEM PROVIDED HISTORY: multiple cranial neuropathies, rule out cerebral venous  thrombosis TECHNOLOGIST PROVIDED HISTORY: Reason for exam:->multiple cranial neuropathies, rule out cerebral venous thrombosis What reading provider will be dictating this exam?->CRC; ORDERING SYSTEM PROVIDED HISTORY: eval for CVT, please include venous phase to eval left jugular vein TECHNOLOGIST PROVIDED HISTORY: Reason for exam:->eval for CVT, please include venous phase to eval left jugular vein Has a \"code stroke\" or \"stroke alert\" been called? ->No What reading provider will be dictating this exam?->CRC FINDINGS: AORTIC ARCH/ARCH VESSELS: No dissection or arterial injury. No significant stenosis of the brachiocephalic or subclavian arteries. CAROTID ARTERIES: No dissection, arterial injury, or hemodynamically significant stenosis by NASCET criteria. VERTEBRAL ARTERIES: No dissection, arterial injury, or significant stenosis. SOFT TISSUES: The lung apices are clear. No cervical or superior mediastinal lymphadenopathy. The larynx and pharynx are unremarkable. No acute abnormality of the salivary and thyroid glands. There is a probable mildly complicated cyst within the medial aspect right breast measuring 1.7 cm. BONES: No acute osseous abnormality. The anterior and posterior cerebral arterial circulations are unremarkable in appearance. CT VENOGRAM: The superior sagittal sinus, inferior sagittal sinus, straight sinus, transverse sinuses, and sigmoid sinuses are patent. No venous sinus thrombosis or significant stenosis. The left transverse sinus and left internal jugular vein are hypoplastic. Unremarkable CTA of the neck. No evidence of intracranial deep vein thrombosis. The left transverse sinus and left internal jugular vein are hypoplastic. The patient's records from referring provider and available information in the EHR was reviewed.       Impression:  Headache: Description of oral pain with associated neck pain and stiffness along with crepitus in neck suggests likely cervical DJD and associated tension headache from cervicogenic origin. We will proceed with low-dose NSAID therapy with further ongoing management with muscle relaxant and physical therapy. Patient will be placed on Fioricet as needed in the hospital for headache symptoms. Anemia: Patient on review of labs showing progressive decline in hemoglobin and hematocrit over the last 1 to 2 weeks. Patient now refusing blood transfusion with ongoing work-up for anemia. This would be a significant contributor to patient's poor energy level and function. Cervicalgia with no significant arthropathy there is abnormalities found along; done 1/9/2023. History of bilateral Bell's palsy, stuttering speech, and diplopia concerning for possible CNS pathology involving other cranial nerves, brainstem as well as intracerebral abnormality. Ridging several negative for any significant pathology. We will proceed with CT of the cervical spine to assess for possible cervical spine pathology. Principal Problem:    Anemia  Resolved Problems:    * No resolved hospital problems. *      Recommendations: Toradol 60 mg IV times 1 with Phenergan 25 mg IV x1. Robaxin 1000 mg p.o. 3 times daily  Evaluation and treatment anemia. No indication for neurologic work-up at this time and will be happy to follow-up in outpatient neurology once current medical issues are resolved. .     Please reconsult if further need. It was my pleasure to evaluate Elena Mannie today. Please call with questions.       Electronically signed by Stephanie Espinosa MD on 1/20/2023 at 4:02 PM

## 2023-01-21 ENCOUNTER — APPOINTMENT (OUTPATIENT)
Dept: ULTRASOUND IMAGING | Age: 28
DRG: 760 | End: 2023-01-21
Payer: COMMERCIAL

## 2023-01-21 PROBLEM — F41.9 ANXIETY AND DEPRESSION: Status: ACTIVE | Noted: 2023-01-21

## 2023-01-21 PROBLEM — N93.8 DYSFUNCTIONAL UTERINE BLEEDING: Status: ACTIVE | Noted: 2023-01-21

## 2023-01-21 PROBLEM — F32.A ANXIETY AND DEPRESSION: Status: ACTIVE | Noted: 2023-01-21

## 2023-01-21 LAB
ALBUMIN SERPL-MCNC: 2.8 G/DL (ref 3.5–5.2)
ALP BLD-CCNC: 40 U/L (ref 35–104)
ALT SERPL-CCNC: 17 U/L (ref 0–32)
ANION GAP SERPL CALCULATED.3IONS-SCNC: 10 MMOL/L (ref 7–16)
AST SERPL-CCNC: 72 U/L (ref 0–31)
BILIRUB SERPL-MCNC: 1.1 MG/DL (ref 0–1.2)
BLOOD BANK DISPENSE STATUS: NORMAL
BLOOD BANK PRODUCT CODE: NORMAL
BPU ID: NORMAL
BUN BLDV-MCNC: 10 MG/DL (ref 6–20)
CALCIUM SERPL-MCNC: 8.1 MG/DL (ref 8.6–10.2)
CHLORIDE BLD-SCNC: 102 MMOL/L (ref 98–107)
CO2: 22 MMOL/L (ref 22–29)
CREAT SERPL-MCNC: 0.8 MG/DL (ref 0.5–1)
DESCRIPTION BLOOD BANK: NORMAL
GFR SERPL CREATININE-BSD FRML MDRD: >60 ML/MIN/1.73
GLUCOSE BLD-MCNC: 97 MG/DL (ref 74–99)
POTASSIUM REFLEX MAGNESIUM: 5 MMOL/L (ref 3.5–5)
REASON FOR REJECTION: NORMAL
REJECTED TEST: NORMAL
SODIUM BLD-SCNC: 134 MMOL/L (ref 132–146)
TOTAL PROTEIN: 8.5 G/DL (ref 6.4–8.3)

## 2023-01-21 PROCEDURE — 6370000000 HC RX 637 (ALT 250 FOR IP): Performed by: INTERNAL MEDICINE

## 2023-01-21 PROCEDURE — 2580000003 HC RX 258: Performed by: INTERNAL MEDICINE

## 2023-01-21 PROCEDURE — 76856 US EXAM PELVIC COMPLETE: CPT

## 2023-01-21 PROCEDURE — 76856 US EXAM PELVIC COMPLETE: CPT | Performed by: RADIOLOGY

## 2023-01-21 PROCEDURE — 2140000000 HC CCU INTERMEDIATE R&B

## 2023-01-21 PROCEDURE — 36430 TRANSFUSION BLD/BLD COMPNT: CPT

## 2023-01-21 PROCEDURE — 80053 COMPREHEN METABOLIC PANEL: CPT

## 2023-01-21 PROCEDURE — 36415 COLL VENOUS BLD VENIPUNCTURE: CPT

## 2023-01-21 RX ORDER — ACETAMINOPHEN 650 MG/1
650 SUPPOSITORY RECTAL EVERY 6 HOURS PRN
Status: DISCONTINUED | OUTPATIENT
Start: 2023-01-21 | End: 2023-01-21

## 2023-01-21 RX ORDER — SODIUM CHLORIDE, SODIUM LACTATE, POTASSIUM CHLORIDE, CALCIUM CHLORIDE 600; 310; 30; 20 MG/100ML; MG/100ML; MG/100ML; MG/100ML
INJECTION, SOLUTION INTRAVENOUS CONTINUOUS
Status: DISCONTINUED | OUTPATIENT
Start: 2023-01-21 | End: 2023-01-22 | Stop reason: HOSPADM

## 2023-01-21 RX ORDER — MECOBALAMIN 5000 MCG
5 TABLET,DISINTEGRATING ORAL NIGHTLY
Status: DISCONTINUED | OUTPATIENT
Start: 2023-01-21 | End: 2023-01-22 | Stop reason: HOSPADM

## 2023-01-21 RX ORDER — SODIUM CHLORIDE 9 MG/ML
INJECTION, SOLUTION INTRAVENOUS PRN
Status: DISCONTINUED | OUTPATIENT
Start: 2023-01-21 | End: 2023-01-21

## 2023-01-21 RX ORDER — POLYETHYLENE GLYCOL 3350 17 G/17G
17 POWDER, FOR SOLUTION ORAL 2 TIMES DAILY
Status: DISCONTINUED | OUTPATIENT
Start: 2023-01-21 | End: 2023-01-22 | Stop reason: HOSPADM

## 2023-01-21 RX ORDER — ACETAMINOPHEN 500 MG
1000 TABLET ORAL 4 TIMES DAILY
Status: DISCONTINUED | OUTPATIENT
Start: 2023-01-21 | End: 2023-01-22 | Stop reason: HOSPADM

## 2023-01-21 RX ORDER — CLONIDINE HYDROCHLORIDE 0.1 MG/1
0.1 TABLET ORAL EVERY 4 HOURS PRN
Status: DISCONTINUED | OUTPATIENT
Start: 2023-01-21 | End: 2023-01-22 | Stop reason: HOSPADM

## 2023-01-21 RX ORDER — MECOBALAMIN 5000 MCG
5 TABLET,DISINTEGRATING ORAL NIGHTLY PRN
Status: DISCONTINUED | OUTPATIENT
Start: 2023-01-21 | End: 2023-01-22 | Stop reason: HOSPADM

## 2023-01-21 RX ORDER — FERROUS SULFATE 325(65) MG
325 TABLET ORAL
Qty: 180 TABLET | Refills: 0 | Status: SHIPPED | OUTPATIENT
Start: 2023-01-21 | End: 2023-01-27

## 2023-01-21 RX ORDER — ACETAMINOPHEN 650 MG/1
650 SUPPOSITORY RECTAL 4 TIMES DAILY
Status: DISCONTINUED | OUTPATIENT
Start: 2023-01-21 | End: 2023-01-22

## 2023-01-21 RX ORDER — SENNA AND DOCUSATE SODIUM 50; 8.6 MG/1; MG/1
2 TABLET, FILM COATED ORAL EVERY EVENING
Status: DISCONTINUED | OUTPATIENT
Start: 2023-01-21 | End: 2023-01-22 | Stop reason: HOSPADM

## 2023-01-21 RX ORDER — ACETAMINOPHEN 500 MG
1000 TABLET ORAL EVERY 6 HOURS PRN
Status: DISCONTINUED | OUTPATIENT
Start: 2023-01-21 | End: 2023-01-21

## 2023-01-21 RX ORDER — FERROUS SULFATE 325(65) MG
325 TABLET ORAL
Qty: 30 TABLET | Refills: 0 | Status: SHIPPED | OUTPATIENT
Start: 2023-01-21

## 2023-01-21 RX ORDER — SENNA AND DOCUSATE SODIUM 50; 8.6 MG/1; MG/1
2 TABLET, FILM COATED ORAL 2 TIMES DAILY PRN
Status: DISCONTINUED | OUTPATIENT
Start: 2023-01-21 | End: 2023-01-22 | Stop reason: HOSPADM

## 2023-01-21 RX ADMIN — OXYCODONE 5 MG: 5 TABLET ORAL at 16:41

## 2023-01-21 RX ADMIN — ERYTHROMYCIN: 5 OINTMENT OPHTHALMIC at 10:10

## 2023-01-21 RX ADMIN — DIVALPROEX SODIUM 125 MG: 125 CAPSULE, COATED PELLETS ORAL at 20:58

## 2023-01-21 RX ADMIN — DICLOFENAC SODIUM 4 G: 10 GEL TOPICAL at 16:41

## 2023-01-21 RX ADMIN — METHOCARBAMOL 500 MG: 500 TABLET ORAL at 20:58

## 2023-01-21 RX ADMIN — METHOCARBAMOL 500 MG: 500 TABLET ORAL at 10:05

## 2023-01-21 RX ADMIN — GABAPENTIN 600 MG: 300 CAPSULE ORAL at 20:57

## 2023-01-21 RX ADMIN — GABAPENTIN 600 MG: 300 CAPSULE ORAL at 10:05

## 2023-01-21 RX ADMIN — Medication 10 ML: at 10:09

## 2023-01-21 RX ADMIN — OXYCODONE 5 MG: 5 TABLET ORAL at 22:20

## 2023-01-21 RX ADMIN — ERYTHROMYCIN: 5 OINTMENT OPHTHALMIC at 04:00

## 2023-01-21 RX ADMIN — Medication 5 MG: at 20:57

## 2023-01-21 RX ADMIN — METHOCARBAMOL 500 MG: 500 TABLET ORAL at 16:41

## 2023-01-21 RX ADMIN — SODIUM CHLORIDE, POTASSIUM CHLORIDE, SODIUM LACTATE AND CALCIUM CHLORIDE: 600; 310; 30; 20 INJECTION, SOLUTION INTRAVENOUS at 18:44

## 2023-01-21 RX ADMIN — ACETAMINOPHEN 1000 MG: 500 TABLET, FILM COATED ORAL at 20:58

## 2023-01-21 RX ADMIN — Medication 10 ML: at 00:38

## 2023-01-21 RX ADMIN — AMLODIPINE BESYLATE 5 MG: 5 TABLET ORAL at 10:05

## 2023-01-21 RX ADMIN — DIVALPROEX SODIUM 125 MG: 125 CAPSULE, COATED PELLETS ORAL at 10:05

## 2023-01-21 RX ADMIN — DILTIAZEM HYDROCHLORIDE 30 MG: 30 TABLET, FILM COATED ORAL at 18:43

## 2023-01-21 ASSESSMENT — PAIN SCALES - GENERAL
PAINLEVEL_OUTOF10: 4
PAINLEVEL_OUTOF10: 7
PAINLEVEL_OUTOF10: 8
PAINLEVEL_OUTOF10: 7
PAINLEVEL_OUTOF10: 6

## 2023-01-21 ASSESSMENT — PAIN DESCRIPTION - DESCRIPTORS
DESCRIPTORS: CRUSHING;ACHING;DISCOMFORT
DESCRIPTORS: SHOOTING;TIGHTNESS
DESCRIPTORS: ACHING;DISCOMFORT;HEAVINESS

## 2023-01-21 ASSESSMENT — PAIN DESCRIPTION - ORIENTATION
ORIENTATION: UPPER
ORIENTATION: MID

## 2023-01-21 ASSESSMENT — PAIN DESCRIPTION - LOCATION
LOCATION: BACK;CHEST
LOCATION: OTHER (COMMENT)
LOCATION: HEAD;BACK

## 2023-01-21 ASSESSMENT — PAIN DESCRIPTION - PAIN TYPE: TYPE: ACUTE PAIN

## 2023-01-21 NOTE — PLAN OF CARE
Problem: Discharge Planning  Goal: Discharge to home or other facility with appropriate resources  1/21/2023 1317 by Ronni Goode RN  Outcome: Progressing  1/21/2023 0110 by Luis Silva RN  Outcome: Progressing     Problem: Pain  Goal: Verbalizes/displays adequate comfort level or baseline comfort level  1/21/2023 1317 by Ronni Goode RN  Outcome: Progressing  1/21/2023 0110 by Luis Silva RN  Outcome: Progressing     Problem: Cardiovascular - Adult  Goal: Maintains optimal cardiac output and hemodynamic stability  Outcome: Progressing  Goal: Absence of cardiac dysrhythmias or at baseline  Outcome: Progressing

## 2023-01-21 NOTE — PROGRESS NOTES
Spoke with Ultrasound, patient is on the list for later this evening. The test may or may not be done today.

## 2023-01-21 NOTE — PROGRESS NOTES
Hospitalist Progress Note            Patient: Lorri Knowles Age: 32 y.o.   DOA: 1/19/2023 Admit Dx / CC: Elevated blood pressure reading [R03.0]  Tachycardia [R00.0]  Anemia, unspecified type [D64.9]  Hematuria, unspecified type [R31.9]  Anemia [D64.9]   LOS:  LOS: 1 day      Assessment/ Plan:     Acute on chronic anemia 2/2 dysfunctional uterine bleeding  H/H stable  IV iron  1 U PRBC today  Pelvic US can be done as op if not completed by discharge  Gynecology consulted and recommend OP f/u    Tachycardia  Hold Norvasc  Start cardizem  IVF overnight     GERD  not on any meds. Recent history of Bell's palsy, stable  was recently treated with IVIG and steroids.       Morbid Obesity, BMI 42    DVT ppx:  SCDs  Code status: Full code    Plan discharge home tomorrow    Plan of care discussed with: patient and bedside RN    Patient Active Problem List   Diagnosis    Labor and delivery, indication for care    Normal pregnancy, antepartum    Closed fracture of distal end of right fibula    Gastroesophageal reflux disease without esophagitis    Morbid obesity ([facility] [address]    AMS (altered mental status)    Seizure-like activity (HCC)    Headache    Anemia    Dysfunctional uterine bleeding    Anxiety and depression        Medications:  Reviewed    Infusion Medications    lactated ringers IV soln      sodium chloride       Scheduled Medications    ferric gluconate (FERRLECIT) IVPB  125 mg IntraVENous Once    acetaminophen  1,000 mg Oral 4x daily    Or    acetaminophen  650 mg Rectal 4x daily    diclofenac sodium  4 g Topical BID    dilTIAZem  30 mg Oral 4 times per day    erythromycin   Left Eye Q6H    methocarbamol  500 mg Oral TID    [Held by provider] amLODIPine  5 mg Oral Daily    divalproex  125 mg Oral 2 times per day    sodium chloride flush  10 mL IntraVENous 2 times per day    [Held by provider] enoxaparin  30 mg SubCUTAneous BID    gabapentin  600 mg Oral BID     PRN Meds: melatonin, sodium chloride flush, sodium chloride, ondansetron **OR** ondansetron, magnesium hydroxide, labetalol, oxyCODONE    I/O    Intake/Output Summary (Last 24 hours) at 1/21/2023 1817  Last data filed at 1/21/2023 1700  Gross per 24 hour   Intake 384.5 ml   Output --   Net 384.5 ml       Labs:   Recent Labs     01/19/23  1335 01/20/23  1221 01/20/23  1929   WBC 10.3  --   --    HGB 7.7* 7.0* 7.2*   HCT 21.9* 20.0* 21.8*     --   --        Recent Labs     01/19/23  1513 01/21/23  0655    134   K 4.2 5.0    102   CO2 22 22   BUN 13 10   CREATININE 0.7 0.8   CALCIUM 8.7 8.1*       Recent Labs     01/19/23  1513 01/21/23  0655   PROT 9.5* 8.5*   ALKPHOS 48 40   ALT 12 17   AST 56* 72*   BILITOT 1.8* 1.1       Recent Labs     01/19/23  1513   INR 1.1       No results for input(s): Gloria Perez in the last 72 hours. Chronic labs:  Lab Results   Component Value Date    CHOL 123 01/07/2023    TRIG 122 01/07/2023    HDL 51 01/07/2023    LDLCALC 48 01/07/2023    TSH 4.430 (H) 01/07/2023    INR 1.1 01/19/2023    LABA1C 5.5 01/07/2023       Radiology:  Imaging studies reviewed today. Subjective:     [facility] c/o dizzy and SOB also chronic neck and back pain    Objective:     Physical Exam:   /80   Pulse 93   Temp 98.4 °F (36.9 °C) (Oral)   Resp 16   Ht 5' 4\" (1.626 m)   Wt 250 lb (113.4 kg)   LMP 01/05/2023   SpO2 95%   BMI 42.91 kg/m²     General appearance:in no distress   Lungs: Clear to auscultation bilaterally, no wheezing or crackles   Heart: Regular rate and rhythm, S1, S2 normal   Abdomen: Soft, non-tender and not-distended. Bowel sounds normal.   Extremities: no edema   Skin: Skin color, texture, turgor normal. No rashes or lesions   Neurologic: Grossly normal and non focal, CN II-XII intact.  Flat affect and slow to answer questions      Yue Kaplan MD   Hospitalist Service   01/21/23 6:17 PM

## 2023-01-21 NOTE — CARE COORDINATION
Social Work Discharge Planning:  Discharge order noted. SW spoke with nursing, no needs identified at this time.    Electronically signed by KEIRA Herring on 1/21/2023 at 8:28 AM

## 2023-01-21 NOTE — PROGRESS NOTES
Spoke with Dr. Katiana Turner. Notified her of patients HR still in the high 130s when up ambulating even after blood transfusion. Wants to hold discharge overnight. See MAR for new orders.

## 2023-01-21 NOTE — PROGRESS NOTES
Chart reviewed and current findings noted. Patient currently sleeping with no finding to warrant awakening patient for acute evaluation.  Consult to be completed in the am.

## 2023-01-22 VITALS
TEMPERATURE: 98.3 F | SYSTOLIC BLOOD PRESSURE: 126 MMHG | HEIGHT: 64 IN | WEIGHT: 251.56 LBS | OXYGEN SATURATION: 100 % | HEART RATE: 94 BPM | BODY MASS INDEX: 42.95 KG/M2 | DIASTOLIC BLOOD PRESSURE: 83 MMHG | RESPIRATION RATE: 16 BRPM

## 2023-01-22 PROBLEM — Z76.5 DRUG-SEEKING BEHAVIOR: Status: RESOLVED | Noted: 2023-01-22 | Resolved: 2023-01-22

## 2023-01-22 PROBLEM — R00.0 CHRONIC TACHYCARDIA: Status: ACTIVE | Noted: 2023-01-22

## 2023-01-22 PROBLEM — I47.11 INAPPROPRIATE SINUS TACHYCARDIA: Status: ACTIVE | Noted: 2023-01-22

## 2023-01-22 PROBLEM — R00.0 INAPPROPRIATE SINUS TACHYCARDIA: Status: ACTIVE | Noted: 2023-01-22

## 2023-01-22 PROBLEM — Z76.5 DRUG-SEEKING BEHAVIOR: Status: ACTIVE | Noted: 2023-01-22

## 2023-01-22 PROBLEM — Z76.5 MALINGERING: Status: ACTIVE | Noted: 2023-01-22

## 2023-01-22 LAB
ALBUMIN SERPL-MCNC: 3 G/DL (ref 3.5–5.2)
ALP BLD-CCNC: 51 U/L (ref 35–104)
ALT SERPL-CCNC: 16 U/L (ref 0–32)
AMPHETAMINE SCREEN, URINE: NOT DETECTED
ANION GAP SERPL CALCULATED.3IONS-SCNC: 10 MMOL/L (ref 7–16)
AST SERPL-CCNC: 41 U/L (ref 0–31)
BARBITURATE SCREEN URINE: NOT DETECTED
BENZODIAZEPINE SCREEN, URINE: NOT DETECTED
BILIRUB SERPL-MCNC: 1 MG/DL (ref 0–1.2)
BUN BLDV-MCNC: 10 MG/DL (ref 6–20)
CALCIUM SERPL-MCNC: 8.4 MG/DL (ref 8.6–10.2)
CANNABINOID SCREEN URINE: NOT DETECTED
CHLORIDE BLD-SCNC: 101 MMOL/L (ref 98–107)
CO2: 23 MMOL/L (ref 22–29)
COCAINE METABOLITE SCREEN URINE: NOT DETECTED
CREAT SERPL-MCNC: 0.8 MG/DL (ref 0.5–1)
FENTANYL SCREEN, URINE: NOT DETECTED
FOLATE: 11.5 NG/ML (ref 4.8–24.2)
GFR SERPL CREATININE-BSD FRML MDRD: >60 ML/MIN/1.73
GLUCOSE BLD-MCNC: 93 MG/DL (ref 74–99)
HCT VFR BLD CALC: 23.3 % (ref 34–48)
HCT VFR BLD CALC: 26.4 % (ref 34–48)
HEMOGLOBIN: 7.8 G/DL (ref 11.5–15.5)
HEMOGLOBIN: 8.9 G/DL (ref 11.5–15.5)
Lab: ABNORMAL
MAGNESIUM: 2 MG/DL (ref 1.6–2.6)
METHADONE SCREEN, URINE: NOT DETECTED
OPIATE SCREEN URINE: NOT DETECTED
OXYCODONE URINE: POSITIVE
PHENCYCLIDINE SCREEN URINE: NOT DETECTED
POTASSIUM SERPL-SCNC: 4.1 MMOL/L (ref 3.5–5)
SODIUM BLD-SCNC: 134 MMOL/L (ref 132–146)
TOTAL PROTEIN: 8.5 G/DL (ref 6.4–8.3)
TSH SERPL DL<=0.05 MIU/L-ACNC: 7.39 UIU/ML (ref 0.27–4.2)
VITAMIN B-12: 725 PG/ML (ref 211–946)
VITAMIN D 25-HYDROXY: 40 NG/ML (ref 30–100)

## 2023-01-22 PROCEDURE — 85018 HEMOGLOBIN: CPT

## 2023-01-22 PROCEDURE — 80053 COMPREHEN METABOLIC PANEL: CPT

## 2023-01-22 PROCEDURE — 36415 COLL VENOUS BLD VENIPUNCTURE: CPT

## 2023-01-22 PROCEDURE — 6360000002 HC RX W HCPCS: Performed by: INTERNAL MEDICINE

## 2023-01-22 PROCEDURE — 82607 VITAMIN B-12: CPT

## 2023-01-22 PROCEDURE — 84443 ASSAY THYROID STIM HORMONE: CPT

## 2023-01-22 PROCEDURE — 82306 VITAMIN D 25 HYDROXY: CPT

## 2023-01-22 PROCEDURE — 6370000000 HC RX 637 (ALT 250 FOR IP): Performed by: INTERNAL MEDICINE

## 2023-01-22 PROCEDURE — 80307 DRUG TEST PRSMV CHEM ANLYZR: CPT

## 2023-01-22 PROCEDURE — 83735 ASSAY OF MAGNESIUM: CPT

## 2023-01-22 PROCEDURE — 2580000003 HC RX 258: Performed by: INTERNAL MEDICINE

## 2023-01-22 PROCEDURE — 82746 ASSAY OF FOLIC ACID SERUM: CPT

## 2023-01-22 PROCEDURE — 85014 HEMATOCRIT: CPT

## 2023-01-22 RX ORDER — PANTOPRAZOLE SODIUM 40 MG/1
40 TABLET, DELAYED RELEASE ORAL
Qty: 30 TABLET | Refills: 0 | Status: SHIPPED | OUTPATIENT
Start: 2023-01-22

## 2023-01-22 RX ORDER — PANTOPRAZOLE SODIUM 40 MG/1
40 TABLET, DELAYED RELEASE ORAL
Status: DISCONTINUED | OUTPATIENT
Start: 2023-01-22 | End: 2023-01-22 | Stop reason: HOSPADM

## 2023-01-22 RX ORDER — DILTIAZEM HYDROCHLORIDE 120 MG/1
120 CAPSULE, COATED, EXTENDED RELEASE ORAL DAILY
Status: DISCONTINUED | OUTPATIENT
Start: 2023-01-22 | End: 2023-01-22 | Stop reason: HOSPADM

## 2023-01-22 RX ORDER — DILTIAZEM HYDROCHLORIDE 120 MG/1
120 CAPSULE, COATED, EXTENDED RELEASE ORAL DAILY
Qty: 30 CAPSULE | Refills: 0 | Status: SHIPPED | OUTPATIENT
Start: 2023-01-22

## 2023-01-22 RX ADMIN — SENNOSIDES AND DOCUSATE SODIUM 2 TABLET: 50; 8.6 TABLET ORAL at 11:54

## 2023-01-22 RX ADMIN — DIVALPROEX SODIUM 125 MG: 125 CAPSULE, COATED PELLETS ORAL at 09:33

## 2023-01-22 RX ADMIN — DICLOFENAC SODIUM 4 G: 10 GEL TOPICAL at 09:34

## 2023-01-22 RX ADMIN — ONDANSETRON 4 MG: 2 INJECTION INTRAMUSCULAR; INTRAVENOUS at 00:51

## 2023-01-22 RX ADMIN — METHOCARBAMOL 500 MG: 500 TABLET ORAL at 09:33

## 2023-01-22 RX ADMIN — DILTIAZEM HYDROCHLORIDE 120 MG: 120 CAPSULE, COATED, EXTENDED RELEASE ORAL at 11:55

## 2023-01-22 RX ADMIN — SODIUM CHLORIDE 125 MG: 9 INJECTION, SOLUTION INTRAVENOUS at 09:39

## 2023-01-22 RX ADMIN — PANTOPRAZOLE SODIUM 40 MG: 40 TABLET, DELAYED RELEASE ORAL at 09:33

## 2023-01-22 RX ADMIN — DICLOFENAC SODIUM 4 G: 10 GEL TOPICAL at 00:48

## 2023-01-22 RX ADMIN — DILTIAZEM HYDROCHLORIDE 30 MG: 30 TABLET, FILM COATED ORAL at 00:47

## 2023-01-22 RX ADMIN — OXYCODONE 5 MG: 5 TABLET ORAL at 09:46

## 2023-01-22 RX ADMIN — ERYTHROMYCIN: 5 OINTMENT OPHTHALMIC at 00:48

## 2023-01-22 RX ADMIN — GABAPENTIN 600 MG: 300 CAPSULE ORAL at 09:33

## 2023-01-22 RX ADMIN — ONDANSETRON 4 MG: 4 TABLET, ORALLY DISINTEGRATING ORAL at 11:08

## 2023-01-22 RX ADMIN — DILTIAZEM HYDROCHLORIDE 30 MG: 30 TABLET, FILM COATED ORAL at 06:06

## 2023-01-22 RX ADMIN — ERYTHROMYCIN: 5 OINTMENT OPHTHALMIC at 06:40

## 2023-01-22 RX ADMIN — ERYTHROMYCIN: 5 OINTMENT OPHTHALMIC at 09:35

## 2023-01-22 ASSESSMENT — PAIN SCALES - GENERAL
PAINLEVEL_OUTOF10: 7
PAINLEVEL_OUTOF10: 3

## 2023-01-22 ASSESSMENT — PAIN DESCRIPTION - DESCRIPTORS: DESCRIPTORS: ACHING;DISCOMFORT;NAGGING

## 2023-01-22 ASSESSMENT — PAIN DESCRIPTION - ORIENTATION: ORIENTATION: LOWER;MID

## 2023-01-22 ASSESSMENT — PAIN DESCRIPTION - LOCATION: LOCATION: BACK;NECK;HEAD

## 2023-01-22 NOTE — PLAN OF CARE
Problem: Discharge Planning  Goal: Discharge to home or other facility with appropriate resources  1/22/2023 1600 by Delmi Padilla RN  Outcome: Completed  1/22/2023 1559 by Delmi Padilla RN  Outcome: Progressing  1/22/2023 0403 by Angela Quigley RN  Outcome: Progressing     Problem: Pain  Goal: Verbalizes/displays adequate comfort level or baseline comfort level  1/22/2023 1600 by Delmi Padilla RN  Outcome: Completed  1/22/2023 1559 by Delmi Padilla RN  Outcome: Progressing  1/22/2023 0403 by Angela Quigley RN  Outcome: Progressing     Problem: Cardiovascular - Adult  Goal: Maintains optimal cardiac output and hemodynamic stability  1/22/2023 1600 by Delmi Padilla RN  Outcome: Completed  1/22/2023 1559 by Delmi Padilla RN  Outcome: Progressing  1/22/2023 0403 by Angela Quigley RN  Outcome: Progressing  Goal: Absence of cardiac dysrhythmias or at baseline  1/22/2023 1600 by Delmi Padilla RN  Outcome: Completed  1/22/2023 1559 by Delmi Padilla RN  Outcome: Progressing  1/22/2023 0403 by Angela Quigley RN  Outcome: Progressing     Problem: Safety - Adult  Goal: Free from fall injury  1/22/2023 1600 by Delmi Padilla RN  Outcome: Completed  1/22/2023 1559 by Delmi Padilla RN  Outcome: Progressing  1/22/2023 0403 by Angela Quigley RN  Outcome: Progressing

## 2023-01-22 NOTE — DISCHARGE SUMMARY
Patient: Aye Wan Sex: female DOA: 1/19/2023   YOB: 1995 Age: 32 y.o. LOS:  LOS: 2 days    Admit Date: 1/19/2023   Discharge Date: 01/22/23   Primary Care Physician: Merry Calles DO   Discharge to: home with support  Readmission risk: Moderate Risk    Hospital admission:  Per HPI:\" 32 y.o. female who presented to [facility] with a complaint of shortness of breath, palpitations and substernal chest pain which have been going on for few days. Patient was recently discharged from the hospital about 3 days prior to this presentation. During the previous admission she was managed for bilateral Bell's palsy and had CT of the brain, MRI of the brain and cervical spine as well as MRV and lumbar puncture all of which were unremarkable and nondiagnostic. She completed a course of IVIG and prednisone as well as acyclovir and was discharged home. Also at home started experiencing the above-mentioned symptoms which she states she could feel blood rushing to her head and was aware of her heartbeat in the ears. She also had some episode of chest pain and lightheadedness as well as weakness and easy fatigability. She complained of some mild abdominal pain but denied any dark stools or blood in her stool. She denied vomiting any blood. She has not had symptoms like this before. Patient said she used to be on Nexplanon and stopped it in the middle of 2022. She said her periods normalize but for the last few months her periods have been abnormal and the flow has also been abnormal with her sometimes having heavy periods in the first couple of days. She said her last period was very light and lasted just about 1 to 2 days. She also admits to some episodic breakthrough bleeding. Review of symptoms otherwise negative. She was noted to be tachycardic in the ED and blood pressure was also elevated. Stool for occult blood done was negative.   Labs done showed hemoglobin of 7.7 with WBC of 10.3 and platelets of 440. Her hemoglobin at time of discharge was around 12. Urinalysis showed moderate bacteria and moderate blood. Pregnancy test was negative and INR was 1.1. Chemistry was essentially unremarkable. EKG shows sinus tachycardia with heart rate of 121. CT of the abdomen and pelvis was read as unremarkable and CT of the chest was negative for any PE. Duplex ultrasound of the lower extremities Was negative for any DVT. She has been admitted to be managed for acute anemia. Probable cause being dysfunctional uterine bleeding. [facility] Course and discharge assessment with plan:     Acute on chronic anemia 2/2 dysfunctional uterine bleeding  No signs of GIB and FOBT 1/19 negative  CT abd/pelvis on admission unremarkable  H/H stable  S/p IV iron  S/p 1 U PRBC  Start PPI  Gynecology consulted and recommend OP f/u     GERD  not on any meds. Recent history of Bell's palsy, stable  was recently treated with IVIG and steroids. Was seen by neurology this admission    Morbid Obesity, BMI 42    Inappropriate sinus tachycardia  Started CCB and needs further titration as OP  Advised pt to f/u with PCP for further titration as OP     Discharge plan of care was discussed with: pt, RN    US pelvis:  Impression   Nonvisualized ovaries. Otherwise, transabdominal sonographic appearance of the uterus and adnexa are   within normal limits. CT;  FINDINGS:   Lower Chest: No infiltrates or pleural effusion. Organs: No focal liver lesions. Gallbladder is present with no calcified   stones. Spleen is not enlarged. Pancreas and adrenal glands appear   unremarkable. There is symmetric enhancement of the kidneys with no   hydronephrosis. GI/Bowel: No obstructing or constricting lesions. Appendix is normal.       Pelvis: Bladder is suboptimally distended with pseudo thickening of the wall. The uterus is present. There is no significant free fluid. Peritoneum/Retroperitoneum: No free air or significant adenopathy. The   ureters are normal in course and caliber bilaterally. Bones/Soft Tissues: Unremarkable. Discharge Diagnoses:   Patient Active Problem List   Diagnosis    Labor and delivery, indication for care    Normal pregnancy, antepartum    Closed fracture of distal end of right fibula    Gastroesophageal reflux disease without esophagitis    Morbid obesity ([facility] [address]    AMS (altered mental status)    Seizure-like activity (HCC)    Headache    Anemia    Dysfunctional uterine bleeding    Anxiety and depression    Chronic tachycardia    Inappropriate sinus tachycardia    Malingering       Discharge Medications:   Current Discharge Medication List             Details   dilTIAZem (CARDIZEM CD) 120 MG extended release capsule Take 1 capsule by mouth daily  Qty: 30 capsule, Refills: 0      pantoprazole (PROTONIX) 40 MG tablet Take 1 tablet by mouth every morning (before breakfast)  Qty: 30 tablet, Refills: 0      !! ferrous sulfate (IRON 325) 325 (65 Fe) MG tablet Take 1 tablet by mouth daily (with breakfast)  Qty: 30 tablet, Refills: 0      diclofenac sodium (VOLTAREN) 1 % GEL Apply 4 g topically 2 times daily  Qty: 100 g, Refills: 0      !! ferrous sulfate (IRON 325) 325 (65 Fe) MG tablet Take 1 tablet by mouth daily (with breakfast)  Qty: 180 tablet, Refills: 0       !! - Potential duplicate medications found. Please discuss with provider.              Details   vitamin B-12 1000 MCG tablet Take 1 tablet by mouth daily  Qty: 30 tablet, Refills: 3      melatonin 3 MG TABS tablet Take 1 tablet by mouth nightly as needed (sleep)  Qty: 7 tablet, Refills: 0      Rimegepant Sulfate 75 MG TBDP Take 75 mg by mouth daily as needed (For migraine treatment)  Qty: 7 tablet, Refills: 0      erythromycin (ROMYCIN) 5 MG/GM ophthalmic ointment Place into the left eye every 6 hours for 7 days  Qty: 3.5 g, Refills: 0      prochlorperazine (COMPAZINE) 10 MG tablet Take 1 tablet by mouth every 6 hours as needed (headache or nausea)  Qty: 30 tablet, Refills: 0      ondansetron (ZOFRAN-ODT) 4 MG disintegrating tablet Take 1 tablet by mouth every 8 hours as needed for Nausea or Vomiting  Qty: 21 tablet, Refills: 0      methocarbamol (ROBAXIN) 500 MG tablet take 1 tablet by mouth three times a day if needed      norethindrone-ethinyl estradiol (LOESTRIN FE 1/20) 1-20 MG-MCG per tablet Take 1 tablet by mouth daily  Qty: 1 packet, Refills: 11    Associated Diagnoses: Medication refill      vitamin D (ERGOCALCIFEROL) 1.25 MG (84525 UT) CAPS capsule take 1 capsule by mouth every week  Qty: 12 capsule, Refills: 1      tiZANidine (ZANAFLEX) 4 MG tablet take 1 tablet by mouth three times a day if needed for SPASM(S)      gabapentin (NEURONTIN) 600 MG tablet take 1 tablet by mouth twice a day  Qty: 90 tablet, Refills: 0    Associated Diagnoses: Chronic bilateral low back pain with bilateral sciatica; Lumbar degenerative disc disease              Follow-up: PCP and GYN     Discharge Condition: stable    Discharge instruction:   Patient instructed to return to the emergency department if: Chest pain, shortness of breath, altered mental status, fever, chills, nausea, vomiting, abdominal pain or any other concerns. Activity: progressive as tolerated. Diet: regular diet     Wound Care: none needed     Consults: GYN, neurology       Discharge Physical Exam:   /83   Pulse 94   Temp 98.3 °F (36.8 °C) (Oral)   Resp 16   Ht 5' 4\" (1.626 m)   Wt 251 lb 9 oz (114.1 kg)   LMP 01/05/2023   SpO2 100%   BMI 43.18 kg/m²     General appearance: Alert, cooperative, no distress, appears stated age   Head: Normocephalic, without obvious abnormality, atraumatic   Neck: Supple, no lymphadenopathy or thyromegaly, trachea midline   Lungs: Clear to auscultation bilaterally, no wheezing or crackles   Heart: Regular rate and rhythm, S1, S2 normal   Abdomen: Soft, non-tender and not-distended.  Bowel sounds normal.   Extremities: Extremities normal, atraumatic, no cyanosis or edema   Skin: Skin color, texture, turgor normal. No rashes or lesions   Neurologic: AAOx3 CN I-XII intact, normal muscle tone and power, normal sensation       Lebron Correa MD   01/22/23 2:23 PM    Hospital Medicine   Time Spent: 55 min negative - no chest pain

## 2023-01-22 NOTE — PLAN OF CARE
Problem: Discharge Planning  Goal: Discharge to home or other facility with appropriate resources  Outcome: Progressing     Problem: Pain  Goal: Verbalizes/displays adequate comfort level or baseline comfort level  Outcome: Progressing     Problem: Cardiovascular - Adult  Goal: Maintains optimal cardiac output and hemodynamic stability  Outcome: Progressing  Goal: Absence of cardiac dysrhythmias or at baseline  Outcome: Progressing     Problem: Safety - Adult  Goal: Free from fall injury  Outcome: Progressing

## 2023-01-22 NOTE — PLAN OF CARE
Problem: Discharge Planning  Goal: Discharge to home or other facility with appropriate resources  1/22/2023 1559 by Bolivar Ortega RN  Outcome: Progressing  1/22/2023 0403 by Dru Florez RN  Outcome: Progressing     Problem: Pain  Goal: Verbalizes/displays adequate comfort level or baseline comfort level  1/22/2023 1559 by Bolivar Ortega RN  Outcome: Progressing  1/22/2023 0403 by Dru Florez RN  Outcome: Progressing     Problem: Cardiovascular - Adult  Goal: Maintains optimal cardiac output and hemodynamic stability  1/22/2023 1559 by Bolivar Ortega RN  Outcome: Progressing  1/22/2023 0403 by Dru Florez RN  Outcome: Progressing  Goal: Absence of cardiac dysrhythmias or at baseline  1/22/2023 1559 by Bolivar Ortega RN  Outcome: Progressing  1/22/2023 0403 by Dru Florez RN  Outcome: Progressing     Problem: Safety - Adult  Goal: Free from fall injury  1/22/2023 1559 by Bolivar Ortega RN  Outcome: Progressing  1/22/2023 0403 by Dru Florez RN  Outcome: Progressing

## 2023-01-24 LAB
BLOOD CULTURE, ROUTINE: NORMAL
CULTURE, BLOOD 2: NORMAL

## 2023-01-25 ENCOUNTER — CARE COORDINATION (OUTPATIENT)
Dept: OTHER | Facility: CLINIC | Age: 28
End: 2023-01-25

## 2023-01-25 NOTE — CARE COORDINATION
Ambulatory Care Coordination Note  1/25/2023    ACC: Rad Estrada, RN    ACM attempted to reach patient for introduction to Associate Care Management related to admissions for AMS and anemia. HIPAA compliant message left requesting a return phone call. Will attempt to outreach patient again.          Future Appointments   Date Time Provider Alfonzo Ramires   2/13/2023 10:20 AM MD NELLY Loco [place]   11/7/2023 10:00 AM MD NELLY Loco [place]

## 2023-01-27 ENCOUNTER — CARE COORDINATION (OUTPATIENT)
Dept: OTHER | Facility: CLINIC | Age: 28
End: 2023-01-27

## 2023-01-27 RX ORDER — ACETAMINOPHEN 500 MG
1000 TABLET ORAL EVERY 6 HOURS PRN
COMMUNITY

## 2023-01-27 NOTE — CARE COORDINATION
Ambulatory Care Coordination Note  2023    ACC: Justin Kauffman, RN    Ambulatory Care Manager [facility]) contacted the patient by telephone to follow up on progress, discuss new issues or concerns, and reinforce/ provide patient education. Verified name and  with patient as identifiers. This ACM outreached patient due to ED visit on 22 for Bell's Palsy, admission on 23-23 for AMS and admission on 23-23 for Dysfunctional Uterine Bleeding. Patient agreeable to care coordination. Patient continues to report intermittent chest pain and palpitations. Patient is monitoring heart rate and blood pressure at home. Patient reports most recent heart rate of 111 and blood pressure 140/90 at rest.  Patient c/o dizziness; discussed changing positions slowly. Patient also reports generalized weakness with intermittent numbness and tingling to arms and legs. Patient completed follow up with Dr. Gladys Linton on 23; received referral to Hematology at [facility]. No referral from Primary Care to Neurology at this time. Patient is awaiting call from [facility] to schedule appt. Patient did receive a transfusion while admitted. Patient c/o constant fatigue. Patient has appt with Dr. Alex Ryan on 23 and [facility] on 23. Patient reports shortness of breath when ambulating. Patient denies falls. Patient reports daily headache that is consistent and never really goes away. Patient reports with Nurtec, headache provides a half day of relief. ACM discussed RED FLAGS and encouraged patient to contact 911 for life threatening emergencies and PCP office  for non life-threatening symptoms. Discussed live health online. Patient reports has all meds in home and is compliant with meds. Patient is hydrating with water, lemonade and tea, reports urine is lightening up. Patient reports appetite \"comes and goes. \"  Patient encouraged to eat small frequent meals. Patient reports mother is helping financially as well as patient received tax refund. Patient agreeable to referral to [facility]. Patient's mother is also helping with children. Patient has applied for fmla, last day worked 1/5/23. Patient reports tentative return to work date is March 2023. Patient has [employer] as secondary insurance. Reinforced/ Provided Education:  Discussed red flags and appropriate site of care based on symptoms and resources available to patient including: PCP  Specialist  Benefits related nurse triage line  When to call [address] . Importance and benefits of: Follow up with PCP and specialist, medication adherence, self monitoring and reporting of symptoms. Plan:  Continue every other week outreaches to provide telephonic support, education and resources as needed. Plan for next call: symptom management-headache, weakness, numbness/tingling, dizziness, tachycardia, fatigue, chest pain, shortness of breath   self management-blood pressure, heart rate  follow up appointment-Any appts scheduled  medication management-Medication changes  And Goal progress     Patient denies any further needs, questions, or concerns at this time other than those being addressed     Pt verbalized understanding and is agreeable to follow up contact.       Goals        Wellness Goal      Patient Self-Management Goal for Health Maintenance  Goal: Return to work as soon as possible  Barriers: complex health issues  Plan for overcoming my barriers: Work with AC, complete follow up appts  Confidence: 10/10  Anticipated Goal Completion Date: 5/1/23              Education Documentation  General medication information, taught by Jarad Suarez RN at 1/27/2023  4:30 PM.  Learner: Patient  Readiness: Acceptance  Method: Explanation  Response: Verbalizes Understanding    Educate reporting changes in condition, taught by Jarad Suarez RN at 1/27/2023  4:30 PM.  Learner: Patient  Readiness: Acceptance  Method: Explanation  Response: Verbalizes Understanding    Educate Patient on When to Call for Symptoms, taught by Elizabet Haskins RN at 1/27/2023  4:30 PM.  Learner: Patient  Readiness: Acceptance  Method: Explanation  Response: Verbalizes Understanding    Education Comments  No comments found. Ambulatory Care Coordination Assessment    Care Coordination Protocol  Referral from Primary Care Provider: No  Week 1 - Initial Assessment     Do you have all of your prescriptions and are they filled?: Yes  Barriers to medication adherence: None  How often do you have trouble taking your medications the way you have been told to take them?: I always take them as prescribed.            Current Housing: Private Residence  For whom are you the caregiver?: children x 2        Per the Fall Risk Screening, did the patient have 2 or more falls or 1 fall with injury in the past year?: No           Thinking about your patient's physical health needs, are there any symptoms or problems (risk indicators) you are unsure about that require further investigation?: Moderate to severe symptoms or problems that impact on daily life   Are there any problems with your patients lifestyle behaviors (alcohol, drugs, diet, exercise) that are impacting on physical or mental well-being?: No identified areas of concern   How would you rate their home environment in terms of safety and stability (including domestic violence, insecure housing, neighbor harassment)?: Consistently safe, supportive, stable, no identified problems   How would you rate their social network (family, work, friends)?: Good participation with social networks   How would you rate their financial resources (including ability to afford all required medical care)?: Financially secure, some resource challenges   How wells does the patient now understand their health and well-being (symptoms, signs or risk factors) and what they need to do to manage their health?: Reasonable to good understanding and already engages in managing health or is willing to undertake better management   How well do you think your patient can engage in healthcare discussions? (Barriers include language, deafness, aphasia, alcohol or drug problems, learning difficulties, concentration): Clear and open communication, no identified barriers   Do other services need to be involved to help this patient?: Other care/services in place and adequate   Are current services involved with this patient well-coordinated? (Include coordination with other services you are now recommendation): Required care/services in place and adequately coordinated   Suggested Interventions and Community Resources   Social Work: Completed         Set up/Review Goals, Set up/Review an Education Plan              Prior to Admission medications    Medication Sig Start Date End Date Taking?  Authorizing Provider   acetaminophen (TYLENOL) 500 MG tablet Take 1,000 mg by mouth every 6 hours as needed for Pain   Yes Historical Provider, MD   dilTIAZem (CARDIZEM CD) 120 MG extended release capsule Take 1 capsule by mouth daily 1/22/23  Yes Janene Brito MD   pantoprazole (PROTONIX) 40 MG tablet Take 1 tablet by mouth every morning (before breakfast) 1/22/23  Yes Janene Brito MD   ferrous sulfate (IRON 325) 325 (65 Fe) MG tablet Take 1 tablet by mouth daily (with breakfast) 1/21/23  Yes Janene Brito MD   diclofenac sodium (VOLTAREN) 1 % GEL Apply 4 g topically 2 times daily 1/21/23  Yes Janene Brito MD   vitamin B-12 1000 MCG tablet Take 1 tablet by mouth daily 1/17/23  Yes TJ Coronado CNP   melatonin 3 MG TABS tablet Take 1 tablet by mouth nightly as needed (sleep) 1/16/23  Yes TJ Coronado CNP   Rimegepant Sulfate 75 MG TBDP Take 75 mg by mouth daily as needed (For migraine treatment) 1/16/23  Yes TJ Coronado CNP prochlorperazine (COMPAZINE) 10 MG tablet Take 1 tablet by mouth every 6 hours as needed (headache or nausea) 12/29/22  Yes Faby Morin MD   ondansetron (ZOFRAN-ODT) 4 MG disintegrating tablet Take 1 tablet by mouth every 8 hours as needed for Nausea or Vomiting 12/29/22  Yes Faby Morin MD   methocarbamol (ROBAXIN) 500 MG tablet take 1 tablet by mouth three times a day if needed 10/12/22  Yes Historical Provider, MD   norethindrone-ethinyl estradiol ([address] [place] 1/20) 1-20 MG-MCG per tablet Take 1 tablet by mouth daily 11/3/22  Yes Naomie Bass MD   vitamin D (ERGOCALCIFEROL) 1.25 MG (54039 UT) CAPS capsule take 1 capsule by mouth every week 10/18/22  Yes Adan Park MD   tiZANidine (ZANAFLEX) 4 MG tablet take 1 tablet by mouth three times a day if needed for SPASM(S) 9/2/22  Yes Historical Provider, MD   gabapentin (NEURONTIN) 600 MG tablet take 1 tablet by mouth twice a day 8/19/22 1/27/23 Yes IRENA Grubbs       Future Appointments   Date Time Provider Alfonzo Ramires   2/13/2023 10:20 AM MD NELLY Eli [facility]   11/7/2023 10:00 AM MD NELLY EliWTRACY [facility]

## 2023-02-01 ENCOUNTER — CARE COORDINATION (OUTPATIENT)
Dept: OTHER | Facility: CLINIC | Age: 28
End: 2023-02-01

## 2023-02-01 NOTE — CARE COORDINATION
MSW outreach to patient via [employer] for purpose of sending requested resources. Patient referred by Nurse CESAR, Hai Hurtado, GM. Purpose of TC  MSW received referral for financial hardship, outreach to patient earlier on this day with patient report interested in financial/cash resources related to rent-located resources to be sent via [address] TC Details  MSW sent via [employer] potential rent assistance resources: [employer] and [address] [facility]); MSW additionally sent patient information on [employer]. Plan of action  MSW to outreach patient again, patient in agreement with this plan.

## 2023-02-01 NOTE — CARE COORDINATION
MSW contacted patient for general evaluation . Patient identifiers confirmed, zip code and . Referral sent to  by Nurse CESAR, Bren John RN. Purpose of TC  MSW responding to referral, referral re: financial hardship. Patient applied for FMLA with last day of work 23 and anticipated RTW 2023. Patient concerns  Patient reports they are only interested in any applicable financial assistance resources specifically related to rent help-patient decline on resources related to utilities. Patient agreeable to receiving [employer] communication of resources. Transportation  Patient reports no assistance needs. Medications  Patient reports able to get to and afford medication-no assistance needed. Patient primary [place]; secondary [employer]. Nutrition  Patient reports able to get to and afford nutrition, additionally noting on SNAP benefits-no assistance needs. Support  Patient has comprehensive psych evaluation on 23; patient has family as support. No assistance needs reported. Plan of action  MSW to explore for financial resources to potentially assist with rent and relay to patient as/if located via [employer]-patient in agreement with this plan. MSW to outreach patient again; patient in agreement with this plan.

## 2023-02-06 ENCOUNTER — CARE COORDINATION (OUTPATIENT)
Dept: OTHER | Facility: CLINIC | Age: 28
End: 2023-02-06

## 2023-02-06 NOTE — CARE COORDINATION
MSW contacted patient for follow up. Patient referred by Nurse CESAR, Agnes Barber RN . Purpose of TC  MSW received referral for financial hardships, outreach to patient previously who reported interested only in resources to assist financially with rent. Patient applied for FMLA, last day worked 1/5/23 with tentative RTW March 2023. [place] primary; [employer] secondary. Patient reported to [facility] nurse mother helping financially, and received tax return. At last contact patient sent rent assistance resource list that included [employer] and [facility] [place]; patient additionally sent [employer] information. MSW outreach for follow up. TC Details  MSW unable to reach, discreet VM left requesting return call. Plan of action  MSW to await return call; MSW to outreach again.

## 2023-02-07 ENCOUNTER — CARE COORDINATION (OUTPATIENT)
Dept: OTHER | Facility: CLINIC | Age: 28
End: 2023-02-07

## 2023-02-07 NOTE — CARE COORDINATION
Ambulatory Care Coordination Note  2/7/2023    ACC: Reji Hunter, RN    ACM attempted to reach patient for follow up call. HIPAA compliant message left requesting a return phone call at patients convenience. Will continue to follow.      Future Appointments   Date Time Provider Alfonzo Ramires   2/13/2023 10:20 AM MD NELLY Field   11/7/2023 10:00 AM MD NELLY Field

## 2023-02-13 ENCOUNTER — CARE COORDINATION (OUTPATIENT)
Dept: OTHER | Facility: CLINIC | Age: 28
End: 2023-02-13

## 2023-02-13 NOTE — CARE COORDINATION
Ambulatory Care Coordination Note  2023    Patient Current Location: [facility]     ACM contacted the patient by telephone. Verified name and  with patient as identifiers. Challenges to be reviewed by the provider   Additional needs identified to be addressed with provider: No  none               Method of communication with provider: none. ACM: Denise Ritchie RN    ACM contacted the patient to follow up on progress, discuss new issues or concerns, and reinforce/ provide patient education. Summary Note: Patient completed follow up with Gynecology today. Patient reports irregular menstrual cycle. Patient discussed oral birth control (currently taking), IUD and ablation with Gynecology. Patient to follow up with Gynecology () after appt at [facility] for hematology (April). Patient completed follow up with [facility]; next counseling appt on 3/1/23. Patient reports med appt in April. Patient reports does not need appt to follow up with Dr. Leanne Kramer, can walk in. Discussed Right Care, Right Place, Right Time. Patient reports intermittent heart racing and \"skipping a beat. \"  Patient continues to c/o shortness of breath when sitting and with minimal exertion. Discussed s/s of anemia. Patient still encouraged to rest and perform deep breathing exercises as well as to balance activity and rest.  Patient reports shortness of breath is not as severe as before. Patient report blood pressure was okay at appt today. Per EMR, 137/80. Patient continues to c/o generalized weakness as well as numbness to bilateral upper and lower extremities as well as fingers. Patient continues to c/o headaches, worse when blood pressure is elevated; but reports headaches all day everyday. Patient states Nurtec helps a little, but denies all day relief from headache. Patient reports nausea; has Zofran in the home. Patient utilizes sleep mask at night.   Patient reports currently taking Tylenol Cold/flu at night for sinus infection. Patient continues to c/o of low energy. Patient's mother continues to assist patient with children. Patient reports chronic blurred vision, will make eye appt. Patient reports compliance with all medications. Patient denies depression and anxiety at this time. Patient to [facility]. Patient denies SI/HI. Patient reports self esteem issues; plan is to work with Counselor. Patient reports a few issues financially. Patient outreached [employer] previously without results. Patient encouraged to follow up with [employer]. Patient reports received assistance with water bill from [employer]. Patient received std from [facility]. Patient also reports and open [facility] claim; is working with a . Reinforced/ Provided Education:  Discussed red flags and appropriate site of care based on symptoms and resources available to patient including: PCP  Specialist  When to call [address] Importance and benefits of: Follow up with PCP and specialist, medication adherence, self monitoring and reporting of symptoms. Plan:  Plan for follow-up call in 10-14 days based on severity of symptoms and risk factors. Plan for next call: symptom management-chest pain, palpitations, dizziness, so, weakness, headache, fatigue  self management-blood pressure, heart rate  follow-up appointment-Any appts scheduled  medication management-Medication changes    Goal progress    Patient denies any further needs, questions, or concerns at this time. Pt verbalized understanding and is agreeable to follow up contact.      Care Coordination Interventions    Referral from Primary Care Provider: No  Suggested Interventions and Community Resources  Social Work: Completed          Goals Addressed                   This Visit's Progress     Wellness Goal   On track     Patient Self-Management Goal for Health Maintenance  Goal: Return to work as soon as possible  Barriers: complex health issues  Plan for overcoming my barriers: Work with ACM, complete follow up appts  Confidence: 10/10  Anticipated Goal Completion Date: 5/1/23 2/13/23 - Plan is to return to work in April. Patient is scheduling follow up appts and attending appts. Education Documentation  General medication information, taught by Anita Dc RN at 2/13/2023 12:36 PM.  Learner: Patient  Readiness: Acceptance  Method: Explanation  Response: Ashely Rivas Understanding, Demonstrated Understanding    Educate reporting changes in condition, taught by Anita Dc RN at 2/13/2023 12:36 PM.  Learner: Patient  Readiness: Acceptance  Method: Explanation  Response: Ashely Anderson, Demonstrated Understanding    Educate Patient on When to Call for Symptoms, taught by Anita Dc RN at 2/13/2023 12:36 PM.  Learner: Patient  Readiness: Acceptance  Method: Explanation  Response: Verbalizes Understanding, Demonstrated Understanding    Education Comments  No comments found.        Future Appointments   Date Time Provider Alfonzo Ramires   6/15/2023  9:20 AM MD NELLY Reynoso [facility]   11/7/2023 10:00 AM MD NELLY Reynoso [facility]    and   General Assessment    Do you have any symptoms that are causing concern?: Yes  Reported Symptoms: Shortness of Breath, Other, Nausea, Weakness, Fatigue (Comment: numbness, dizziness)

## 2023-02-16 ENCOUNTER — CARE COORDINATION (OUTPATIENT)
Dept: OTHER | Facility: CLINIC | Age: 28
End: 2023-02-16

## 2023-02-16 NOTE — CARE COORDINATION
MSW contacted patient for follow up. Patient referred by Nurse CESAR, Parish Akers, GM . Purpose of TC  MSW received referral for financial hardships, outreach to patient previously who reported interested only in resources to assist financially with rent. Patient applied for FMLA, last day worked 1/5/23 with tentative RTW March/April 2023. [place] primary; [employer] secondary. Patient reported to [employer] RN mother helping financially, and received tax return. At last contact patient sent rent assistance resource list that included [employer] and [facility] [place]; patient additionally sent [employer] information. MSW outreach for follow up. TC Details  MSW unable to reach, x2, discreet VM left requesting return call. Plan of action  MSW to await return call; MSW to outreach again.

## 2023-02-23 ENCOUNTER — CARE COORDINATION (OUTPATIENT)
Dept: OTHER | Facility: CLINIC | Age: 28
End: 2023-02-23

## 2023-02-23 NOTE — CARE COORDINATION
MSW contacted patient for follow up. Patient referred by Nurse CESAR, Jaelyn Padilla RN. Purpose of TC  MSW outreach to follow up with patient regarding previous discussion and resources received. At last contact patient interested only in applicable resources for financial rent assistance and provided information on [employer], [employer] and [employer]. Patient on FMLA with tentative RTW March/April 2023 per last report. [place] primary insurance; [employer] secondary. Patient receiving SNAP benefits. TC Details  MSW unable to reach, x3 attempts, discreet VM left. Plan of action  MSW to sign off; MSW remains available to assist as needed, please re-refer as needed.

## 2023-02-28 ENCOUNTER — CARE COORDINATION (OUTPATIENT)
Dept: OTHER | Facility: CLINIC | Age: 28
End: 2023-02-28

## 2023-02-28 NOTE — CARE COORDINATION
Ambulatory Care Coordination Note  2023    Patient Current Location:  [place] [facility] [place] [address]     ACM contacted the patient by telephone. Verified name and  with patient as identifiers. Challenges to be reviewed by the provider   Additional needs identified to be addressed with provider: No  none               Method of communication with provider: none. ACM: Liz Crowe RN    ACM contacted the patient to follow up on progress, discuss new issues or concerns, and reinforce/ provide patient education. Summary Note: Patient states feels a little better. Patient reports shortness of breath is not as bad. Patient reports shortness of breath worse when laying flat. Patient is elevating head with pillows. Patient reports a little bit of shortness of breath when sitting. Shortness of breath increases with exertion. Patient reports taking a break relieves shortness of breath more than deep breathing. Patient continues to c/o generalized weakness. Patient continues to c/o chronic neuropathy. Patient denies changes to fatigue. Patient c/o daily headache; out of Nurtec. Patient to outreach Dr. Eulalia Hays to schedule appointment or walk in for appt. Patient reports menstrual bleeding is heavy x 1 day then lighter x 1-2 days. Patient c/o intermittent chest pain. Patient reports highest heart rate is 110 bpm, intermittent in nature. Patient continues to c/o dizziness; reminded patient to change positions slowly. Patient reports eating only 1-2 meals daily. Discussed importance of nutrition for energy and physical health. Patient encouraged to snack on healthy foods. Patient is hydrating with water and caffeine free tea. Patient reports compliance with all medications. Patient has not checked blood pressure recently. Patient reports anxiety and depression. Patient reports both are at an all time high.   Declined assistance by this ACM to assist with managing anxiety and depression. Patient denies SI/HI. Therapeutic communication provided. Patient to discuss mental health complaints with counselor at appt on 3/1/23. Patient scheduled for medication appt at [facility] in April    Patient states spoke to [email or web address] last week and will cover rent for November 2022 to April 2023. Any overage already paid by patient will be applied to future months or reimbursed. Patient states not up to date with rent payments but not in arrears for all months. Patient reports water, electric and gas are in arrears. Patient states her mother is assisting with financial issues with utilities. Patient reports has groceries in the home. Patient's mom is currently keeping oldest son during the week. Patient did not make eye appt as of today, 2/28/23. Appts scheduled:  Gynecology June  Hematology Aril     AC discussed RED FLAGS and encouraged patient to contact 911 for life threatening emergencies and PCP office 24/7 for non life-threatening symptoms. Patient denies any further needs, questions, or concerns at this time. Reinforced/ Provided Education:  Discussed red flags and appropriate site of care based on symptoms and resources available to patient including: PCP  Specialist  When to call [address] Importance and benefits of: Follow up with PCP and specialist, medication adherence, self monitoring and reporting of symptoms. Plan:  Plan for follow-up call in 10-14 days based on severity of symptoms and risk factors. Plan for next call: symptom management-fatigue, weakness, headache, anxiety, depression, chest pain, tachycardia, dizziness  self management-vital signs  follow-up appointment-Any new appts scheduled  medication management-Refilled Nurtec, Medication changes    Pt verbalized understanding and is agreeable to follow up contact.          Offered patient enrollment in the Remote Patient Monitoring (RPM) program for in-home monitoring: NA.      Care Coordination Interventions    Referral from Primary Care Provider: No  Suggested Interventions and Community Resources  Social Work: Completed          Goals Addressed                   This Visit's Progress     Wellness Goal   On track     Patient Self-Management Goal for Health Maintenance  Goal: Return to work as soon as possible  Barriers: complex health issues  Plan for overcoming my barriers: Work with ACM, complete follow up appts  Confidence: 10/10  Anticipated Goal Completion Date: 5/1/23 2/13/23 - Plan is to return to work in April. Patient is scheduling follow up appts and attending appts. 2/28/23 - Tentative rtw date March 12, 2023. Education Documentation  Lifestyle Changes/Goal Setting, taught by Dago Peacock RN at 2/28/2023  5:04 PM.  Learner: Patient  Readiness: Acceptance  Method: Explanation  Response: Verbalizes Understanding    Educate reporting changes in condition, taught by Dago Peacock RN at 2/28/2023  5:04 PM.  Learner: Patient  Readiness: Acceptance  Method: Explanation  Response: [address] Understanding    Educate Patient on When to Call for Symptoms, taught by Dago Peacock RN at 2/28/2023  5:04 PM.  Learner: Patient  Readiness: Acceptance  Method: Explanation  Response: Verbalizes Understanding    Educate limitations or barriers to activity and/or exercise on discharge, taught by Dago Peacock RN at 2/28/2023  5:04 PM.  Learner: Patient  Readiness: Acceptance  Method: Explanation  Response: Verbalizes Understanding    Educate dietary adherence benefits, taught by Dago Peacock RN at 2/28/2023  5:04 PM.  Learner: Patient  Readiness: Acceptance  Method: Explanation  Response: Verbalizes Understanding    Education Comments  No comments found.        Future Appointments   Date Time Provider Alfonzo Ramires   6/15/2023  9:20 AM Manas Rabago MD [facility]   11/7/2023 10:00 AM Marjan Samano MD [facility]

## 2023-03-15 ENCOUNTER — CARE COORDINATION (OUTPATIENT)
Dept: OTHER | Facility: CLINIC | Age: 28
End: 2023-03-15

## 2023-03-15 NOTE — CARE COORDINATION
ACC: Mackenzie Jim RN    Care Coordination Interventions    Referral from Primary Care Provider: No  Suggested Interventions and Community Resources  Social Work: Completed       ACM attempted to reach patient for follow up call. HIPAA compliant message left requesting a return phone call at patients convenience. Will continue to follow.

## 2023-03-30 ENCOUNTER — CARE COORDINATION (OUTPATIENT)
Dept: OTHER | Facility: CLINIC | Age: 28
End: 2023-03-30

## 2023-03-30 NOTE — CARE COORDINATION
Patient reports coping mechanisms such as music, audiobooks, writing, drawing and crossword puzzles. Patient states her mother continues to assist with her children and financially. Patient did receive monies from [employer]. Patient reports [facility] claim has been denied; patient is working with . Patient received rent assistance from [employer] for November 2022-April 2023. Patient reports overpayments being applied to May-July. Patient states has some utilities in arrears; received disconnection for gas. Patient states will pay with this weeks paycheck. Patient reports medication compliance and denies medication changes. Reinforced/Provided Education:  Discussed red flags and appropriate site of care based on symptoms and resources available to patient including: PCP  Specialist  Benefits related nurse triage line  When to call [address] Provided the following associate/dependent related resources: Nurse Access line 24/7 # [phone]. Download the free [employer] and create and account. Go to [email or web address] to create an account. Your copay is $15     Information sent to patient via [employer]      Importance and benefits of: Follow up with PCP and specialist, medication adherence, self monitoring and reporting of symptoms. Plan:  Plan for follow-up call in 7-10 days based on severity of symptoms and risk factors. Plan for next call: symptom management-headache, dizziness, fatigue, chest pain, shortness of breath   self management-heart rate and blood pressure  follow-up appointment-Any appts scheduled or completed  medication management-Medication changes    Patient  verbalized understanding and is agreeable to follow up call. Patient denies any further needs, questions, or concerns at this time other than those being addressed.      Offered patient enrollment in the Remote Patient Monitoring

## 2023-05-10 ENCOUNTER — CARE COORDINATION (OUTPATIENT)
Dept: OTHER | Facility: CLINIC | Age: 28
End: 2023-05-10

## 2023-05-10 RX ORDER — DULOXETIN HYDROCHLORIDE 60 MG/1
60 CAPSULE, DELAYED RELEASE ORAL DAILY
COMMUNITY

## 2023-09-13 ENCOUNTER — HOSPITAL ENCOUNTER (OUTPATIENT)
Dept: MRI IMAGING | Age: 28
Discharge: HOME | End: 2023-09-15
Payer: COMMERCIAL

## 2023-09-13 DIAGNOSIS — R70.0 ELEVATED SEDIMENTATION RATE: ICD-10-CM

## 2023-09-13 DIAGNOSIS — M51.37 DEGENERATION OF LUMBAR OR LUMBOSACRAL INTERVERTEBRAL DISC: ICD-10-CM

## 2023-09-13 DIAGNOSIS — M50.30 DISC DISEASE, DEGENERATIVE, CERVICAL: ICD-10-CM

## 2023-09-13 DIAGNOSIS — M54.2 CERVICALGIA: ICD-10-CM

## 2023-09-13 DIAGNOSIS — M54.16 LUMBAR RADICULOPATHY: ICD-10-CM

## 2023-09-13 DIAGNOSIS — M54.12 CERVICAL RADICULOPATHY: ICD-10-CM

## 2023-09-13 DIAGNOSIS — M54.50 LOW BACK PAIN, UNSPECIFIED BACK PAIN LATERALITY, UNSPECIFIED CHRONICITY, UNSPECIFIED WHETHER SCIATICA PRESENT: ICD-10-CM

## 2023-09-13 DIAGNOSIS — M47.896 OTHER OSTEOARTHRITIS OF SPINE, LUMBAR REGION: ICD-10-CM

## 2023-09-13 PROCEDURE — 72148 MRI LUMBAR SPINE W/O DYE: CPT

## 2024-01-09 ENCOUNTER — OFFICE VISIT (OUTPATIENT)
Dept: PAIN MANAGEMENT | Age: 29
End: 2024-01-09
Payer: COMMERCIAL

## 2024-01-09 VITALS
BODY MASS INDEX: 47.46 KG/M2 | DIASTOLIC BLOOD PRESSURE: 70 MMHG | WEIGHT: 278 LBS | OXYGEN SATURATION: 94 % | HEIGHT: 64 IN | SYSTOLIC BLOOD PRESSURE: 111 MMHG | TEMPERATURE: 98.2 F | HEART RATE: 92 BPM | RESPIRATION RATE: 16 BRPM

## 2024-01-09 DIAGNOSIS — G89.4 CHRONIC PAIN SYNDROME: Primary | ICD-10-CM

## 2024-01-09 DIAGNOSIS — M79.7 FIBROMYALGIA: ICD-10-CM

## 2024-01-09 DIAGNOSIS — M54.42 CHRONIC BILATERAL LOW BACK PAIN WITH BILATERAL SCIATICA: ICD-10-CM

## 2024-01-09 DIAGNOSIS — M54.41 CHRONIC BILATERAL LOW BACK PAIN WITH BILATERAL SCIATICA: ICD-10-CM

## 2024-01-09 DIAGNOSIS — G89.29 CHRONIC BILATERAL LOW BACK PAIN WITH BILATERAL SCIATICA: ICD-10-CM

## 2024-01-09 PROCEDURE — G8427 DOCREV CUR MEDS BY ELIG CLIN: HCPCS | Performed by: PAIN MEDICINE

## 2024-01-09 PROCEDURE — G8484 FLU IMMUNIZE NO ADMIN: HCPCS | Performed by: PAIN MEDICINE

## 2024-01-09 PROCEDURE — G8417 CALC BMI ABV UP PARAM F/U: HCPCS | Performed by: PAIN MEDICINE

## 2024-01-09 PROCEDURE — 99204 OFFICE O/P NEW MOD 45 MIN: CPT | Performed by: PAIN MEDICINE

## 2024-01-09 PROCEDURE — 1036F TOBACCO NON-USER: CPT | Performed by: PAIN MEDICINE

## 2024-01-09 PROCEDURE — 99205 OFFICE O/P NEW HI 60 MIN: CPT | Performed by: PAIN MEDICINE

## 2024-01-09 RX ORDER — PREGABALIN 150 MG/1
150 CAPSULE ORAL 3 TIMES DAILY
Qty: 90 CAPSULE | Refills: 1 | Status: SHIPPED | OUTPATIENT
Start: 2024-01-09 | End: 2024-03-09

## 2024-01-09 NOTE — PROGRESS NOTES
Sveta Stack presents to the [facility] on 1/9/2024. Sveta is complaining of pain lumbar pain . The pain is constant. The pain is described as aching, throbbing, shooting, and stabbing. Pain is rated on her best day at a 7, on her worst day at a 10, and on average at a 8 on the VAS scale. She took her last dose of Motrin last night .    Any procedures since your last visit: No  She is not on NSAIDS and  is not on anticoagulation medications   Pacemaker or defibrillator: No         Medication Contract and Consent for Opioid Use Documents Filed        No documents found                       Temp 98.2 °F (36.8 °C) (Temporal)   Resp 16   Ht 1.626 m (5' 4\")   Wt 126.1 kg (278 lb)   LMP 12/19/2023 (Exact Date)   SpO2 97%   BMI 47.72 kg/m²      Patient's last menstrual period was 12/19/2023 (exact date).  
Strain: Not on file   Food Insecurity: Not on file   Transportation Needs: Not on file   Physical Activity: Not on file   Stress: Not on file   Social Connections: Not on file   Intimate Partner Violence: Not on file   Housing Stability: Not on file       History reviewed. No pertinent family history.    REVIEW OF SYSTEMS:     Patient specifically denies fever/chills, chest pain, shortness of breath, new bowel or bladder complaints.  All other review of systems was negative.    PHYSICAL EXAMINATION:      /70   Pulse 92   Temp 98.2 °F (36.8 °C) (Temporal)   Resp 16   Ht 1.626 m (5' 4\")   Wt 126.1 kg (278 lb)   LMP 12/19/2023 (Exact Date)   SpO2 94%   BMI 47.72 kg/m²     General:      General appearance:pleasant and well-hydrated, in no distress and A & O x3  Build:Overweight  Function:Rises from a seated position easily.    HEENT:    Head:normocephalic, atraumatic  Pupils:regular, round, equal  Sclera: icterus absent    Lungs:    Breathing:normal breathing pattern    Abdomen:    Shape:non-distended  Tenderness:none  Guarding:none    Lumbar spine:    Spine inspection:normal   CVA tenderness:No   Palpation:tenderness paravertebral muscles, left, right and positive.  Range of motion:abnormal mildly in lateral bending, flexion, extension rotation bilateral and is painful.    Musculoskeletal:    Trigger points in Paraveteral:absent bilaterally  SI joint tenderness:negative right, negative left              RITO test:not done right, not done left  Piriformis tenderness:negative right, negative left  Trochanteric bursa tenderness:negative right, negative left  SLR:negative right, negative left, sitting     Extremities:    Tremors:None bilaterally upper and lower  Range of motion:pain with internal rotation of hips negative.  Intact:Yes  Varicose veins:absent   Pulses:present Lt radial  Cyanosis:none  Edema:none x all 4 extremities    Neurological:    Sensory:normal to light touch BLE  Motor:              
Self

## 2024-01-12 ENCOUNTER — APPOINTMENT (OUTPATIENT)
Dept: GENERAL RADIOLOGY | Age: 29
End: 2024-01-12
Payer: COMMERCIAL

## 2024-01-12 ENCOUNTER — APPOINTMENT (OUTPATIENT)
Dept: CT IMAGING | Age: 29
End: 2024-01-12
Payer: COMMERCIAL

## 2024-01-12 LAB
ALBUMIN SERPL-MCNC: 4.4 G/DL (ref 3.5–5.2)
ALP SERPL-CCNC: 108 U/L (ref 35–104)
ALT SERPL-CCNC: 6 U/L (ref 0–32)
ANION GAP SERPL CALCULATED.3IONS-SCNC: 13 MMOL/L (ref 7–16)
AST SERPL-CCNC: 16 U/L (ref 0–31)
B PARAP IS1001 DNA NPH QL NAA+NON-PROBE: NOT DETECTED
B PERT DNA SPEC QL NAA+PROBE: NOT DETECTED
BASOPHILS # BLD: 0.05 K/UL (ref 0–0.2)
BASOPHILS # BLD: 0.06 K/UL (ref 0–0.2)
BASOPHILS NFR BLD: 0 % (ref 0–2)
BASOPHILS NFR BLD: 0 % (ref 0–2)
BILIRUB SERPL-MCNC: <0.2 MG/DL (ref 0–1.2)
BILIRUB UR QL STRIP: NEGATIVE
BUN SERPL-MCNC: 13 MG/DL (ref 6–20)
C PNEUM DNA NPH QL NAA+NON-PROBE: NOT DETECTED
CALCIUM SERPL-MCNC: 9.7 MG/DL (ref 8.6–10.2)
CHLORIDE SERPL-SCNC: 100 MMOL/L (ref 98–107)
CLARITY UR: CLEAR
CO2 SERPL-SCNC: 25 MMOL/L (ref 22–29)
COLOR UR: YELLOW
CREAT SERPL-MCNC: 0.8 MG/DL (ref 0.5–1)
D DIMER: <200 NG/ML DDU (ref 0–232)
EOSINOPHIL # BLD: 0.02 K/UL (ref 0.05–0.5)
EOSINOPHIL # BLD: 0.05 K/UL (ref 0.05–0.5)
EOSINOPHILS RELATIVE PERCENT: 0 % (ref 0–6)
EOSINOPHILS RELATIVE PERCENT: 0 % (ref 0–6)
ERYTHROCYTE [DISTWIDTH] IN BLOOD BY AUTOMATED COUNT: 13.2 % (ref 11.5–15)
ERYTHROCYTE [DISTWIDTH] IN BLOOD BY AUTOMATED COUNT: 13.2 % (ref 11.5–15)
FLUAV RNA NPH QL NAA+NON-PROBE: NOT DETECTED
FLUBV RNA NPH QL NAA+NON-PROBE: NOT DETECTED
GFR SERPL CREATININE-BSD FRML MDRD: >60 ML/MIN/1.73M2
GLUCOSE SERPL-MCNC: 128 MG/DL (ref 74–99)
GLUCOSE UR STRIP-MCNC: NEGATIVE MG/DL
HADV DNA NPH QL NAA+NON-PROBE: NOT DETECTED
HCG, URINE, POC: NEGATIVE
HCOV 229E RNA NPH QL NAA+NON-PROBE: NOT DETECTED
HCOV HKU1 RNA NPH QL NAA+NON-PROBE: NOT DETECTED
HCOV NL63 RNA NPH QL NAA+NON-PROBE: NOT DETECTED
HCOV OC43 RNA NPH QL NAA+NON-PROBE: NOT DETECTED
HCT VFR BLD AUTO: 39.9 % (ref 34–48)
HCT VFR BLD AUTO: 43 % (ref 34–48)
HGB BLD-MCNC: 13 G/DL (ref 11.5–15.5)
HGB BLD-MCNC: 14.4 G/DL (ref 11.5–15.5)
HGB UR QL STRIP.AUTO: ABNORMAL
HMPV RNA NPH QL NAA+NON-PROBE: NOT DETECTED
HPIV1 RNA NPH QL NAA+NON-PROBE: NOT DETECTED
HPIV2 RNA NPH QL NAA+NON-PROBE: NOT DETECTED
HPIV3 RNA NPH QL NAA+NON-PROBE: NOT DETECTED
HPIV4 RNA NPH QL NAA+NON-PROBE: NOT DETECTED
IMM GRANULOCYTES # BLD AUTO: 0.15 K/UL (ref 0–0.58)
IMM GRANULOCYTES # BLD AUTO: 0.2 K/UL (ref 0–0.58)
IMM GRANULOCYTES NFR BLD: 1 % (ref 0–5)
IMM GRANULOCYTES NFR BLD: 1 % (ref 0–5)
KETONES UR STRIP-MCNC: NEGATIVE MG/DL
LEUKOCYTE ESTERASE UR QL STRIP: NEGATIVE
LYMPHOCYTES NFR BLD: 2.39 K/UL (ref 1.5–4)
LYMPHOCYTES NFR BLD: 3.24 K/UL (ref 1.5–4)
LYMPHOCYTES RELATIVE PERCENT: 10 % (ref 20–42)
LYMPHOCYTES RELATIVE PERCENT: 15 % (ref 20–42)
Lab: NORMAL
M PNEUMO DNA NPH QL NAA+NON-PROBE: NOT DETECTED
MCH RBC QN AUTO: 30.4 PG (ref 26–35)
MCH RBC QN AUTO: 30.6 PG (ref 26–35)
MCHC RBC AUTO-ENTMCNC: 32.6 G/DL (ref 32–34.5)
MCHC RBC AUTO-ENTMCNC: 33.5 G/DL (ref 32–34.5)
MCV RBC AUTO: 91.3 FL (ref 80–99.9)
MCV RBC AUTO: 93.4 FL (ref 80–99.9)
MONOCYTES NFR BLD: 10 % (ref 2–12)
MONOCYTES NFR BLD: 12 % (ref 2–12)
MONOCYTES NFR BLD: 2.19 K/UL (ref 0.1–0.95)
MONOCYTES NFR BLD: 2.6 K/UL (ref 0.1–0.95)
NEGATIVE QC PASS/FAIL: NORMAL
NEUTROPHILS NFR BLD: 72 % (ref 43–80)
NEUTROPHILS NFR BLD: 79 % (ref 43–80)
NEUTS SEG NFR BLD: 15.36 K/UL (ref 1.8–7.3)
NEUTS SEG NFR BLD: 18.31 K/UL (ref 1.8–7.3)
NITRITE UR QL STRIP: NEGATIVE
PH UR STRIP: 6.5 [PH] (ref 5–9)
PLATELET # BLD AUTO: 287 K/UL (ref 130–450)
PLATELET # BLD AUTO: 330 K/UL (ref 130–450)
PMV BLD AUTO: 10.2 FL (ref 7–12)
PMV BLD AUTO: 10.3 FL (ref 7–12)
POSITIVE QC PASS/FAIL: NORMAL
POTASSIUM SERPL-SCNC: 3.9 MMOL/L (ref 3.5–5)
PROT SERPL-MCNC: 8.3 G/DL (ref 6.4–8.3)
PROT UR STRIP-MCNC: NEGATIVE MG/DL
RBC # BLD AUTO: 4.27 M/UL (ref 3.5–5.5)
RBC # BLD AUTO: 4.71 M/UL (ref 3.5–5.5)
RBC # BLD: ABNORMAL 10*6/UL
RBC # BLD: ABNORMAL 10*6/UL
RBC #/AREA URNS HPF: ABNORMAL /HPF
RSV RNA NPH QL NAA+NON-PROBE: NOT DETECTED
RV+EV RNA NPH QL NAA+NON-PROBE: NOT DETECTED
SARS-COV-2 RNA NPH QL NAA+NON-PROBE: NOT DETECTED
SODIUM SERPL-SCNC: 138 MMOL/L (ref 132–146)
SP GR UR STRIP: >1.03 (ref 1–1.03)
SPECIMEN DESCRIPTION: NORMAL
TROPONIN I SERPL HS-MCNC: 10 NG/L (ref 0–9)
TROPONIN I SERPL HS-MCNC: 11 NG/L (ref 0–9)
UROBILINOGEN UR STRIP-ACNC: 0.2 EU/DL (ref 0–1)
WBC #/AREA URNS HPF: ABNORMAL /HPF
WBC OTHER # BLD: 21.5 K/UL (ref 4.5–11.5)
WBC OTHER # BLD: 23.2 K/UL (ref 4.5–11.5)

## 2024-01-12 PROCEDURE — 6370000000 HC RX 637 (ALT 250 FOR IP)

## 2024-01-12 PROCEDURE — 96361 HYDRATE IV INFUSION ADD-ON: CPT

## 2024-01-12 PROCEDURE — 85379 FIBRIN DEGRADATION QUANT: CPT

## 2024-01-12 PROCEDURE — 96374 THER/PROPH/DIAG INJ IV PUSH: CPT

## 2024-01-12 PROCEDURE — 71045 X-RAY EXAM CHEST 1 VIEW: CPT

## 2024-01-12 PROCEDURE — 0202U NFCT DS 22 TRGT SARS-COV-2: CPT

## 2024-01-12 PROCEDURE — 74177 CT ABD & PELVIS W/CONTRAST: CPT

## 2024-01-12 PROCEDURE — 93005 ELECTROCARDIOGRAM TRACING: CPT

## 2024-01-12 PROCEDURE — 80053 COMPREHEN METABOLIC PANEL: CPT

## 2024-01-12 PROCEDURE — 84484 ASSAY OF TROPONIN QUANT: CPT

## 2024-01-12 PROCEDURE — 85025 COMPLETE CBC W/AUTO DIFF WBC: CPT

## 2024-01-12 PROCEDURE — 81001 URINALYSIS AUTO W/SCOPE: CPT

## 2024-01-12 PROCEDURE — 96372 THER/PROPH/DIAG INJ SC/IM: CPT

## 2024-01-12 PROCEDURE — 2580000003 HC RX 258: Performed by: EMERGENCY MEDICINE

## 2024-01-12 PROCEDURE — 6360000002 HC RX W HCPCS

## 2024-01-12 PROCEDURE — 2580000003 HC RX 258

## 2024-01-12 PROCEDURE — 87086 URINE CULTURE/COLONY COUNT: CPT

## 2024-01-12 PROCEDURE — 99285 EMERGENCY DEPT VISIT HI MDM: CPT

## 2024-01-12 PROCEDURE — 6360000004 HC RX CONTRAST MEDICATION: Performed by: RADIOLOGY

## 2024-01-12 RX ORDER — ACETAMINOPHEN 500 MG
1000 TABLET ORAL ONCE
Status: COMPLETED | OUTPATIENT
Start: 2024-01-12 | End: 2024-01-12

## 2024-01-12 RX ORDER — 0.9 % SODIUM CHLORIDE 0.9 %
1000 INTRAVENOUS SOLUTION INTRAVENOUS ONCE
Status: COMPLETED | OUTPATIENT
Start: 2024-01-12 | End: 2024-01-12

## 2024-01-12 RX ORDER — DICYCLOMINE HYDROCHLORIDE 10 MG/ML
20 INJECTION INTRAMUSCULAR ONCE
Status: COMPLETED | OUTPATIENT
Start: 2024-01-12 | End: 2024-01-12

## 2024-01-12 RX ORDER — 0.9 % SODIUM CHLORIDE 0.9 %
1000 INTRAVENOUS SOLUTION INTRAVENOUS ONCE
Status: COMPLETED | OUTPATIENT
Start: 2024-01-12 | End: 2024-01-13

## 2024-01-12 RX ORDER — ONDANSETRON 2 MG/ML
4 INJECTION INTRAMUSCULAR; INTRAVENOUS ONCE
Status: COMPLETED | OUTPATIENT
Start: 2024-01-12 | End: 2024-01-12

## 2024-01-12 RX ADMIN — SODIUM CHLORIDE 1000 ML: 9 INJECTION, SOLUTION INTRAVENOUS at 20:33

## 2024-01-12 RX ADMIN — SODIUM CHLORIDE 1000 ML: 9 INJECTION, SOLUTION INTRAVENOUS at 16:56

## 2024-01-12 RX ADMIN — ACETAMINOPHEN 1000 MG: 500 TABLET ORAL at 16:44

## 2024-01-12 RX ADMIN — IOPAMIDOL 75 ML: 755 INJECTION, SOLUTION INTRAVENOUS at 23:20

## 2024-01-12 RX ADMIN — ONDANSETRON 4 MG: 2 INJECTION INTRAMUSCULAR; INTRAVENOUS at 22:20

## 2024-01-12 RX ADMIN — DICYCLOMINE HYDROCHLORIDE 20 MG: 10 INJECTION, SOLUTION INTRAMUSCULAR at 22:21

## 2024-01-12 ASSESSMENT — PAIN DESCRIPTION - LOCATION: LOCATION: ABDOMEN

## 2024-01-12 ASSESSMENT — PAIN SCALES - GENERAL
PAINLEVEL_OUTOF10: 7
PAINLEVEL_OUTOF10: 6

## 2024-01-12 ASSESSMENT — PAIN DESCRIPTION - DESCRIPTORS: DESCRIPTORS: ACHING

## 2024-01-12 NOTE — ED PROVIDER NOTES
Monocytes Absolute 2.60 (*)     All other components within normal limits   RESPIRATORY PANEL, MOLECULAR, WITH COVID-19   CULTURE, BLOOD 1   CULTURE, BLOOD 2   CULTURE, URINE   D-DIMER, QUANTITATIVE   POC PREGNANCY UR-QUAL       As interpreted by me, the above displayed labs are abnormal. All other labs obtained during this visit were within normal range or not returned as of this dictation.    EKG Interpretation  Interpreted by emergency department resident physician, Jeremiah Matson DO  *Please see ED Course for EKG interpretation*    RADIOLOGY:   Non-plain film images such as CT, Ultrasound and MRI are read by the radiologist. Plain radiographic images are visualized and preliminarily interpreted by the ED Provider with the below findings:    My interpretation of chest x-ray: No significant pneumonia, pneumothorax, acute rib fractures, or mediastinal widening noted.    Interpretation per the Radiologist below, if available at the time of this note:    CT ABDOMEN PELVIS W IV CONTRAST Additional Contrast? None   Final Result   1.  No acute intra-abdominal pathology identified.  There is no evidence of   bowel obstruction or inflammation.  The appendix is well visualized and   normal.  Mild stool burden in the colon.      2.  Symmetric enhancement of the kidneys.  No hydronephrosis.  No radiopaque   obstructive uropathy.  Normal appearance of the bladder.      3.  Remainder of the study is as above.         XR CHEST PORTABLE   Final Result   1. No acute cardiopulmonary process.   2. The heart is increased in size and is now top-normal in size.           No results found.    No results found.    PROCEDURES   Unless otherwise noted below, none    EMERGENCY DEPARTMENT COURSE    Vitals:    Vitals:    01/12/24 1452 01/12/24 1530 01/12/24 2033 01/13/24 0135   BP:  (!) 173/111 130/78 130/68   Pulse: (!) 142 (!) 120 99 84   Resp:   16 16   Temp: 98.2 °F (36.8 °C)  98.6 °F (37 °C) 98.2 °F (36.8 °C)   TempSrc: Temporal Oral  Oral Oral   SpO2: 99% 99% 99% 100%       Patient was given the following medications:  Medications   sodium chloride 0.9 % bolus 1,000 mL (0 mLs IntraVENous Stopped 1/12/24 1726)   acetaminophen (TYLENOL) tablet 1,000 mg (1,000 mg Oral Given 1/12/24 1644)   sodium chloride 0.9 % bolus 1,000 mL (0 mLs IntraVENous Stopped 1/13/24 0007)   ondansetron (ZOFRAN) injection 4 mg (4 mg IntraVENous Given 1/12/24 2220)   dicyclomine (BENTYL) injection 20 mg (20 mg IntraMUSCular Given 1/12/24 2221)   iopamidol (ISOVUE-370) 76 % injection 75 mL (75 mLs IntraVENous Given 1/12/24 2320)       Medical Decision Making/Differential Diagnosis:  CC/HPI Summary, Social Determinants of health, Records Reviewed, DDx, testing done/not done, ED Course, Reassessment, disposition considerations/shared decision making with patient, consults, disposition:      CC/HPI Summary, DDx, ED Course, Reassessment, Tests Considered, Patient expectation:   28-year-old female presenting to emergency department chief complaint of tachycardia.  On arrival in the emergency department due to patient's tachycardia EKG obtained and patient given a liter normal saline.  EKG demonstrates sinus tachycardia without acute findings.  Patient is having a mild headache and is given a gram of Tylenol as well.  On reassessment patient is stating that she is feeling much better but is having a little bit of nausea and abdominal discomfort.  4 mg of Zofran are given and 20 mg of IM Bentyl given as well.  Patient also given him with an additional liter of normal saline for hydration.  CMP without acute electrolyte derangements, kidney injury, or LFT changes and patient is afebrile and CBC does demonstrate initially leukocytosis of 23.2 with no acute anemia.  Patient has been afebrile with multiple temperature rechecks but given this concern for infection in his head.  EKG reassuring with stable troponin x 2 and D-dimer less than 200 with low suspicion for pulmonary emboli

## 2024-01-13 ENCOUNTER — HOSPITAL ENCOUNTER (EMERGENCY)
Age: 29
Discharge: HOME | End: 2024-01-13
Attending: EMERGENCY MEDICINE
Payer: COMMERCIAL

## 2024-01-13 VITALS
TEMPERATURE: 98.2 F | RESPIRATION RATE: 16 BRPM | SYSTOLIC BLOOD PRESSURE: 130 MMHG | DIASTOLIC BLOOD PRESSURE: 68 MMHG | OXYGEN SATURATION: 100 % | HEART RATE: 84 BPM

## 2024-01-13 DIAGNOSIS — R00.0 TACHYCARDIA: Primary | ICD-10-CM

## 2024-01-13 DIAGNOSIS — D72.829 LEUKOCYTOSIS, UNSPECIFIED TYPE: ICD-10-CM

## 2024-01-13 PROCEDURE — 87040 BLOOD CULTURE FOR BACTERIA: CPT

## 2024-01-13 ASSESSMENT — PAIN - FUNCTIONAL ASSESSMENT: PAIN_FUNCTIONAL_ASSESSMENT: NONE - DENIES PAIN

## 2024-01-13 NOTE — ED NOTES
Blood cultures obtained from right forearm, per policy.  Set one of two drawn at this time by gil haddad lpn.

## 2024-01-13 NOTE — ED NOTES
Blood cultures obtained from left forearm, per policy.  Set  two of two drawn at this time by gil haddad lpn.

## 2024-01-13 NOTE — ED NOTES
Pt asked to give urine sample pt voiced no able to go at this time. Er Resident in room assessing pt and updating plans to discharge after blood cultures drawn.

## 2024-01-13 NOTE — DISCHARGE INSTRUCTIONS
Please call and follow up with your family doctor as soon as possible. Return to ER if symptoms recur or worsen.    You had cultures drawn today from your blood - you will be contacted for abnormal results. If you develop fever or other worsening symptoms, please promptly return to ER.

## 2024-01-14 LAB
MICROORGANISM SPEC CULT: ABNORMAL
MICROORGANISM SPEC CULT: NORMAL
MICROORGANISM SPEC CULT: NORMAL
SERVICE CMNT-IMP: NORMAL
SERVICE CMNT-IMP: NORMAL
SPECIMEN DESCRIPTION: ABNORMAL
SPECIMEN DESCRIPTION: NORMAL
SPECIMEN DESCRIPTION: NORMAL

## 2024-01-15 ENCOUNTER — CARE COORDINATION (OUTPATIENT)
Dept: OTHER | Facility: CLINIC | Age: 29
End: 2024-01-15

## 2024-01-15 LAB
EKG ATRIAL RATE: 127 BPM
EKG P AXIS: 112 DEGREES
EKG P-R INTERVAL: 166 MS
EKG Q-T INTERVAL: 292 MS
EKG QRS DURATION: 84 MS
EKG QTC CALCULATION (BAZETT): 424 MS
EKG R AXIS: 114 DEGREES
EKG T AXIS: 154 DEGREES
EKG VENTRICULAR RATE: 127 BPM

## 2024-01-15 PROCEDURE — 93010 ELECTROCARDIOGRAM REPORT: CPT | Performed by: INTERNAL MEDICINE

## 2024-01-15 NOTE — CARE COORDINATION
Ambulatory Care Coordination Note    ACM attempted to reach patient for introduction to Associate Care Management related to ED visit to [facility]. Unable to leave a voicemail at this time, call failed.    Plan for follow-up call in 1-2 days      Future Appointments   Date Time Provider Department Center   1/17/2024  3:00 PM Ru Jacob, PT [facility]   2/23/2024 10:45 AM Abby Holman, DO [facility] PAIN MAR [facility]   3/25/2024  8:00 AM Edgard Mota, DO BDM RHEUM [facility]   1/13/2025 10:30 AM Latasha Clemente, APRN - CNP [facility]

## 2024-01-16 ENCOUNTER — CARE COORDINATION (OUTPATIENT)
Dept: OTHER | Facility: CLINIC | Age: 29
End: 2024-01-16

## 2024-01-16 NOTE — CARE COORDINATION
Ambulatory Care Coordination Note    ACM attempted 2nd outreach to patient for introduction to Associate Care Management related to ED visit on 1/13/24. HIPAA compliant message left requesting a return phone call at patient convenience.     Unable to Reach Letter sent to patient via My Chart.    Will continue to outreach.      Future Appointments   Date Time Provider Department Center   1/17/2024  3:00 PM Ru Jacob, PT [facility]   2/23/2024 10:45 AM Abby Holman, DO BDM PAIN MAR [facility]   3/25/2024  8:00 AM Edgard Mota, DO BDM RHEUM [facility]   1/13/2025 10:30 AM Latasha Clemente, TJ - CNP [facility]

## 2024-01-17 ENCOUNTER — HOSPITAL ENCOUNTER (OUTPATIENT)
Dept: PHYSICAL THERAPY | Age: 29
Setting detail: THERAPIES SERIES
Discharge: HOME | End: 2024-01-17
Attending: PAIN MEDICINE
Payer: COMMERCIAL

## 2024-01-17 PROCEDURE — 97161 PT EVAL LOW COMPLEX 20 MIN: CPT | Performed by: PHYSICAL THERAPIST

## 2024-01-17 ASSESSMENT — PAIN DESCRIPTION - PAIN TYPE: TYPE: CHRONIC PAIN

## 2024-01-17 ASSESSMENT — PAIN DESCRIPTION - DESCRIPTORS: DESCRIPTORS: DULL;ACHING;THROBBING

## 2024-01-17 ASSESSMENT — PAIN SCALES - GENERAL: PAINLEVEL_OUTOF10: 7

## 2024-01-17 ASSESSMENT — PAIN DESCRIPTION - LOCATION: LOCATION: BACK

## 2024-01-17 ASSESSMENT — PAIN DESCRIPTION - ORIENTATION: ORIENTATION: LOWER;RIGHT;LEFT

## 2024-01-18 LAB
MICROORGANISM SPEC CULT: NORMAL
MICROORGANISM SPEC CULT: NORMAL
SERVICE CMNT-IMP: NORMAL
SERVICE CMNT-IMP: NORMAL
SPECIMEN DESCRIPTION: NORMAL
SPECIMEN DESCRIPTION: NORMAL

## 2024-01-24 ENCOUNTER — CARE COORDINATION (OUTPATIENT)
Dept: OTHER | Facility: CLINIC | Age: 29
End: 2024-01-24

## 2024-01-24 ENCOUNTER — HOSPITAL ENCOUNTER (OUTPATIENT)
Dept: PHYSICAL THERAPY | Age: 29
Setting detail: THERAPIES SERIES
Discharge: HOME | End: 2024-01-24
Attending: PAIN MEDICINE
Payer: COMMERCIAL

## 2024-01-24 NOTE — CARE COORDINATION
Verified  and address for HIPAA security.  Introduced [facility] services for patient.   Patient does not identify any Care Management needs at this time and declines services.       Patient states she does not need anything at this time.  Encouraged her to keep my contact information and reach out should that change.  She was appreciative.

## 2024-01-31 ENCOUNTER — HOSPITAL ENCOUNTER (OUTPATIENT)
Dept: PHYSICAL THERAPY | Age: 29
Setting detail: THERAPIES SERIES
Discharge: HOME | End: 2024-01-31
Attending: PAIN MEDICINE
Payer: COMMERCIAL

## 2024-02-05 ENCOUNTER — HOSPITAL ENCOUNTER (OUTPATIENT)
Dept: PHYSICAL THERAPY | Age: 29
Setting detail: THERAPIES SERIES
End: 2024-02-05
Attending: PAIN MEDICINE
Payer: COMMERCIAL

## 2024-02-07 ENCOUNTER — HOSPITAL ENCOUNTER (OUTPATIENT)
Dept: PHYSICAL THERAPY | Age: 29
Setting detail: THERAPIES SERIES
Discharge: HOME | End: 2024-02-07
Attending: PAIN MEDICINE

## 2024-02-14 ENCOUNTER — HOSPITAL ENCOUNTER (OUTPATIENT)
Dept: PHYSICAL THERAPY | Age: 29
Setting detail: THERAPIES SERIES
Discharge: HOME | End: 2024-02-14
Attending: PAIN MEDICINE
Payer: COMMERCIAL

## 2024-02-23 ENCOUNTER — OFFICE VISIT (OUTPATIENT)
Dept: PAIN MANAGEMENT | Age: 29
End: 2024-02-23
Payer: COMMERCIAL

## 2024-02-23 VITALS
BODY MASS INDEX: 47.46 KG/M2 | DIASTOLIC BLOOD PRESSURE: 92 MMHG | SYSTOLIC BLOOD PRESSURE: 162 MMHG | HEIGHT: 64 IN | HEART RATE: 98 BPM | TEMPERATURE: 97.2 F | RESPIRATION RATE: 16 BRPM | OXYGEN SATURATION: 97 % | WEIGHT: 278 LBS

## 2024-02-23 DIAGNOSIS — M54.41 CHRONIC BILATERAL LOW BACK PAIN WITH BILATERAL SCIATICA: ICD-10-CM

## 2024-02-23 DIAGNOSIS — M79.10 MYALGIA: ICD-10-CM

## 2024-02-23 DIAGNOSIS — M54.42 CHRONIC BILATERAL LOW BACK PAIN WITH BILATERAL SCIATICA: ICD-10-CM

## 2024-02-23 DIAGNOSIS — G89.29 CHRONIC BILATERAL LOW BACK PAIN WITH BILATERAL SCIATICA: ICD-10-CM

## 2024-02-23 DIAGNOSIS — G89.4 CHRONIC PAIN SYNDROME: Primary | ICD-10-CM

## 2024-02-23 DIAGNOSIS — M79.7 FIBROMYALGIA: ICD-10-CM

## 2024-02-23 PROCEDURE — 99204 OFFICE O/P NEW MOD 45 MIN: CPT | Performed by: PAIN MEDICINE

## 2024-02-23 RX ORDER — BENZONATATE 100 MG
CAPSULE ORAL AS NEEDED
COMMUNITY
Start: 2024-01-10

## 2024-02-23 RX ORDER — BACLOFEN 10 MG/1
TABLET ORAL
COMMUNITY
Start: 2023-09-27

## 2024-02-23 RX ORDER — GUAIFENESIN 1200 MG/1
TABLET, EXTENDED RELEASE ORAL PRN
COMMUNITY
Start: 2024-01-10

## 2024-02-23 RX ORDER — CETIRIZINE HCL 1 MG/ML
SOLUTION, ORAL ORAL AS NEEDED
COMMUNITY
Start: 2024-01-10

## 2024-02-23 RX ORDER — DESVENLAFAXINE 50 MG/1
TABLET, EXTENDED RELEASE ORAL
COMMUNITY
Start: 2024-02-13

## 2024-02-23 NOTE — PROGRESS NOTES
Sveta Stack presents to the [facility] on 2/23/2024. Sveta is complaining of pain neck radiating to low back. The pain is constant. The pain is described as aching, shooting, and sharp. Pain is rated on her best day at a 7, on her worst day at a 10, and on average at a 9 on the VAS scale. She took her last dose of Motrin yesterday.     Any procedures since your last visit: No.    Pacemaker or defibrillator: No.    She is  NSAIDS and is not on anticoagulation medications to include none.     Medication Contract and Consent for Opioid Use Documents Filed        No documents found                    BP (!) 162/92   Pulse 98   Temp 97.2 °F (36.2 °C) (Infrared)   Resp 16   Ht 1.626 m (5' 4\")   Wt 126.1 kg (278 lb)   SpO2 97%   BMI 47.72 kg/m²      No LMP recorded.  
Tibialis5/5  Left Ant Tibialis5/5     Reflexes:  (not assessed today)  Right Quadriceps reflex2+  Left Quadriceps reflex2+  Right Achilles reflex2+  Left Achilles reflex2+  Gait:normal     Dermatology:     Skin:no rashes or lesions noted     Impression:     LBP BLE pain  Pain is out of proportion to imaging studies pointing toward a diagnosis of fibromyalgia  Failed PT, meds, and injections (DELFIN, MNBB) with Dr. 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 Amador at [facility])  Failed pregabalin (150 mg BID), gabapentin, zanaflex, methocarbamol, cyclobenzaprine, duloxetine  Currently on desvenlafaxine  On diclofenac gel and apap  Continue pregabalin 150 mg at HS only due to fatigue  Continue TENS  Continue Aqua therapy  Ztlido samples given, order placed  Would avoid TPI's at this time given hyperalgesia  Recommend weight loss and dietary changes, pt states \"I tried all of that\"  Referral to non-surgical weight loss, she states she was being considered for gastric bypass  Patient encouraged to stay active and to lose weight  Treatment plan discussed with the patient including medication and procedure side effects      Cc:  Referring physician    RAFFI Holman D.O.

## 2024-03-15 ENCOUNTER — TELEPHONE (OUTPATIENT)
Dept: BARIATRICS/WEIGHT MGMT | Age: 29
End: 2024-03-15

## 2024-04-09 ENCOUNTER — TELEPHONE (OUTPATIENT)
Dept: BARIATRICS/WEIGHT MGMT | Age: 29
End: 2024-04-09

## 2024-04-24 ENCOUNTER — OFFICE VISIT (OUTPATIENT)
Dept: PAIN MANAGEMENT | Age: 29
End: 2024-04-24
Payer: COMMERCIAL

## 2024-04-24 VITALS
HEART RATE: 90 BPM | DIASTOLIC BLOOD PRESSURE: 80 MMHG | RESPIRATION RATE: 16 BRPM | HEIGHT: 64 IN | OXYGEN SATURATION: 94 % | WEIGHT: 278 LBS | SYSTOLIC BLOOD PRESSURE: 149 MMHG | TEMPERATURE: 97.2 F | BODY MASS INDEX: 47.46 KG/M2

## 2024-04-24 DIAGNOSIS — M79.7 FIBROMYALGIA: ICD-10-CM

## 2024-04-24 DIAGNOSIS — G89.4 CHRONIC PAIN SYNDROME: Primary | ICD-10-CM

## 2024-04-24 DIAGNOSIS — M54.42 CHRONIC BILATERAL LOW BACK PAIN WITH BILATERAL SCIATICA: ICD-10-CM

## 2024-04-24 DIAGNOSIS — G89.29 CHRONIC BILATERAL LOW BACK PAIN WITH BILATERAL SCIATICA: ICD-10-CM

## 2024-04-24 DIAGNOSIS — M79.10 MYALGIA: ICD-10-CM

## 2024-04-24 DIAGNOSIS — G89.4 CHRONIC PAIN SYNDROME: ICD-10-CM

## 2024-04-24 DIAGNOSIS — M54.41 CHRONIC BILATERAL LOW BACK PAIN WITH BILATERAL SCIATICA: ICD-10-CM

## 2024-04-24 PROCEDURE — G8417 CALC BMI ABV UP PARAM F/U: HCPCS | Performed by: PHYSICIAN ASSISTANT

## 2024-04-24 PROCEDURE — 99213 OFFICE O/P EST LOW 20 MIN: CPT

## 2024-04-24 PROCEDURE — 99213 OFFICE O/P EST LOW 20 MIN: CPT | Performed by: PHYSICIAN ASSISTANT

## 2024-04-24 PROCEDURE — G8427 DOCREV CUR MEDS BY ELIG CLIN: HCPCS | Performed by: PHYSICIAN ASSISTANT

## 2024-04-24 PROCEDURE — 1036F TOBACCO NON-USER: CPT | Performed by: PHYSICIAN ASSISTANT

## 2024-04-24 RX ORDER — IBUPROFEN 200 MG
200 TABLET ORAL PRN
COMMUNITY

## 2024-04-24 RX ORDER — PREGABALIN 150 MG/1
150 CAPSULE ORAL DAILY
Qty: 30 CAPSULE | Refills: 2 | Status: SHIPPED | OUTPATIENT
Start: 2024-04-24 | End: 2024-05-24

## 2024-04-24 RX ORDER — ARIPIPRAZOLE 2 MG/1
TABLET ORAL
COMMUNITY
Start: 2024-03-28

## 2024-04-24 RX ORDER — LIDOCAINE 50 MG/G
1 PATCH TOPICAL DAILY
Qty: 30 PATCH | Refills: 0 | Status: SHIPPED | OUTPATIENT
Start: 2024-04-24

## 2024-04-24 NOTE — PROGRESS NOTES
[facility]  [address] [place] [id number]    Follow up Note      Sveta Stack     Date of Visit:  2024    CC:  Patient presents for follow up   Chief Complaint   Patient presents with    Follow-up     Neck and low back pain       HPI:    Pain is unchanged.  Appropriate analgesia with current medications regimen: yes - somwehat.    Change in quality of symptoms:no.    Medication side effects:none.   Recent diagnostic testing:none.   Recent interventional procedures:none.    She has not been on anticoagulation medications to include ASA, NSAIDS, Plavix, heparin, LMW heparin, and warfarin and has not been on herbal supplements.  She is not diabetic.     Imagin/2023 MRI lumbar w/o -  FINDINGS:  BONES/ALIGNMENT: There is normal alignment of the spine. The vertebral body  heights are maintained. The bone marrow signal appears unremarkable.  No  acute fracture is detected.     Mild degenerative disc disease identified at L5-S1 with presence of  desiccation of the disc space.     SPINAL CORD: The conus terminates normally.     SOFT TISSUES: No paraspinal mass identified.     L1-L2: There is no significant disc herniation, spinal canal stenosis or  neural foraminal narrowing.     L2-L3: There is no significant disc herniation, spinal canal stenosis or  neural foraminal narrowing.     L3-L4: There is no significant disc herniation, spinal canal stenosis or  neural foraminal narrowing.     L4-L5: There is no significant disc herniation, spinal canal stenosis or  neural foraminal narrowing.     L5-S1: Shallow disc bulge flattens the thecal sac.     IMPRESSION:  Mild degenerative disc disease at L5-S1.  No significant focal disc  protrusion or stenosis is detected.     2023 MRI cervical w/o -  FINDINGS:  MRI CERVICAL SPINE:     Bones:     Vertebral heights and alignment are maintained.     No suspicious marrow edema is seen.     Paraspinal:     No paraspinal soft tissue abnormality is

## 2024-04-24 NOTE — PROGRESS NOTES
Sveta Stack presents to the [facility] on 4/24/2024. Sveta is complaining of pain neck and low back. The pain is constant. The pain is described as aching, shooting, and sharp. Pain is rated on her best day at a 8, on her worst day at a 10, and on average at a 8 on the VAS scale. She took her last dose of Lyrica and Tylenol yesterday.     Any procedures since your last visit: No.    Pacemaker or defibrillator: No    She is on NSAIDS and is not on anticoagulation medications to include none.     Medication Contract and Consent for Opioid Use Documents Filed        No documents found                    BP (!) 149/80   Pulse 90   Temp 97.2 °F (36.2 °C) (Infrared)   Resp 16   Ht 1.626 m (5' 4\")   Wt 126.1 kg (278 lb)   SpO2 94%   BMI 47.72 kg/m²      No LMP recorded.

## 2024-05-31 ENCOUNTER — APPOINTMENT (OUTPATIENT)
Dept: ULTRASOUND IMAGING | Age: 29
End: 2024-05-31
Payer: COMMERCIAL

## 2024-05-31 ENCOUNTER — HOSPITAL ENCOUNTER (EMERGENCY)
Age: 29
Discharge: HOME | End: 2024-05-31
Attending: STUDENT IN AN ORGANIZED HEALTH CARE EDUCATION/TRAINING PROGRAM
Payer: COMMERCIAL

## 2024-05-31 VITALS
BODY MASS INDEX: 47.2 KG/M2 | RESPIRATION RATE: 18 BRPM | OXYGEN SATURATION: 100 % | HEART RATE: 98 BPM | TEMPERATURE: 98.4 F | DIASTOLIC BLOOD PRESSURE: 72 MMHG | WEIGHT: 275 LBS | SYSTOLIC BLOOD PRESSURE: 159 MMHG

## 2024-05-31 DIAGNOSIS — O46.90 VAGINAL BLEEDING IN PREGNANCY: Primary | ICD-10-CM

## 2024-05-31 DIAGNOSIS — O20.0 THREATENED MISCARRIAGE: ICD-10-CM

## 2024-05-31 LAB
ABO + RH BLD: NORMAL
ALBUMIN SERPL-MCNC: 4.1 G/DL (ref 3.5–5.2)
ALP SERPL-CCNC: 69 U/L (ref 35–104)
ALT SERPL-CCNC: <5 U/L (ref 0–32)
ANION GAP SERPL CALCULATED.3IONS-SCNC: 12 MMOL/L (ref 7–16)
AST SERPL-CCNC: 11 U/L (ref 0–31)
B-HCG SERPL EIA 3RD IS-ACNC: ABNORMAL MIU/ML (ref 0–7)
BASOPHILS # BLD: 0.04 K/UL (ref 0–0.2)
BASOPHILS NFR BLD: 0 % (ref 0–2)
BILIRUB SERPL-MCNC: 0.4 MG/DL (ref 0–1.2)
BILIRUB UR QL STRIP: NEGATIVE
BUN SERPL-MCNC: 8 MG/DL (ref 6–20)
CALCIUM SERPL-MCNC: 9.4 MG/DL (ref 8.6–10.2)
CHLORIDE SERPL-SCNC: 101 MMOL/L (ref 98–107)
CLARITY UR: CLEAR
CO2 SERPL-SCNC: 24 MMOL/L (ref 22–29)
COLOR UR: YELLOW
CREAT SERPL-MCNC: 0.7 MG/DL (ref 0.5–1)
EOSINOPHIL # BLD: 0.06 K/UL (ref 0.05–0.5)
EOSINOPHILS RELATIVE PERCENT: 1 % (ref 0–6)
ERYTHROCYTE [DISTWIDTH] IN BLOOD BY AUTOMATED COUNT: 13.4 % (ref 11.5–15)
GFR, ESTIMATED: >90 ML/MIN/1.73M2
GLUCOSE SERPL-MCNC: 101 MG/DL (ref 74–99)
GLUCOSE UR STRIP-MCNC: NEGATIVE MG/DL
HCG UR QL: POSITIVE
HCT VFR BLD AUTO: 41.3 % (ref 34–48)
HGB BLD-MCNC: 13.9 G/DL (ref 11.5–15.5)
HGB UR QL STRIP.AUTO: ABNORMAL
IMM GRANULOCYTES # BLD AUTO: 0.03 K/UL (ref 0–0.58)
IMM GRANULOCYTES NFR BLD: 0 % (ref 0–5)
KETONES UR STRIP-MCNC: NEGATIVE MG/DL
LACTATE BLDV-SCNC: 1.3 MMOL/L (ref 0.5–2.2)
LEUKOCYTE ESTERASE UR QL STRIP: NEGATIVE
LIPASE SERPL-CCNC: 29 U/L (ref 13–60)
LYMPHOCYTES NFR BLD: 2.27 K/UL (ref 1.5–4)
LYMPHOCYTES RELATIVE PERCENT: 21 % (ref 20–42)
MCH RBC QN AUTO: 30.4 PG (ref 26–35)
MCHC RBC AUTO-ENTMCNC: 33.7 G/DL (ref 32–34.5)
MCV RBC AUTO: 90.4 FL (ref 80–99.9)
MONOCYTES NFR BLD: 0.93 K/UL (ref 0.1–0.95)
MONOCYTES NFR BLD: 9 % (ref 2–12)
NEUTROPHILS NFR BLD: 70 % (ref 43–80)
NEUTS SEG NFR BLD: 7.58 K/UL (ref 1.8–7.3)
NITRITE UR QL STRIP: NEGATIVE
PH UR STRIP: 7 [PH] (ref 5–9)
PLATELET # BLD AUTO: 317 K/UL (ref 130–450)
PMV BLD AUTO: 9.8 FL (ref 7–12)
POTASSIUM SERPL-SCNC: 3.9 MMOL/L (ref 3.5–5)
PROT SERPL-MCNC: 7.8 G/DL (ref 6.4–8.3)
PROT UR STRIP-MCNC: NEGATIVE MG/DL
RBC # BLD AUTO: 4.57 M/UL (ref 3.5–5.5)
RBC #/AREA URNS HPF: ABNORMAL /HPF
SODIUM SERPL-SCNC: 137 MMOL/L (ref 132–146)
SP GR UR STRIP: 1.01 (ref 1–1.03)
UROBILINOGEN UR STRIP-ACNC: 0.2 EU/DL (ref 0–1)
WBC #/AREA URNS HPF: ABNORMAL /HPF
WBC OTHER # BLD: 10.9 K/UL (ref 4.5–11.5)

## 2024-05-31 PROCEDURE — 6360000002 HC RX W HCPCS: Performed by: STUDENT IN AN ORGANIZED HEALTH CARE EDUCATION/TRAINING PROGRAM

## 2024-05-31 PROCEDURE — 80053 COMPREHEN METABOLIC PANEL: CPT

## 2024-05-31 PROCEDURE — 99284 EMERGENCY DEPT VISIT MOD MDM: CPT

## 2024-05-31 PROCEDURE — 96374 THER/PROPH/DIAG INJ IV PUSH: CPT

## 2024-05-31 PROCEDURE — 85025 COMPLETE CBC W/AUTO DIFF WBC: CPT

## 2024-05-31 PROCEDURE — 86900 BLOOD TYPING SEROLOGIC ABO: CPT

## 2024-05-31 PROCEDURE — 84703 CHORIONIC GONADOTROPIN ASSAY: CPT

## 2024-05-31 PROCEDURE — 86901 BLOOD TYPING SEROLOGIC RH(D): CPT

## 2024-05-31 PROCEDURE — 76817 TRANSVAGINAL US OBSTETRIC: CPT

## 2024-05-31 PROCEDURE — 83605 ASSAY OF LACTIC ACID: CPT

## 2024-05-31 PROCEDURE — 83690 ASSAY OF LIPASE: CPT

## 2024-05-31 PROCEDURE — 81001 URINALYSIS AUTO W/SCOPE: CPT

## 2024-05-31 PROCEDURE — 84702 CHORIONIC GONADOTROPIN TEST: CPT

## 2024-05-31 RX ORDER — DROPERIDOL 2.5 MG/ML
INJECTION, SOLUTION INTRAMUSCULAR; INTRAVENOUS
Status: DISCONTINUED
Start: 2024-05-31 | End: 2024-05-31 | Stop reason: WASHOUT

## 2024-05-31 RX ORDER — PYRIDOXINE HCL (VITAMIN B6) 25 MG
25 TABLET ORAL ONCE
Status: DISCONTINUED | OUTPATIENT
Start: 2024-05-31 | End: 2024-05-31 | Stop reason: HOSPADM

## 2024-05-31 RX ORDER — ONDANSETRON 2 MG/ML
4 INJECTION INTRAMUSCULAR; INTRAVENOUS ONCE
Status: COMPLETED | OUTPATIENT
Start: 2024-05-31 | End: 2024-05-31

## 2024-05-31 RX ADMIN — ONDANSETRON 4 MG: 2 INJECTION INTRAMUSCULAR; INTRAVENOUS at 10:16

## 2024-05-31 ASSESSMENT — ENCOUNTER SYMPTOMS
ABDOMINAL PAIN: 1
SHORTNESS OF BREATH: 0
EYE DISCHARGE: 0
DIARRHEA: 0
VOMITING: 0
EYE PAIN: 0
WHEEZING: 0
SORE THROAT: 0
NAUSEA: 1
ABDOMINAL DISTENTION: 0
SINUS PRESSURE: 0
EYE REDNESS: 0
COUGH: 0
BACK PAIN: 0

## 2024-05-31 ASSESSMENT — LIFESTYLE VARIABLES
HOW MANY STANDARD DRINKS CONTAINING ALCOHOL DO YOU HAVE ON A TYPICAL DAY: PATIENT DOES NOT DRINK
HOW OFTEN DO YOU HAVE A DRINK CONTAINING ALCOHOL: NEVER

## 2024-05-31 ASSESSMENT — PAIN DESCRIPTION - DESCRIPTORS: DESCRIPTORS: CRAMPING;DISCOMFORT;TENDER

## 2024-05-31 ASSESSMENT — PAIN SCALES - GENERAL: PAINLEVEL_OUTOF10: 6

## 2024-05-31 ASSESSMENT — PAIN - FUNCTIONAL ASSESSMENT: PAIN_FUNCTIONAL_ASSESSMENT: 0-10

## 2024-05-31 ASSESSMENT — PAIN DESCRIPTION - LOCATION: LOCATION: ABDOMEN

## 2024-05-31 NOTE — DISCHARGE INSTRUCTIONS
US OB TRANSVAGINAL   Final Result   1.  Single, viable intrauterine pregnancy of approximately 6 weeks 6 days   gestational age.           COMPARISON:   None     HISTORY:   ORDERING SYSTEM PROVIDED HISTORY: Evaluate pregnancy   TECHNOLOGIST PROVIDED HISTORY:     Reason for exam:->Evaluate pregnancy   What reading provider will be dictating this exam?->CRC     FINDINGS:   Uterus: 7.1 cm in length by 4.8 cm in AP diameter by 5.4 cm in width     Cervical length: 1.8 cm     Gestational Sac(s):  Single normal appearing gestational sac.  No evidence of   subchorionic hemorrhage.     Yolk Sac:  Present     Fetal Pole:  Single fetal pole     Crown Rump Length:  0.9 cm     Fetal Heart Rate:  127 beats per minute     Right ovary: 2.0 x 2.9 x 2.1 cm.  Color Doppler and spectral wave analysis of   the right ovary reveals normal arterial and venous phasicity and flow.     Left ovary: 3.1 x 1.7 x 2.2 cm.  Color Doppler and spectral wave analysis of   the left ovary reveals normal arterial and venous phasicity and flow.     Free fluid: None     Measurements:     Estimated gestational age by current ultrasound: 6 weeks 6 days     Estimated gestational age by LMP/prior ultrasound: 7 weeks 4 days     Estimated Due Date: 01/18/2025

## 2024-05-31 NOTE — ED PROVIDER NOTES
eye: No discharge.         Left eye: No discharge.      Extraocular Movements: Extraocular movements intact.      Conjunctiva/sclera: Conjunctivae normal.   Cardiovascular:      Rate and Rhythm: Normal rate and regular rhythm.      Heart sounds: Normal heart sounds. No murmur heard.  Pulmonary:      Effort: Pulmonary effort is normal. No respiratory distress.      Breath sounds: Normal breath sounds. No stridor.   Abdominal:      General: There is no distension.      Palpations: Abdomen is soft. There is no mass.      Tenderness: There is generalized abdominal tenderness and tenderness in the right upper quadrant, right lower quadrant, suprapubic area, left upper quadrant and left lower quadrant. There is no guarding or rebound.      Hernia: No hernia is present.   Musculoskeletal:      Cervical back: Normal range of motion and neck supple.   Skin:     General: Skin is warm and dry.      Coloration: Skin is not jaundiced or pale.   Neurological:      General: No focal deficit present.      Mental Status: She is alert.          Procedures    MDM             Differential diagnosis: Vaginal discharge, bleeding, threatened miscarriage, ectopic pregnancy to name a few  Review of ED/ Outpatient Records: On chart review patient saw pain management on 04/20/2024 for chronic pain syndrome, fibromyalgia  Historians that case was discussed with: ***  My EKG interpretation: See ED course  Imaging Non-plain film images such as CT, Ultrasound and MRI are read by the radiologist. Plain radiographic images are visualized and preliminarily interpreted by the ED provider:***  Discussed with other providers: ***  Tests Considered but not ordered: ***  Decision making tools/risks stratification / MDM / Independent Interpretation of labs: Sveta Stack presents to the ED for ***.  History from ***, with *** limitations. Workup in the ED revealed  ***.   Social Determinants of health impacting treatment or disposition: ***  CODE

## 2024-06-03 ENCOUNTER — CARE COORDINATION (OUTPATIENT)
Dept: OTHER | Facility: CLINIC | Age: 29
End: 2024-06-03

## 2024-06-03 NOTE — CARE COORDINATION
Ambulatory Care Coordination Note    ACM attempted to reach patient for introduction to Associate Care Management related to Maternity CM/ED follow-up. HIPAA compliant message left requesting a return phone call at patient convenience.     Plan for follow-up call in 5-7 days      Future Appointments   Date Time Provider Department Center   6/12/2024  9:40 AM SCHEDULE, [facility]  [facility]   6/12/2024  9:40 AM Latasha Celmente APRN - CNP [facility]   7/17/2024  9:00 AM Samantha Rivers PA BDM PAIN MAR [facility]   1/13/2025 10:30 AM Latasha Clemente APRN - CNP [facility]       PATEL Wotrhington, RN  Associate Care Manager   Cell: [phone]  [email or web address]

## 2024-06-14 ENCOUNTER — APPOINTMENT (OUTPATIENT)
Dept: ULTRASOUND IMAGING | Age: 29
End: 2024-06-14
Payer: COMMERCIAL

## 2024-06-14 ENCOUNTER — HOSPITAL ENCOUNTER (EMERGENCY)
Age: 29
Discharge: HOME | End: 2024-06-15
Payer: COMMERCIAL

## 2024-06-14 VITALS
TEMPERATURE: 97.6 F | SYSTOLIC BLOOD PRESSURE: 148 MMHG | RESPIRATION RATE: 15 BRPM | OXYGEN SATURATION: 100 % | HEART RATE: 84 BPM | DIASTOLIC BLOOD PRESSURE: 79 MMHG

## 2024-06-14 DIAGNOSIS — O46.90 VAGINAL BLEEDING IN PREGNANCY: Primary | ICD-10-CM

## 2024-06-14 DIAGNOSIS — O26.891 ABDOMINAL PAIN IN PREGNANCY, FIRST TRIMESTER: ICD-10-CM

## 2024-06-14 DIAGNOSIS — R10.9 ABDOMINAL PAIN IN PREGNANCY, FIRST TRIMESTER: ICD-10-CM

## 2024-06-14 DIAGNOSIS — D72.829 LEUKOCYTOSIS, UNSPECIFIED TYPE: ICD-10-CM

## 2024-06-14 DIAGNOSIS — O16.1 ELEVATED BLOOD PRESSURE AFFECTING PREGNANCY IN FIRST TRIMESTER, ANTEPARTUM: ICD-10-CM

## 2024-06-14 LAB
ALBUMIN SERPL-MCNC: 4 G/DL (ref 3.5–5.2)
ALP SERPL-CCNC: 66 U/L (ref 35–104)
ALT SERPL-CCNC: <5 U/L (ref 0–32)
ANION GAP SERPL CALCULATED.3IONS-SCNC: 11 MMOL/L (ref 7–16)
AST SERPL-CCNC: 14 U/L (ref 0–31)
B-HCG SERPL EIA 3RD IS-ACNC: ABNORMAL MIU/ML (ref 0–7)
BASOPHILS # BLD: 0.05 K/UL (ref 0–0.2)
BASOPHILS NFR BLD: 0 % (ref 0–2)
BILIRUB SERPL-MCNC: 0.2 MG/DL (ref 0–1.2)
BILIRUB UR QL STRIP: NEGATIVE
BUN SERPL-MCNC: 14 MG/DL (ref 6–20)
CALCIUM SERPL-MCNC: 9.2 MG/DL (ref 8.6–10.2)
CHLORIDE SERPL-SCNC: 99 MMOL/L (ref 98–107)
CLARITY UR: CLEAR
CO2 SERPL-SCNC: 22 MMOL/L (ref 22–29)
COLOR UR: YELLOW
CREAT SERPL-MCNC: 1 MG/DL (ref 0.5–1)
EOSINOPHIL # BLD: 0.1 K/UL (ref 0.05–0.5)
EOSINOPHILS RELATIVE PERCENT: 1 % (ref 0–6)
EPI CELLS #/AREA URNS HPF: ABNORMAL /HPF
ERYTHROCYTE [DISTWIDTH] IN BLOOD BY AUTOMATED COUNT: 13.3 % (ref 11.5–15)
GFR, ESTIMATED: 82 ML/MIN/1.73M2
GLUCOSE SERPL-MCNC: 91 MG/DL (ref 74–99)
GLUCOSE UR STRIP-MCNC: NEGATIVE MG/DL
HCT VFR BLD AUTO: 41 % (ref 34–48)
HGB BLD-MCNC: 13.4 G/DL (ref 11.5–15.5)
HGB UR QL STRIP.AUTO: ABNORMAL
IMM GRANULOCYTES # BLD AUTO: 0.07 K/UL (ref 0–0.58)
IMM GRANULOCYTES NFR BLD: 1 % (ref 0–5)
KETONES UR STRIP-MCNC: NEGATIVE MG/DL
LACTATE BLDV-SCNC: 1.1 MMOL/L (ref 0.5–2.2)
LEUKOCYTE ESTERASE UR QL STRIP: NEGATIVE
LYMPHOCYTES NFR BLD: 2.85 K/UL (ref 1.5–4)
LYMPHOCYTES RELATIVE PERCENT: 19 % (ref 20–42)
MCH RBC QN AUTO: 30.6 PG (ref 26–35)
MCHC RBC AUTO-ENTMCNC: 32.7 G/DL (ref 32–34.5)
MCV RBC AUTO: 93.6 FL (ref 80–99.9)
MONOCYTES NFR BLD: 1.14 K/UL (ref 0.1–0.95)
MONOCYTES NFR BLD: 8 % (ref 2–12)
NEUTROPHILS NFR BLD: 72 % (ref 43–80)
NEUTS SEG NFR BLD: 10.85 K/UL (ref 1.8–7.3)
NITRITE UR QL STRIP: NEGATIVE
PH UR STRIP: 6.5 [PH] (ref 5–9)
PLATELET # BLD AUTO: 301 K/UL (ref 130–450)
PMV BLD AUTO: 9.8 FL (ref 7–12)
POTASSIUM SERPL-SCNC: 3.8 MMOL/L (ref 3.5–5)
PROT SERPL-MCNC: 7.7 G/DL (ref 6.4–8.3)
PROT UR STRIP-MCNC: NEGATIVE MG/DL
RBC # BLD AUTO: 4.38 M/UL (ref 3.5–5.5)
RBC #/AREA URNS HPF: ABNORMAL /HPF
SODIUM SERPL-SCNC: 132 MMOL/L (ref 132–146)
SP GR UR STRIP: 1.01 (ref 1–1.03)
UROBILINOGEN UR STRIP-ACNC: 1 EU/DL (ref 0–1)
WBC #/AREA URNS HPF: ABNORMAL /HPF
WBC OTHER # BLD: 15.1 K/UL (ref 4.5–11.5)

## 2024-06-14 PROCEDURE — 6370000000 HC RX 637 (ALT 250 FOR IP)

## 2024-06-14 PROCEDURE — 80053 COMPREHEN METABOLIC PANEL: CPT

## 2024-06-14 PROCEDURE — 85025 COMPLETE CBC W/AUTO DIFF WBC: CPT

## 2024-06-14 PROCEDURE — 2580000003 HC RX 258

## 2024-06-14 PROCEDURE — 83605 ASSAY OF LACTIC ACID: CPT

## 2024-06-14 PROCEDURE — 76817 TRANSVAGINAL US OBSTETRIC: CPT

## 2024-06-14 PROCEDURE — 99284 EMERGENCY DEPT VISIT MOD MDM: CPT

## 2024-06-14 PROCEDURE — 87086 URINE CULTURE/COLONY COUNT: CPT

## 2024-06-14 PROCEDURE — 81001 URINALYSIS AUTO W/SCOPE: CPT

## 2024-06-14 PROCEDURE — 76705 ECHO EXAM OF ABDOMEN: CPT

## 2024-06-14 PROCEDURE — 84702 CHORIONIC GONADOTROPIN TEST: CPT

## 2024-06-14 RX ORDER — OXYCODONE HYDROCHLORIDE AND ACETAMINOPHEN 5; 325 MG/1; MG/1
1 TABLET ORAL ONCE
Status: COMPLETED | OUTPATIENT
Start: 2024-06-14 | End: 2024-06-14

## 2024-06-14 RX ORDER — ONDANSETRON 4 MG/1
4 TABLET, ORALLY DISINTEGRATING ORAL ONCE
Status: COMPLETED | OUTPATIENT
Start: 2024-06-14 | End: 2024-06-14

## 2024-06-14 RX ORDER — 0.9 % SODIUM CHLORIDE 0.9 %
1000 INTRAVENOUS SOLUTION INTRAVENOUS ONCE
Status: COMPLETED | OUTPATIENT
Start: 2024-06-14 | End: 2024-06-14

## 2024-06-14 RX ORDER — ACETAMINOPHEN 325 MG/1
650 TABLET ORAL ONCE
Status: COMPLETED | OUTPATIENT
Start: 2024-06-14 | End: 2024-06-14

## 2024-06-14 RX ADMIN — ACETAMINOPHEN 650 MG: 325 TABLET ORAL at 20:51

## 2024-06-14 RX ADMIN — SODIUM CHLORIDE 1000 ML: 9 INJECTION, SOLUTION INTRAVENOUS at 20:55

## 2024-06-14 RX ADMIN — ONDANSETRON 4 MG: 4 TABLET, ORALLY DISINTEGRATING ORAL at 22:45

## 2024-06-14 RX ADMIN — OXYCODONE HYDROCHLORIDE AND ACETAMINOPHEN 1 TABLET: 5; 325 TABLET ORAL at 22:48

## 2024-06-14 NOTE — ED PROVIDER NOTES
***        [facility] EMERGENCY DEPARTMENT  EMERGENCY DEPARTMENT ENCOUNTER        Pt Name: Sveta Stack  MRN: [id number]  Birthdate 1995  Date of evaluation: 6/14/2024  Provider: TJ Valdes - CNP  PCP: Robby Zimmerman DO  Note Started: 7:23 PM EDT 6/14/24    CHIEF COMPLAINT       Chief Complaint   Patient presents with    Vaginal Bleeding     States started today at work and is also cramping with bleeding, 9 weeks pregnant. 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 OB Néstor Escamilla. States no clots or tissues.     Back Pain       HISTORY OF PRESENT ILLNESS: 1 or more Elements   History from : Patient and chart review  Limitations to history : None    Sveta Stack is a 29 y.o. female with a history of GERD, tachycardia, fibromyalgia, anemia, and seizure-like activity who is currently 9 weeks pregnant presenting to the emergency department from work, for spotty to light pink vaginal bleeding, which occured a few hour(s) prior to arrival.  Since onset the symptoms have been gradually worsening and mild in severity.  Symptoms are associated with diffuse lower abdominal pain with radiation into her lower back bilaterally, nausea, and urinary frequency.  Pain seems to be worse in the left lower quadrant.  She has had some nausea and urinary frequency since earlier in her pregnancy, but they do seem to be a bit worse.  She denies any chest pain, shortness of breath, fever, chills, sweating, vomiting, diarrhea, constipation, dark/black stools, blood in stool, cloudy urine, dysuria, hematuria, urinary urgency, abnormal vaginal discharge, vaginal itching, or passage of tissue.  She is established with Dr. Néstor Escamilla, OB/GYN and has had an ultrasound confirming an IUP.  She had negative STI testing at her initial prenatal visit on 6/12/2024.    STD History: no history of PID, STD's.  LMP: Last Menstrual Period:  04/08/2024 and On time & regular.   Gravid Status:   ***. They will follow-up in ***days.  A referral was provided to***. Patient is to return to the ER for new or worsening symptoms as instructed.  *** verbalized understanding. I emphasized the importance of follow-up with the physician I referred them to in the timeframe recommended. I discussed with the patient emergent symptoms and the need to immediately return to the ER for any new or worsening symptoms as discussed above. Written information was included in their discharge instructions. Additional verbal discharge instructions were also given and discussed with the patient to supplement those generated by the EMR. We also discussed medications that were prescribed*** including common side effects and interactions. ***The patient was advised to abstain from driving, operating heavy machinery or making significant decisions while taking medications such as opiates and muscle relaxers that may impair this. All questions were addressed.  They understand return precautions and discharge instructions. The {Persons; Family Members:92491}  expressed understanding. Vitals were stable and they were in no distress at discharge.     FINAL IMPRESSION      1. Vaginal bleeding in pregnancy    2. Abdominal pain in pregnancy, first trimester    3. Leukocytosis, unspecified type          DISPOSITION/PLAN     DISPOSITION  06/14/2024 10:44:10 PM  {Plan; disposition:48482}.  Patient condition is {condition:41000}.    PATIENT REFERRED TO:  Vladimir Ford MD  [address] [address]  [phone]    Call       [facility] Emergency Department  [address] [address]  [phone]    If symptoms worsen      DISCHARGE MEDICATIONS:  New Prescriptions    No medications on file       DISCONTINUED MEDICATIONS:  Discontinued Medications    No medications on file            END OF PROVIDER NOTE    I am the primary clinician of record.     Electronically signed by

## 2024-06-15 RX ORDER — ONDANSETRON 4 MG/1
4 TABLET, ORALLY DISINTEGRATING ORAL 3 TIMES DAILY PRN
Qty: 21 TABLET | Refills: 0 | Status: SHIPPED | OUTPATIENT
Start: 2024-06-15

## 2024-06-16 LAB
MICROORGANISM SPEC CULT: ABNORMAL
MICROORGANISM SPEC CULT: ABNORMAL
SERVICE CMNT-IMP: ABNORMAL
SPECIMEN DESCRIPTION: ABNORMAL

## 2024-06-17 ENCOUNTER — CARE COORDINATION (OUTPATIENT)
Dept: OTHER | Facility: CLINIC | Age: 29
End: 2024-06-17

## 2024-06-17 NOTE — CARE COORDINATION
Ambulatory Care Coordination Note    ACM attempted 2nd outreach to patient for introduction to Associate Care Management related to Maternity CM and ED follow-up. HIPAA compliant message left requesting a return phone call at patient convenience.     Unable to Reach Letter sent to patient via inSelly.    Will continue to outreach.      Future Appointments   Date Time Provider Department Center   6/28/2024 10:30 AM SCHEDULE, [facility]  [facility]   7/10/2024 10:20 AM Latasha Clemente APRN - CNP [facility]   7/17/2024  9:00 AM Samantha Rivers PA BDM PAIN MAR [facility]   1/13/2025 10:30 AM Latasha Clemente APRN - CNP [facility]         KAELA WorthingtonN, RN  Associate Care Manager   Cell: [phone]  [email or web address]

## 2024-07-04 ENCOUNTER — HOSPITAL ENCOUNTER (EMERGENCY)
Age: 29
Discharge: HOME | End: 2024-07-04
Attending: EMERGENCY MEDICINE
Payer: COMMERCIAL

## 2024-07-04 ENCOUNTER — APPOINTMENT (OUTPATIENT)
Dept: ULTRASOUND IMAGING | Age: 29
End: 2024-07-04
Payer: COMMERCIAL

## 2024-07-04 VITALS
BODY MASS INDEX: 46.1 KG/M2 | DIASTOLIC BLOOD PRESSURE: 92 MMHG | RESPIRATION RATE: 18 BRPM | OXYGEN SATURATION: 98 % | SYSTOLIC BLOOD PRESSURE: 155 MMHG | TEMPERATURE: 97 F | HEART RATE: 77 BPM | HEIGHT: 64 IN | WEIGHT: 270 LBS

## 2024-07-04 DIAGNOSIS — O21.9 NAUSEA AND VOMITING IN PREGNANCY: Primary | ICD-10-CM

## 2024-07-04 LAB
ABO + RH BLD: NORMAL
ALBUMIN SERPL-MCNC: 4 G/DL (ref 3.5–5.2)
ALP SERPL-CCNC: 65 U/L (ref 35–104)
ALT SERPL-CCNC: <5 U/L (ref 0–32)
ANION GAP SERPL CALCULATED.3IONS-SCNC: 11 MMOL/L (ref 7–16)
AST SERPL-CCNC: 12 U/L (ref 0–31)
B-HCG SERPL EIA 3RD IS-ACNC: ABNORMAL MIU/ML (ref 0–7)
BILIRUB SERPL-MCNC: 0.5 MG/DL (ref 0–1.2)
BILIRUB UR QL STRIP: ABNORMAL
BUN SERPL-MCNC: 5 MG/DL (ref 6–20)
CALCIUM SERPL-MCNC: 9.3 MG/DL (ref 8.6–10.2)
CHLORIDE SERPL-SCNC: 99 MMOL/L (ref 98–107)
CLARITY UR: CLEAR
CO2 SERPL-SCNC: 22 MMOL/L (ref 22–29)
COLOR UR: YELLOW
CREAT SERPL-MCNC: 0.7 MG/DL (ref 0.5–1)
EPI CELLS #/AREA URNS HPF: ABNORMAL /HPF
ERYTHROCYTE [DISTWIDTH] IN BLOOD BY AUTOMATED COUNT: 13.2 % (ref 11.5–15)
GFR, ESTIMATED: >90 ML/MIN/1.73M2
GLUCOSE SERPL-MCNC: 90 MG/DL (ref 74–99)
GLUCOSE UR STRIP-MCNC: NEGATIVE MG/DL
HCT VFR BLD AUTO: 42 % (ref 34–48)
HGB BLD-MCNC: 14.1 G/DL (ref 11.5–15.5)
HGB UR QL STRIP.AUTO: ABNORMAL
KETONES UR STRIP-MCNC: >80 MG/DL
LEUKOCYTE ESTERASE UR QL STRIP: NEGATIVE
MCH RBC QN AUTO: 30.3 PG (ref 26–35)
MCHC RBC AUTO-ENTMCNC: 33.6 G/DL (ref 32–34.5)
MCV RBC AUTO: 90.1 FL (ref 80–99.9)
NITRITE UR QL STRIP: NEGATIVE
PH UR STRIP: 7 [PH] (ref 5–9)
PLATELET # BLD AUTO: 308 K/UL (ref 130–450)
PMV BLD AUTO: 9.7 FL (ref 7–12)
POTASSIUM SERPL-SCNC: 3.8 MMOL/L (ref 3.5–5)
PROT SERPL-MCNC: 7.7 G/DL (ref 6.4–8.3)
PROT UR STRIP-MCNC: ABNORMAL MG/DL
RBC # BLD AUTO: 4.66 M/UL (ref 3.5–5.5)
RBC #/AREA URNS HPF: ABNORMAL /HPF
SODIUM SERPL-SCNC: 132 MMOL/L (ref 132–146)
SP GR UR STRIP: 1.02 (ref 1–1.03)
UROBILINOGEN UR STRIP-ACNC: 1 EU/DL (ref 0–1)
WBC #/AREA URNS HPF: ABNORMAL /HPF
WBC OTHER # BLD: 13.2 K/UL (ref 4.5–11.5)

## 2024-07-04 PROCEDURE — 6360000002 HC RX W HCPCS: Performed by: EMERGENCY MEDICINE

## 2024-07-04 PROCEDURE — 84702 CHORIONIC GONADOTROPIN TEST: CPT

## 2024-07-04 PROCEDURE — 2580000003 HC RX 258: Performed by: EMERGENCY MEDICINE

## 2024-07-04 PROCEDURE — 80053 COMPREHEN METABOLIC PANEL: CPT

## 2024-07-04 PROCEDURE — 85027 COMPLETE CBC AUTOMATED: CPT

## 2024-07-04 PROCEDURE — 86901 BLOOD TYPING SEROLOGIC RH(D): CPT

## 2024-07-04 PROCEDURE — 76801 OB US < 14 WKS SINGLE FETUS: CPT

## 2024-07-04 PROCEDURE — 87086 URINE CULTURE/COLONY COUNT: CPT

## 2024-07-04 PROCEDURE — 86900 BLOOD TYPING SEROLOGIC ABO: CPT

## 2024-07-04 PROCEDURE — 81001 URINALYSIS AUTO W/SCOPE: CPT

## 2024-07-04 PROCEDURE — 99284 EMERGENCY DEPT VISIT MOD MDM: CPT

## 2024-07-04 PROCEDURE — 6370000000 HC RX 637 (ALT 250 FOR IP): Performed by: EMERGENCY MEDICINE

## 2024-07-04 PROCEDURE — 96374 THER/PROPH/DIAG INJ IV PUSH: CPT

## 2024-07-04 RX ORDER — ACETAMINOPHEN 325 MG/1
650 TABLET ORAL ONCE
Status: COMPLETED | OUTPATIENT
Start: 2024-07-04 | End: 2024-07-04

## 2024-07-04 RX ORDER — ONDANSETRON 4 MG/1
4 TABLET, ORALLY DISINTEGRATING ORAL 3 TIMES DAILY PRN
Qty: 21 TABLET | Refills: 0 | Status: SHIPPED | OUTPATIENT
Start: 2024-07-04

## 2024-07-04 RX ORDER — ONDANSETRON 2 MG/ML
4 INJECTION INTRAMUSCULAR; INTRAVENOUS ONCE
Status: COMPLETED | OUTPATIENT
Start: 2024-07-04 | End: 2024-07-04

## 2024-07-04 RX ORDER — DOXYLAMINE SUCCINATE AND PYRIDOXINE HYDROCHLORIDE, DELAYED RELEASE TABLETS 10 MG/10 MG 10; 10 MG/1; MG/1
2 TABLET, DELAYED RELEASE ORAL 3 TIMES DAILY PRN
Qty: 60 TABLET | Refills: 0 | Status: SHIPPED | OUTPATIENT
Start: 2024-07-04

## 2024-07-04 RX ORDER — 0.9 % SODIUM CHLORIDE 0.9 %
1000 INTRAVENOUS SOLUTION INTRAVENOUS ONCE
Status: COMPLETED | OUTPATIENT
Start: 2024-07-04 | End: 2024-07-04

## 2024-07-04 RX ADMIN — ONDANSETRON 4 MG: 2 INJECTION INTRAMUSCULAR; INTRAVENOUS at 13:24

## 2024-07-04 RX ADMIN — ACETAMINOPHEN 650 MG: 325 TABLET ORAL at 16:32

## 2024-07-04 RX ADMIN — SODIUM CHLORIDE 1000 ML: 9 INJECTION, SOLUTION INTRAVENOUS at 16:52

## 2024-07-04 ASSESSMENT — PAIN SCALES - GENERAL
PAINLEVEL_OUTOF10: 7
PAINLEVEL_OUTOF10: 6

## 2024-07-04 ASSESSMENT — PAIN DESCRIPTION - ORIENTATION: ORIENTATION: LOWER

## 2024-07-04 ASSESSMENT — PAIN DESCRIPTION - DESCRIPTORS: DESCRIPTORS: CRAMPING

## 2024-07-04 ASSESSMENT — PAIN DESCRIPTION - LOCATION: LOCATION: ABDOMEN

## 2024-07-04 NOTE — ED PROVIDER NOTES
------------------------- NURSING NOTES AND VITALS REVIEWED ---------------------------   The nursing notes within the ED encounter and vital signs as below have been reviewed.   BP (!) 155/92   Pulse (!) 102   Temp 97 °F (36.1 °C)   Resp 18   Ht 1.626 m (5' 4\")   Wt 122.5 kg (270 lb)   LMP 2024   SpO2 100%   BMI 46.35 kg/m²   Oxygen Saturation Interpretation: Normal      ---------------------------------------------------PHYSICAL EXAM--------------------------------------      Constitutional/General: Alert and oriented x3, well appearing, non toxic in NAD  Head: Normocephalic and atraumatic  Eyes: PERRL, EOMI no nystagmus.  Mouth: Oropharynx clear, handling secretions, no trismus  Neck: Supple, full ROM,   Pulmonary: Lungs clear to auscultation bilaterally, no wheezes, rales, or rhonchi. Not in respiratory distress  Cardiovascular:  Regular rate and rhythm, no murmurs, gallops, or rubs. 2+ distal pulses  Abdomen: Soft, non tender, non distended,   Extremities: Moves all extremities x 4. Warm and well perfused  Skin: warm and dry without rash  Neurologic: GCS 15, 5 out of 5 motor strength upper and lower extremities bilaterally.  Equal  strength.  Ambulates out difficulty.  Psych: Normal Affect    ------------------------------ ED COURSE/MEDICAL DECISION MAKING----------------------  Medications   sodium chloride 0.9 % bolus 1,000 mL (has no administration in time range)   ondansetron (ZOFRAN) injection 4 mg (4 mg IntraVENous Given 24 1324)   acetaminophen (TYLENOL) tablet 650 mg (650 mg Oral Given 24 1632)             Medical Decision Making:       Sveta Stack is a 29 y.o. female presenting to the ED for lower abdominal cramping.  Patient is a 12 weeks gestation draft  3 para 2 denies any vaginal bleeding she has been vomiting gastric contents but keep anything down including water last evening.  Complains of a mild headache.  Micheal cramping 7 out of 10.  She does

## 2024-07-05 ENCOUNTER — CARE COORDINATION (OUTPATIENT)
Dept: OTHER | Facility: CLINIC | Age: 29
End: 2024-07-05

## 2024-07-05 LAB
MICROORGANISM SPEC CULT: ABNORMAL
MICROORGANISM SPEC CULT: ABNORMAL
SPECIMEN DESCRIPTION: ABNORMAL

## 2024-07-05 NOTE — CARE COORDINATION
Patient eligible for [facility] Maternity Management Program    Maternity Care Manager contacted the patient by telephone to discuss the maternity management program.  Patient agrees to care management services at this time. Verified name and  with patient as identifiers.   Patient Current Location: [place]      Call within 2 business days of discharge: Yes     Last Discharge Facility       Date Complaint Diagnosis Description Type Department Provider    24 Abdominal Cramping; Emesis Nausea and vomiting in pregnancy ED (DISCHARGE) [facility] ED, DO            Risk Factors Identified:  3rd baby/1st c/s, 2nd vag/nausea abd pains      Needs to be reviewed by the provider   none         Method of communication with provider : none    Advance Care Planning:   Does patient have an Advance Directive:  not on file.     Does patient have OB/Gyn Selected? Yes    Discussed follow up appointments. If no appointment was previously scheduled, appointment scheduling offered: Yes  BS follow up appointment(s):   Future Appointments   Date Time Provider Department Center   7/10/2024 10:20 AM Latasha Clemente APRN - CNP [facility]   2024  9:00 AM Samantha Rivers PA BDM PAIN [facility]   2025 10:30 AM Latasha Clemente APRN - CNP [facility]     Non-[facility] follow up appointment(s): n/a    OB History:   OB History    Para Term  AB Living   3 2 2     2   SAB IAB Ectopic Molar Multiple Live Births           0 2      # Outcome Date GA Lbr Casey/2nd Weight Sex Delivery Anes PTL Lv   3 Current            2 Term 19 39w2d  3.118 kg (6 lb 14 oz) M Vag-Spont EPI N ANNA   1 Term 14 40w0d   M CS-LTranv EPI  NANA      Complications: Failure to Progress in Second Stage      Obstetric Comments   Patient presents for initial prenatal visit.  Patient was trying to get pregnant.  Prenatal cultures, labs, and ultrasound were done today.  Patient

## 2024-07-12 ENCOUNTER — CARE COORDINATION (OUTPATIENT)
Dept: OTHER | Facility: CLINIC | Age: 29
End: 2024-07-12

## 2024-07-12 NOTE — CARE COORDINATION
Call within 2 business days of discharge: Yes     Patient Current Location: [place]    Last Discharge Facility       Date Complaint Diagnosis Description Type Department Provider    24 Abdominal Cramping; Emesis Nausea and vomiting in pregnancy ED (DISCHARGE) Dimit[facility] Mccoy ED, DO     Maternity Care Manager contacted the patient by telephone to discuss the maternity management program.  Patient agrees to care management services at this time. Verified name and  with patient as identifiers.     Risk Factors Identified:  ABD CRAMPING IN EARLY PREGNANCY/BP CONCERNS      Needs to be reviewed by the provider   none         Method of communication with provider : none    Advance Care Planning:   Does patient have an Advance Directive:  not on file.     Does patient have OB/Gyn Selected? Yes    Discussed follow up appointments. If no appointment was previously scheduled, appointment scheduling offered: Yes  BS follow up appointment(s):   Future Appointments   Date Time Provider Department Center   2024  9:00 AM Samantha Rivers PA BDM PAIN MAR [facility]   2024  9:40 AM Latasha Clemente APRN - CNP [facility]   2025 10:30 AM Latasha Clemente APRN - CNP [facility]     Non-[facility] follow up appointment(s): N/A    OB History:   OB History    Para Term  AB Living   3 2 2     2   SAB IAB Ectopic Molar Multiple Live Births           0 2      # Outcome Date GA Lbr Casey/2nd Weight Sex Delivery Anes PTL Lv   3 Current            2 Term 19 39w2d  3.118 kg (6 lb 14 oz) M Vag-Spont EPI N ANNA   1 Term 14 40w0d   M CS-LTranv EPI  ANNA      Complications: Failure to Progress in Second Stage      Obstetric Comments   Patient presents for initial prenatal visit.  Patient was trying to get pregnant.  Prenatal cultures, labs, and ultrasound were done today.  Patient started taking vitamins.  All pregnancy counseling concerns and questions were addressed

## 2024-07-17 ENCOUNTER — OFFICE VISIT (OUTPATIENT)
Dept: PAIN MANAGEMENT | Age: 29
End: 2024-07-17
Payer: COMMERCIAL

## 2024-07-17 VITALS
HEART RATE: 101 BPM | WEIGHT: 267 LBS | TEMPERATURE: 98.2 F | DIASTOLIC BLOOD PRESSURE: 84 MMHG | BODY MASS INDEX: 45.58 KG/M2 | SYSTOLIC BLOOD PRESSURE: 129 MMHG | HEIGHT: 64 IN

## 2024-07-17 DIAGNOSIS — G89.29 CHRONIC BILATERAL LOW BACK PAIN WITH BILATERAL SCIATICA: ICD-10-CM

## 2024-07-17 DIAGNOSIS — G89.4 CHRONIC PAIN SYNDROME: Primary | ICD-10-CM

## 2024-07-17 DIAGNOSIS — M79.7 FIBROMYALGIA: ICD-10-CM

## 2024-07-17 DIAGNOSIS — M54.41 CHRONIC BILATERAL LOW BACK PAIN WITH BILATERAL SCIATICA: ICD-10-CM

## 2024-07-17 DIAGNOSIS — M54.42 CHRONIC BILATERAL LOW BACK PAIN WITH BILATERAL SCIATICA: ICD-10-CM

## 2024-07-17 DIAGNOSIS — M79.10 MYALGIA: ICD-10-CM

## 2024-07-17 PROCEDURE — G8427 DOCREV CUR MEDS BY ELIG CLIN: HCPCS | Performed by: PHYSICIAN ASSISTANT

## 2024-07-17 PROCEDURE — G8417 CALC BMI ABV UP PARAM F/U: HCPCS | Performed by: PHYSICIAN ASSISTANT

## 2024-07-17 PROCEDURE — 99213 OFFICE O/P EST LOW 20 MIN: CPT | Performed by: PHYSICIAN ASSISTANT

## 2024-07-17 PROCEDURE — 1036F TOBACCO NON-USER: CPT | Performed by: PHYSICIAN ASSISTANT

## 2024-07-17 PROCEDURE — 99213 OFFICE O/P EST LOW 20 MIN: CPT

## 2024-07-17 RX ORDER — LIDOCAINE 50 MG/G
1 PATCH TOPICAL DAILY
Qty: 30 PATCH | Refills: 5 | Status: SHIPPED | OUTPATIENT
Start: 2024-07-17

## 2024-07-17 NOTE — PROGRESS NOTES
Sveta Stack presents to the [facility] on 7/17/2024. Sveta is complaining of pain in her neck and lower back. The pain is constant. The pain is described as aching, dull, and sharp. Pain is rated on her best day at a 7, on her worst day at a 10, and on average at a 8 on the VAS scale. She took her last dose of Tylenol yesterday.      Any procedures since your last visit: No.    She is not on NSAIDS and  is not on anticoagulation medications to include none.     Pacemaker or defibrillator: No.    Medication Contract and Consent for Opioid Use Documents Filed        No documents found                       Ht 1.626 m (5' 4.02\")   Wt 121.1 kg (267 lb)   LMP 04/08/2024   BMI 45.81 kg/m²      Patient's last menstrual period was 04/08/2024.

## 2024-07-17 NOTE — PROGRESS NOTES
[facility]  [address] [place] [id number]    Follow up Note      Sveta VARGHESE Luisangelica     Date of Visit:  2024    CC:  Patient presents for follow up   Chief Complaint   Patient presents with    Follow-up     Neck and lower back pain        HPI:    Pain is worse due to stopping all meds due to being pregnant.    Appropriate analgesia with current medications regimen: yes - somwehat.    Change in quality of symptoms:no.    Medication side effects:none.   Recent diagnostic testing:none.   Recent interventional procedures:none.    She has not been on anticoagulation medications to include ASA, NSAIDS, Plavix, heparin, LMW heparin, and warfarin and has not been on herbal supplements.  She is not diabetic.     Imagin/2023 MRI lumbar w/o -  FINDINGS:  BONES/ALIGNMENT: There is normal alignment of the spine. The vertebral body  heights are maintained. The bone marrow signal appears unremarkable.  No  acute fracture is detected.     Mild degenerative disc disease identified at L5-S1 with presence of  desiccation of the disc space.     SPINAL CORD: The conus terminates normally.     SOFT TISSUES: No paraspinal mass identified.     L1-L2: There is no significant disc herniation, spinal canal stenosis or  neural foraminal narrowing.     L2-L3: There is no significant disc herniation, spinal canal stenosis or  neural foraminal narrowing.     L3-L4: There is no significant disc herniation, spinal canal stenosis or  neural foraminal narrowing.     L4-L5: There is no significant disc herniation, spinal canal stenosis or  neural foraminal narrowing.     L5-S1: Shallow disc bulge flattens the thecal sac.     IMPRESSION:  Mild degenerative disc disease at L5-S1.  No significant focal disc  protrusion or stenosis is detected.     2023 MRI cervical w/o -  FINDINGS:  MRI CERVICAL SPINE:     Bones:     Vertebral heights and alignment are maintained.     No suspicious marrow edema is seen.

## 2024-08-29 ENCOUNTER — CARE COORDINATION (OUTPATIENT)
Dept: OTHER | Facility: CLINIC | Age: 29
End: 2024-08-29

## 2024-09-05 ENCOUNTER — HOSPITAL ENCOUNTER (OUTPATIENT)
Age: 29
Setting detail: OBSERVATION
Discharge: HOME | End: 2024-09-05
Attending: OBSTETRICS & GYNECOLOGY | Admitting: OBSTETRICS & GYNECOLOGY
Payer: COMMERCIAL

## 2024-09-05 VITALS — TEMPERATURE: 98.6 F | RESPIRATION RATE: 16 BRPM

## 2024-09-05 PROBLEM — Z3A.21 21 WEEKS GESTATION OF PREGNANCY: Status: ACTIVE | Noted: 2024-09-05

## 2024-09-05 LAB
AMNISURE, POC: NEGATIVE
BILIRUB UR QL STRIP: NEGATIVE
CLARITY UR: CLEAR
COLOR UR: YELLOW
GLUCOSE UR STRIP-MCNC: NEGATIVE MG/DL
HGB UR QL STRIP.AUTO: ABNORMAL
KETONES UR STRIP-MCNC: NEGATIVE MG/DL
LEUKOCYTE ESTERASE UR QL STRIP: NEGATIVE
Lab: NORMAL
NEGATIVE QC PASS/FAIL: NORMAL
NITRITE UR QL STRIP: NEGATIVE
PH UR STRIP: 6.5 [PH] (ref 5–9)
POSITIVE QC PASS/FAIL: NORMAL
PROT UR STRIP-MCNC: NEGATIVE MG/DL
RBC #/AREA URNS HPF: ABNORMAL /HPF
SP GR UR STRIP: <1.005 (ref 1–1.03)
UROBILINOGEN UR STRIP-ACNC: 0.2 EU/DL (ref 0–1)
WBC #/AREA URNS HPF: ABNORMAL /HPF

## 2024-09-05 PROCEDURE — 81001 URINALYSIS AUTO W/SCOPE: CPT

## 2024-09-05 PROCEDURE — G0378 HOSPITAL OBSERVATION PER HR: HCPCS

## 2024-09-06 ENCOUNTER — CARE COORDINATION (OUTPATIENT)
Dept: OTHER | Facility: CLINIC | Age: 29
End: 2024-09-06

## 2024-09-06 NOTE — H&P
Dysfunctional uterine bleeding    Anxiety and depression    Chronic tachycardia    Inappropriate sinus tachycardia (HCC)    Malingering    Chronic pain syndrome [G89.4]    Chronic bilateral low back pain with bilateral sciatica    Body mass index (BMI) 45.0-49.9, adult (HCC)    Fibromyalgia    Myalgia    21 weeks gestation of pregnancy     Fetus: Reassuring  GBS: not done    PLAN:  Discussed with Dr. Weston  UA- if negative may discharge to home  Pelvic rest, follow up in office next week     Yudi Jose, TJ - OLIVIER, FACNM  9/5/24 8:21 PM EDT

## 2024-09-06 NOTE — DISCHARGE INSTRUCTIONS
Home Undelivered Discharge Instructions    After Discharge Orders:    Future Appointments   Date Time Provider Department Center   9/10/2024  3:50 PM Vladimir Ford MD [facility]   9/10/2024  3:50 PM SCHEDULE, [facility]  [facility]   1/13/2025 10:30 AM Latasha Clemente, APRN - CNP [facility]     Please follow up with Dr. Ford as scheduled next week.             Diet:  normal diet as tolerated    Rest: other, no sex, and no douching; pelvic rest    Other instructions: Do kick counts once a day on your baby. Choose the time of day your baby is most active. Get in a comfortable lying or sitting position and time how long it takes to feel 10 kicks, twists, turns, swishes, or rolls. Call your physician or midwife if there have not been 10 kicks in 2 hours    Call physician or midwife, return to Labor and Delivery, call 911, or go to the nearest Emergency Room if: increased leakage or fluid, contractions more than  6 per  1 hour, decreased fetal movement, persistent low back pain or cramping, bleeding from vaginal area, difficulty urinating, pain with urination, difficulty breathing, new calf pain, persistent headache, or vision change

## 2024-09-06 NOTE — CARE COORDINATION
ACM attempted to reach patient for Maternity Care Management/ Care transitions call. HIPAA compliant message left requesting a return phone call at patients convenience. Will continue to follow.    Area M Indication Text: Tumors in this location are included in Area M (cheek, forehead, scalp, neck, jawline and pretibial skin).  Mohs surgery is indicated for tumors in these anatomic locations.

## 2024-09-06 NOTE — PROGRESS NOTES
Pt  21w3d presents to the unit for leaking of fluid, vaginal bleeding, and abdominal cramping. Pt states she noticed leaking around 1500 today while she was at work and she had a couple of blood clots in her underwear. Pt states she has abdominal cramping and rating it a 6/10 on the pain scale. VSS. Call light within reach.

## 2024-09-10 PROBLEM — Z76.5 MALINGERING: Status: RESOLVED | Noted: 2023-01-22 | Resolved: 2024-09-10

## 2024-09-11 ENCOUNTER — CARE COORDINATION (OUTPATIENT)
Dept: OTHER | Facility: CLINIC | Age: 29
End: 2024-09-11

## 2024-10-15 ENCOUNTER — CARE COORDINATION (OUTPATIENT)
Dept: OTHER | Facility: CLINIC | Age: 29
End: 2024-10-15

## 2024-10-17 ENCOUNTER — HOSPITAL ENCOUNTER (OUTPATIENT)
Age: 29
Setting detail: OBSERVATION
Discharge: HOME | End: 2024-10-18
Attending: OBSTETRICS & GYNECOLOGY | Admitting: OBSTETRICS & GYNECOLOGY
Payer: COMMERCIAL

## 2024-10-17 ENCOUNTER — APPOINTMENT (OUTPATIENT)
Dept: ULTRASOUND IMAGING | Age: 29
End: 2024-10-17
Payer: COMMERCIAL

## 2024-10-17 DIAGNOSIS — Z34.90 NORMAL PREGNANCY, ANTEPARTUM: Primary | ICD-10-CM

## 2024-10-17 PROBLEM — Z3A.27 27 WEEKS GESTATION OF PREGNANCY: Status: ACTIVE | Noted: 2024-10-17

## 2024-10-17 LAB
ALBUMIN SERPL-MCNC: 3.5 G/DL (ref 3.5–5.2)
ALP SERPL-CCNC: 101 U/L (ref 35–104)
ALT SERPL-CCNC: <5 U/L (ref 0–32)
ANION GAP SERPL CALCULATED.3IONS-SCNC: 10 MMOL/L (ref 7–16)
AST SERPL-CCNC: 13 U/L (ref 0–31)
BILIRUB SERPL-MCNC: 0.2 MG/DL (ref 0–1.2)
BUN SERPL-MCNC: 6 MG/DL (ref 6–20)
CALCIUM SERPL-MCNC: 8.8 MG/DL (ref 8.6–10.2)
CHLORIDE SERPL-SCNC: 105 MMOL/L (ref 98–107)
CO2 SERPL-SCNC: 22 MMOL/L (ref 22–29)
CREAT SERPL-MCNC: 0.6 MG/DL (ref 0.5–1)
CREAT UR-MCNC: 207.7 MG/DL (ref 29–226)
ERYTHROCYTE [DISTWIDTH] IN BLOOD BY AUTOMATED COUNT: 13.9 % (ref 11.5–15)
GFR, ESTIMATED: >90 ML/MIN/1.73M2
GLUCOSE SERPL-MCNC: 109 MG/DL (ref 74–99)
HCT VFR BLD AUTO: 37.3 % (ref 34–48)
HGB BLD-MCNC: 12.4 G/DL (ref 11.5–15.5)
MCH RBC QN AUTO: 30.6 PG (ref 26–35)
MCHC RBC AUTO-ENTMCNC: 33.2 G/DL (ref 32–34.5)
MCV RBC AUTO: 92.1 FL (ref 80–99.9)
PLATELET # BLD AUTO: 280 K/UL (ref 130–450)
PMV BLD AUTO: 10.3 FL (ref 7–12)
POTASSIUM SERPL-SCNC: 3.8 MMOL/L (ref 3.5–5)
PROT SERPL-MCNC: 6.6 G/DL (ref 6.4–8.3)
RBC # BLD AUTO: 4.05 M/UL (ref 3.5–5.5)
SODIUM SERPL-SCNC: 137 MMOL/L (ref 132–146)
TOTAL PROTEIN, URINE: 20 MG/DL (ref 0–12)
URINE TOTAL PROTEIN CREATININE RATIO: 0.1 (ref 0–0.2)
WBC OTHER # BLD: 13 K/UL (ref 4.5–11.5)

## 2024-10-17 PROCEDURE — 85027 COMPLETE CBC AUTOMATED: CPT

## 2024-10-17 PROCEDURE — 82239 BILE ACIDS TOTAL: CPT

## 2024-10-17 PROCEDURE — 84156 ASSAY OF PROTEIN URINE: CPT

## 2024-10-17 PROCEDURE — G0378 HOSPITAL OBSERVATION PER HR: HCPCS

## 2024-10-17 PROCEDURE — 82570 ASSAY OF URINE CREATININE: CPT

## 2024-10-17 PROCEDURE — 93970 EXTREMITY STUDY: CPT

## 2024-10-17 PROCEDURE — 80053 COMPREHEN METABOLIC PANEL: CPT

## 2024-10-17 NOTE — H&P
Department of Obstetrics and Gynecology   Obstetrics History and Physical        CHIEF COMPLAINT:  diffuse itching and swelling of extremities      HISTORY OF PRESENT ILLNESS:          The patient is a 29 y.o. female , Patient's last menstrual period was 2024.,  at 27w3d.  OB History          3    Para   2    Term   2            AB        Living   2         SAB        IAB        Ectopic        Molar        Multiple   0    Live Births   2          Obstetric Comments   Patient presents for initial prenatal visit.  Patient was trying to get pregnant.  Prenatal cultures, labs, and ultrasound were done today.  Patient started taking vitamins.  All pregnancy counseling concerns and questions were addressed and answered.  LINDY lam's currently taking no other medications, was taking Cymbalta and Pristiq-stopped cold turkey.  Patient will return to the office in 4 weeks.  Patient will be offered first trimester testing at that time. Patient denies any family history of congen ital defects or chromosomal abnormalities or genetic disorders that could be pertinent to this pregnancy.  Pt has had 2 deliveries via primary C/S followed by . No complications.     No cats in the house. All meat must be cooked well done, all lunch  meat must be cooked, all dairy must be pasteurized, no queso at Mexican restaurant, no shellfish, only low mercury-content fish once weekly, only Tylenol for headaches, fever, or pain.  For nausea, patient may take Unisom and vitamin B6 together at [facility], with vitamin B6 in the morning and 1 vitamin B6 in the afternoon.  Patient may also try ginger candies, lollipops, ginger ale, peppermint candies, peppermint gum, or sea bands.     Upon completion of the visit all questions were answered and the basia ents follow-up and testing schedule were reviewed.                Patient presents with a chief complaint as above and is being admitted for evaluation for diffuse itching  cooked, all dairy must be pasteurized, no queso at Mexican restaurant, no shellfish, only low mercury-content fish once weekly, only Tylenol for headaches, fever, or pain.  For nausea, patient may take Unisom and vitamin B6 together at [facility], with vitamin B6 in the morning and 1 vitamin B6 in the afternoon.  Patient may also try ginger candies, lollipops, ginger ale, peppermint candies, peppermint gum, or sea bands.     Upon completion of the visit all questions were answered and the basia ents follow-up and testing schedule were reviewed.                    Past Medical History:        Diagnosis Date    Abnormal Papanicolaou smear of cervix with positive human papilloma virus (HPV) test     Anemia 2023    Anxiety and depression 2023    Closed fracture of distal end of right fibula 2020    Fibromyalgia     Inappropriate sinus tachycardia (HCC) 2023    Morbid obesity due to excess calories     Seizure-like activity (HCC) 2023    Tachycardia      (vaginal birth after )      Past Surgical History:        Procedure Laterality Date    ANKLE SURGERY Right      SECTION      LEE  2017    UPPER GASTROINTESTINAL ENDOSCOPY N/A 2022    EGD BIOPSY performed by Dimitri Craig MD at [facility] ENDOSCOPY     Social History:    TOBACCO:   reports that she has never smoked. She has never been exposed to tobacco smoke. She has never used smokeless tobacco.  ETOH:   reports no history of alcohol use.  DRUGS:   reports no history of drug use.  Family History:   History reviewed. No pertinent family history.      Medications Prior to Admission:  Medications Prior to Admission: Prenatal Vit-Fe Fumarate-FA (PRENATAL PLUS) 27-1 MG TABS, Take 1 tablet by mouth daily  Misc. Devices KIT, Nature's Blend Prenatal Multivitamin with Minerals ([facility] Baby Box)  ND: [address]-41;  Take 1 softgel by mouth daily or as instructed by provider;  #qs for 6 months  lidocaine (LIDODERM) 5 %, Place 1

## 2024-10-17 NOTE — H&P
Department of Obstetrics and Gynecology  Attending Obstetrics History and Physical      HISTORY OF PRESENT ILLNESS:      The patient is a 29 y.o.  3 parity 2 at 27 weeks 3 days.  Complaining of pruritus in hands, soles of feet and abdomen. Started yesterday. No rash. Also has bilateral lower extremity swelling since yesterday. No calf pain, dyspnea or chest pain. No abdminal pain , headaches or visual changes.     Estimated Due Date:  25  Contractions:  no  Leaking of fluid: no  nfm  Blleeding:  No    PRENATAL CARE:    Complications: No      Active Problems:    * No active hospital problems. *  Resolved Problems:    * No resolved hospital problems. *        PAST OB HISTORY    OB History    Para Term  AB Living   3 2 2     2   SAB IAB Ectopic Molar Multiple Live Births           0 2      # Outcome Date GA Lbr Casey/2nd Weight Sex Type Anes PTL Lv   3 Current            2 Term 19 39w2d  3.118 kg (6 lb 14 oz) M Vag-Spont EPI N ANNA   1 Term 14 40w0d   M CS-LTranv EPI  ANNA      Complications: Failure to Progress in Second Stage      Obstetric Comments   Patient presents for initial prenatal visit.  Patient was trying to get pregnant.  Prenatal cultures, labs, and ultrasound were done today.  Patient started taking vitamins.  All pregnancy counseling concerns and questions were addressed and answered.  Patient's currently taking no other medications, was taking Cymbalta and Pristiq-stopped cold turkey.  Patient will return to the office in 4 weeks.  Patient will be offered first trimester testing at that time. Patient denies any family history of congenital defects or chromosomal abnormalities or genetic disorders that could be pertinent to this pregnancy.   Pt has had 2 deliveries via primary C/S followed by . No complications.       No cats in the house. All meat must be cooked well done, all lunch meat must be cooked, all dairy must be pasteurized, no queso at Mexican restaurant,  no joint pain, no myalgia, no change in ROM  Neuro: no focal weakness in extremities, no slurred speech, no double vision, no numbness or tingling in extremities  Endo: no heat/cold intolerance, no polyphagia, polydipsia or polyuria  Hem: no increased bleeding, no bruising, no lymphadenopathy  Skin: no skin changes  Psych: no depressed mood, no suicidal ideation    Social History:     reports that she has never smoked. She has never been exposed to tobacco smoke. She has never used smokeless tobacco. She reports that she does not drink alcohol and does not use drugs.     Medications Prior to Admission:  Medications Prior to Admission: Prenatal Vit-Fe Fumarate-FA (PRENATAL PLUS) 27-1 MG TABS, Take 1 tablet by mouth daily  Misc. Devices KIT, Nature's Blend Prenatal Multivitamin with Minerals ([facility] Baby Box)  ND: [address]-23;  Take 1 softgel by mouth daily or as instructed by provider;  #qs for 6 months  lidocaine (LIDODERM) 5 %, Place 1 patch onto the skin daily 12 hours on, 12 hours off.  ondansetron (ZOFRAN) 4 MG tablet, Take 1 tablet by mouth every 8 hours as needed for Nausea or Vomiting  ondansetron (ZOFRAN-ODT) 4 MG disintegrating tablet, Take 1 tablet by mouth 3 times daily as needed for Nausea or Vomiting  doxylamine-pyridoxine (DICLEGIS) 10-10 MG TBEC, Take 2 tablets by mouth 3 times daily as needed (vomiting) Start: 2 tabs PO qhs, if sx persist after 2 days, increase to 1 tab PO qam and 2 tabs PO qhs. May further increased to 1 tab by mouth in the morning and one tablet by mouth every midafternoon and 2 tabs by mouth at night. Max 4 tabs/day, (Patient not taking: Reported on 9/5/2024)  ibuprofen (ADVIL;MOTRIN) 200 MG tablet, Take 1 tablet by mouth as needed for Pain (Patient not taking: Reported on 5/31/2024)  TESSALON PERLES 100 MG capsule, as needed (Patient not taking: Reported on 5/31/2024)  MUCINEX MAXIMUM STRENGTH 1200 MG TB12, as needed (Patient not taking: Reported on 5/31/2024)  modafinil

## 2024-10-17 NOTE — PROGRESS NOTES
Dr. Ford at patient bedside for evaluation. Orders received for MFM to see her and diet order placed.

## 2024-10-17 NOTE — PROGRESS NOTES
27w3d presents to Antepartum for increased swelling of her feet and legs. Patient also has concerns about being itchy in her hands, feet, and stomach. Patient denies lof, vb, or ctx at this time. Perceives positive fetal movement. VSS. Call light within reach.

## 2024-10-18 ENCOUNTER — ANCILLARY PROCEDURE (OUTPATIENT)
Dept: OBGYN CLINIC | Age: 29
End: 2024-10-18
Payer: COMMERCIAL

## 2024-10-18 VITALS
TEMPERATURE: 97.8 F | SYSTOLIC BLOOD PRESSURE: 119 MMHG | RESPIRATION RATE: 16 BRPM | HEART RATE: 98 BPM | DIASTOLIC BLOOD PRESSURE: 70 MMHG

## 2024-10-18 PROBLEM — Z3A.21 21 WEEKS GESTATION OF PREGNANCY: Status: RESOLVED | Noted: 2024-09-05 | Resolved: 2024-10-18

## 2024-10-18 PROBLEM — L29.9: Status: ACTIVE | Noted: 2024-10-18

## 2024-10-18 PROBLEM — K76.89 INTRAHEPATIC CHOLESTASIS: Status: ACTIVE | Noted: 2024-10-18

## 2024-10-18 PROBLEM — O99.713: Status: ACTIVE | Noted: 2024-10-18

## 2024-10-18 PROBLEM — Z34.90 NORMAL PREGNANCY, ANTEPARTUM: Status: ACTIVE | Noted: 2024-10-18

## 2024-10-18 PROBLEM — O12.03 EDEMA DURING PREGNANCY IN THIRD TRIMESTER: Status: ACTIVE | Noted: 2024-10-18

## 2024-10-18 PROCEDURE — G0378 HOSPITAL OBSERVATION PER HR: HCPCS

## 2024-10-18 PROCEDURE — 76819 FETAL BIOPHYS PROFIL W/O NST: CPT | Performed by: OBSTETRICS & GYNECOLOGY

## 2024-10-18 PROCEDURE — 76811 OB US DETAILED SNGL FETUS: CPT | Performed by: OBSTETRICS & GYNECOLOGY

## 2024-10-18 PROCEDURE — 76820 UMBILICAL ARTERY ECHO: CPT | Performed by: OBSTETRICS & GYNECOLOGY

## 2024-10-18 PROCEDURE — 76821 MIDDLE CEREBRAL ARTERY ECHO: CPT | Performed by: OBSTETRICS & GYNECOLOGY

## 2024-10-18 PROCEDURE — 6370000000 HC RX 637 (ALT 250 FOR IP)

## 2024-10-18 PROCEDURE — 99221 1ST HOSP IP/OBS SF/LOW 40: CPT | Performed by: OBSTETRICS & GYNECOLOGY

## 2024-10-18 RX ORDER — ONDANSETRON 4 MG/1
4 TABLET, ORALLY DISINTEGRATING ORAL EVERY 8 HOURS PRN
Status: DISCONTINUED | OUTPATIENT
Start: 2024-10-18 | End: 2024-10-18 | Stop reason: ALTCHOICE

## 2024-10-18 RX ORDER — ONDANSETRON 4 MG/1
4 TABLET, FILM COATED ORAL ONCE
Status: COMPLETED | OUTPATIENT
Start: 2024-10-18 | End: 2024-10-18

## 2024-10-18 RX ORDER — ONDANSETRON 2 MG/ML
4 INJECTION INTRAMUSCULAR; INTRAVENOUS EVERY 6 HOURS PRN
Status: DISCONTINUED | OUTPATIENT
Start: 2024-10-18 | End: 2024-10-18 | Stop reason: ALTCHOICE

## 2024-10-18 RX ORDER — ONDANSETRON 4 MG/1
TABLET, FILM COATED ORAL
Status: COMPLETED
Start: 2024-10-18 | End: 2024-10-18

## 2024-10-18 RX ORDER — ONDANSETRON 2 MG/ML
4 INJECTION INTRAMUSCULAR; INTRAVENOUS EVERY 6 HOURS PRN
Status: DISCONTINUED | OUTPATIENT
Start: 2024-10-18 | End: 2024-10-18

## 2024-10-18 RX ADMIN — ONDANSETRON HYDROCHLORIDE 4 MG: 4 TABLET, FILM COATED ORAL at 13:12

## 2024-10-18 RX ADMIN — ONDANSETRON 4 MG: 4 TABLET, FILM COATED ORAL at 13:12

## 2024-10-18 NOTE — DISCHARGE INSTRUCTIONS
Home Undelivered Discharge Instructions    After Discharge Orders:    Future Appointments   Date Time Provider Department Center   10/25/2024  8:00 AM SCHEDULE, NE[facility]  [facility]   10/25/2024  8:00 AM Latasha Clemente, TJ - CNP [facility]       Call physician or midwife's office on *** for instructions.              Diet:  normal diet as tolerated    Rest: normal activity as tolerated    Other instructions: Do kick counts once a day on your baby. Choose the time of day your baby is most active. Get in a comfortable lying or sitting position and time how long it takes to feel 10 kicks, twists, turns, swishes, or rolls. Call your physician or midwife if there have not been 10 kicks in 2 hours    Call physician or midwife, return to Labor and Delivery, call 911, or go to the nearest Emergency Room if: increased leakage or fluid, contractions more than  6 per  1 hour, decreased fetal movement, persistent low back pain or cramping, bleeding from vaginal area, difficulty urinating, pain with urination, difficulty breathing, new calf pain, persistent headache, or vision change           PUPPP (Pruritic Urticarial Papules and Plaques of Pregnancy): Care Instructions  Overview  Pruritic urticarial papules and plaques of pregnancy (PUPPP) is a raised, itchy rash. It is also called polymorphic eruption of pregnancy (PEP). The rash may be reddish or slightly darker than your usual skin color. It most often occurs in a first pregnancy. The rash may appear first on stretch marks on the stomach. Then it may spread to the thighs, rear end (buttocks), and arms. PUPPP is not a serious condition and does not cause problems for your baby. But it can be very itchy. Controlling the itching is the main focus of treatment.  PUPPP usually goes away on its own within a week after birth. It is treated with medicine to stop the itching.  Follow-up care is a key part of your treatment and safety.

## 2024-10-18 NOTE — PROGRESS NOTES
Department of Obstetrics and Gynecology  Labor and Delivery   Attending Progress Note      SUBJECTIVE:  itching.  Skin/lower extremity edema    OBJECTIVE:      Vitals:    /70   Pulse 98   Temp 97.8 °F (36.6 °C) (Oral)   Resp 16   LMP 04/08/2024     Uterine Size:  28cm    Fetal heart rate:reassuring             Contraction frequency: 0 minutes    Fetal Presentation:  Cephalic,   Abdomen soft, nontender    Membranes:  Intact    Cervix:           Dilation:  Closed    DATA:  LAB REVIEW:  CBC:    Lab Results   Component Value Date/Time    WBC 13.0 10/17/2024 02:15 PM    RBC 4.05 10/17/2024 02:15 PM    HGB 12.4 10/17/2024 02:15 PM    HCT 37.3 10/17/2024 02:15 PM    MCV 92.1 10/17/2024 02:15 PM    RDW 13.9 10/17/2024 02:15 PM     10/17/2024 02:15 PM       ASSESSMENT & PLAN:    IUP at 27 4/7weeks  Weight MFM evaluation and consult

## 2024-10-18 NOTE — PROGRESS NOTES
Spoke with Dr. Néstor Escamilla, notified that when I spoke with Dr. Dave, he stated that patient can be discharged if ok with Néstor Escamilla, also states if Dr. Néstor Escamilla would like, patient can follow up in [facility]in's office the last week of October after patient follows up with Dr. Néstor Escamilla.   Dr. Néstor Escamilla states patient may be discharged.

## 2024-10-18 NOTE — CONSULTS
Vladimir Ford MD  [address]  [place], [place] [address]     RE:  GURDEEP STACK  : 1995   AGE: 29 y.o.    This report has been created using voice recognition software. It may contain errors which are inherent in voice recognition technology.    Dear Dr. Ford:     I saw Gurdeep Stack for a consultation today for the following indications:    Patient Active Problem List   Diagnosis    Maternal morbid obesity    Lower extremity edema    Body mass index (BMI) 45.0-49.9, adult    Pruritus of pregnancy, antepartum, third trimester    Intrahepatic cholestasis     Gurdeep Stack is a 29 y.o. female, who is G3(2,0,0,2). She has an Estimated Date of Delivery: 2025 based on her established dates.  She is currently 27 weeks 4 days gestation based on that assessment.     The patient was admitted to the labor and delivery unit for evaluation and management secondary to lower extremity edema and pruritus beginning on the palms of her hands and soles of her feet.  She had no prior history of the symptomatology.  The patient states that her fetal movement has been good.  She has had no vaginal bleeding or leaking of amniotic fluid.    The patient had a  section delivery with her first pregnancy in .  She subsequently had a vaginal birth after  section on 3/12/2019.    The fetal heart rate tracing shows multiple areas of discontinuity related to maternal and fetal movement.  The baseline heart rate was 138 bpm.    The results of Panorama screen were negative. I advised her that this a screening test not a diagnostic test. I advised her that the test screens only for trisomy 21, 18 and 13. We discussed the sensitivity of the test which I advised the patient is as follows:      99.99% for detection of trisomy 21 with a specificity of 99.99%     99.99% for trisomy 18 with a specificity of 99.99%     99.99% for trisomy 13 with a specificity of 99.99%     99.99% for

## 2024-10-18 NOTE — PROGRESS NOTES
D/c instructions given to and reviewed with patient.   Verbalized understanding of d/c instructions.   Denies questions.   Patient ambulated off unit.

## 2024-10-20 LAB — BILE AC SERPL-SCNC: 2 UMOL/L (ref 0–10)

## 2024-10-21 ENCOUNTER — CARE COORDINATION (OUTPATIENT)
Dept: OTHER | Facility: CLINIC | Age: 29
End: 2024-10-21

## 2024-11-01 ENCOUNTER — INITIAL PRENATAL (OUTPATIENT)
Dept: OBGYN CLINIC | Age: 29
End: 2024-11-01
Payer: COMMERCIAL

## 2024-11-01 ENCOUNTER — ANCILLARY PROCEDURE (OUTPATIENT)
Dept: OBGYN CLINIC | Age: 29
End: 2024-11-01
Payer: COMMERCIAL

## 2024-11-01 VITALS
DIASTOLIC BLOOD PRESSURE: 82 MMHG | BODY MASS INDEX: 46.32 KG/M2 | SYSTOLIC BLOOD PRESSURE: 124 MMHG | WEIGHT: 270 LBS | HEART RATE: 101 BPM

## 2024-11-01 DIAGNOSIS — L29.9 PRURITUS OF PREGNANCY, ANTEPARTUM, THIRD TRIMESTER: ICD-10-CM

## 2024-11-01 DIAGNOSIS — F41.9 ANXIETY AND DEPRESSION: ICD-10-CM

## 2024-11-01 DIAGNOSIS — O99.713 PRURITUS OF PREGNANCY, ANTEPARTUM, THIRD TRIMESTER: ICD-10-CM

## 2024-11-01 DIAGNOSIS — Z36.9 ENCOUNTER FOR FETAL ULTRASOUND: ICD-10-CM

## 2024-11-01 DIAGNOSIS — G89.4 CHRONIC PAIN SYNDROME: ICD-10-CM

## 2024-11-01 DIAGNOSIS — F32.A ANXIETY AND DEPRESSION: ICD-10-CM

## 2024-11-01 DIAGNOSIS — K76.89 INTRAHEPATIC CHOLESTASIS: Primary | ICD-10-CM

## 2024-11-01 DIAGNOSIS — E66.01 MORBID OBESITY: ICD-10-CM

## 2024-11-01 DIAGNOSIS — O12.03 EDEMA DURING PREGNANCY IN THIRD TRIMESTER: ICD-10-CM

## 2024-11-01 DIAGNOSIS — M79.7 FIBROMYALGIA: ICD-10-CM

## 2024-11-01 PROCEDURE — 76818 FETAL BIOPHYS PROFILE W/NST: CPT | Performed by: OBSTETRICS & GYNECOLOGY

## 2024-11-01 PROCEDURE — 76821 MIDDLE CEREBRAL ARTERY ECHO: CPT | Performed by: OBSTETRICS & GYNECOLOGY

## 2024-11-01 PROCEDURE — 99203 OFFICE O/P NEW LOW 30 MIN: CPT | Performed by: OBSTETRICS & GYNECOLOGY

## 2024-11-01 PROCEDURE — 76815 OB US LIMITED FETUS(S): CPT | Performed by: OBSTETRICS & GYNECOLOGY

## 2024-11-01 PROCEDURE — 76820 UMBILICAL ARTERY ECHO: CPT | Performed by: OBSTETRICS & GYNECOLOGY

## 2024-11-01 RX ORDER — URSODIOL 300 MG/1
300 CAPSULE ORAL 4 TIMES DAILY
Qty: 120 CAPSULE | Refills: 3 | Status: SHIPPED | OUTPATIENT
Start: 2024-11-01 | End: 2025-11-01

## 2024-11-01 RX ORDER — FAMOTIDINE 20 MG/1
20 TABLET, FILM COATED ORAL 2 TIMES DAILY
Qty: 60 TABLET | Refills: 4 | Status: SHIPPED | OUTPATIENT
Start: 2024-11-01

## 2024-11-01 RX ORDER — CETIRIZINE HYDROCHLORIDE 10 MG/1
10 TABLET ORAL 2 TIMES DAILY
Qty: 60 TABLET | Refills: 3 | Status: SHIPPED | OUTPATIENT
Start: 2024-11-01

## 2024-11-01 NOTE — PROGRESS NOTES
Pt alert and pleasant, here as a new patient here for a hospital follow up. Pt denies bleeding, cramping, and lof. Pt is still having itching in her bilateral palms of hands and feet. Positive fetal movement per patient.

## 2024-11-01 NOTE — PROGRESS NOTES
24    Vladimir Ford MD  [address]  [place], [place] [address]                RE:  GURDEEP STACK  : 1995   AGE: 29 y.o.     This report has been created using voice recognition software. It may contain errors which are inherent in voice recognition technology.     Dear Dr. Ford:     Gurdeep Stack had an appointment today for the following indications:     Patient Active Problem List   Diagnosis    Maternal morbid obesity    Lower extremity edema    Body mass index (BMI) 45.0-49.9, adult    Pruritus of pregnancy, antepartum, third trimester    Intrahepatic cholestasis      Gurdeep Stack is a 29 y.o. female, who is G3(2,0,0,2). She has an Estimated Date of Delivery: 2025 based on her established dates.  She is currently 29 weeks 4 days gestation based on that assessment.      The patient was previously admitted to the labor and delivery unit for evaluation and management secondary to lower extremity edema and pruritus beginning on the palms of her hands and soles of her feet.  She had no prior history of the symptomatology.  The patient states that her fetal movement has been good.  She has had no vaginal bleeding or leaking of amniotic fluid.    The patient had an empiric course of ursodiol for management of her pruritus with significant reduction in her symptomatology.  She is currently taking 300 mg of ursodiol 3 times a day.  She stated that her pruritus is still present, but significantly reduced.  I recommended that she increase the dose to 300 mg 4 times a day.  I also recommended that she take cetirizine 10 mg every 12-24 hours and Pepcid 20 mg every 12 hours for symptomatic relief of her pruritus.     The patient had a  section delivery with her first pregnancy in .  She subsequently had a vaginal birth after  section on 3/12/2019.     The nonstress test performed today was reactive for the patient's gestational age.     The results of

## 2024-11-03 ENCOUNTER — HOSPITAL ENCOUNTER (OUTPATIENT)
Age: 29
Discharge: HOME | End: 2024-11-03
Attending: OBSTETRICS & GYNECOLOGY | Admitting: OBSTETRICS & GYNECOLOGY
Payer: COMMERCIAL

## 2024-11-03 VITALS
WEIGHT: 270 LBS | BODY MASS INDEX: 46.1 KG/M2 | TEMPERATURE: 98.5 F | HEIGHT: 64 IN | DIASTOLIC BLOOD PRESSURE: 59 MMHG | RESPIRATION RATE: 16 BRPM | SYSTOLIC BLOOD PRESSURE: 122 MMHG | HEART RATE: 109 BPM

## 2024-11-03 PROBLEM — Z3A.29 29 WEEKS GESTATION OF PREGNANCY: Status: ACTIVE | Noted: 2024-11-03

## 2024-11-03 PROCEDURE — 59025 FETAL NON-STRESS TEST: CPT

## 2024-11-03 PROCEDURE — 59025 FETAL NON-STRESS TEST: CPT | Performed by: ADVANCED PRACTICE MIDWIFE

## 2024-11-03 NOTE — PROGRESS NOTES
Discharge instructions provided and educated to patient. Patient instructed to follow up with OB. Patient understood instructions.  Pt left unit via ambulation.

## 2024-11-03 NOTE — PROCEDURES
PROCEDURE NOTE  Date: 11/3/2024   Name: Sveta Stack  YOB: 1995    Procedures    Patient is ,Patient's last menstrual period was 2024.,  29w6d here for NST.    Estimated Date of Delivery: 25    Reason for NST:Cholestasis    BP (!) 122/59   Pulse (!) 109   Temp 98.5 °F (36.9 °C) (Oral)   Resp 16   Ht 1.626 m (5' 4\")   Wt 122.5 kg (270 lb)   LMP 2024   BMI 46.35 kg/m²     FHR:140,  Variability: moderate,   Accelerations: present, Decelerations: none  No ctx  Category 1 tracing    Assessment NST is Reactive

## 2024-11-03 NOTE — PROGRESS NOTES
, 29.6 presents to unit for scheduled NST for cholestasis. Patient denies leaking of fluid, denies vaginal bleeding, states positive fetal movement, denies cramping or contractions. NST button given, call light in reach.

## 2024-11-03 NOTE — DISCHARGE INSTRUCTIONS
Home Undelivered Discharge Instructions    After Discharge Orders:              Diet:  normal diet as tolerated    Rest: normal activity as tolerated    Other instructions: Do kick counts once a day on your baby. Choose the time of day your baby is most active. Get in a comfortable lying or sitting position and time how long it takes to feel 10 kicks, twists, turns, swishes, or rolls. Call your physician or midwife if there have not been 10 kicks in 2 hours    Call physician or midwife, return to Labor and Delivery, call 911, or go to the nearest Emergency Room if: increased leakage or fluid, contractions more than  6 per  1 hour, decreased fetal movement, persistent low back pain or cramping, bleeding from vaginal area, difficulty urinating, pain with urination, difficulty breathing, new calf pain, persistent headache, or vision change

## 2024-11-05 ENCOUNTER — CARE COORDINATION (OUTPATIENT)
Dept: OTHER | Facility: CLINIC | Age: 29
End: 2024-11-05

## 2024-11-07 ENCOUNTER — ROUTINE PRENATAL (OUTPATIENT)
Dept: OBGYN CLINIC | Age: 29
End: 2024-11-07
Payer: COMMERCIAL

## 2024-11-07 ENCOUNTER — ANCILLARY PROCEDURE (OUTPATIENT)
Dept: OBGYN CLINIC | Age: 29
End: 2024-11-07
Payer: COMMERCIAL

## 2024-11-07 ENCOUNTER — HOSPITAL ENCOUNTER (OUTPATIENT)
Age: 29
Discharge: HOME | End: 2024-11-07
Attending: OBSTETRICS & GYNECOLOGY | Admitting: OBSTETRICS & GYNECOLOGY
Payer: COMMERCIAL

## 2024-11-07 VITALS
HEART RATE: 103 BPM | DIASTOLIC BLOOD PRESSURE: 81 MMHG | WEIGHT: 274.7 LBS | SYSTOLIC BLOOD PRESSURE: 125 MMHG | BODY MASS INDEX: 47.15 KG/M2

## 2024-11-07 VITALS
HEART RATE: 114 BPM | RESPIRATION RATE: 18 BRPM | SYSTOLIC BLOOD PRESSURE: 125 MMHG | DIASTOLIC BLOOD PRESSURE: 69 MMHG | TEMPERATURE: 98.4 F

## 2024-11-07 DIAGNOSIS — F32.A ANXIETY AND DEPRESSION: ICD-10-CM

## 2024-11-07 DIAGNOSIS — F41.9 ANXIETY AND DEPRESSION: ICD-10-CM

## 2024-11-07 DIAGNOSIS — M79.7 FIBROMYALGIA: ICD-10-CM

## 2024-11-07 DIAGNOSIS — E66.01 MORBID OBESITY: ICD-10-CM

## 2024-11-07 DIAGNOSIS — G89.4 CHRONIC PAIN SYNDROME: ICD-10-CM

## 2024-11-07 DIAGNOSIS — K76.89 INTRAHEPATIC CHOLESTASIS: Primary | ICD-10-CM

## 2024-11-07 PROBLEM — Z03.71 ENCOUNTER FOR SUSPECTED PREMATURE RUPTURE OF AMNIOTIC MEMBRANES, WITH RUPTURE OF MEMBRANES NOT FOUND: Status: ACTIVE | Noted: 2024-11-07

## 2024-11-07 PROBLEM — R10.9 ABDOMINAL PAIN AFFECTING PREGNANCY, ANTEPARTUM: Status: ACTIVE | Noted: 2024-11-07

## 2024-11-07 PROBLEM — Z3A.30 30 WEEKS GESTATION OF PREGNANCY: Status: ACTIVE | Noted: 2024-11-07

## 2024-11-07 PROBLEM — O26.899 ABDOMINAL PAIN AFFECTING PREGNANCY, ANTEPARTUM: Status: ACTIVE | Noted: 2024-11-07

## 2024-11-07 LAB
AMNISURE, POC: NEGATIVE
BILIRUB UR QL STRIP: NEGATIVE
CLARITY UR: CLEAR
COLOR UR: YELLOW
GLUCOSE UR STRIP-MCNC: NEGATIVE MG/DL
GLUCOSE URINE, POC: NEGATIVE MG/DL
HGB UR QL STRIP.AUTO: NEGATIVE
KETONES UR STRIP-MCNC: ABNORMAL MG/DL
LEUKOCYTE ESTERASE UR QL STRIP: NEGATIVE
Lab: NORMAL
NEGATIVE QC PASS/FAIL: NORMAL
NITRITE UR QL STRIP: NEGATIVE
PH UR STRIP: 6 [PH] (ref 5–9)
POSITIVE QC PASS/FAIL: NORMAL
PROT UR STRIP-MCNC: ABNORMAL MG/DL
PROTEIN UA: NEGATIVE
RBC #/AREA URNS HPF: ABNORMAL /HPF
SP GR UR STRIP: 1.02 (ref 1–1.03)
UROBILINOGEN UR STRIP-ACNC: 0.2 EU/DL (ref 0–1)
WBC #/AREA URNS HPF: ABNORMAL /HPF

## 2024-11-07 PROCEDURE — 76821 MIDDLE CEREBRAL ARTERY ECHO: CPT | Performed by: OBSTETRICS & GYNECOLOGY

## 2024-11-07 PROCEDURE — G8484 FLU IMMUNIZE NO ADMIN: HCPCS | Performed by: OBSTETRICS & GYNECOLOGY

## 2024-11-07 PROCEDURE — 81002 URINALYSIS NONAUTO W/O SCOPE: CPT | Performed by: OBSTETRICS & GYNECOLOGY

## 2024-11-07 PROCEDURE — 76820 UMBILICAL ARTERY ECHO: CPT | Performed by: OBSTETRICS & GYNECOLOGY

## 2024-11-07 PROCEDURE — 76816 OB US FOLLOW-UP PER FETUS: CPT | Performed by: OBSTETRICS & GYNECOLOGY

## 2024-11-07 PROCEDURE — 81001 URINALYSIS AUTO W/SCOPE: CPT

## 2024-11-07 PROCEDURE — 99213 OFFICE O/P EST LOW 20 MIN: CPT

## 2024-11-07 PROCEDURE — 76818 FETAL BIOPHYS PROFILE W/NST: CPT | Performed by: OBSTETRICS & GYNECOLOGY

## 2024-11-07 PROCEDURE — 99214 OFFICE O/P EST MOD 30 MIN: CPT | Performed by: OBSTETRICS & GYNECOLOGY

## 2024-11-07 PROCEDURE — G8417 CALC BMI ABV UP PARAM F/U: HCPCS | Performed by: OBSTETRICS & GYNECOLOGY

## 2024-11-07 PROCEDURE — 1036F TOBACCO NON-USER: CPT | Performed by: OBSTETRICS & GYNECOLOGY

## 2024-11-07 PROCEDURE — G8427 DOCREV CUR MEDS BY ELIG CLIN: HCPCS | Performed by: OBSTETRICS & GYNECOLOGY

## 2024-11-07 PROCEDURE — 99213 OFFICE O/P EST LOW 20 MIN: CPT | Performed by: OBSTETRICS & GYNECOLOGY

## 2024-11-07 RX ORDER — BUSPIRONE HYDROCHLORIDE 10 MG/1
TABLET ORAL
COMMUNITY
Start: 2024-10-29

## 2024-11-07 NOTE — PATIENT INSTRUCTIONS
Please arrive for your scheduled appointment at least 15 minutes early with your actual insurance card+ a photo ID. Also if you need any refills ordered or have questions, it may take up 48 hours to reply. Please allow ample time for your refills. Call me when you use last refill. Thank you for your cooperation. You might be having an NST at your next appt. Please eat a large snack or breakfast before coming to office. Thank youCall your primary obstetrician with bleeding, leaking of fluid, abdominal tenderness, headache, blurry vision, epigastric pain and increased urinary frequency.If you are experiencing an emergency and need immediate help, call 911 or go to go emergency room or labor and delivery. Do kick counts after dinner. Call your primary obstetrician if less than 10 kicks in 2 hours after dinner.     Call your primary obstetrician with bleeding, leaking of fluid, abdominal tenderness, headache, blurry vision, epigastric pain and increased urinary frequency.if you are sick, not feeling well or have an infectious process going on please reschedule your appointment by calling [phone]. Also if any family members are not feeling well, please do not bring them to your appointment. We appreciate your cooperation. We are doing this in order to protect our pregnant mothers+ their babies.if you are sick, not feeling well or have an infectious process going on please reschedule your appointment by calling [phone]. Also if any family members are not feeling well, please do not bring them to your appointment. We appreciate your cooperation. We are doing this in order to protect our pregnant mothers+ their babies.

## 2024-11-07 NOTE — DISCHARGE INSTRUCTIONS
Home Undelivered Discharge Instructions    After Discharge Orders:    Future Appointments   Date Time Provider Department Center   11/7/2024  2:30 PM SCHEDULE, RENE GOYAL RM 2 BDM [facility]   11/8/2024  9:50 AM Vladimir Ford MD [facility]   11/10/2024 10:00 AM SEB L&D GEN RM 02 [facility] OP LD [place] [facility]                   Diet:  normal diet as tolerated    Rest: normal activity as tolerated    Other instructions: Do kick counts once a day on your baby. Choose the time of day your baby is most active. Get in a comfortable lying or sitting position and time how long it takes to feel 10 kicks, twists, turns, swishes, or rolls. Call your physician or midwife if there have not been 10 kicks in 2 hours    Call physician or midwife, return to Labor and Delivery, call 911, or go to the nearest Emergency Room if: increased leakage or fluid, contractions more than  6 per  1 hour, decreased fetal movement, persistent low back pain or cramping, bleeding from vaginal area, difficulty urinating, pain with urination, difficulty breathing, new calf pain, persistent headache, or vision change

## 2024-11-07 NOTE — PROGRESS NOTES
24    Vladimir Ford MD  [address]  [place], [place] [address]                RE:  GURDEEP STACK  : 1995   AGE: 29 y.o.     This report has been created using voice recognition software. It may contain errors which are inherent in voice recognition technology.     Dear Dr. Ford:     Gurdeep Stack had an appointment today for the following indications:     Patient Active Problem List   Diagnosis    Maternal morbid obesity    Lower extremity edema    Body mass index (BMI) 45.0-49.9, adult    Pruritus of pregnancy, antepartum, third trimester    Intrahepatic cholestasis      Gurdeep Stack is a 29 y.o. female, who is G3(2,0,0,2). She has an Estimated Date of Delivery: 2025 based on her established dates.  She is currently 30 weeks 3 days gestation based on that assessment.      The patient was previously admitted to the labor and delivery unit for evaluation and management secondary to lower extremity edema and pruritus beginning on the palms of her hands and soles of her feet.  She had no prior history of the symptomatology.  The patient states that her fetal movement has been good.  She has had no vaginal bleeding or leaking of amniotic fluid.    The patient had an empiric course of ursodiol for management of her pruritus with a significant reduction in her symptomatology.  She is currently taking 300 mg of ursodiol 4 times a day.  I also recommended that she take cetirizine 10 mg every 12-24 hours and Pepcid 20 mg every 12 hours for symptomatic relief of her pruritus.     The patient had a  section delivery with her first pregnancy in .  She subsequently had a vaginal birth after  section on 3/12/2019.     The non-stress test performed today was reactive for the patient's gestational age.     The results of Panorama screen were negative. I advised her that this a screening test not a diagnostic test. I advised her that the test screens only for

## 2024-11-07 NOTE — H&P
Department of Obstetrics and Gynecology  Physician Assistant Obstetrics History and Physical      HISTORY OF PRESENT ILLNESS:      The patient is a 29 y.o.  3 parity 2 at 30 weeks' 3 days' gestation presents to L&D from home due to having a gush of fluid after coughing at 09:30 this am and some intermittent abdominal pain and pressure for a few weeks that worsened after coughing this am. Amnisure (-). Last saw [facility] 2024.    Current obstetric history is significant for:  Previous  section  Successful    Intrahepatic cholestasis of pregnancy      Estimated Due Date:  2024  Contractions: No  Leaking of fluid: Yes: Amnisure (-)  Bleeding:  No  Perceived fetal movement: Good        PAST OB HISTORY:  OB History    Para Term  AB Living   3 2 2     2   SAB IAB Ectopic Molar Multiple Live Births           0 2      # Outcome Date GA Lbr Casey/2nd Weight Sex Type Anes PTL Lv   3 Current            2 Term 19 39w2d  3.118 kg (6 lb 14 oz) M Vag-Spont EPI N ANNA   1 Term 14 40w0d   M CS-LTranv EPI  ANNA      Complications: Failure to Progress in Second Stage      Obstetric Comments           Pre-eclampsia:  No      :  Yes       D & C:  No      Cerclage:  No      LEEP:  No      Myomectomy:  No       Labor: No    Past Medical History:    Diagnosis Date    Abnormal Papanicolaou smear of cervix with positive human papilloma virus (HPV) test     Anemia 2023    Anxiety and depression 2023    Closed fracture of distal end of right fibula 2020    Fibromyalgia     Inappropriate sinus tachycardia (HCC) 2023    Morbid obesity due to excess calories     Seizure-like activity (HCC) 2023    Tachycardia      (vaginal birth after )         Past Surgical History:    Procedure Laterality Date    ANKLE SURGERY Right      SECTION      LEEP  2017    UPPER GASTROINTESTINAL ENDOSCOPY N/A 2022    EGD BIOPSY performed by Dimitri GREEN

## 2024-11-07 NOTE — PROGRESS NOTES
Pt alert and pleasant, here for 1 wk follow up. Pt was seen today in L&D for a gush of fluid she had today. Her AmniSure was negative. She had something go down the wrong tube and she started coughing, which caused the leakage of fluid at the time. She went to L&D just to be on the safe side. Pt Denies bleeding, cramping, and lof. Positive fetal movement per patient. Urine was negative for glucose and protein.

## 2024-11-07 NOTE — PROGRESS NOTES
Patient seen with melisa hernandez  Amnisure neg  Speculum no fluid or blood  Cx closed/thick/firm/high  No definite ctx's seen  Fht's reactive  Melisa to notify attending

## 2024-11-07 NOTE — PROGRESS NOTES
, 30+3 weeks gestation presents after feeling a gush of fluid from coughing. Reports abdominal pain/ pressure. Denies any vb. Amnisure negative.

## 2024-11-08 DIAGNOSIS — K76.89 INTRAHEPATIC CHOLESTASIS: ICD-10-CM

## 2024-11-08 LAB
ALBUMIN: 3.6 G/DL (ref 3.5–5.2)
ALP BLD-CCNC: 121 U/L (ref 35–104)
ALT SERPL-CCNC: <5 U/L (ref 0–32)
ANION GAP SERPL CALCULATED.3IONS-SCNC: 10 MMOL/L (ref 7–16)
AST SERPL-CCNC: 11 U/L (ref 0–31)
BILIRUB SERPL-MCNC: 0.3 MG/DL (ref 0–1.2)
BUN BLDV-MCNC: 4 MG/DL (ref 6–20)
CALCIUM SERPL-MCNC: 8.8 MG/DL (ref 8.6–10.2)
CHLORIDE BLD-SCNC: 104 MMOL/L (ref 98–107)
CO2: 23 MMOL/L (ref 22–29)
CREAT SERPL-MCNC: 0.6 MG/DL (ref 0.5–1)
GFR, ESTIMATED: >90 ML/MIN/1.73M2
GLUCOSE BLD-MCNC: 100 MG/DL (ref 74–99)
POTASSIUM SERPL-SCNC: 3.9 MMOL/L (ref 3.5–5)
SODIUM BLD-SCNC: 137 MMOL/L (ref 132–146)
TOTAL PROTEIN: 6.5 G/DL (ref 6.4–8.3)

## 2024-11-11 LAB — BILE ACIDS TOTAL: 9 UMOL/L (ref 0–10)

## 2024-11-12 ENCOUNTER — ANCILLARY PROCEDURE (OUTPATIENT)
Dept: OBGYN CLINIC | Age: 29
End: 2024-11-12
Payer: COMMERCIAL

## 2024-11-12 ENCOUNTER — APPOINTMENT (OUTPATIENT)
Dept: ULTRASOUND IMAGING | Age: 29
End: 2024-11-12
Payer: COMMERCIAL

## 2024-11-12 ENCOUNTER — APPOINTMENT (OUTPATIENT)
Dept: GENERAL RADIOLOGY | Age: 29
End: 2024-11-12
Payer: COMMERCIAL

## 2024-11-12 ENCOUNTER — HOSPITAL ENCOUNTER (OUTPATIENT)
Age: 29
Setting detail: OBSERVATION
Discharge: HOME | End: 2024-11-15
Attending: OBSTETRICS & GYNECOLOGY | Admitting: OBSTETRICS & GYNECOLOGY
Payer: COMMERCIAL

## 2024-11-12 ENCOUNTER — HOSPITAL ENCOUNTER (EMERGENCY)
Age: 29
Discharge: ANOTHER HOSPITAL | End: 2024-11-12
Attending: EMERGENCY MEDICINE
Payer: COMMERCIAL

## 2024-11-12 VITALS
HEART RATE: 97 BPM | SYSTOLIC BLOOD PRESSURE: 138 MMHG | OXYGEN SATURATION: 96 % | DIASTOLIC BLOOD PRESSURE: 70 MMHG | RESPIRATION RATE: 16 BRPM | TEMPERATURE: 98.1 F

## 2024-11-12 DIAGNOSIS — E66.01 MORBID OBESITY: ICD-10-CM

## 2024-11-12 DIAGNOSIS — K76.89 INTRAHEPATIC CHOLESTASIS: ICD-10-CM

## 2024-11-12 DIAGNOSIS — R06.02 SHORTNESS OF BREATH: Primary | ICD-10-CM

## 2024-11-12 DIAGNOSIS — F32.A ANXIETY AND DEPRESSION: ICD-10-CM

## 2024-11-12 DIAGNOSIS — E61.1 IRON DEFICIENCY: ICD-10-CM

## 2024-11-12 DIAGNOSIS — Z3A.31 31 WEEKS GESTATION OF PREGNANCY: ICD-10-CM

## 2024-11-12 DIAGNOSIS — R79.89 LOW VITAMIN B12 LEVEL: ICD-10-CM

## 2024-11-12 DIAGNOSIS — J81.0 ACUTE PULMONARY EDEMA: ICD-10-CM

## 2024-11-12 DIAGNOSIS — F41.9 ANXIETY AND DEPRESSION: ICD-10-CM

## 2024-11-12 DIAGNOSIS — E53.8 LOW FOLATE: ICD-10-CM

## 2024-11-12 DIAGNOSIS — E55.9 VITAMIN D DEFICIENCY: ICD-10-CM

## 2024-11-12 DIAGNOSIS — J06.9 ACUTE UPPER RESPIRATORY INFECTION: Primary | ICD-10-CM

## 2024-11-12 DIAGNOSIS — O26.899 ABDOMINAL PAIN AFFECTING PREGNANCY, ANTEPARTUM: ICD-10-CM

## 2024-11-12 DIAGNOSIS — R10.9 ABDOMINAL PAIN AFFECTING PREGNANCY, ANTEPARTUM: ICD-10-CM

## 2024-11-12 DIAGNOSIS — D64.9 ANEMIA, UNSPECIFIED TYPE: ICD-10-CM

## 2024-11-12 DIAGNOSIS — O12.03 EDEMA DURING PREGNANCY IN THIRD TRIMESTER: ICD-10-CM

## 2024-11-12 LAB
25(OH)D3 SERPL-MCNC: 19.8 NG/ML (ref 30–100)
ALBUMIN SERPL-MCNC: 3.4 G/DL (ref 3.5–5.2)
ALBUMIN SERPL-MCNC: 3.5 G/DL (ref 3.5–5.2)
ALP SERPL-CCNC: 123 U/L (ref 35–104)
ALP SERPL-CCNC: 135 U/L (ref 35–104)
ALT SERPL-CCNC: <5 U/L (ref 0–32)
ALT SERPL-CCNC: <5 U/L (ref 0–32)
ANION GAP SERPL CALCULATED.3IONS-SCNC: 12 MMOL/L (ref 7–16)
ANION GAP SERPL CALCULATED.3IONS-SCNC: 13 MMOL/L (ref 7–16)
AST SERPL-CCNC: 12 U/L (ref 0–31)
AST SERPL-CCNC: 14 U/L (ref 0–31)
B PARAP IS1001 DNA NPH QL NAA+NON-PROBE: NOT DETECTED
B PERT DNA SPEC QL NAA+PROBE: NOT DETECTED
BASOPHILS # BLD: 0.05 K/UL (ref 0–0.2)
BASOPHILS # BLD: 0.06 K/UL (ref 0–0.2)
BASOPHILS NFR BLD: 0 % (ref 0–2)
BASOPHILS NFR BLD: 0 % (ref 0–2)
BILIRUB SERPL-MCNC: 0.2 MG/DL (ref 0–1.2)
BILIRUB SERPL-MCNC: 0.3 MG/DL (ref 0–1.2)
BNP SERPL-MCNC: <36 PG/ML (ref 0–125)
BUN SERPL-MCNC: 6 MG/DL (ref 6–20)
BUN SERPL-MCNC: 6 MG/DL (ref 6–20)
C PNEUM DNA NPH QL NAA+NON-PROBE: NOT DETECTED
CALCIUM SERPL-MCNC: 9.1 MG/DL (ref 8.6–10.2)
CALCIUM SERPL-MCNC: 9.4 MG/DL (ref 8.6–10.2)
CHLORIDE SERPL-SCNC: 101 MMOL/L (ref 98–107)
CHLORIDE SERPL-SCNC: 104 MMOL/L (ref 98–107)
CO2 SERPL-SCNC: 20 MMOL/L (ref 22–29)
CO2 SERPL-SCNC: 21 MMOL/L (ref 22–29)
CREAT SERPL-MCNC: 0.6 MG/DL (ref 0.5–1)
CREAT SERPL-MCNC: 0.6 MG/DL (ref 0.5–1)
EKG ATRIAL RATE: 109 BPM
EKG P AXIS: 46 DEGREES
EKG P-R INTERVAL: 150 MS
EKG Q-T INTERVAL: 312 MS
EKG QRS DURATION: 70 MS
EKG QTC CALCULATION (BAZETT): 420 MS
EKG R AXIS: 61 DEGREES
EKG T AXIS: 15 DEGREES
EKG VENTRICULAR RATE: 109 BPM
EOSINOPHIL # BLD: 0.11 K/UL (ref 0.05–0.5)
EOSINOPHIL # BLD: 0.12 K/UL (ref 0.05–0.5)
EOSINOPHILS RELATIVE PERCENT: 1 % (ref 0–6)
EOSINOPHILS RELATIVE PERCENT: 1 % (ref 0–6)
ERYTHROCYTE [DISTWIDTH] IN BLOOD BY AUTOMATED COUNT: 13.6 % (ref 11.5–15)
ERYTHROCYTE [DISTWIDTH] IN BLOOD BY AUTOMATED COUNT: 13.8 % (ref 11.5–15)
FERRITIN SERPL-MCNC: 23 NG/ML
FIBRINOGEN PPP-MCNC: 632 MG/DL (ref 200–400)
FLUAV RNA NPH QL NAA+NON-PROBE: NOT DETECTED
FLUBV RNA NPH QL NAA+NON-PROBE: NOT DETECTED
FOLATE SERPL-MCNC: 6.6 NG/ML (ref 4.8–24.2)
GFR, ESTIMATED: >90 ML/MIN/1.73M2
GFR, ESTIMATED: >90 ML/MIN/1.73M2
GLUCOSE SERPL-MCNC: 134 MG/DL (ref 74–99)
GLUCOSE SERPL-MCNC: 83 MG/DL (ref 74–99)
HADV DNA NPH QL NAA+NON-PROBE: NOT DETECTED
HBA1C MFR BLD: 5.6 % (ref 4–5.6)
HCOV 229E RNA NPH QL NAA+NON-PROBE: NOT DETECTED
HCOV HKU1 RNA NPH QL NAA+NON-PROBE: NOT DETECTED
HCOV NL63 RNA NPH QL NAA+NON-PROBE: NOT DETECTED
HCOV OC43 RNA NPH QL NAA+NON-PROBE: NOT DETECTED
HCT VFR BLD AUTO: 36.9 % (ref 34–48)
HCT VFR BLD AUTO: 37.4 % (ref 34–48)
HGB BLD-MCNC: 12 G/DL (ref 11.5–15.5)
HGB BLD-MCNC: 12 G/DL (ref 11.5–15.5)
HMPV RNA NPH QL NAA+NON-PROBE: NOT DETECTED
HPIV1 RNA NPH QL NAA+NON-PROBE: NOT DETECTED
HPIV2 RNA NPH QL NAA+NON-PROBE: NOT DETECTED
HPIV3 RNA NPH QL NAA+NON-PROBE: NOT DETECTED
HPIV4 RNA NPH QL NAA+NON-PROBE: NOT DETECTED
IMM GRANULOCYTES # BLD AUTO: 0.16 K/UL (ref 0–0.58)
IMM GRANULOCYTES # BLD AUTO: 0.17 K/UL (ref 0–0.58)
IMM GRANULOCYTES NFR BLD: 1 % (ref 0–5)
IMM GRANULOCYTES NFR BLD: 1 % (ref 0–5)
INFLUENZA A BY PCR: NOT DETECTED
INFLUENZA B BY PCR: NOT DETECTED
INR PPP: 1
IRON SATN MFR SERPL: 12 % (ref 15–50)
IRON SERPL-MCNC: 45 UG/DL (ref 37–145)
LDH SERPL-CCNC: 228 U/L (ref 135–214)
LIPASE SERPL-CCNC: 39 U/L (ref 13–60)
LYMPHOCYTES NFR BLD: 2.12 K/UL (ref 1.5–4)
LYMPHOCYTES NFR BLD: 2.32 K/UL (ref 1.5–4)
LYMPHOCYTES RELATIVE PERCENT: 16 % (ref 20–42)
LYMPHOCYTES RELATIVE PERCENT: 17 % (ref 20–42)
M PNEUMO DNA NPH QL NAA+NON-PROBE: NOT DETECTED
MAGNESIUM SERPL-MCNC: 1.9 MG/DL (ref 1.6–2.6)
MCH RBC QN AUTO: 29.2 PG (ref 26–35)
MCH RBC QN AUTO: 29.6 PG (ref 26–35)
MCHC RBC AUTO-ENTMCNC: 32.1 G/DL (ref 32–34.5)
MCHC RBC AUTO-ENTMCNC: 32.5 G/DL (ref 32–34.5)
MCV RBC AUTO: 91 FL (ref 80–99.9)
MCV RBC AUTO: 91.1 FL (ref 80–99.9)
MONOCYTES NFR BLD: 0.97 K/UL (ref 0.1–0.95)
MONOCYTES NFR BLD: 1.33 K/UL (ref 0.1–0.95)
MONOCYTES NFR BLD: 10 % (ref 2–12)
MONOCYTES NFR BLD: 7 % (ref 2–12)
NEUTROPHILS NFR BLD: 72 % (ref 43–80)
NEUTROPHILS NFR BLD: 73 % (ref 43–80)
NEUTS SEG NFR BLD: 9.47 K/UL (ref 1.8–7.3)
NEUTS SEG NFR BLD: 9.96 K/UL (ref 1.8–7.3)
PARTIAL THROMBOPLASTIN TIME: 30.4 SEC (ref 24.5–35.1)
PLATELET # BLD AUTO: 260 K/UL (ref 130–450)
PLATELET CONFIRMATION: NORMAL
PLATELET, FLUORESCENCE: 279 K/UL (ref 130–450)
PMV BLD AUTO: 10.2 FL (ref 7–12)
PMV BLD AUTO: 10.3 FL (ref 7–12)
POTASSIUM SERPL-SCNC: 3.6 MMOL/L (ref 3.5–5)
POTASSIUM SERPL-SCNC: 3.9 MMOL/L (ref 3.5–5)
PROT SERPL-MCNC: 6.6 G/DL (ref 6.4–8.3)
PROT SERPL-MCNC: 6.8 G/DL (ref 6.4–8.3)
PROTHROMBIN TIME: 10.6 SEC (ref 9.3–12.4)
RBC # BLD AUTO: 4.05 M/UL (ref 3.5–5.5)
RBC # BLD AUTO: 4.11 M/UL (ref 3.5–5.5)
RSV RNA NPH QL NAA+NON-PROBE: NOT DETECTED
RV+EV RNA NPH QL NAA+NON-PROBE: NOT DETECTED
SARS-COV-2 RDRP RESP QL NAA+PROBE: NOT DETECTED
SARS-COV-2 RNA NPH QL NAA+NON-PROBE: NOT DETECTED
SODIUM SERPL-SCNC: 135 MMOL/L (ref 132–146)
SODIUM SERPL-SCNC: 136 MMOL/L (ref 132–146)
SPECIMEN DESCRIPTION: NORMAL
SPECIMEN DESCRIPTION: NORMAL
T4 FREE SERPL-MCNC: 0.7 NG/DL (ref 0.9–1.7)
TIBC SERPL-MCNC: 378 UG/DL (ref 250–450)
TROPONIN I SERPL HS-MCNC: <6 NG/L (ref 0–9)
TSH SERPL DL<=0.05 MIU/L-ACNC: 1.9 UIU/ML (ref 0.27–4.2)
URATE SERPL-MCNC: 4.5 MG/DL (ref 2.4–5.7)
VIT B12 SERPL-MCNC: 351 PG/ML (ref 211–946)
WBC OTHER # BLD: 13.3 K/UL (ref 4.5–11.5)
WBC OTHER # BLD: 13.6 K/UL (ref 4.5–11.5)

## 2024-11-12 PROCEDURE — 71045 X-RAY EXAM CHEST 1 VIEW: CPT

## 2024-11-12 PROCEDURE — G0378 HOSPITAL OBSERVATION PER HR: HCPCS

## 2024-11-12 PROCEDURE — 0202U NFCT DS 22 TRGT SARS-COV-2: CPT

## 2024-11-12 PROCEDURE — 85384 FIBRINOGEN ACTIVITY: CPT

## 2024-11-12 PROCEDURE — 93005 ELECTROCARDIOGRAM TRACING: CPT

## 2024-11-12 PROCEDURE — 83690 ASSAY OF LIPASE: CPT

## 2024-11-12 PROCEDURE — 86645 CMV ANTIBODY IGM: CPT

## 2024-11-12 PROCEDURE — 93970 EXTREMITY STUDY: CPT

## 2024-11-12 PROCEDURE — 87502 INFLUENZA DNA AMP PROBE: CPT

## 2024-11-12 PROCEDURE — 6370000000 HC RX 637 (ALT 250 FOR IP): Performed by: OBSTETRICS & GYNECOLOGY

## 2024-11-12 PROCEDURE — 76820 UMBILICAL ARTERY ECHO: CPT | Performed by: OBSTETRICS & GYNECOLOGY

## 2024-11-12 PROCEDURE — 82728 ASSAY OF FERRITIN: CPT

## 2024-11-12 PROCEDURE — 94640 AIRWAY INHALATION TREATMENT: CPT

## 2024-11-12 PROCEDURE — 6360000002 HC RX W HCPCS

## 2024-11-12 PROCEDURE — 85025 COMPLETE CBC W/AUTO DIFF WBC: CPT

## 2024-11-12 PROCEDURE — 84439 ASSAY OF FREE THYROXINE: CPT

## 2024-11-12 PROCEDURE — 85610 PROTHROMBIN TIME: CPT

## 2024-11-12 PROCEDURE — 87635 SARS-COV-2 COVID-19 AMP PRB: CPT

## 2024-11-12 PROCEDURE — 83550 IRON BINDING TEST: CPT

## 2024-11-12 PROCEDURE — 84443 ASSAY THYROID STIM HORMONE: CPT

## 2024-11-12 PROCEDURE — 96372 THER/PROPH/DIAG INJ SC/IM: CPT

## 2024-11-12 PROCEDURE — 94664 DEMO&/EVAL PT USE INHALER: CPT

## 2024-11-12 PROCEDURE — 82306 VITAMIN D 25 HYDROXY: CPT

## 2024-11-12 PROCEDURE — 86644 CMV ANTIBODY: CPT

## 2024-11-12 PROCEDURE — 83615 LACTATE (LD) (LDH) ENZYME: CPT

## 2024-11-12 PROCEDURE — 85730 THROMBOPLASTIN TIME PARTIAL: CPT

## 2024-11-12 PROCEDURE — 80053 COMPREHEN METABOLIC PANEL: CPT

## 2024-11-12 PROCEDURE — 83735 ASSAY OF MAGNESIUM: CPT

## 2024-11-12 PROCEDURE — 82746 ASSAY OF FOLIC ACID SERUM: CPT

## 2024-11-12 PROCEDURE — 82607 VITAMIN B-12: CPT

## 2024-11-12 PROCEDURE — 96374 THER/PROPH/DIAG INJ IV PUSH: CPT

## 2024-11-12 PROCEDURE — 6360000002 HC RX W HCPCS: Performed by: INTERNAL MEDICINE

## 2024-11-12 PROCEDURE — 83880 ASSAY OF NATRIURETIC PEPTIDE: CPT

## 2024-11-12 PROCEDURE — 84484 ASSAY OF TROPONIN QUANT: CPT

## 2024-11-12 PROCEDURE — 76815 OB US LIMITED FETUS(S): CPT | Performed by: OBSTETRICS & GYNECOLOGY

## 2024-11-12 PROCEDURE — 84550 ASSAY OF BLOOD/URIC ACID: CPT

## 2024-11-12 PROCEDURE — 76819 FETAL BIOPHYS PROFIL W/O NST: CPT | Performed by: OBSTETRICS & GYNECOLOGY

## 2024-11-12 PROCEDURE — 76821 MIDDLE CEREBRAL ARTERY ECHO: CPT | Performed by: OBSTETRICS & GYNECOLOGY

## 2024-11-12 PROCEDURE — 83036 HEMOGLOBIN GLYCOSYLATED A1C: CPT

## 2024-11-12 PROCEDURE — 6370000000 HC RX 637 (ALT 250 FOR IP): Performed by: INTERNAL MEDICINE

## 2024-11-12 PROCEDURE — 99223 1ST HOSP IP/OBS HIGH 75: CPT | Performed by: OBSTETRICS & GYNECOLOGY

## 2024-11-12 PROCEDURE — 83540 ASSAY OF IRON: CPT

## 2024-11-12 PROCEDURE — 93010 ELECTROCARDIOGRAM REPORT: CPT | Performed by: INTERNAL MEDICINE

## 2024-11-12 RX ORDER — ONDANSETRON 2 MG/ML
4 INJECTION INTRAMUSCULAR; INTRAVENOUS ONCE
Status: COMPLETED | OUTPATIENT
Start: 2024-11-12 | End: 2024-11-12

## 2024-11-12 RX ORDER — ENOXAPARIN SODIUM 100 MG/ML
30 INJECTION SUBCUTANEOUS 2 TIMES DAILY
Status: DISCONTINUED | OUTPATIENT
Start: 2024-11-12 | End: 2024-11-15 | Stop reason: HOSPADM

## 2024-11-12 RX ORDER — ONDANSETRON 2 MG/ML
INJECTION INTRAMUSCULAR; INTRAVENOUS
Status: COMPLETED
Start: 2024-11-12 | End: 2024-11-12

## 2024-11-12 RX ORDER — IPRATROPIUM BROMIDE AND ALBUTEROL SULFATE 2.5; .5 MG/3ML; MG/3ML
1 SOLUTION RESPIRATORY (INHALATION)
Status: DISCONTINUED | OUTPATIENT
Start: 2024-11-12 | End: 2024-11-15 | Stop reason: HOSPADM

## 2024-11-12 RX ORDER — ONDANSETRON 2 MG/ML
4 INJECTION INTRAMUSCULAR; INTRAVENOUS EVERY 6 HOURS PRN
Status: DISCONTINUED | OUTPATIENT
Start: 2024-11-12 | End: 2024-11-15 | Stop reason: HOSPADM

## 2024-11-12 RX ORDER — GUAIFENESIN/DEXTROMETHORPHAN 100-10MG/5
10 SYRUP ORAL EVERY 4 HOURS PRN
Status: DISCONTINUED | OUTPATIENT
Start: 2024-11-12 | End: 2024-11-15 | Stop reason: HOSPADM

## 2024-11-12 RX ORDER — ACETAMINOPHEN 325 MG/1
650 TABLET ORAL EVERY 4 HOURS PRN
Status: DISCONTINUED | OUTPATIENT
Start: 2024-11-12 | End: 2024-11-15 | Stop reason: HOSPADM

## 2024-11-12 RX ADMIN — ENOXAPARIN SODIUM 30 MG: 100 INJECTION SUBCUTANEOUS at 20:43

## 2024-11-12 RX ADMIN — IPRATROPIUM BROMIDE AND ALBUTEROL SULFATE 1 DOSE: 2.5; .5 SOLUTION RESPIRATORY (INHALATION) at 17:46

## 2024-11-12 RX ADMIN — ACETAMINOPHEN 650 MG: 325 TABLET ORAL at 23:24

## 2024-11-12 RX ADMIN — ONDANSETRON 4 MG: 2 INJECTION INTRAMUSCULAR; INTRAVENOUS at 19:02

## 2024-11-12 RX ADMIN — IPRATROPIUM BROMIDE AND ALBUTEROL SULFATE 1 DOSE: 2.5; .5 SOLUTION RESPIRATORY (INHALATION) at 21:08

## 2024-11-12 RX ADMIN — ONDANSETRON 4 MG: 2 INJECTION INTRAMUSCULAR; INTRAVENOUS at 12:34

## 2024-11-12 RX ADMIN — GUAIFENESIN AND DEXTROMETHORPHAN 10 ML: 100; 10 SYRUP ORAL at 20:43

## 2024-11-12 ASSESSMENT — PAIN - FUNCTIONAL ASSESSMENT: PAIN_FUNCTIONAL_ASSESSMENT: 0-10

## 2024-11-12 ASSESSMENT — PAIN SCALES - GENERAL
PAINLEVEL_OUTOF10: 8
PAINLEVEL_OUTOF10: 6

## 2024-11-12 ASSESSMENT — PAIN DESCRIPTION - LOCATION: LOCATION: OTHER (COMMENT)

## 2024-11-12 NOTE — PROGRESS NOTES
Patient presents to antepartum after ER evaluation. Patient states she was diagnosed with upper respiratory infection. Has not received any treatment, states she is still experiencing shortness of breath.  Denies OB complaints.   Consult placed with ROSA ELENA

## 2024-11-12 NOTE — ED NOTES
Wishes to stay in home shirt d/t hurts stomach when moves around -31 weeks pregnant, baby moving good

## 2024-11-12 NOTE — ED PROVIDER NOTES
Score: 15                CIWA Assessment  BP: 138/70  Pulse: 97           PHYSICAL EXAM  1 or more Elements     ED Triage Vitals [11/12/24 1140]   BP Systolic BP Percentile Diastolic BP Percentile Temp Temp Source Pulse Respirations SpO2   138/70 -- -- 98.1 °F (36.7 °C) Oral (!) 116 16 96 %      Height Weight         -- --                      Constitutional/General: Alert and oriented x3  Head: Normocephalic and atraumatic  Eyes: PERRL, EOMI, conjunctiva normal, sclera non icteric  ENT:  Oropharynx clear, handling secretions, no trismus, no asymmetry of the posterior oropharynx or uvular edema  Neck: Supple, full ROM, no stridor, no meningeal signs  Respiratory: Lungs clear to auscultation bilaterally, no wheezes, rales, or rhonchi. Not in respiratory distress  Cardiovascular: Tachycardic rate. Regular rhythm. Equal extremity pulses.   GI:  Abdomen Soft, Non tender.  Abdomen is appropriate distended for gestational age.  No rebound, guarding, or rigidity.   Musculoskeletal: Moves all extremities x 4. Warm and well perfused, no clubbing, no cyanosis, no edema. Capillary refill <3 seconds  Integument: skin warm and dry. No rashes.   Neurologic: no focal deficits, CN II-XII grossly intact. Symmetric strength 5/5 in the upper and lower extremities bilaterally  Psychiatric: Normal Affect            DIAGNOSTIC RESULTS   LABS:    Labs Reviewed   CBC WITH AUTO DIFFERENTIAL - Abnormal; Notable for the following components:       Result Value    WBC 13.3 (*)     Lymphocytes % 16 (*)     Neutrophils Absolute 9.47 (*)     Monocytes Absolute 1.33 (*)     All other components within normal limits   COMPREHENSIVE METABOLIC PANEL - Abnormal; Notable for the following components:    CO2 20 (*)     Albumin 3.4 (*)     Alkaline Phosphatase 123 (*)     All other components within normal limits   COVID-19, RAPID   INFLUENZA A + B, PCR   TROPONIN   LIPASE   PLATELET CONFIRMATION   BRAIN NATRIURETIC PEPTIDE       As interpreted by me,  the above displayed labs are abnormal. All other labs obtained during this visit were within normal range or not returned as of this dictation.      EKG Interpretation  Interpreted by emergency department physician, Kiko Milan DO    ECG: Twelve-lead ECG shows sinus tachycardia with 109 bpm.  Normal axis.  KY interval 100 ms.  QTc 420ms.  No obvious acute ischemic ST-T wave changes.  Stable as compared to patient's most recent ECG.        RADIOLOGY:   Non-plain film images such as CT, Ultrasound and MRI are read by the radiologist. Plain radiographic images are visualized and preliminarily interpreted by the ED Provider with the below findings:    CXR: Cardiomegaly with mild pulmonary edema.        Interpretation per the Radiologist below, if available at the time of this note:    XR CHEST PORTABLE   Final Result   Cardiomegaly with mild pulmonary edema.           US FETAL BIOPHYSICAL PROFILE WO NON STRESS TESTING    Result Date: 2024  Patient Name: Sveta Stack    : 1995 [facility] [place] [address] [phone] Ordering Physician: Dr. Néstor Escamilla Sonographer: Anisa MONTELONGO RDMS Date of Exam: 2024 Indication: CHOLESTASIS, HTN Fetal Biophysical Profile Gestational Age: 31 weeks 0 days Fetal Heart Rate = 147 BPM Fetal Position = VERTEX Placenta Position & Grade = ANTERIOR GR 2 Total JALEEL = 15.3 cm BPP SCORING Fluid / 2x2 Pocket = 2 Fetal Tone = 2 Gross Body Movements= 2 Fetal Breathing = 2 TOTAL = 8/8 in 10 minutes S/D RATIO:  3.5 Reviewed, Interpreted and Approved by : Dr. Néstor Escamilla       No results found.    PROCEDURES   Unless otherwise noted below, none          PAST MEDICAL HISTORY/Chronic Conditions Affecting Care      has a past medical history of Abnormal Papanicolaou smear of cervix with positive human papilloma virus (HPV) test, Anemia (2023), Anxiety and depression (2023), Closed fracture of distal end of right fibula

## 2024-11-12 NOTE — PROGRESS NOTES
Event Note    Discussed with maternal-fetal medicine, viral panel is ordered.  Will start aerosol treatments empirically and check ultrasounds of the lower extremities.  Prophylaxis for DVT.  Kiko Hilliard M.D., F.C.C.P.    [facility]    [facility], [address] [place] [address], [address], [place] [address]

## 2024-11-12 NOTE — CONSULTS
patient's oxygen saturation has been normal on room air.  She has been intermittently tachycardic.    Cardiology consult recommended.  The provider was contacted.    Maternal echocardiogram recommended, testing ordered.    3.  Elevated blood pressure -- The patient reports that she did not feel well this morning while at work.  Her blood pressure was elevated at 143/103.  She then came to the hospital for evaluation.  Blood pressures have been normal since admission.  She has symptoms of an upper respiratory infection.  --A baseline preeclampsia panel was ordered.  -- Continue to monitor blood pressures serially  -- Continue daily low-dose aspirin  -- Preeclampsia precautions reviewed      4.  Cholestasis of pregnancy -- The patient follows with Dr. Dave.  She was diagnosed with cholestasis of pregnancy.  She currently takes 300 mg of ursodiol 4 times daily.  -- The patient reports that her symptoms are stable.  -- Counseling was previously provided.  Counseling was reviewed.  -- Continue increased fetal surveillance.  -- Repeat bile acids and LFTs ordered, results pending    5. Elevated umbilical artery PI -- The patient's records were reviewed.  The umbilical artery PI was noted to be elevated at 30 weeks 3 days.  End-diastolic was seen.  Fetal growth was appropriate for the gestational age.    The umbilical artery PI was normal at 31 weeks 1 day.  End-diastolic flow was seen.    Counseling was recently provided.    The patient was counseled that this finding can be indicative of placental dysfunction. It also may be associated with an increased risk for poor fetal growth, hypertensive disorders of pregnancy, and stillbirth. Thus, increased fetal surveillance is recommended.      The patient was counseled to monitor fetal kick counts daily, instructions were reviewed.      The patient should have a follow-up ultrasound weekly for biophysical profiles and repeat Doppler studies.  She should also have a weekly  on today's visit was 80 minutes.  This included preparation for the consultation (i.e. reviewing prior external notes and test results), performance of a medically appropriate history and examination, counseling, orders for medications, tests or other procedures, and coordination of care.  Greater than 50% of the time was spent face-to-face with the patient.  This time is exclusive of procedures performed.   I answered all of  the patient's questions to her satisfaction.     I requested the patient return for a follow-up assessment in 1 weeks unless there is a clinical reason for her to return prior to that time. She is to call if she has any problems or questions prior to her next visit. Further evaluation and management will be dependent on her clinical presentation and the results of her testing.       --The patient was advised to call if she has any increased vaginal discharge, vaginal bleeding, contractions, abdominal pain, back pain or any new significant symptomatology prior to her next visit. I advised her that these are signs and symptoms of cervical change and require follow-up assessment when they occur.  Preeclampsia precautions were also reviewed with the patient.       --The patient was also counseled to call and/or return with any concerns for decreased fetal activity.    The patient is to continue to follow with you in your office for ongoing obstetric care.    If you have any questions regarding her management, please contact me at your convenience and thank you for allowing me to participate in her care.    Sincerely,          Diana Gracia MD, FACOG [facility]  [phone] option 1   [address] [place] [address] [address] [place] [address]        *All or parts of this note may have been generated using a voice recognition program. There may be typo, grammar, or Word substitution errors that have escaped my review of this note.

## 2024-11-12 NOTE — DISCHARGE INSTR - COC
Continuity of Care Form    Patient Name: Sveta Stack   :  1995  MRN:  [id number]    Admit date:  2024  Discharge date:  ***    Code Status Order: Prior   Advance Directives:   Advance Care Flowsheet Documentation             Admitting Physician:  No admitting provider for patient encounter.  PCP: Robby Zimmerman DO    Discharging Nurse: ***  Discharging Hospital Unit/Room#:   Discharging Unit Phone Number: ***    Emergency Contact:   Extended Emergency Contact Information  Primary Emergency Contact: [facility]  Address: [address] of [facility]  Home Phone: [phone]  Mobile Phone: [phone]  Relation: Parent  Low vision? Yes  Hearing or visual needs: Glasses  Other needs: None  Preferred language: English   needed? No    Past Surgical History:  Past Surgical History:   Procedure Laterality Date    ANKLE SURGERY Right      SECTION      [facility]  2017    UPPER GASTROINTESTINAL ENDOSCOPY N/A 2022    EGD BIOPSY performed by Dimitri Craig MD at [facility] ENDOSCOPY       Immunization History:   Immunization History   Administered Date(s) Administered    COVID-19, MODERNA BLUE border, Primary or Immunocompromised, (age 12y+), IM, 100 mcg/0.5mL 2021, 2021    COVID-19, MODERNA Bivalent, (age 12y+), IM, 50 mcg/0.5 mL 2022    COVID-19, MODERNA Booster BLUE border, (age 18y+), IM, 50mcg/0.25mL 2022    Influenza Virus Vaccine 2021       Active Problems:  Patient Active Problem List   Diagnosis Code    Morbid obesity E66.01    Anemia D64.9    Anxiety and depression F41.9, F32.A    Chronic pain syndrome [G89.4] G89.4    Chronic bilateral low back pain with bilateral sciatica M54.42, M54.41, G89.29    Body mass index (BMI) 45.0-49.9, adult Z68.42    Fibromyalgia M79.7    Pruritus of pregnancy, antepartum, third trimester O99.713, L29.9    Intrahepatic cholestasis K76.89    Edema during  pregnancy in third trimester O12.03    30 weeks gestation of pregnancy Z3A.30    Encounter for suspected premature rupture of amniotic membranes, with rupture of membranes not found Z03.71    Abdominal pain affecting pregnancy, antepartum O26.899, R10.9       Isolation/Infection:   Isolation            No Isolation          Patient Infection Status       None to display                     Nurse Assessment:  Last Vital Signs: /70   Pulse 97   Temp 98.1 °F (36.7 °C) (Oral)   Resp 16   LMP 04/08/2024   SpO2 96%     Last documented pain score (0-10 scale): Pain Level: 6  Last Weight:   Wt Readings from Last 1 Encounters:   11/11/24 125.6 kg (277 lb)     Mental Status:  {IP PT MENTAL STATUS:20030}    IV Access:  { CARRILLO IV ACCESS:208576511}    Nursing Mobility/ADLs:  Walking   {CHP DME ADLs:680232391}  Transfer  {CHP DME ADLs:563622208}  Bathing  {CHP DME ADLs:063422441}  Dressing  {CHP DME ADLs:319756024}  Toileting  {CHP DME ADLs:607062634}  Feeding  {CHP DME ADLs:872526407}  Med Admin  {CHP DME ADLs:080772349}  Med Delivery   { CARRILLO MED Delivery:341104327}    Wound Care Documentation and Therapy:        Elimination:  Continence:   Bowel: {YES / NO:19727}  Bladder: {YES / NO:19727}  Urinary Catheter: {Urinary Catheter:820461624}   Colostomy/Ileostomy/Ileal Conduit: {YES / NO:19727}       Date of Last BM: ***  No intake or output data in the 24 hours ending 11/12/24 1502  No intake/output data recorded.    Safety Concerns:     { CARRILLO Safety Concerns:897964812}    Impairments/Disabilities:      { CARRILLO Impairments/Disabilities:992415947}    Nutrition Therapy:  Current Nutrition Therapy:   { CARRILLO Diet List:020335422}    Routes of Feeding: {CHP DME Other Feedings:501429493}  Liquids: {Slp liquid thickness:59350}  Daily Fluid Restriction: {CHP DME Yes amt example:260393923}  Last Modified Barium Swallow with Video (Video Swallowing Test): {Done Not Done Date:304088012}    Treatments at the Time of Hospital

## 2024-11-12 NOTE — DISCHARGE INSTRUCTIONS
Today you were seen for cough , congestion, shortness of breath.  Please follow up with PCP. Please take Tylenol, Mucinex as needed. Please drink fluids. Please return to ED if symptoms worsen or new symptoms arise.  Thank you.

## 2024-11-13 ENCOUNTER — APPOINTMENT (OUTPATIENT)
Age: 29
End: 2024-11-13
Attending: OBSTETRICS & GYNECOLOGY
Payer: COMMERCIAL

## 2024-11-13 ENCOUNTER — APPOINTMENT (OUTPATIENT)
Dept: CT IMAGING | Age: 29
End: 2024-11-13
Payer: COMMERCIAL

## 2024-11-13 ENCOUNTER — ANCILLARY PROCEDURE (OUTPATIENT)
Dept: OBGYN CLINIC | Age: 29
End: 2024-11-13
Payer: COMMERCIAL

## 2024-11-13 DIAGNOSIS — K76.89 INTRAHEPATIC CHOLESTASIS: ICD-10-CM

## 2024-11-13 DIAGNOSIS — Z3A.31 31 WEEKS GESTATION OF PREGNANCY: ICD-10-CM

## 2024-11-13 DIAGNOSIS — O13.3 PREGNANCY-INDUCED HYPERTENSION IN THIRD TRIMESTER: ICD-10-CM

## 2024-11-13 PROBLEM — R06.02 SHORTNESS OF BREATH: Status: ACTIVE | Noted: 2024-11-13

## 2024-11-13 PROBLEM — R00.0 SINUS TACHYCARDIA: Status: ACTIVE | Noted: 2024-11-13

## 2024-11-13 LAB
BACTERIA URNS QL MICRO: ABNORMAL
BILIRUB UR QL STRIP: NEGATIVE
BNP SERPL-MCNC: <36 PG/ML (ref 0–125)
CLARITY UR: CLEAR
CMV IGG SERPL QL IA: PRESENT
CMV IGM SERPL QL IA: NORMAL
COLOR UR: YELLOW
CREAT UR-MCNC: 112.1 MG/DL (ref 29–226)
D-DIMER QUANTITATIVE: 263 NG/ML DDU (ref 0–230)
ECHO AO ASC DIAM: 2.8 CM
ECHO AO ROOT DIAM: 2.5 CM
ECHO AO SINUS VALSALVA DIAM: 2.5 CM
ECHO AV AREA PEAK VELOCITY: 2.7 CM2
ECHO AV AREA VTI: 2.8 CM2
ECHO AV CUSP MM: 1.5 CM
ECHO AV MEAN GRADIENT: 5 MMHG
ECHO AV MEAN VELOCITY: 1 M/S
ECHO AV PEAK GRADIENT: 10 MMHG
ECHO AV PEAK VELOCITY: 1.6 M/S
ECHO AV VELOCITY RATIO: 0.88
ECHO AV VTI: 23.2 CM
ECHO LA DIAMETER: 3 CM
ECHO LA TO AORTIC ROOT RATIO: 1.2
ECHO LA VOL A-L A2C: 45 ML (ref 22–52)
ECHO LA VOL A-L A4C: 33 ML (ref 22–52)
ECHO LA VOL MOD A2C: 40 ML (ref 22–52)
ECHO LA VOL MOD A4C: 30 ML (ref 22–52)
ECHO LA VOLUME AREA LENGTH: 43 ML
ECHO LV E' LATERAL VELOCITY: 10 CM/S
ECHO LV E' SEPTAL VELOCITY: 8 CM/S
ECHO LV EF PHYSICIAN: 55 %
ECHO LV FRACTIONAL SHORTENING: 31 % (ref 28–44)
ECHO LV INTERNAL DIMENSION DIASTOLIC: 3.5 CM (ref 3.9–5.3)
ECHO LV INTERNAL DIMENSION SYSTOLIC: 2.4 CM
ECHO LV ISOVOLUMETRIC RELAXATION TIME (IVRT): 79.6 MS
ECHO LV IVSD: 1.1 CM (ref 0.6–0.9)
ECHO LV IVSS: 1.2 CM
ECHO LV MASS 2D: 111 G (ref 67–162)
ECHO LV POSTERIOR WALL DIASTOLIC: 1 CM (ref 0.6–0.9)
ECHO LV POSTERIOR WALL SYSTOLIC: 1.2 CM
ECHO LV RELATIVE WALL THICKNESS RATIO: 0.57
ECHO LVOT AREA: 3.1 CM2
ECHO LVOT AV VTI INDEX: 0.9
ECHO LVOT DIAM: 2 CM
ECHO LVOT MEAN GRADIENT: 5 MMHG
ECHO LVOT PEAK GRADIENT: 8 MMHG
ECHO LVOT PEAK VELOCITY: 1.4 M/S
ECHO LVOT SV: 65.3 ML
ECHO LVOT VTI: 20.8 CM
ECHO MV "A" WAVE DURATION: 72.7 MSEC
ECHO MV A VELOCITY: 0.65 M/S
ECHO MV AREA PHT: 4.5 CM2
ECHO MV AREA VTI: 3.8 CM2
ECHO MV E DECELERATION TIME (DT): 222.5 MS
ECHO MV E VELOCITY: 0.78 M/S
ECHO MV E/A RATIO: 1.2
ECHO MV E/E' LATERAL: 7.8
ECHO MV E/E' RATIO (AVERAGED): 8.78
ECHO MV E/E' SEPTAL: 9.75
ECHO MV LVOT VTI INDEX: 0.82
ECHO MV MAX VELOCITY: 0.9 M/S
ECHO MV MEAN GRADIENT: 2 MMHG
ECHO MV MEAN VELOCITY: 0.6 M/S
ECHO MV PEAK GRADIENT: 3 MMHG
ECHO MV PRESSURE HALF TIME (PHT): 49.4 MS
ECHO MV VTI: 17.1 CM
ECHO PV MAX VELOCITY: 1.2 M/S
ECHO PV MEAN GRADIENT: 3 MMHG
ECHO PV MEAN VELOCITY: 0.8 M/S
ECHO PV PEAK GRADIENT: 6 MMHG
ECHO PV VTI: 17.5 CM
ECHO RV INTERNAL DIMENSION: 2.7 CM
ECHO RV TAPSE: 1.9 CM (ref 1.7–?)
EPI CELLS #/AREA URNS HPF: ABNORMAL /HPF
GLUCOSE UR STRIP-MCNC: NEGATIVE MG/DL
HGB UR QL STRIP.AUTO: NEGATIVE
KETONES UR STRIP-MCNC: 15 MG/DL
LEUKOCYTE ESTERASE UR QL STRIP: NEGATIVE
NITRITE UR QL STRIP: NEGATIVE
PH UR STRIP: 6.5 [PH] (ref 5–9)
PROCALCITONIN SERPL-MCNC: 0.07 NG/ML (ref 0–0.08)
PROT UR STRIP-MCNC: NEGATIVE MG/DL
RBC #/AREA URNS HPF: ABNORMAL /HPF
SP GR UR STRIP: 1.02 (ref 1–1.03)
TOTAL PROTEIN, URINE: 16 MG/DL (ref 0–12)
URINE TOTAL PROTEIN CREATININE RATIO: 0.14 (ref 0–0.2)
UROBILINOGEN UR STRIP-ACNC: 0.2 EU/DL (ref 0–1)
WBC #/AREA URNS HPF: ABNORMAL /HPF

## 2024-11-13 PROCEDURE — 81001 URINALYSIS AUTO W/SCOPE: CPT

## 2024-11-13 PROCEDURE — 76821 MIDDLE CEREBRAL ARTERY ECHO: CPT | Performed by: OBSTETRICS & GYNECOLOGY

## 2024-11-13 PROCEDURE — 76820 UMBILICAL ARTERY ECHO: CPT | Performed by: OBSTETRICS & GYNECOLOGY

## 2024-11-13 PROCEDURE — 93306 TTE W/DOPPLER COMPLETE: CPT

## 2024-11-13 PROCEDURE — 76819 FETAL BIOPHYS PROFIL W/O NST: CPT | Performed by: OBSTETRICS & GYNECOLOGY

## 2024-11-13 PROCEDURE — 6360000002 HC RX W HCPCS: Performed by: INTERNAL MEDICINE

## 2024-11-13 PROCEDURE — 86747 PARVOVIRUS ANTIBODY: CPT

## 2024-11-13 PROCEDURE — 6360000004 HC RX CONTRAST MEDICATION: Performed by: RADIOLOGY

## 2024-11-13 PROCEDURE — 6370000000 HC RX 637 (ALT 250 FOR IP): Performed by: INTERNAL MEDICINE

## 2024-11-13 PROCEDURE — 99232 SBSQ HOSP IP/OBS MODERATE 35: CPT | Performed by: INTERNAL MEDICINE

## 2024-11-13 PROCEDURE — 94640 AIRWAY INHALATION TREATMENT: CPT

## 2024-11-13 PROCEDURE — 96376 TX/PRO/DX INJ SAME DRUG ADON: CPT

## 2024-11-13 PROCEDURE — 6370000000 HC RX 637 (ALT 250 FOR IP): Performed by: OBSTETRICS & GYNECOLOGY

## 2024-11-13 PROCEDURE — 84145 PROCALCITONIN (PCT): CPT

## 2024-11-13 PROCEDURE — 99232 SBSQ HOSP IP/OBS MODERATE 35: CPT | Performed by: OBSTETRICS & GYNECOLOGY

## 2024-11-13 PROCEDURE — G0378 HOSPITAL OBSERVATION PER HR: HCPCS

## 2024-11-13 PROCEDURE — 84156 ASSAY OF PROTEIN URINE: CPT

## 2024-11-13 PROCEDURE — APPSS180 APP SPLIT SHARED TIME > 60 MINUTES: Performed by: NURSE PRACTITIONER

## 2024-11-13 PROCEDURE — 82570 ASSAY OF URINE CREATININE: CPT

## 2024-11-13 PROCEDURE — 93306 TTE W/DOPPLER COMPLETE: CPT | Performed by: INTERNAL MEDICINE

## 2024-11-13 PROCEDURE — 86376 MICROSOMAL ANTIBODY EACH: CPT

## 2024-11-13 PROCEDURE — 86800 THYROGLOBULIN ANTIBODY: CPT

## 2024-11-13 PROCEDURE — 6360000002 HC RX W HCPCS: Performed by: OBSTETRICS & GYNECOLOGY

## 2024-11-13 PROCEDURE — 87086 URINE CULTURE/COLONY COUNT: CPT

## 2024-11-13 PROCEDURE — 85379 FIBRIN DEGRADATION QUANT: CPT

## 2024-11-13 PROCEDURE — 76815 OB US LIMITED FETUS(S): CPT | Performed by: OBSTETRICS & GYNECOLOGY

## 2024-11-13 PROCEDURE — 36415 COLL VENOUS BLD VENIPUNCTURE: CPT

## 2024-11-13 PROCEDURE — 71275 CT ANGIOGRAPHY CHEST: CPT

## 2024-11-13 PROCEDURE — 96372 THER/PROPH/DIAG INJ SC/IM: CPT

## 2024-11-13 PROCEDURE — 82239 BILE ACIDS TOTAL: CPT

## 2024-11-13 RX ORDER — IOPAMIDOL 755 MG/ML
75 INJECTION, SOLUTION INTRAVASCULAR
Status: COMPLETED | OUTPATIENT
Start: 2024-11-13 | End: 2024-11-13

## 2024-11-13 RX ORDER — FOLIC ACID 1 MG/1
1 TABLET ORAL DAILY
Status: DISCONTINUED | OUTPATIENT
Start: 2024-11-13 | End: 2024-11-15 | Stop reason: HOSPADM

## 2024-11-13 RX ORDER — LANOLIN ALCOHOL/MO/W.PET/CERES
1000 CREAM (GRAM) TOPICAL DAILY
Status: DISCONTINUED | OUTPATIENT
Start: 2024-11-13 | End: 2024-11-15 | Stop reason: HOSPADM

## 2024-11-13 RX ORDER — CYANOCOBALAMIN 1000 UG/ML
1000 INJECTION, SOLUTION INTRAMUSCULAR; SUBCUTANEOUS ONCE
Status: COMPLETED | OUTPATIENT
Start: 2024-11-13 | End: 2024-11-13

## 2024-11-13 RX ORDER — CHOLECALCIFEROL (VITAMIN D3) 50 MCG
2000 TABLET ORAL DAILY
Status: DISCONTINUED | OUTPATIENT
Start: 2024-11-13 | End: 2024-11-15 | Stop reason: HOSPADM

## 2024-11-13 RX ORDER — FERROUS SULFATE 325(65) MG
325 TABLET ORAL 2 TIMES DAILY WITH MEALS
Status: DISCONTINUED | OUTPATIENT
Start: 2024-11-13 | End: 2024-11-15 | Stop reason: HOSPADM

## 2024-11-13 RX ADMIN — ONDANSETRON 4 MG: 2 INJECTION INTRAMUSCULAR; INTRAVENOUS at 20:22

## 2024-11-13 RX ADMIN — IPRATROPIUM BROMIDE AND ALBUTEROL SULFATE 1 DOSE: 2.5; .5 SOLUTION RESPIRATORY (INHALATION) at 09:49

## 2024-11-13 RX ADMIN — Medication 2000 UNITS: at 15:29

## 2024-11-13 RX ADMIN — IPRATROPIUM BROMIDE AND ALBUTEROL SULFATE 1 DOSE: 2.5; .5 SOLUTION RESPIRATORY (INHALATION) at 01:09

## 2024-11-13 RX ADMIN — ENOXAPARIN SODIUM 30 MG: 100 INJECTION SUBCUTANEOUS at 21:51

## 2024-11-13 RX ADMIN — CYANOCOBALAMIN TAB 1000 MCG 1000 MCG: 1000 TAB at 15:29

## 2024-11-13 RX ADMIN — IPRATROPIUM BROMIDE AND ALBUTEROL SULFATE 1 DOSE: 2.5; .5 SOLUTION RESPIRATORY (INHALATION) at 13:21

## 2024-11-13 RX ADMIN — CYANOCOBALAMIN 1000 MCG: 1000 INJECTION, SOLUTION INTRAMUSCULAR; SUBCUTANEOUS at 17:46

## 2024-11-13 RX ADMIN — ACETAMINOPHEN 650 MG: 325 TABLET ORAL at 03:00

## 2024-11-13 RX ADMIN — ACETAMINOPHEN 650 MG: 325 TABLET ORAL at 18:33

## 2024-11-13 RX ADMIN — ONDANSETRON 4 MG: 2 INJECTION INTRAMUSCULAR; INTRAVENOUS at 09:03

## 2024-11-13 RX ADMIN — GUAIFENESIN AND DEXTROMETHORPHAN 10 ML: 100; 10 SYRUP ORAL at 03:00

## 2024-11-13 RX ADMIN — FERROUS SULFATE TAB 325 MG (65 MG ELEMENTAL FE) 325 MG: 325 (65 FE) TAB at 17:45

## 2024-11-13 RX ADMIN — FOLIC ACID 1 MG: 1 TABLET ORAL at 15:29

## 2024-11-13 RX ADMIN — IPRATROPIUM BROMIDE AND ALBUTEROL SULFATE 1 DOSE: 2.5; .5 SOLUTION RESPIRATORY (INHALATION) at 05:05

## 2024-11-13 RX ADMIN — GUAIFENESIN AND DEXTROMETHORPHAN 10 ML: 100; 10 SYRUP ORAL at 20:22

## 2024-11-13 RX ADMIN — ONDANSETRON 4 MG: 2 INJECTION INTRAMUSCULAR; INTRAVENOUS at 03:00

## 2024-11-13 RX ADMIN — IOPAMIDOL 75 ML: 755 INJECTION, SOLUTION INTRAVENOUS at 19:15

## 2024-11-13 RX ADMIN — IPRATROPIUM BROMIDE AND ALBUTEROL SULFATE 1 DOSE: 2.5; .5 SOLUTION RESPIRATORY (INHALATION) at 18:40

## 2024-11-13 RX ADMIN — ENOXAPARIN SODIUM 30 MG: 100 INJECTION SUBCUTANEOUS at 09:05

## 2024-11-13 ASSESSMENT — PAIN SCALES - GENERAL: PAINLEVEL_OUTOF10: 4

## 2024-11-13 ASSESSMENT — PAIN DESCRIPTION - LOCATION: LOCATION: HEAD

## 2024-11-13 NOTE — PROGRESS NOTES
RN at bedside. Pt refusing EFM at this time. Pt states she will call out after breakfast to be placed on EFM.

## 2024-11-13 NOTE — PROGRESS NOTES
Assumed care of patient. Patient comfortable in bed, call light within reach. Patient denies any leaking of fluid or vaginal bleeding. Patient requesting cough drops or cough syrup.

## 2024-11-13 NOTE — H&P
CHIEF COMPLAINT:  abdominal pain/chest pain    HISTORY OF PRESENT ILLNESS:      The patient is a 29 y.o. female at 31w2d.  OB History          3    Para   2    Term   2            AB        Living   2         SAB        IAB        Ectopic        Molar        Multiple   0    Live Births   2          Obstetric Comments   Patient presents for initial prenatal visit.  Patient was trying to get pregnant.  Prenatal cultures, labs, and ultrasound were done today.  Patient started taking vitamins.  All pregnancy counseling concerns and questions were addressed and answered.  P genaro's currently taking no other medications, was taking Cymbalta and Pristiq-stopped cold turkey.  Patient will return to the office in 4 weeks.  Patient will be offered first trimester testing at that time. Patient denies any family history of congen ital defects or chromosomal abnormalities or genetic disorders that could be pertinent to this pregnancy.  Pt has had 2 deliveries via primary C/S followed by . No complications.     No cats in the house. All meat must be cooked well done, all lunch  meat must be cooked, all dairy must be pasteurized, no queso at Mexican restaurant, no shellfish, only low mercury-content fish once weekly, only Tylenol for headaches, fever, or pain.  For nausea, patient may take Unisom and vitamin B6 together at [facility], with vitamin B6 in the morning and 1 vitamin B6 in the afternoon.  Patient may also try ginger candies, lollipops, ginger ale, peppermint candies, peppermint gum, or sea bands.     Upon completion of the visit all questions were answered and the basia ents follow-up and testing schedule were reviewed.                Patient presents with a chief complaint as above and is being admitted for observation    Estimated Due Date: Estimated Date of Delivery: 25    PRENATAL CARE:    Complicated by: none    PAST OB HISTORY  OB History          3    Para   2    Term   2

## 2024-11-13 NOTE — CONSULTS
Pulmonary Consultation    Admit Date: 2024  Requesting Physician: Robby Zimmerman DO      Reason for consultation:  Shortness of breath, pulmonary edema        HPI:  Patient is a 29 year old female who presents to emergency room after seeing her high risk OB physician for high blood pressure and tachycardia. Patient admits that since beginning her third trimester of pregnancy she has had shortness of breath and at first related it to her uterine growth. Patient admits she gets dyspnea with talking. She had a chest xray that does show bilateral haziness. She does have a cardiology consult pending. Blood pressure has stabilized, tachycardia continues. Echocardiogram reviewed with trace regurgitation with all valves.      Patient denies any underlying lung disease. 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 She has two other children at home. She denies any sick contacts. Respiratory panel was negative. Patient endorses nasal congestion, occasional sore throat. She does intermittently lose her voice. Patient is seen on room air. She admits the duoneb treatments have helped her chest heaviness.       PMH:    Past Medical History:   Diagnosis Date    Abnormal Papanicolaou smear of cervix with positive human papilloma virus (HPV) test     Anemia 2023    Anxiety and depression 2023    Closed fracture of distal end of right fibula 2020    Fibromyalgia     Inappropriate sinus tachycardia (HCC) 2023    Morbid obesity due to excess calories     Seizure-like activity (HCC) 2023    Tachycardia      (vaginal birth after )      PSH:   Past Surgical History:   Procedure Laterality Date    ANKLE SURGERY Right      SECTION      [facility]  2017    UPPER GASTROINTESTINAL ENDOSCOPY N/A 2022    EGD BIOPSY performed by Dimitri Craig MD at [facility] ENDOSCOPY       Review of Systems:   Constitutional: As noted in the HPI.   Eyes: No visual changes  thyroid. No jvd, no hjr.   Resp: no wheezing.  No accessory muscle use. no rales. no rhonchi.   CV: Regular rate. Regular rhythm. No murmur No rub.    Abd: Non-tender. Non-distended. No masses. No organmegaly. Normal bowel sounds.   Skin: Warm and dry. No nodule on exposed extremities. No rash on exposed extremities.  Lymph: No cervical LAD. No supraclavicular LAD.   Ext: No joint deformity. No clubbing. No cyanosis. Trace BLE edema  Neuro: Awake. Follows commands. Positive pupils/gag/corneals. Normal pain response.     Lab Results:  CBC:   Recent Labs     11/12/24  1208 11/12/24  1740   WBC 13.3* 13.6*   HGB 12.0 12.0   HCT 36.9 37.4   MCV 91.1 91.0   PLT  --  260       BMP:  Recent Labs     11/12/24  1208 11/12/24  1740    135   K 3.9 3.6    101   CO2 20* 21*   BUN 6 6   CREATININE 0.6 0.6    ALB:3,BILIDIR:3,BILITOT:3,ALKPHOS:3)@    PT/INR:   Recent Labs     11/12/24  1740   PROTIME 10.6   INR 1.0       Cultures:  Sputum: not available  Blood: not available    ABG:   No results for input(s): \"PH\", \"PO2\", \"PCO2\", \"HCO3\", \"BE\", \"O2SAT\", \"METHB\", \"O2HB\", \"COHB\", \"O2CON\", \"HHB\", \"THB\" in the last 72 hours.          Films:     Vascular duplex lower extremity venous bilateral   Final Result      Normal bilateral lower extremity duplex venous ultrasound.         US OB 1 OR MORE FETUS LIMITED    (Results Pending)   US FETAL BIOPHYSICAL PROFILE WO NON STRESS TESTING    (Results Pending)   US DOPPLER FETAL UMBILICAL ARTERY    (Results Pending)   US DOPPLER FETAL MIDDLE CEREBRAL ARTERY    (Results Pending)           Assessment:  Abnormal chest radiograph: This likely represents overlying soft tissue and not CHF.  Tachycardia: Typically seen with pregnancy, underlying pathology is not excluded  31 weeks gestation with shortness of breath.     Plan:  Continue ipratropium-albuterol   DVT prophylaxis  Check D-dimer, if elevated which may in pregnancy anyway.. CT-A given the patient's body habitus and

## 2024-11-13 NOTE — PROGRESS NOTES
Spoke with Dr Abbott. Order states if we have Nyquil, patient may have that. If not patient can have Robitussin.

## 2024-11-13 NOTE — CONSULTS
INPATIENT CARDIOLOGY CONSULT     Reason for Consult:  SOB, Tachycardia     Cardiologist: Dr. Worthington    Requesting Physician:  Dr. Ford    Date of Consultation: 11/13/2024    HISTORY OF PRESENT ILLNESS:   Sveta Stack is a 29 year old AA female who is known to Dr. Valadez during initial outpatient consultation on 6/27/2022 for clearance for bariatric surgery for sleeve.  Due to patient having no cardiac complaints within normal EKG patient was at an acceptable risk for surgery with no prior cardiac testing needed.  Patient has had no follow-up with cardiology since that time.    Past medical history includes anxiety, morbid obesity, history of anemia, fibromyalgia, history of seizure-like activity (as a child), GERD.    Patient is currently 31 weeks 2 days pregnant (girl) with her third child.     Patient reported that since she became pregnant she has had nausea, vomiting and lower extremity swelling.  She stated at times she feels dizzy while she is changing positions from sitting to standing and if she gets up too fast, lasting for seconds and resolving spontaneously.  Patient does report having increased lower extremity edema over the past week along with belly tightness and a new onset of PND.  Patient stated that her blood pressure has been more elevated than normal and has been following closely with her OB/GYN.  Patient stated that since this past Sunday she has been experiencing constant chest pain in the middle of her chest \"dull ache\" nonradiating, associated with feeling of her heart racing at times.  Patient stated that she has been getting increased night sweats.  She frequently wears her Apple Watch and has noted that her heart rate has been fluctuating anywhere from low 90s to 140s at rest.  Patient reported that she was at work on 11/12/2024 and suddenly felt her heart racing.  She checked her Apple Watch and noticed that her heart rate was 139.  A coworker took her blood pressure and  discharge.   Consider UA   No further advanced cardiac testing needed or planned  Cardiology will sign off please call if needed.      Above as per Dr. Worthington -- A+P discussed and made in collaboration with him.  Further recommendations to follow      NOTE: This report was transcribed using voice recognition software. Every effort was made to ensure accuracy; however, inadvertent computerized transcription errors may be present.      TJ Castle-CNP  [facility]

## 2024-11-13 NOTE — PROGRESS NOTES
[facility] Follow-Up Consultation/Antepartum Note    S:  The patient reports that she still feels unwell.  She has continued complaints of chest pain, shortness of breath, tachycardia, myalgias, cough, congestion, and hoarseness.  She notes fetal movement.  She denies having any leaking of fluid or vaginal bleeding.  She reports having occasional contractions.    O:   Patient Vitals for the past 24 hrs:   BP Temp Temp src Pulse Resp SpO2   11/13/24 1321 -- -- -- (!) 101 20 99 %   11/13/24 1152 122/61 -- -- (!) 113 18 99 %   11/13/24 0949 -- -- -- (!) 102 18 99 %   11/13/24 0735 (!) 109/55 98.6 °F (37 °C) Oral (!) 107 20 97 %   11/12/24 1940 (!) 122/59 98.7 °F (37.1 °C) Oral (!) 117 16 --   11/12/24 1714 (!) 121/57 97.8 °F (36.6 °C) Oral (!) 112 16 --   11/12/24 1541 104/70 -- -- (!) 112 -- --        Gen NAD  CV RRR  Lungs CTA B  Abd Gravid/soft/NTTP/NABS  Ext trace edema BLE, no calf TTP BLE, +2 patellar reflexes BLE, no clonus BLE    Ultrasound 31 weeks 1 day:  SIUP, cephalic, heart rate 135, anterior placenta, JALEEL 11.9 cm, BPP 8/8, umbilical artery Dopplers normal, end-diastolic flow seen, MCA PI normal, MCA PSV 1.18 MOM, CPR PI >1.    Ultrasound 31 weeks 2 days:  SIUP, cephalic, heart rate 137, anterior placenta, JALEEL 10.4 cm, BPP 8/8, umbilical artery PI intermittently elevated, end-diastolic flow seen, MCA PI normal, MCA PSV 1 4 MOM, CPR PI >1.        LABS:    Recent Labs     11/12/24  1208 11/12/24  1740   WBC 13.3* 13.6*   RBC 4.05 4.11   HGB 12.0 12.0   HCT 36.9 37.4   MCV 91.1 91.0   MCH 29.6 29.2   MCHC 32.5 32.1   RDW 13.6 13.8   PLT  --  260   MPV 10.3 10.2       Recent Labs     11/12/24  1208 11/12/24  1740    135   K 3.9 3.6    101   CO2 20* 21*   BUN 6 6   CREATININE 0.6 0.6   GLUCOSE 83 134*   CALCIUM 9.1 9.4             Admission on 11/12/2024   Component Date Value Ref Range Status    IVSd 11/13/2024 1.1 (A)  0.6 - 0.9 cm Final    IVSs 11/13/2024 1.2  cm Final    LVIDd 11/13/2024 3.5 (A)  3.9 -  defined as a maternal free T4 concentration in the lower 2.5th to 5th percentile of the reference range, in conjunction with a normal TSH. The effect of isolated maternal hypothyroxinemia on  and  outcome is unclear. Study outcomes regarding pregnancy outcomes is conflicting.  In one study, isolated hypothyroxinemia was not associated with adverse pregnancy outcomes.  Another study, women with a low free T4 and normal TSH at increased risk for  labor, macrosomia, and gestational diabetes. Regarding cognitive outcomes, some studies have reported that hypothyroxinemia between 12 and 20 weeks gestation was associated with a lower mean intelligence, psychomotor, and/or behavioral scores compared to children born to women with normal thyroid function. Alternatively, another study looking at children at age 2 did not find any differences in neurocognitive development. Studies have also evaluated the benefit of thyroid hormone replacement. There was no significant difference in neurodevelopmental or behavioral outcomes and children at 5 years of age between the thyroid replacement group and placebo group. Additionally, there were no significant differences in the rate of  delivery, preeclampsia, gestational hypertension, pregnancy loss, and/or any other maternal or fetal outcomes.    Per the American thyroid Association, treatment is not recommended for women with isolated hypothyroxinemia. However, thyroid function study should be monitored closely, every 4 weeks throughout the pregnancy.  --Recommend repeat thyroid function studies in at 6-week postpartum visit  --Long-term follow-up with PCP for monitoring management      12. Low ferritin -- The patient's ferritin was low at 23 (considered low if <30), iron saturation low at 12%  --Ferrous sulfate 325 mg BID prescribed  --Monitor levels serially  --Monitor nutrition panel q4-6 weeks (CBC, CMP, magnesium, ferritin, folate, vitamin B12,

## 2024-11-13 NOTE — PROGRESS NOTES
Spiritual Health History and Assessment/Progress Note  [facility]    Attempted Encounter,  ,  ,  Patient declined  services at this time.    Name: Sveta Stack MRN: [id number]    Age: 29 y.o.     Sex: female   Language: English   Confucianism: Religious   31 weeks gestation of pregnancy     Date: 11/13/2024                           Spiritual Assessment began in [address] HIGH RISK AP/PP MATERNITY        Referral/Consult From: Rounding   Encounter Overview/Reason: Attempted Encounter  Service Provided For: Patient    Manasa, Belief, Meaning:   Patient unable to assess at this time  Family/Friends No family/friends present      Importance and Influence:  Patient unable to assess at this time  Family/Friends No family/friends present    Community:  Patient Other: N/A  Family/Friends No family/friends present    Assessment and Plan of Care:     Patient Interventions include: Other: N/A  Family/Friends Interventions include: No family/friends present    Patient Plan of Care: Spiritual Care available upon further referral  Family/Friends Plan of Care: No family/friends present    Electronically signed by SALMA Ceron on 11/13/2024 at 3:27 PM

## 2024-11-13 NOTE — PROGRESS NOTES
Spoke with Yudi Jose CNM regarding hard to trace baby with EFM due to patient having to sit straight up to breath due to respiratory infection. Ok to do intermittent monitoring.     165.1

## 2024-11-14 ENCOUNTER — ANCILLARY PROCEDURE (OUTPATIENT)
Dept: OBGYN CLINIC | Age: 29
End: 2024-11-14
Payer: COMMERCIAL

## 2024-11-14 LAB
THYROGLOBULIN AB: 35 IU/ML (ref 0–40)
THYROPEROXIDASE AB SERPL IA-ACNC: <4 IU/ML (ref 0–25)

## 2024-11-14 PROCEDURE — 6370000000 HC RX 637 (ALT 250 FOR IP)

## 2024-11-14 PROCEDURE — G0378 HOSPITAL OBSERVATION PER HR: HCPCS

## 2024-11-14 PROCEDURE — 6370000000 HC RX 637 (ALT 250 FOR IP): Performed by: OBSTETRICS & GYNECOLOGY

## 2024-11-14 PROCEDURE — 94640 AIRWAY INHALATION TREATMENT: CPT

## 2024-11-14 PROCEDURE — 96372 THER/PROPH/DIAG INJ SC/IM: CPT

## 2024-11-14 PROCEDURE — 6370000000 HC RX 637 (ALT 250 FOR IP): Performed by: INTERNAL MEDICINE

## 2024-11-14 PROCEDURE — 6360000002 HC RX W HCPCS: Performed by: INTERNAL MEDICINE

## 2024-11-14 PROCEDURE — 6360000002 HC RX W HCPCS: Performed by: OBSTETRICS & GYNECOLOGY

## 2024-11-14 PROCEDURE — 76820 UMBILICAL ARTERY ECHO: CPT | Performed by: OBSTETRICS & GYNECOLOGY

## 2024-11-14 PROCEDURE — 99232 SBSQ HOSP IP/OBS MODERATE 35: CPT | Performed by: OBSTETRICS & GYNECOLOGY

## 2024-11-14 PROCEDURE — 76815 OB US LIMITED FETUS(S): CPT | Performed by: OBSTETRICS & GYNECOLOGY

## 2024-11-14 PROCEDURE — 76821 MIDDLE CEREBRAL ARTERY ECHO: CPT | Performed by: OBSTETRICS & GYNECOLOGY

## 2024-11-14 PROCEDURE — 76819 FETAL BIOPHYS PROFIL W/O NST: CPT | Performed by: OBSTETRICS & GYNECOLOGY

## 2024-11-14 PROCEDURE — 96376 TX/PRO/DX INJ SAME DRUG ADON: CPT

## 2024-11-14 RX ORDER — DOCUSATE SODIUM 100 MG/1
100 CAPSULE, LIQUID FILLED ORAL DAILY
Status: DISCONTINUED | OUTPATIENT
Start: 2024-11-14 | End: 2024-11-15 | Stop reason: HOSPADM

## 2024-11-14 RX ADMIN — ACETAMINOPHEN 650 MG: 325 TABLET ORAL at 03:11

## 2024-11-14 RX ADMIN — ONDANSETRON 4 MG: 2 INJECTION INTRAMUSCULAR; INTRAVENOUS at 16:05

## 2024-11-14 RX ADMIN — ONDANSETRON 4 MG: 2 INJECTION INTRAMUSCULAR; INTRAVENOUS at 03:11

## 2024-11-14 RX ADMIN — IPRATROPIUM BROMIDE AND ALBUTEROL SULFATE 1 DOSE: 2.5; .5 SOLUTION RESPIRATORY (INHALATION) at 03:58

## 2024-11-14 RX ADMIN — SALINE NASAL SPRAY 1 SPRAY: 1.5 SOLUTION NASAL at 16:02

## 2024-11-14 RX ADMIN — ACETAMINOPHEN 650 MG: 325 TABLET ORAL at 16:03

## 2024-11-14 RX ADMIN — FERROUS SULFATE TAB 325 MG (65 MG ELEMENTAL FE) 325 MG: 325 (65 FE) TAB at 18:14

## 2024-11-14 RX ADMIN — CYANOCOBALAMIN TAB 1000 MCG 1000 MCG: 1000 TAB at 08:45

## 2024-11-14 RX ADMIN — ENOXAPARIN SODIUM 30 MG: 100 INJECTION SUBCUTANEOUS at 21:13

## 2024-11-14 RX ADMIN — DOCUSATE SODIUM 100 MG: 100 CAPSULE, LIQUID FILLED ORAL at 18:26

## 2024-11-14 RX ADMIN — IPRATROPIUM BROMIDE AND ALBUTEROL SULFATE 1 DOSE: 2.5; .5 SOLUTION RESPIRATORY (INHALATION) at 08:14

## 2024-11-14 RX ADMIN — GUAIFENESIN AND DEXTROMETHORPHAN 10 ML: 100; 10 SYRUP ORAL at 03:12

## 2024-11-14 RX ADMIN — FERROUS SULFATE TAB 325 MG (65 MG ELEMENTAL FE) 325 MG: 325 (65 FE) TAB at 08:45

## 2024-11-14 RX ADMIN — GUAIFENESIN AND DEXTROMETHORPHAN 10 ML: 100; 10 SYRUP ORAL at 16:13

## 2024-11-14 RX ADMIN — IPRATROPIUM BROMIDE AND ALBUTEROL SULFATE 1 DOSE: 2.5; .5 SOLUTION RESPIRATORY (INHALATION) at 20:40

## 2024-11-14 RX ADMIN — FOLIC ACID 1 MG: 1 TABLET ORAL at 08:45

## 2024-11-14 RX ADMIN — IPRATROPIUM BROMIDE AND ALBUTEROL SULFATE 1 DOSE: 2.5; .5 SOLUTION RESPIRATORY (INHALATION) at 17:00

## 2024-11-14 RX ADMIN — Medication 2000 UNITS: at 08:45

## 2024-11-14 RX ADMIN — ENOXAPARIN SODIUM 30 MG: 100 INJECTION SUBCUTANEOUS at 08:45

## 2024-11-14 ASSESSMENT — PAIN - FUNCTIONAL ASSESSMENT: PAIN_FUNCTIONAL_ASSESSMENT: ACTIVITIES ARE NOT PREVENTED

## 2024-11-14 ASSESSMENT — PAIN SCALES - GENERAL
PAINLEVEL_OUTOF10: 7
PAINLEVEL_OUTOF10: 7

## 2024-11-14 ASSESSMENT — PAIN DESCRIPTION - LOCATION: LOCATION: CHEST;HEAD

## 2024-11-14 ASSESSMENT — PAIN DESCRIPTION - DESCRIPTORS: DESCRIPTORS: ACHING;DULL

## 2024-11-14 NOTE — PROGRESS NOTES
[facility] Follow-Up Consultation/Antepartum Note    S:  The patient reports feeling a little better today.  She still has complaints of cough, congestion, and myalgias.  She notes fetal movement.  She denies any leaking of fluid or vaginal bleeding.  She reports having occasional contractions.    O:   Patient Vitals for the past 24 hrs:   BP Temp Temp src Pulse Resp SpO2   11/14/24 0845 126/63 97.9 °F (36.6 °C) Oral (!) 108 14 99 %   11/14/24 0814 -- -- -- -- -- 97 %   11/13/24 2015 127/70 98.9 °F (37.2 °C) Oral 89 18 --   11/13/24 1841 -- -- -- -- -- 100 %        Gen NAD  CV RRR  Lungs CTA B  Abd Gravid/soft/NTTP/NABS  Ext trace edema BLE, no calf TTP BLE, +1 patellar reflexes BLE, no clonus BLE    Ultrasound 31 weeks 1 day:  SIUP, cephalic, heart rate 135, anterior placenta, JALEEL 11.9 cm, BPP 8/8, umbilical artery Dopplers normal, end-diastolic flow seen, MCA PI normal, MCA PSV 1.18 MOM, CPR PI >1.     Ultrasound 31 weeks 2 days:  SIUP, cephalic, heart rate 137, anterior placenta, JALEEL 10.4 cm, BPP 8/8, umbilical artery PI intermittently elevated, end-diastolic flow seen, MCA PI normal, MCA PSV 1 4 MOM, CPR PI >1.    Ultrasound 31 weeks 3 days:  S IUP, cephalic, heart rate 143, anterior placenta, JALEEL 13.8 cm, BPP 8/8, umbilical artery PI normal, MCA Dopplers normal, CPR PI normal.        LABS:    Recent Labs     11/12/24  1208 11/12/24  1740   WBC 13.3* 13.6*   RBC 4.05 4.11   HGB 12.0 12.0   HCT 36.9 37.4   MCV 91.1 91.0   MCH 29.6 29.2   MCHC 32.5 32.1   RDW 13.6 13.8   PLT  --  260   MPV 10.3 10.2       Recent Labs     11/12/24  1208 11/12/24  1740    135   K 3.9 3.6    101   CO2 20* 21*   BUN 6 6   CREATININE 0.6 0.6   GLUCOSE 83 134*   CALCIUM 9.1 9.4           Admission on 11/12/2024   Component Date Value Ref Range Status    IVSd 11/13/2024 1.1 (A)  0.6 - 0.9 cm Final    IVSs 11/13/2024 1.2  cm Final    LVIDd 11/13/2024 3.5 (A)  3.9 - 5.3 cm Final    LVIDs 11/13/2024 2.4  cm Final    LVOT Diameter  Screening for thyroid peroxidase antibody and antithyroglobulin antibody was pending.     Counseling was previously provided.  Counseling was reviewed.     Per the American thyroid Association, treatment is not recommended for women with isolated hypothyroxinemia. However, thyroid function study should be monitored closely, every 4 weeks throughout the pregnancy.  --Recommend repeat thyroid function studies in at 6-week postpartum visit  --Long-term follow-up with PCP for monitoring management        12. Low ferritin -- The patient's ferritin was low at 23 (considered low if <30), iron saturation low at 12%  --Ferrous sulfate 325 mg BID prescribed  --Monitor levels serially  --Monitor nutrition panel q4-6 weeks (CBC, CMP, magnesium, ferritin, folate, vitamin B12, vitamin D25OH), repeat on/after 12/10  --Follow up with PCP for long term monitoring and management     13. Low folate -- The patient's folate was low at 6.6.    --Monitor levels serially  --Repeat nutrition panel (CBC, CMP, magnesium, ferritin, folate, vitamin B12, vitamin D 25 OH) in 4 weeks, on/after 12/10  --Long-term follow-up with PCP for monitoring and management     14.  Low vitamin B12 -- The patient's vitamin B12 level was borderline at 351.  Individuals with vitamin B12 level between 200 and 400 are increased risk for anemia and side effects related to low vitamin B12.  Thus, supplementation is recommended  --The patient reported a history of a B12 deficiency and need for B12 injections.  B12, 1000 mcg deep subcu x 1 ordered.  --Recommend vitamin B12, 1000 mcg daily  --Monitor levels serially  --Repeat nutrition panel (CBC, CMP, magnesium, ferritin, folate, vitamin B12, vitamin D 25 OH) in 4 weeks, on/after 12/10  --Long-term follow-up with PCP for monitoring and management        15. Vitamin D deficiency -- The patient's vitamin D was deficient at 19.8  --Recommend vitamin D3 2000 IU daily  --Monitor nutrition panel q4-6 weeks (CBC, CMP,

## 2024-11-14 NOTE — PROGRESS NOTES
Pulmonary Progress Note    Admit Date: 2024  Hospital day                               PCP: Robby Zimmerman DO    Chief Complaint (s):  Patient Active Problem List   Diagnosis    Morbid obesity    Anemia    Anxiety and depression    Chronic pain syndrome [G89.4]    Chronic bilateral low back pain with bilateral sciatica    Body mass index (BMI) 45.0-49.9, adult    Fibromyalgia    Pruritus of pregnancy, antepartum, third trimester    Intrahepatic cholestasis    Edema during pregnancy in third trimester    30 weeks gestation of pregnancy    Encounter for suspected premature rupture of amniotic membranes, with rupture of membranes not found    Abdominal pain affecting pregnancy, antepartum    31 weeks gestation of pregnancy    Shortness of breath    Sinus tachycardia       Subjective:  Patient seen on room air. Voice is still quiet. She does feel some relief with aerosols, otherwise shortness of breath at rest. Admits to frequent mouth breathing due to nasal congestion.       Vitals:  VITALS:  /63   Pulse (!) 108   Temp 97.9 °F (36.6 °C) (Oral)   Resp 14   LMP 2024   SpO2 99%     24HR INTAKE/OUTPUT:    No intake or output data in the 24 hours ending 24 1120    24HR PULSE OXIMETRY RANGE:    SpO2  Av.5 %  Min: 97 %  Max: 100 %    Medications:  IV:      Scheduled Meds:   ferrous sulfate  325 mg Oral BID WC    folic acid  1 mg Oral Daily    vitamin B-12  1,000 mcg Oral Daily    vitamin D  2,000 Units Oral Daily    ipratropium 0.5 mg-albuterol 2.5 mg  1 Dose Inhalation Q4H RT    enoxaparin  30 mg SubCUTAneous BID       Diet:   ADULT DIET; Regular     EXAM:  General: No distress. Alert.  Eyes: PERRL. No sclera icterus. No conjunctival injection.  ENT: No discharge. Pharynx clear.  Neck: Trachea midline. Normal thyroid.  Resp: No accessory muscle use. no rales. no wheezing. no rhonchi. Clear.   CV: Regular rate. Regular rhythm. No murmur or rub.   Abd: Non-tender.

## 2024-11-15 ENCOUNTER — ANCILLARY PROCEDURE (OUTPATIENT)
Dept: OBGYN CLINIC | Age: 29
End: 2024-11-15
Payer: COMMERCIAL

## 2024-11-15 VITALS
TEMPERATURE: 98.8 F | SYSTOLIC BLOOD PRESSURE: 128 MMHG | OXYGEN SATURATION: 97 % | HEART RATE: 108 BPM | RESPIRATION RATE: 20 BRPM | DIASTOLIC BLOOD PRESSURE: 60 MMHG

## 2024-11-15 PROBLEM — R79.89 LOW VITAMIN B12 LEVEL: Status: ACTIVE | Noted: 2024-11-15

## 2024-11-15 PROBLEM — O99.340 ANXIETY DURING PREGNANCY: Status: ACTIVE | Noted: 2024-11-15

## 2024-11-15 PROBLEM — R00.0 TACHYCARDIA: Status: ACTIVE | Noted: 2024-11-15

## 2024-11-15 PROBLEM — O16.3 ELEVATED BLOOD PRESSURE AFFECTING PREGNANCY IN THIRD TRIMESTER, ANTEPARTUM: Status: ACTIVE | Noted: 2024-11-15

## 2024-11-15 PROBLEM — R07.9 CHEST PAIN: Status: ACTIVE | Noted: 2024-11-15

## 2024-11-15 PROBLEM — Z98.890 HISTORY OF LOOP ELECTROSURGICAL EXCISION PROCEDURE (LEEP) OF CERVIX AFFECTING PREGNANCY IN THIRD TRIMESTER: Status: ACTIVE | Noted: 2024-11-15

## 2024-11-15 PROBLEM — R79.89 HYPOTHYROXINEMIA: Status: ACTIVE | Noted: 2024-11-15

## 2024-11-15 PROBLEM — R79.0 LOW FERRITIN: Status: ACTIVE | Noted: 2024-11-15

## 2024-11-15 PROBLEM — O26.643 CHOLESTASIS DURING PREGNANCY IN THIRD TRIMESTER: Status: ACTIVE | Noted: 2024-11-15

## 2024-11-15 PROBLEM — E53.8 LOW FOLATE: Status: ACTIVE | Noted: 2024-11-15

## 2024-11-15 PROBLEM — F41.9 ANXIETY DURING PREGNANCY: Status: ACTIVE | Noted: 2024-11-15

## 2024-11-15 PROBLEM — Z98.891 HISTORY OF C-SECTION: Status: ACTIVE | Noted: 2024-11-15

## 2024-11-15 PROBLEM — E55.9 VITAMIN D DEFICIENCY: Status: ACTIVE | Noted: 2024-11-15

## 2024-11-15 PROBLEM — J81.0 ACUTE PULMONARY EDEMA: Status: ACTIVE | Noted: 2024-11-15

## 2024-11-15 PROBLEM — Z57.9 OCCUPATIONAL EXPOSURE IN WORKPLACE: Status: ACTIVE | Noted: 2024-11-15

## 2024-11-15 PROBLEM — R94.8 ABNORMAL PLACENTA FUNCTION TEST: Status: ACTIVE | Noted: 2024-11-15

## 2024-11-15 PROBLEM — O34.43 HISTORY OF LOOP ELECTROSURGICAL EXCISION PROCEDURE (LEEP) OF CERVIX AFFECTING PREGNANCY IN THIRD TRIMESTER: Status: ACTIVE | Noted: 2024-11-15

## 2024-11-15 PROCEDURE — 96372 THER/PROPH/DIAG INJ SC/IM: CPT

## 2024-11-15 PROCEDURE — 6360000002 HC RX W HCPCS: Performed by: INTERNAL MEDICINE

## 2024-11-15 PROCEDURE — 76821 MIDDLE CEREBRAL ARTERY ECHO: CPT | Performed by: OBSTETRICS & GYNECOLOGY

## 2024-11-15 PROCEDURE — 94640 AIRWAY INHALATION TREATMENT: CPT

## 2024-11-15 PROCEDURE — 6370000000 HC RX 637 (ALT 250 FOR IP): Performed by: OBSTETRICS & GYNECOLOGY

## 2024-11-15 PROCEDURE — 76815 OB US LIMITED FETUS(S): CPT | Performed by: OBSTETRICS & GYNECOLOGY

## 2024-11-15 PROCEDURE — G0378 HOSPITAL OBSERVATION PER HR: HCPCS

## 2024-11-15 PROCEDURE — 6370000000 HC RX 637 (ALT 250 FOR IP): Performed by: INTERNAL MEDICINE

## 2024-11-15 PROCEDURE — 76819 FETAL BIOPHYS PROFIL W/O NST: CPT | Performed by: OBSTETRICS & GYNECOLOGY

## 2024-11-15 PROCEDURE — 93242 EXT ECG>48HR<7D RECORDING: CPT

## 2024-11-15 PROCEDURE — 76820 UMBILICAL ARTERY ECHO: CPT | Performed by: OBSTETRICS & GYNECOLOGY

## 2024-11-15 RX ORDER — ALBUTEROL SULFATE 0.83 MG/ML
2.5 SOLUTION RESPIRATORY (INHALATION) 3 TIMES DAILY
Qty: 120 EACH | Refills: 3 | Status: SHIPPED | OUTPATIENT
Start: 2024-11-15

## 2024-11-15 RX ADMIN — IPRATROPIUM BROMIDE AND ALBUTEROL SULFATE 1 DOSE: 2.5; .5 SOLUTION RESPIRATORY (INHALATION) at 07:55

## 2024-11-15 RX ADMIN — CYANOCOBALAMIN TAB 1000 MCG 1000 MCG: 1000 TAB at 10:04

## 2024-11-15 RX ADMIN — DOCUSATE SODIUM 100 MG: 100 CAPSULE, LIQUID FILLED ORAL at 10:05

## 2024-11-15 RX ADMIN — IPRATROPIUM BROMIDE AND ALBUTEROL SULFATE 1 DOSE: 2.5; .5 SOLUTION RESPIRATORY (INHALATION) at 04:31

## 2024-11-15 RX ADMIN — ACETAMINOPHEN 650 MG: 325 TABLET ORAL at 10:11

## 2024-11-15 RX ADMIN — IPRATROPIUM BROMIDE AND ALBUTEROL SULFATE 1 DOSE: 2.5; .5 SOLUTION RESPIRATORY (INHALATION) at 15:15

## 2024-11-15 RX ADMIN — FOLIC ACID 1 MG: 1 TABLET ORAL at 10:04

## 2024-11-15 RX ADMIN — IPRATROPIUM BROMIDE AND ALBUTEROL SULFATE 1 DOSE: 2.5; .5 SOLUTION RESPIRATORY (INHALATION) at 00:35

## 2024-11-15 RX ADMIN — ENOXAPARIN SODIUM 30 MG: 100 INJECTION SUBCUTANEOUS at 10:05

## 2024-11-15 RX ADMIN — Medication 2000 UNITS: at 10:04

## 2024-11-15 RX ADMIN — FERROUS SULFATE TAB 325 MG (65 MG ELEMENTAL FE) 325 MG: 325 (65 FE) TAB at 10:04

## 2024-11-15 ASSESSMENT — PAIN DESCRIPTION - LOCATION: LOCATION: HEAD;ABDOMEN

## 2024-11-15 ASSESSMENT — PAIN - FUNCTIONAL ASSESSMENT: PAIN_FUNCTIONAL_ASSESSMENT: ACTIVITIES ARE NOT PREVENTED

## 2024-11-15 ASSESSMENT — PAIN DESCRIPTION - DESCRIPTORS: DESCRIPTORS: DULL;DISCOMFORT

## 2024-11-15 ASSESSMENT — PAIN SCALES - GENERAL: PAINLEVEL_OUTOF10: 4

## 2024-11-15 ASSESSMENT — PAIN DESCRIPTION - ORIENTATION: ORIENTATION: UPPER

## 2024-11-15 NOTE — PROGRESS NOTES
Spiritual Health History and Assessment/Progress Note  [facility]    Attempted Encounter,  ,  ,  Patient declined  services at this time.    Name: Sveta Stack MRN: [id number]    Age: 29 y.o.     Sex: female   Language: English   Yarsanism: Protestant   31 weeks gestation of pregnancy     Date: 11/15/2024                           Spiritual Assessment began in [address] HIGH RISK AP/PP MATERNITY        Referral/Consult From: Rounding   Encounter Overview/Reason: Attempted Encounter  Service Provided For: Patient    Manasa, Belief, Meaning:   Patient unable to assess at this time  Family/Friends No family/friends present      Importance and Influence:  Patient unable to assess at this time  Family/Friends No family/friends present    Community:  Patient Other: N/A  Family/Friends No family/friends present    Assessment and Plan of Care:     Patient Interventions include: Other: N/A  Family/Friends Interventions include: No family/friends present    Patient Plan of Care: Spiritual Care available upon further referral  Family/Friends Plan of Care: No family/friends present    Electronically signed by SALMA Ceron on 11/15/2024 at 2:26 PM

## 2024-11-15 NOTE — PROGRESS NOTES
duplex venous ultrasound.         US OB 1 OR MORE FETUS LIMITED    (Results Pending)   US FETAL BIOPHYSICAL PROFILE WO NON STRESS TESTING    (Results Pending)   US DOPPLER FETAL UMBILICAL ARTERY    (Results Pending)   US DOPPLER FETAL MIDDLE CEREBRAL ARTERY    (Results Pending)   US OB 1 OR MORE FETUS LIMITED    (Results Pending)   US FETAL BIOPHYSICAL PROFILE WO NON STRESS TESTING    (Results Pending)   US DOPPLER FETAL UMBILICAL ARTERY    (Results Pending)   US DOPPLER FETAL MIDDLE CEREBRAL ARTERY    (Results Pending)   US OB 1 OR MORE FETUS LIMITED    (Results Pending)   US FETAL BIOPHYSICAL PROFILE WO NON STRESS TESTING    (Results Pending)   US DOPPLER FETAL UMBILICAL ARTERY    (Results Pending)   US DOPPLER FETAL MIDDLE CEREBRAL ARTERY    (Results Pending)       Assessment:  Abnormal chest radiograph: This likely represents overlying soft tissue and not CHF.  Tachycardia: Typically seen with pregnancy, underlying pathology is not excluded. Pulmonary emboli ruled out.   31 weeks gestation with shortness of breath.     Plan:  Add nasal spray   Home nebulizer    Time at the bedside, reviewing labs and radiographs, reviewing updated notes and consultations, discussing with staff and family was more than 50 minutes.    Please note that voice recognition technology was used in the preparation of this note and make therefore it may contain inadvertent transcription errors.  If the patient is a COVID 19 isolation patient, the above physical exam reflects that of the examining physician for the day.      Kiko Hilliard MD,  M.D., F.C.C.P.    [facility]    [facility], [address] [place] [address], [address], [place] [address]  Office visits:  [address] [place] [address]

## 2024-11-15 NOTE — PROGRESS NOTES
Prescription for albuterol called into [facility] pharmacy. Preparing medication however they do not have a nebulizer machine available for patient and stated that she would have to order one through a medical supply store or specialty pharmacy.   Discharge instruction given to patient with DME order for nebulizer.   Patient states she has her mothers nebulizer to use if she has trouble getting set up with one over the weekend.  Labor precautions reviewed with patient. Verbalized understanding. Patient left ambulatory.

## 2024-11-15 NOTE — PROGRESS NOTES
The patient was admitted for management of a respiratory tract infection.  She had been discharged by her pulmonary medicine specialist.  She also had a consultation with cardiology during her current admission.    A fetal ultrasound evaluation was performed on 11/15/2024.  A detailed report is included in the EMR under the imaging tab.  A living garibay intrauterine fetus was identified in the cephalic presentation, with normal fetal heart motion and normal fetal motion noted.  The placenta was on the anterior wall of the uterus.  The amniotic fluid index was 13.7 cm.  The biophysical profile was scored 10 of 10.  The cord Doppler SD ratio was 3.35.  This places the fetus at the 81st percentile for gestational age.  The MCA PSV was 1.29 MOM.  This value is not associated with fetal anemia or polycythemia.  The CPR PI was 2.06.  This value is not associated with compensatory centralization of fetal blood flow.    The patient is to continue to follow with Dr. Ford and Dr. Weston in their office for ongoing prenatal care.  She is to return to our office for follow-up assessment as previously scheduled, unless she has a clinical indication return prior to that time.    Further evaluation and management will be dependent on the results of the patient's testing and her clinical presentation.     Noah Dave MD, MS, FACOG, FACS, RDCS, RDMS, RVT

## 2024-11-15 NOTE — PROGRESS NOTES
Department of Obstetrics and Gynecology  Labor and Delivery   Attending Progress Note        SUBJECTIVE:  upper respiratory infection   labor  Chest pain     OBJECTIVE:       Vitals:    /70   Pulse 98   Temp 97.8 °F (36.6 °C) (Oral)   Resp 16   LMP 2024      Uterine Size:  28cm     Fetal heart rate:reassuring              Contraction frequency: 0 minutes     Fetal Presentation:  Cephalic,   Abdomen soft, nontender     Membranes:  Intact     Cervix:            Dilation:  Closed     DATA:  LAB REVIEW:  CBC:          Lab Results   Component Value Date/Time     WBC 13.0 10/17/2024 02:15 PM     RBC 4.05 10/17/2024 02:15 PM     HGB 12.4 10/17/2024 02:15 PM     HCT 37.3 10/17/2024 02:15 PM     MCV 92.1 10/17/2024 02:15 PM     RDW 13.9 10/17/2024 02:15 PM      10/17/2024 02:15 PM         ASSESSMENT & PLAN:     IUP at 31+  MFM follow-up  Pulmonology, cardiology on case

## 2024-11-15 NOTE — DISCHARGE INSTRUCTIONS
Home Undelivered Discharge Instructions    After Discharge Orders:    Future Appointments   Date Time Provider Department Center   11/18/2024 11:00 AM Vladimir Ford MD [facility]   11/22/2024  9:10 AM Vladimir Ford MD [facility]                   Diet:  normal diet as tolerated    Rest: normal activity as tolerated    Other instructions: Do kick counts once a day on your baby. Choose the time of day your baby is most active. Get in a comfortable lying or sitting position and time how long it takes to feel 10 kicks, twists, turns, swishes, or rolls. Call your physician or midwife if there have not been 10 kicks in 2 hours    Call physician or midwife, return to Labor and Delivery, call 911, or go to the nearest Emergency Room if: increased leakage or fluid, contractions more than  6 per  1 hour, decreased fetal movement, persistent low back pain or cramping, bleeding from vaginal area, difficulty urinating, pain with urination, difficulty breathing, new calf pain, persistent headache, or vision change

## 2024-11-17 LAB — BILE AC SERPL-SCNC: 1 UMOL/L (ref 0–10)

## 2024-11-18 ENCOUNTER — CARE COORDINATION (OUTPATIENT)
Dept: OTHER | Facility: CLINIC | Age: 29
End: 2024-11-18

## 2024-11-18 LAB
PARVOVIRUS B19 IGG ANTIBODY: 1.41 IV
PARVOVIRUS B19 IGM ANTIBODY: 0.16 IV

## 2024-11-20 ENCOUNTER — ROUTINE PRENATAL (OUTPATIENT)
Dept: OBGYN CLINIC | Age: 29
End: 2024-11-20
Payer: COMMERCIAL

## 2024-11-20 ENCOUNTER — ANCILLARY PROCEDURE (OUTPATIENT)
Dept: OBGYN CLINIC | Age: 29
End: 2024-11-20
Payer: COMMERCIAL

## 2024-11-20 VITALS
SYSTOLIC BLOOD PRESSURE: 136 MMHG | BODY MASS INDEX: 47.03 KG/M2 | DIASTOLIC BLOOD PRESSURE: 83 MMHG | HEART RATE: 94 BPM | WEIGHT: 274 LBS

## 2024-11-20 DIAGNOSIS — O34.43 HISTORY OF LOOP ELECTROSURGICAL EXCISION PROCEDURE (LEEP) OF CERVIX AFFECTING PREGNANCY IN THIRD TRIMESTER: ICD-10-CM

## 2024-11-20 DIAGNOSIS — Z98.891 HISTORY OF C-SECTION: ICD-10-CM

## 2024-11-20 DIAGNOSIS — L29.9 PRURITUS OF PREGNANCY, ANTEPARTUM, THIRD TRIMESTER: ICD-10-CM

## 2024-11-20 DIAGNOSIS — O12.03 EDEMA DURING PREGNANCY IN THIRD TRIMESTER: ICD-10-CM

## 2024-11-20 DIAGNOSIS — O99.713 PRURITUS OF PREGNANCY, ANTEPARTUM, THIRD TRIMESTER: ICD-10-CM

## 2024-11-20 DIAGNOSIS — R94.8 ABNORMAL PLACENTA FUNCTION TEST: ICD-10-CM

## 2024-11-20 DIAGNOSIS — O26.643 CHOLESTASIS DURING PREGNANCY IN THIRD TRIMESTER: ICD-10-CM

## 2024-11-20 DIAGNOSIS — O16.3 ELEVATED BLOOD PRESSURE AFFECTING PREGNANCY IN THIRD TRIMESTER, ANTEPARTUM: Primary | ICD-10-CM

## 2024-11-20 DIAGNOSIS — E66.01 MORBID OBESITY: ICD-10-CM

## 2024-11-20 DIAGNOSIS — Z98.890 HISTORY OF LOOP ELECTROSURGICAL EXCISION PROCEDURE (LEEP) OF CERVIX AFFECTING PREGNANCY IN THIRD TRIMESTER: ICD-10-CM

## 2024-11-20 DIAGNOSIS — K76.89 INTRAHEPATIC CHOLESTASIS: ICD-10-CM

## 2024-11-20 LAB
GLUCOSE URINE, POC: NORMAL MG/DL
PROTEIN UA: POSITIVE

## 2024-11-20 PROCEDURE — 76820 UMBILICAL ARTERY ECHO: CPT | Performed by: OBSTETRICS & GYNECOLOGY

## 2024-11-20 PROCEDURE — 99213 OFFICE O/P EST LOW 20 MIN: CPT | Performed by: OBSTETRICS & GYNECOLOGY

## 2024-11-20 PROCEDURE — 1036F TOBACCO NON-USER: CPT | Performed by: OBSTETRICS & GYNECOLOGY

## 2024-11-20 PROCEDURE — 76821 MIDDLE CEREBRAL ARTERY ECHO: CPT | Performed by: OBSTETRICS & GYNECOLOGY

## 2024-11-20 PROCEDURE — 81002 URINALYSIS NONAUTO W/O SCOPE: CPT | Performed by: OBSTETRICS & GYNECOLOGY

## 2024-11-20 PROCEDURE — 99214 OFFICE O/P EST MOD 30 MIN: CPT | Performed by: OBSTETRICS & GYNECOLOGY

## 2024-11-20 PROCEDURE — G8484 FLU IMMUNIZE NO ADMIN: HCPCS | Performed by: OBSTETRICS & GYNECOLOGY

## 2024-11-20 PROCEDURE — 76815 OB US LIMITED FETUS(S): CPT | Performed by: OBSTETRICS & GYNECOLOGY

## 2024-11-20 PROCEDURE — G8427 DOCREV CUR MEDS BY ELIG CLIN: HCPCS | Performed by: OBSTETRICS & GYNECOLOGY

## 2024-11-20 PROCEDURE — G8417 CALC BMI ABV UP PARAM F/U: HCPCS | Performed by: OBSTETRICS & GYNECOLOGY

## 2024-11-20 PROCEDURE — 76818 FETAL BIOPHYS PROFILE W/NST: CPT | Performed by: OBSTETRICS & GYNECOLOGY

## 2024-11-20 NOTE — PATIENT INSTRUCTIONS
Weeks 32 to 34 of Your Pregnancy: Care Instructions  Decide whether you want to bank or donate your baby's umbilical cord blood. If you want to save this blood, you have to arrange for it ahead of time.   Decide about circumcision. Personal, Taoist, or cultural beliefs may play a role in your decision. You get to decide what you want for your baby.      Learn how to ease hemorrhoids.   Get more liquids, fruits, vegetables, and fiber in your diet.  Avoid sitting for too long.  Clean yourself with moist toilet paper. Or try witch hazel pads.  Try ice packs or warm sitz baths for discomfort.  Use hydrocortisone cream for pain or itching.  Ask your doctor about stool softeners.     Consider the benefits of breastfeeding.   It reduces your baby's risk of sudden infant death syndrome (SIDS).   babies are less likely to get certain infections. And they're less likely to be obese or get diabetes later in life.  It can lower your risk of breast and ovarian cancers and osteoporosis.  It saves you money.  Follow-up care is a key part of your treatment and safety. Be sure to make and go to all appointments, and call your doctor if you are having problems. It's also a good idea to know your test results and keep a list of the medicines you take.  Where can you learn more?  Go to https://www.[employer].net/patientEd and enter X711 to learn more about \"Weeks 32 to 34 of Your Pregnancy: Care Instructions.\"  Current as of: February 23, 2022               Content Version: 13.5  © 2006-2022 [employer].   Care instructions adapted under license by [employer]. If you have questions about a medical condition or this instruction, always ask your healthcare professional. [employer] disclaims any warranty or liability for your use of this information.        Learning About When to Call Your Doctor During Pregnancy (After 20 Weeks)  Overview  It's common to have concerns about what might be a

## 2024-11-20 NOTE — PROGRESS NOTES
Sveta Stack presents to office today for bpp/nst.  Patient denies bleeding, LOF, or contractions.  States she is having cramping and pressure.  Also, carpal tunnel and swelling of left hand, wondering what she can do for the pain.  Positive fetal movement.    
226.0 mg/dL Final    Urine Total Protein Creatinine Rat* 10/17/2024 0.10  0.0 - 0.2 Final      IMPRESSION:    1.  IUP at 32 weeks 2 days Estimated Date of Delivery: 2025    2.  Maternal morbid obesity    3.  Severe pruritus beginning on the palms of the hands and the soles of the feet consistent with intrahepatic cholestasis    4.  Previous  section delivery followed by     5.  Lower extremity and upper extremity edema    6.  Estimated fetal weight 1711 g consistent with the 64th growth percentile on 2024    7.  Limited visualization of the fetal anatomy 2024    8.  Reassuring biophysical profile, MCA and cord Doppler testing on 2024    9.  Panorama screen showed no increased risk for fetal aneuploidy  10.  Pruritus has significantly improved with ursodiol     PLAN:  I would recommend that the patient count fetal movements and call if she notices any subjective decrease in fetal movements, particularly if there are less than 10 major movements in 2 hours. Non-stress testing should be performed every 3 to 4 days through the balance of the pregnancy. Serial ultrasounds to assess fetal anatomy and growth should be performed. The patient is at increase risk for  morbidity and mortality secondary to her history.  Twice weekly BPP and cord Doppler testing should be performed, unless there is a clinical indication to perform the testing more frequently.    I recommended that the patient continue to take ursodiol 300 mg administered 4 times daily.  Intrahepatic cholestasis of pregnancy is consistent with the patient's history of symptomatic relief of her pruritus within about 5 days of beginning ursodiol therapy. Liver enzymes and serum bile acids may be within normal limits even in individuals with intrahepatic cholestasis of pregnancy.     I recommended that the patient follow-up in our office in 1 week unless she has a clinical indication return prior to that time.       She is

## 2024-11-25 ENCOUNTER — HOSPITAL ENCOUNTER (OUTPATIENT)
Age: 29
Setting detail: OBSERVATION
Discharge: HOME | End: 2024-11-25
Attending: OBSTETRICS & GYNECOLOGY | Admitting: OBSTETRICS & GYNECOLOGY
Payer: COMMERCIAL

## 2024-11-25 VITALS
HEART RATE: 107 BPM | BODY MASS INDEX: 46.95 KG/M2 | WEIGHT: 275 LBS | HEIGHT: 64 IN | TEMPERATURE: 98.1 F | OXYGEN SATURATION: 98 % | SYSTOLIC BLOOD PRESSURE: 124 MMHG | DIASTOLIC BLOOD PRESSURE: 59 MMHG | RESPIRATION RATE: 16 BRPM

## 2024-11-25 PROBLEM — Z3A.33 33 WEEKS GESTATION OF PREGNANCY: Status: ACTIVE | Noted: 2024-11-25

## 2024-11-25 LAB
ALBUMIN SERPL-MCNC: 3.4 G/DL (ref 3.5–5.2)
ALP SERPL-CCNC: 142 U/L (ref 35–104)
ALT SERPL-CCNC: <5 U/L (ref 0–32)
ANION GAP SERPL CALCULATED.3IONS-SCNC: 12 MMOL/L (ref 7–16)
AST SERPL-CCNC: 23 U/L (ref 0–31)
BILIRUB SERPL-MCNC: 0.4 MG/DL (ref 0–1.2)
BUN SERPL-MCNC: 7 MG/DL (ref 6–20)
CALCIUM SERPL-MCNC: 9.3 MG/DL (ref 8.6–10.2)
CHLORIDE SERPL-SCNC: 102 MMOL/L (ref 98–107)
CO2 SERPL-SCNC: 20 MMOL/L (ref 22–29)
CREAT SERPL-MCNC: 0.6 MG/DL (ref 0.5–1)
ERYTHROCYTE [DISTWIDTH] IN BLOOD BY AUTOMATED COUNT: 14 % (ref 11.5–15)
GFR, ESTIMATED: >90 ML/MIN/1.73M2
GLUCOSE SERPL-MCNC: 83 MG/DL (ref 74–99)
HCT VFR BLD AUTO: 40.9 % (ref 34–48)
HGB BLD-MCNC: 13.3 G/DL (ref 11.5–15.5)
MCH RBC QN AUTO: 29.3 PG (ref 26–35)
MCHC RBC AUTO-ENTMCNC: 32.5 G/DL (ref 32–34.5)
MCV RBC AUTO: 90.1 FL (ref 80–99.9)
PLATELET # BLD AUTO: 299 K/UL (ref 130–450)
PMV BLD AUTO: 10.2 FL (ref 7–12)
POTASSIUM SERPL-SCNC: 4.5 MMOL/L (ref 3.5–5)
PROT SERPL-MCNC: 7.3 G/DL (ref 6.4–8.3)
RBC # BLD AUTO: 4.54 M/UL (ref 3.5–5.5)
SODIUM SERPL-SCNC: 134 MMOL/L (ref 132–146)
WBC OTHER # BLD: 14.7 K/UL (ref 4.5–11.5)

## 2024-11-25 PROCEDURE — 2580000003 HC RX 258: Performed by: OBSTETRICS & GYNECOLOGY

## 2024-11-25 PROCEDURE — 99221 1ST HOSP IP/OBS SF/LOW 40: CPT | Performed by: PHYSICIAN ASSISTANT

## 2024-11-25 PROCEDURE — 6360000002 HC RX W HCPCS: Performed by: OBSTETRICS & GYNECOLOGY

## 2024-11-25 PROCEDURE — 36415 COLL VENOUS BLD VENIPUNCTURE: CPT

## 2024-11-25 PROCEDURE — 85027 COMPLETE CBC AUTOMATED: CPT

## 2024-11-25 PROCEDURE — 96374 THER/PROPH/DIAG INJ IV PUSH: CPT

## 2024-11-25 PROCEDURE — G0378 HOSPITAL OBSERVATION PER HR: HCPCS

## 2024-11-25 PROCEDURE — 96361 HYDRATE IV INFUSION ADD-ON: CPT

## 2024-11-25 PROCEDURE — 80053 COMPREHEN METABOLIC PANEL: CPT

## 2024-11-25 RX ORDER — SODIUM CHLORIDE, SODIUM LACTATE, POTASSIUM CHLORIDE, CALCIUM CHLORIDE 600; 310; 30; 20 MG/100ML; MG/100ML; MG/100ML; MG/100ML
INJECTION, SOLUTION INTRAVENOUS CONTINUOUS
Status: DISCONTINUED | OUTPATIENT
Start: 2024-11-25 | End: 2024-11-25 | Stop reason: HOSPADM

## 2024-11-25 RX ORDER — ONDANSETRON 2 MG/ML
4 INJECTION INTRAMUSCULAR; INTRAVENOUS ONCE
Status: COMPLETED | OUTPATIENT
Start: 2024-11-25 | End: 2024-11-25

## 2024-11-25 RX ORDER — SODIUM CHLORIDE, SODIUM LACTATE, POTASSIUM CHLORIDE, AND CALCIUM CHLORIDE .6; .31; .03; .02 G/100ML; G/100ML; G/100ML; G/100ML
500 INJECTION, SOLUTION INTRAVENOUS ONCE
Status: COMPLETED | OUTPATIENT
Start: 2024-11-25 | End: 2024-11-25

## 2024-11-25 RX ADMIN — SODIUM CHLORIDE, POTASSIUM CHLORIDE, SODIUM LACTATE AND CALCIUM CHLORIDE 500 ML: 600; 310; 30; 20 INJECTION, SOLUTION INTRAVENOUS at 16:47

## 2024-11-25 RX ADMIN — ONDANSETRON 4 MG: 2 INJECTION, SOLUTION INTRAMUSCULAR; INTRAVENOUS at 16:57

## 2024-11-25 NOTE — H&P
Department of Obstetrics and Gynecology  Physician Assistant Obstetrics History and Physical      HISTORY OF PRESENT ILLNESS:      The patient is a 29 y.o.  3 parity 2 at 33 weeks' 0 days' gestation presents to L&D Antepartum from ob office due to c/o feeling dizzy, lightheaded, and nauseated today. Sent for monitoring and further assessment. Reports she had 2 emesis last night, none today. Per Ob office note BPP 8/8 , elevated SD ratio. Denies abdominal pain, cramping, contractions.    Current obstetric history is significant for:  Intrahepatic cholestasis  Maternal morbid obesity: BMI 47.2 kg/m2  Previous  section  Successful      Estimated Due Date:  2025  Contractions: No  Leaking of fluid: No  Bleeding:  No  Perceived fetal movement: Good        PAST OB HISTORY:  OB History    Para Term  AB Living   3 2 2     2   SAB IAB Ectopic Molar Multiple Live Births           0 2      # Outcome Date GA Lbr Casey/2nd Weight Sex Type Anes PTL Lv   3 Current            2 Term 19 39w2d  3.118 kg (6 lb 14 oz) M Vag-Spont EPI N ANNA   1 Term 14 40w0d   M CS-LTranv EPI  ANNA      Complications: Failure to Progress in Second Stage              Pre-eclampsia:  No      :  Yes       D & C:  No      Cerclage:  No      LEEP:  No      Myomectomy:  No       Labor: No    Past Medical History:    Diagnosis Date    Abnormal Papanicolaou smear of cervix with positive human papilloma virus (HPV) test     Anemia 2023    Anxiety and depression 2023    Closed fracture of distal end of right fibula 2020    Fibromyalgia     Inappropriate sinus tachycardia (HCC) 2023    Morbid obesity due to excess calories     Seizure-like activity (HCC) 2023    Tachycardia      (vaginal birth after )         Past Surgical History:    Procedure Laterality Date    ANKLE SURGERY Right      SECTION      LEEP  2017    UPPER GASTROINTESTINAL ENDOSCOPY

## 2024-11-25 NOTE — PROGRESS NOTES
Spiritual Health History and Assessment/Progress Note  [facility]    Attempted Encounter,  ,  ,  Patient declined  services at this time.    Name: Sveta Stack MRN: [id number]    Age: 29 y.o.     Sex: female   Language: English   Faith: Jain   33 weeks gestation of pregnancy     Date: 11/25/2024                           Spiritual Assessment began in [address] HIGH RISK AP/PP MATERNITY        Referral/Consult From: Rounding   Encounter Overview/Reason: Attempted Encounter  Service Provided For: Patient    Manasa, Belief, Meaning:   Patient unable to assess at this time  Family/Friends No family/friends present      Importance and Influence:  Patient unable to assess at this time  Family/Friends No family/friends present    Community:  Patient Other: N/A  Family/Friends No family/friends present    Assessment and Plan of Care:     Patient Interventions include: Other: N/A  Family/Friends Interventions include: No family/friends present    Patient Plan of Care: Spiritual Care available upon further referral  Family/Friends Plan of Care: Spiritual Care available upon further referral    Electronically signed by SALMA Ceron on 11/25/2024 at 4:00 PM

## 2024-11-25 NOTE — PROGRESS NOTES
Pt presents to L&D from the office for complaints of dizziness lightheadedness and nausea that started this am but worsened at her OB appointment, pt states they told her to come in for evaluation. Pt states good fetal movement, denies LOF or vaginal bleeding. Pt placed on the monitor and fetal heart tones present and abdomen soft and gravid. Pt given discharge instructions and instructed on use.

## 2024-11-26 ENCOUNTER — CARE COORDINATION (OUTPATIENT)
Dept: OTHER | Facility: CLINIC | Age: 29
End: 2024-11-26

## 2024-11-26 NOTE — PROGRESS NOTES
Pt states she is feeling better, Dr. Robertson notified and new orders obtained to discharge pt home.

## 2024-11-26 NOTE — CARE COORDINATION
Ambulatory Care Coordination Note    ACM attempted to reach patient for Associate Care Management related to MCM/ED follow-up call. HIPAA compliant message left requesting a return phone call at patient convenience.     Plan for follow-up call in 5-7 days      Future Appointments   Date Time Provider Department Center   11/29/2024  2:15 PM Noah Dave MD B[facility]   12/5/2024 10:20 AM Vladimir Ford MD [facility]   12/6/2024  8:15 AM Noah Dave MD [facility]       PATEL Worthington, RN  Associate Care Manager   Cell: [phone]  [email or web address]

## 2024-11-29 ENCOUNTER — ANCILLARY PROCEDURE (OUTPATIENT)
Dept: OBGYN CLINIC | Age: 29
End: 2024-11-29
Payer: COMMERCIAL

## 2024-11-29 ENCOUNTER — ROUTINE PRENATAL (OUTPATIENT)
Dept: OBGYN CLINIC | Age: 29
End: 2024-11-29
Payer: COMMERCIAL

## 2024-11-29 VITALS
WEIGHT: 273.4 LBS | HEART RATE: 120 BPM | DIASTOLIC BLOOD PRESSURE: 81 MMHG | SYSTOLIC BLOOD PRESSURE: 134 MMHG | BODY MASS INDEX: 46.93 KG/M2

## 2024-11-29 DIAGNOSIS — Z3A.33 33 WEEKS GESTATION OF PREGNANCY: ICD-10-CM

## 2024-11-29 DIAGNOSIS — O26.643 CHOLESTASIS DURING PREGNANCY IN THIRD TRIMESTER: Primary | ICD-10-CM

## 2024-11-29 DIAGNOSIS — O99.213 MATERNAL MORBID OBESITY IN THIRD TRIMESTER, ANTEPARTUM: ICD-10-CM

## 2024-11-29 DIAGNOSIS — O16.3 ELEVATED BLOOD PRESSURE AFFECTING PREGNANCY IN THIRD TRIMESTER, ANTEPARTUM: ICD-10-CM

## 2024-11-29 DIAGNOSIS — R94.8 ABNORMAL PLACENTA FUNCTION TEST: ICD-10-CM

## 2024-11-29 DIAGNOSIS — Z98.891 HISTORY OF C-SECTION: ICD-10-CM

## 2024-11-29 DIAGNOSIS — Z98.890 HISTORY OF LOOP ELECTROSURGICAL EXCISION PROCEDURE (LEEP) OF CERVIX AFFECTING PREGNANCY IN THIRD TRIMESTER: ICD-10-CM

## 2024-11-29 DIAGNOSIS — K76.89 INTRAHEPATIC CHOLESTASIS: ICD-10-CM

## 2024-11-29 DIAGNOSIS — E66.01 MATERNAL MORBID OBESITY IN THIRD TRIMESTER, ANTEPARTUM: ICD-10-CM

## 2024-11-29 DIAGNOSIS — O34.43 HISTORY OF LOOP ELECTROSURGICAL EXCISION PROCEDURE (LEEP) OF CERVIX AFFECTING PREGNANCY IN THIRD TRIMESTER: ICD-10-CM

## 2024-11-29 LAB
GLUCOSE URINE, POC: NEGATIVE MG/DL
PROTEIN UA: NEGATIVE

## 2024-11-29 PROCEDURE — 81002 URINALYSIS NONAUTO W/O SCOPE: CPT | Performed by: OBSTETRICS & GYNECOLOGY

## 2024-11-29 PROCEDURE — 76818 FETAL BIOPHYS PROFILE W/NST: CPT | Performed by: OBSTETRICS & GYNECOLOGY

## 2024-11-29 PROCEDURE — 76821 MIDDLE CEREBRAL ARTERY ECHO: CPT | Performed by: OBSTETRICS & GYNECOLOGY

## 2024-11-29 PROCEDURE — 99213 OFFICE O/P EST LOW 20 MIN: CPT | Performed by: OBSTETRICS & GYNECOLOGY

## 2024-11-29 PROCEDURE — 76820 UMBILICAL ARTERY ECHO: CPT | Performed by: OBSTETRICS & GYNECOLOGY

## 2024-11-29 PROCEDURE — 76816 OB US FOLLOW-UP PER FETUS: CPT | Performed by: OBSTETRICS & GYNECOLOGY

## 2024-11-29 NOTE — PROGRESS NOTES
24    Vladimir Ford MD  [address]  [place], [place] [address]                RE:  GURDEEP STACK  : 1995   AGE: 29 y.o.     This report has been created using voice recognition software. It may contain errors which are inherent in voice recognition technology.     Dear Dr. Ford:     Gurdeep Stack had an appointment today for the following indications:     Patient Active Problem List   Diagnosis    Maternal morbid obesity    Lower extremity edema    Body mass index (BMI) 45.0-49.9, adult    Pruritus of pregnancy, antepartum, third trimester    Intrahepatic cholestasis      Gurdeep Stack is a 29 y.o. female, who is G3(2,0,0,2). She has an Estimated Date of Delivery: 2025 based on her established dates.  She is currently 33 weeks 4 days gestation based on that assessment.    The patient was admitted for respiratory tract infection on 2024.  She had consultations with pulmonary medicine and cardiology during that admission.  She was feeling well on her evaluation today.     The patient was previously admitted to the labor and delivery unit for evaluation and management secondary to lower extremity edema and pruritus beginning on the palms of her hands and soles of her feet.  She had no prior history of the symptomatology.  The patient states that her fetal movement has been good.  She has had no vaginal bleeding or leaking of amniotic fluid.    The patient had an empiric course of ursodiol for management of her pruritus with a significant reduction in her symptomatology.  She is currently taking 300 mg of ursodiol 4 times a day.  I also recommended that she take cetirizine 10 mg every 12-24 hours and Pepcid 20 mg every 12 hours for symptomatic relief of her pruritus.     The patient had a  section delivery with her first pregnancy in .  She subsequently had a vaginal birth after  section on 3/12/2019.     The non-stress test performed today

## 2024-12-02 DIAGNOSIS — R06.02 SHORTNESS OF BREATH: ICD-10-CM

## 2024-12-06 ENCOUNTER — ROUTINE PRENATAL (OUTPATIENT)
Dept: OBGYN CLINIC | Age: 29
End: 2024-12-06
Payer: COMMERCIAL

## 2024-12-06 ENCOUNTER — ANCILLARY PROCEDURE (OUTPATIENT)
Dept: OBGYN CLINIC | Age: 29
End: 2024-12-06
Payer: COMMERCIAL

## 2024-12-06 VITALS
WEIGHT: 277.8 LBS | HEART RATE: 134 BPM | BODY MASS INDEX: 47.68 KG/M2 | SYSTOLIC BLOOD PRESSURE: 135 MMHG | DIASTOLIC BLOOD PRESSURE: 80 MMHG

## 2024-12-06 DIAGNOSIS — O16.3 ELEVATED BLOOD PRESSURE AFFECTING PREGNANCY IN THIRD TRIMESTER, ANTEPARTUM: Primary | ICD-10-CM

## 2024-12-06 DIAGNOSIS — K76.89 INTRAHEPATIC CHOLESTASIS: ICD-10-CM

## 2024-12-06 DIAGNOSIS — O34.43 HISTORY OF LOOP ELECTROSURGICAL EXCISION PROCEDURE (LEEP) OF CERVIX AFFECTING PREGNANCY IN THIRD TRIMESTER: ICD-10-CM

## 2024-12-06 DIAGNOSIS — O99.213 MATERNAL MORBID OBESITY IN THIRD TRIMESTER, ANTEPARTUM: ICD-10-CM

## 2024-12-06 DIAGNOSIS — Z98.890 HISTORY OF LOOP ELECTROSURGICAL EXCISION PROCEDURE (LEEP) OF CERVIX AFFECTING PREGNANCY IN THIRD TRIMESTER: ICD-10-CM

## 2024-12-06 DIAGNOSIS — Z3A.34 34 WEEKS GESTATION OF PREGNANCY: ICD-10-CM

## 2024-12-06 DIAGNOSIS — E66.01 MATERNAL MORBID OBESITY IN THIRD TRIMESTER, ANTEPARTUM: ICD-10-CM

## 2024-12-06 DIAGNOSIS — R94.8 ABNORMAL PLACENTA FUNCTION TEST: ICD-10-CM

## 2024-12-06 LAB
GLUCOSE URINE, POC: NEGATIVE MG/DL
PROTEIN UA: NEGATIVE

## 2024-12-06 PROCEDURE — 76818 FETAL BIOPHYS PROFILE W/NST: CPT | Performed by: OBSTETRICS & GYNECOLOGY

## 2024-12-06 PROCEDURE — 76821 MIDDLE CEREBRAL ARTERY ECHO: CPT | Performed by: OBSTETRICS & GYNECOLOGY

## 2024-12-06 PROCEDURE — 81002 URINALYSIS NONAUTO W/O SCOPE: CPT | Performed by: OBSTETRICS & GYNECOLOGY

## 2024-12-06 PROCEDURE — 76820 UMBILICAL ARTERY ECHO: CPT | Performed by: OBSTETRICS & GYNECOLOGY

## 2024-12-06 PROCEDURE — 99213 OFFICE O/P EST LOW 20 MIN: CPT | Performed by: OBSTETRICS & GYNECOLOGY

## 2024-12-06 PROCEDURE — 76815 OB US LIMITED FETUS(S): CPT | Performed by: OBSTETRICS & GYNECOLOGY

## 2024-12-06 NOTE — PROGRESS NOTES
Pt denies bleeding cramping and lof  Pt stated she just has some pressure  Baby is active  
including but not limited to, headache, change in vision, shortness of breath, chest pain, abdominal pain or any new clinically significant symptomatology.     I recommended that the patient continue to take ursodiol 300 mg administered 4 times daily.  Intrahepatic cholestasis of pregnancy is consistent with the patient's history of symptomatic relief of her pruritus within about 5 days of beginning ursodiol therapy. Liver enzymes and serum bile acids may be within normal limits even in individuals with intrahepatic cholestasis of pregnancy.     I recommended that the patient follow-up in our office in 1 week unless she has a clinical indication return prior to that time.       She is to continue to follow with you in your office for ongoing prenatal care.     Further evaluation and management will be dependent on the results of the patient's testing and her clinical symptomatology.     Delivery should be at approximately 37 weeks gestational age unless there is a clinical indication for delivery prior to that time.  Serial fetal ultrasound evaluations to assess fetal growth and serial fetal testing should be instituted in the third trimester of pregnancy.     If you have any questions regarding her management, please contact me at your convenience and thank you for allowing me to participate in her care.     Sincerely,           Noah Dave MD, MS, FACOG, FACS, RDCS, RDMS, RVT  Director Maternal-Fetal Medicine  [facility]  [phone]  
oral

## 2024-12-10 ENCOUNTER — HOSPITAL ENCOUNTER (OUTPATIENT)
Age: 29
Setting detail: OBSERVATION
Discharge: HOME | End: 2024-12-11
Attending: OBSTETRICS & GYNECOLOGY | Admitting: OBSTETRICS & GYNECOLOGY
Payer: COMMERCIAL

## 2024-12-10 ENCOUNTER — ANCILLARY PROCEDURE (OUTPATIENT)
Dept: OBGYN CLINIC | Age: 29
End: 2024-12-10
Payer: COMMERCIAL

## 2024-12-10 ENCOUNTER — ROUTINE PRENATAL (OUTPATIENT)
Dept: OBGYN CLINIC | Age: 29
End: 2024-12-10
Payer: COMMERCIAL

## 2024-12-10 VITALS
DIASTOLIC BLOOD PRESSURE: 81 MMHG | SYSTOLIC BLOOD PRESSURE: 120 MMHG | BODY MASS INDEX: 46.33 KG/M2 | WEIGHT: 269.9 LBS | HEART RATE: 113 BPM | TEMPERATURE: 98.6 F | OXYGEN SATURATION: 98 % | RESPIRATION RATE: 12 BRPM

## 2024-12-10 DIAGNOSIS — E66.01 MATERNAL MORBID OBESITY IN THIRD TRIMESTER, ANTEPARTUM: ICD-10-CM

## 2024-12-10 DIAGNOSIS — O12.03 EDEMA DURING PREGNANCY IN THIRD TRIMESTER: ICD-10-CM

## 2024-12-10 DIAGNOSIS — O99.213 MATERNAL MORBID OBESITY IN THIRD TRIMESTER, ANTEPARTUM: ICD-10-CM

## 2024-12-10 DIAGNOSIS — Z3A.35 35 WEEKS GESTATION OF PREGNANCY: ICD-10-CM

## 2024-12-10 DIAGNOSIS — O34.219 PREVIOUS CESAREAN DELIVERY AFFECTING PREGNANCY, ANTEPARTUM: ICD-10-CM

## 2024-12-10 DIAGNOSIS — Z98.890 HISTORY OF LOOP ELECTROSURGICAL EXCISION PROCEDURE (LEEP) OF CERVIX AFFECTING PREGNANCY IN THIRD TRIMESTER: ICD-10-CM

## 2024-12-10 DIAGNOSIS — O16.3 ELEVATED BLOOD PRESSURE AFFECTING PREGNANCY IN THIRD TRIMESTER, ANTEPARTUM: Primary | ICD-10-CM

## 2024-12-10 DIAGNOSIS — O34.43 HISTORY OF LOOP ELECTROSURGICAL EXCISION PROCEDURE (LEEP) OF CERVIX AFFECTING PREGNANCY IN THIRD TRIMESTER: ICD-10-CM

## 2024-12-10 DIAGNOSIS — E86.0 DEHYDRATION DURING PREGNANCY: ICD-10-CM

## 2024-12-10 DIAGNOSIS — O99.713 PRURITUS OF PREGNANCY, ANTEPARTUM, THIRD TRIMESTER: ICD-10-CM

## 2024-12-10 DIAGNOSIS — L29.9 PRURITUS OF PREGNANCY, ANTEPARTUM, THIRD TRIMESTER: ICD-10-CM

## 2024-12-10 DIAGNOSIS — Z98.891 HISTORY OF C-SECTION: ICD-10-CM

## 2024-12-10 DIAGNOSIS — O26.643 CHOLESTASIS DURING PREGNANCY IN THIRD TRIMESTER: ICD-10-CM

## 2024-12-10 DIAGNOSIS — R94.8 ABNORMAL PLACENTA FUNCTION TEST: ICD-10-CM

## 2024-12-10 DIAGNOSIS — O99.280 DEHYDRATION DURING PREGNANCY: ICD-10-CM

## 2024-12-10 DIAGNOSIS — K76.89 INTRAHEPATIC CHOLESTASIS: ICD-10-CM

## 2024-12-10 PROBLEM — R11.2 NAUSEA AND VOMITING: Status: ACTIVE | Noted: 2024-12-10

## 2024-12-10 LAB
ALBUMIN SERPL-MCNC: 3.7 G/DL (ref 3.5–5.2)
ALP SERPL-CCNC: 188 U/L (ref 35–104)
ALT SERPL-CCNC: <5 U/L (ref 0–32)
ANION GAP SERPL CALCULATED.3IONS-SCNC: 17 MMOL/L (ref 7–16)
AST SERPL-CCNC: 16 U/L (ref 0–31)
BILIRUB DIRECT SERPL-MCNC: <0.2 MG/DL (ref 0–0.3)
BILIRUB INDIRECT SERPL-MCNC: ABNORMAL MG/DL (ref 0–1)
BILIRUB SERPL-MCNC: 0.6 MG/DL (ref 0–1.2)
BILIRUB UR QL STRIP: ABNORMAL
BUN SERPL-MCNC: 8 MG/DL (ref 6–20)
CALCIUM SERPL-MCNC: 9.7 MG/DL (ref 8.6–10.2)
CHLORIDE SERPL-SCNC: 102 MMOL/L (ref 98–107)
CLARITY UR: CLEAR
CO2 SERPL-SCNC: 18 MMOL/L (ref 22–29)
COLOR UR: YELLOW
CREAT SERPL-MCNC: 0.6 MG/DL (ref 0.5–1)
CREAT UR-MCNC: 344.3 MG/DL (ref 29–226)
ERYTHROCYTE [DISTWIDTH] IN BLOOD BY AUTOMATED COUNT: 14.2 % (ref 11.5–15)
GFR, ESTIMATED: >90 ML/MIN/1.73M2
GLUCOSE SERPL-MCNC: 76 MG/DL (ref 74–99)
GLUCOSE UR STRIP-MCNC: NEGATIVE MG/DL
GLUCOSE URINE, POC: NEGATIVE MG/DL
HCT VFR BLD AUTO: 40.6 % (ref 34–48)
HGB BLD-MCNC: 13.4 G/DL (ref 11.5–15.5)
HGB UR QL STRIP.AUTO: ABNORMAL
KETONES UR STRIP-MCNC: >80 MG/DL
LEUKOCYTE ESTERASE UR QL STRIP: NEGATIVE
MCH RBC QN AUTO: 29.6 PG (ref 26–35)
MCHC RBC AUTO-ENTMCNC: 33 G/DL (ref 32–34.5)
MCV RBC AUTO: 89.6 FL (ref 80–99.9)
NITRITE UR QL STRIP: NEGATIVE
PH UR STRIP: 6 [PH] (ref 5–9)
PLATELET # BLD AUTO: 285 K/UL (ref 130–450)
PMV BLD AUTO: 10.2 FL (ref 7–12)
POTASSIUM SERPL-SCNC: 3.9 MMOL/L (ref 3.5–5)
PROT SERPL-MCNC: 7.4 G/DL (ref 6.4–8.3)
PROT UR STRIP-MCNC: 30 MG/DL
PROTEIN UA: ABNORMAL
RBC # BLD AUTO: 4.53 M/UL (ref 3.5–5.5)
RBC #/AREA URNS HPF: ABNORMAL /HPF
SODIUM SERPL-SCNC: 137 MMOL/L (ref 132–146)
SP GR UR STRIP: >1.03 (ref 1–1.03)
TOTAL PROTEIN, URINE: 52 MG/DL (ref 0–12)
URATE SERPL-MCNC: 6.3 MG/DL (ref 2.4–5.7)
URINE TOTAL PROTEIN CREATININE RATIO: 0.15 (ref 0–0.2)
UROBILINOGEN UR STRIP-ACNC: 1 EU/DL (ref 0–1)
WBC #/AREA URNS HPF: ABNORMAL /HPF
WBC OTHER # BLD: 12.9 K/UL (ref 4.5–11.5)

## 2024-12-10 PROCEDURE — 76820 UMBILICAL ARTERY ECHO: CPT | Performed by: OBSTETRICS & GYNECOLOGY

## 2024-12-10 PROCEDURE — 81001 URINALYSIS AUTO W/SCOPE: CPT

## 2024-12-10 PROCEDURE — 82248 BILIRUBIN DIRECT: CPT

## 2024-12-10 PROCEDURE — 99213 OFFICE O/P EST LOW 20 MIN: CPT | Performed by: OBSTETRICS & GYNECOLOGY

## 2024-12-10 PROCEDURE — 6360000002 HC RX W HCPCS

## 2024-12-10 PROCEDURE — G0378 HOSPITAL OBSERVATION PER HR: HCPCS

## 2024-12-10 PROCEDURE — 81002 URINALYSIS NONAUTO W/O SCOPE: CPT | Performed by: OBSTETRICS & GYNECOLOGY

## 2024-12-10 PROCEDURE — 76821 MIDDLE CEREBRAL ARTERY ECHO: CPT | Performed by: OBSTETRICS & GYNECOLOGY

## 2024-12-10 PROCEDURE — 85027 COMPLETE CBC AUTOMATED: CPT

## 2024-12-10 PROCEDURE — 99999 PR OFFICE/OUTPT VISIT,PROCEDURE ONLY: CPT | Performed by: OBSTETRICS & GYNECOLOGY

## 2024-12-10 PROCEDURE — 96374 THER/PROPH/DIAG INJ IV PUSH: CPT

## 2024-12-10 PROCEDURE — 80053 COMPREHEN METABOLIC PANEL: CPT

## 2024-12-10 PROCEDURE — 76818 FETAL BIOPHYS PROFILE W/NST: CPT | Performed by: OBSTETRICS & GYNECOLOGY

## 2024-12-10 PROCEDURE — 36415 COLL VENOUS BLD VENIPUNCTURE: CPT

## 2024-12-10 PROCEDURE — 99221 1ST HOSP IP/OBS SF/LOW 40: CPT | Performed by: ADVANCED PRACTICE MIDWIFE

## 2024-12-10 PROCEDURE — 2580000003 HC RX 258: Performed by: OBSTETRICS & GYNECOLOGY

## 2024-12-10 PROCEDURE — 84156 ASSAY OF PROTEIN URINE: CPT

## 2024-12-10 PROCEDURE — 76815 OB US LIMITED FETUS(S): CPT | Performed by: OBSTETRICS & GYNECOLOGY

## 2024-12-10 PROCEDURE — 82570 ASSAY OF URINE CREATININE: CPT

## 2024-12-10 PROCEDURE — 84550 ASSAY OF BLOOD/URIC ACID: CPT

## 2024-12-10 RX ORDER — SODIUM CHLORIDE, SODIUM LACTATE, POTASSIUM CHLORIDE, AND CALCIUM CHLORIDE .6; .31; .03; .02 G/100ML; G/100ML; G/100ML; G/100ML
500 INJECTION, SOLUTION INTRAVENOUS ONCE
Status: COMPLETED | OUTPATIENT
Start: 2024-12-10 | End: 2024-12-11

## 2024-12-10 RX ORDER — ONDANSETRON 2 MG/ML
INJECTION INTRAMUSCULAR; INTRAVENOUS
Status: COMPLETED
Start: 2024-12-10 | End: 2024-12-10

## 2024-12-10 RX ORDER — SODIUM CHLORIDE, SODIUM LACTATE, POTASSIUM CHLORIDE, AND CALCIUM CHLORIDE .6; .31; .03; .02 G/100ML; G/100ML; G/100ML; G/100ML
125 INJECTION, SOLUTION INTRAVENOUS ONCE
Status: DISCONTINUED | OUTPATIENT
Start: 2024-12-10 | End: 2024-12-11 | Stop reason: HOSPADM

## 2024-12-10 RX ORDER — ONDANSETRON 2 MG/ML
4 INJECTION INTRAMUSCULAR; INTRAVENOUS EVERY 6 HOURS PRN
Status: DISCONTINUED | OUTPATIENT
Start: 2024-12-10 | End: 2024-12-11 | Stop reason: HOSPADM

## 2024-12-10 RX ADMIN — SODIUM CHLORIDE, POTASSIUM CHLORIDE, SODIUM LACTATE AND CALCIUM CHLORIDE 1000 ML: 600; 310; 30; 20 INJECTION, SOLUTION INTRAVENOUS at 14:03

## 2024-12-10 RX ADMIN — ONDANSETRON 4 MG: 2 INJECTION, SOLUTION INTRAMUSCULAR; INTRAVENOUS at 14:06

## 2024-12-10 NOTE — PROGRESS NOTES
, 35+1 weeks gestation presents because she is unable to keep anything down, reports mild cramping. Denies any lof, vb, ctx's. Reports good fetal movement.

## 2024-12-10 NOTE — H&P
Department of Obstetrics and Gynecology  Nurse Practitioner Obstetrics History and Physical        CHIEF COMPLAINT:  nausea and vomiting    HISTORY OF PRESENT ILLNESS:      The patient is a 29 y.o.  3 parity 2 at 35.1 weeks.  Patient presents with a chief complaint as above and is being admitted for dehydration and observation    DATES:    Last Menstrual Period:  24  Estimated Due Date:  25     PRENATAL CARE:    Provider:  Liliana    Blood Type/Rh:  A+  Group B Strep:  Unknown      PAST OB HISTORY        Depression:  No      Post-partum depression:  No      Diabetes:  No      Gestational Diabetes:  No      Thyroid Disease:  No      Chronic HTN:  No      Gestation HTN:  No, borderline Htn      Pre-eclampsia:  No      Seizure disorder:  No      Asthma:  No      Clotting disorder:  No      :  Yes       Tubal ligation:  No      D & C:  No      Cerclage:  No      LEEP:  No      Myomectomy:  No    OB History    Para Term  AB Living   3 2 2     2   SAB IAB Ectopic Molar Multiple Live Births           0 2      # Outcome Date GA Lbr Casey/2nd Weight Sex Type Anes PTL Lv   3 Current            2 Term 19 39w2d  3.118 kg (6 lb 14 oz) M Vag-Spont EPI N ANNA   1 Term 14 40w0d   M CS-LTranv EPI  ANNA      Complications: Failure to Progress in Second Stage      Obstetric Comments   Patient presents for initial prenatal visit.  Patient was trying to get pregnant.  Prenatal cultures, labs, and ultrasound were done today.  Patient started taking vitamins.  All pregnancy counseling concerns and questions were addressed and answered.  Patient's currently taking no other medications, was taking Cymbalta and Pristiq-stopped cold turkey.  Patient will return to the office in 4 weeks.  Patient will be offered first trimester testing at that time. Patient denies any family history of congenital defects or chromosomal abnormalities or genetic disorders that could be pertinent to this pregnancy.  -5 cm  CONSISTENCY:  firm  POSITION:  posterior  BISHOPS SCORE:      Contraction frequency:  5-7 minutes  Membranes:  Intact    Pelvic Ultrasound:      General Labs:      ASSESSMENT:     The patient is a 29 y.o.  3 parity 2 at 35.1 weeks    Active Problems:    Nausea and Vomiting    Dehydration   Threatened PTL  Morbid obesity  Hx Cholestasis  Hx C/S and         PLAN:  Dr Ford notified & odrers obtained  IVF bolus  Zofran IV  Ua  Labs  Observation

## 2024-12-11 ENCOUNTER — ANCILLARY PROCEDURE (OUTPATIENT)
Dept: OBGYN CLINIC | Age: 29
End: 2024-12-11
Payer: COMMERCIAL

## 2024-12-11 VITALS
TEMPERATURE: 98.3 F | SYSTOLIC BLOOD PRESSURE: 128 MMHG | RESPIRATION RATE: 16 BRPM | DIASTOLIC BLOOD PRESSURE: 56 MMHG | HEART RATE: 110 BPM

## 2024-12-11 PROBLEM — O34.219 HX SUCCESSFUL VBAC (VAGINAL BIRTH AFTER CESAREAN), CURRENTLY PREGNANT: Status: ACTIVE | Noted: 2024-12-11

## 2024-12-11 PROBLEM — K83.1 CHOLESTASIS: Status: ACTIVE | Noted: 2024-12-11

## 2024-12-11 PROBLEM — R63.8 INCREASED BMI: Status: ACTIVE | Noted: 2024-12-11

## 2024-12-11 PROCEDURE — G0378 HOSPITAL OBSERVATION PER HR: HCPCS

## 2024-12-11 PROCEDURE — 99232 SBSQ HOSP IP/OBS MODERATE 35: CPT | Performed by: OBSTETRICS & GYNECOLOGY

## 2024-12-11 PROCEDURE — 76819 FETAL BIOPHYS PROFIL W/O NST: CPT | Performed by: OBSTETRICS & GYNECOLOGY

## 2024-12-11 PROCEDURE — 6360000002 HC RX W HCPCS: Performed by: OBSTETRICS & GYNECOLOGY

## 2024-12-11 PROCEDURE — 96376 TX/PRO/DX INJ SAME DRUG ADON: CPT

## 2024-12-11 RX ADMIN — ONDANSETRON 4 MG: 2 INJECTION, SOLUTION INTRAMUSCULAR; INTRAVENOUS at 13:23

## 2024-12-11 NOTE — PROGRESS NOTES
Dr Oliva rounded on patient. Orders received for TID monitoring, increase IV LR to 200 and patient may have general diet order

## 2024-12-11 NOTE — PROGRESS NOTES
MATERNAL FETAL MEDICINE PROGRESS NOTE    NAME: Sveta Stack  MRN: [id number]            SERVICE DATE: 2024  SERVICE TIME: 1:17 PM  REQUESTING PROVIDER: Vladimir Ford MD          Sveta Stack is a 29 y.o. female,  at 35w2d with an ELISEO of 2025, by Last Menstrual Period dating method.  Patient is here with the following problems:  Principal Problem:    35 weeks gestation of pregnancy  Active Problems:    Nausea and vomiting    Dehydration during pregnancy    Previous  delivery affecting pregnancy, antepartum  Resolved Problems:    * No resolved hospital problems. *      SUBJECTIVE:  HISTORY OF THE PRESENT ILLNESS:  HISTORY REVIEW  Past Medical History:   Diagnosis Date    Abnormal Pap smear of cervix     Abnormal Papanicolaou smear of cervix with positive human papilloma virus (HPV) test     Anemia 2023    Anxiety and depression 2023    Closed fracture of distal end of right fibula 2020    Fibromyalgia     Inappropriate sinus tachycardia (HCC) 2023    Morbid obesity due to excess calories     Seizure-like activity (HCC) 2023    Tachycardia      (vaginal birth after )      Past Surgical History:   Procedure Laterality Date    ANKLE SURGERY Right      SECTION      LEE  2017    UPPER GASTROINTESTINAL ENDOSCOPY N/A 2022    EGD BIOPSY performed by Dimitri Craig MD at [facility]COPY     Family History   Problem Relation Age of Onset    Asthma Mother     Hypertension Mother     Hypertension Maternal Grandmother     Asthma Brother      Social History     Socioeconomic History    Marital status: Single     Spouse name: Not on file    Number of children: Not on file    Years of education: Not on file    Highest education level: Not on file   Occupational History    Not on file   Tobacco Use    Smoking status: Never     Passive exposure: Never    Smokeless tobacco: Never   Vaping Use    Vaping status: Never Used  or pain.  For nausea, patient may take Unisom and vitamin B6 together at bedtime, with vitamin B6 in the morning and 1 vitamin B6 in the afternoon.  Patient may also try ginger candies, lollipops, ginger ale, peppermint candies, peppermint gum, or sea bands.       Upon completion of the visit all questions were answered and the patients follow-up and testing schedule were reviewed.               ALLERGIES: Patient has no known allergies.    CURRENT MEDS:   lactated ringers  125 mL IntraVENous Once       ondansetron    PRIOR TO ADMISSION MEDICATIONS:  Prior to Admission medications    Medication Sig Start Date End Date Taking? Authorizing Provider   famotidine (PEPCID) 20 MG tablet Take 1 tablet by mouth 2 times daily   Yes Ester Polk MD   cetirizine (ZYRTEC) 10 MG tablet Take 1 tablet by mouth daily    Ester Polk MD   ursodiol (ACTIGALL) 300 MG capsule Take 1 capsule by mouth 2 times daily    Ester Polk MD   cephALEXin (KEFLEX) 500 MG capsule Take 1 capsule by mouth 2 times daily  Patient not taking: Reported on 12/10/2024 11/22/24   Vladimir Ford MD   albuterol (PROVENTIL) (2.5 MG/3ML) 0.083% nebulizer solution Take 3 mLs by nebulization in the morning, at noon, and at bedtime 11/15/24   Vladimir Ford MD   Respiratory Therapy Supplies (FULL KIT NEBULIZER SET) MISC Use as directed with nebulized medication. 11/15/24   Vladimir Ford MD   Misc. Devices KIT Nature's Blend Prenatal Multivitamin with Minerals ([facility] Baby Box)  NDC: [address]-26;  Take 1 softgel by mouth daily or as instructed by provider;  #qs for 6 months 9/10/24   Ru Talbert MD   Prenatal Vit-Fe Fumarate-FA (PRENATAL PLUS) 27-1 MG TABS Take 1 tablet by mouth daily 6/12/24   Latasha Clemente, APRN - CNP       OBJECTIVE:    REVIEW OF SYSTEMS:    The patient has the following complaints: The patient is still complaining of nausea and vomiting although she does say that it is improved.    Fetal

## 2024-12-11 NOTE — PROGRESS NOTES
Assumed care of pt. Pt denies lof, vaginal bleeding, headache, and visual disturbances. Pt states she is having occasional abdominal cramps. EFM tracing. + FM. Call light within reach.

## 2024-12-11 NOTE — PROGRESS NOTES
Assumed care of patient, patient sitting up in bed, reports she does feel better this morning. Reports +FM, denies any new complaints.

## 2024-12-11 NOTE — DISCHARGE INSTRUCTIONS
Home Undelivered Discharge Instructions    After Discharge Orders:    Future Appointments   Date Time Provider Department Center   12/13/2024 11:00 AM Latasha Clemente, APRN - CNP [facility]   12/17/2024 10:45 AM SCHEDULE, RENE GOYAL RM 2 BDM [facility]                   Diet:  normal diet as tolerated    Rest: normal activity as tolerated    Other instructions: Do kick counts once a day on your baby. Choose the time of day your baby is most active. Get in a comfortable lying or sitting position and time how long it takes to feel 10 kicks, twists, turns, swishes, or rolls. Call your physician or midwife if there have not been 10 kicks in 2 hours    Call physician or midwife, return to Labor and Delivery, call 911, or go to the nearest Emergency Room if: increased leakage or fluid, contractions more than  10 per  2 hours, decreased fetal movement, persistent low back pain or cramping, or bleeding from vaginal area

## 2024-12-11 NOTE — PROGRESS NOTES
Sveta Stack presents to office today for  Patient denies bleeding, LOF, or contractions.  States she does have some cramping.  Positive fetal movement.     States that she is unable to keep anything down, even water.   
12/10/2024.  A detailed report is included in the EMR under the imaging tab.  A living garibay intrauterine fetus was identified in the cephalic presentation, with normal fetal heart motion and normal fetal motion noted.  The amniotic fluid index was 11.3 cm.  The placenta was on the anterior wall of the uterus.  A three-vessel umbilical cord was identified.  The biophysical profile was scored 10 of 10.  The cord Doppler SD ratios were within normal limits at 2.87 consistent with 73rd % for gestational age.  There was no evidence of absence, or reversal of end-diastolic flow in the samples evaluated.  The MCA PSV was 1.26 MOM.  This value is not associated with fetal anemia or polycythemia.  The CPR PI was 2.02.  This value is not associated with compensatory centralization of fetal blood flow.                 GENETIC SCREENING/TERATOLOGY COUNSELING              (Includes patient, FTB, and any affected family members)     Patient Age > 35 Years NO   Thalassemia ( MVC<80) NO   Congential Heart Defect NO   Neural Tube Defect NO   Bridger-Sachs NO   Sickle Cell Disease NO   Sickle Cell Trait NO   Sickle C Disease or Trait NO   Hemophilia NO   Muscular Dystrophy NO   Cystic Fibrosis NO   Leslie Disease NO   Autism NO   Mental Retardation NO   History of Fragile X NO   Maternal Diabetes NO   Other Genetic Disease or Syndrome NO   Previous Child With Congenital Abnormality Not Listed NO   Recreational Drugs NO                                                                  INFECTION HISTORY         HEPATITIS IMMUNIZED NO   HEPATITIS INFECTION NO   EXPOSURE TO TB NO   GENITAL HERPES NO   PARVOVIRUS B-19 NO   CHICKEN POX  NO   MEASLES NO   HIV NO      OB History    Para Term  AB Living   3 2 2     2   SAB IAB Ectopic Molar Multiple Live Births            0 2       # Outcome Date GA Lbr Casey/2nd Weight Sex Type Anes PTL Lv   3 Current                     2 Term 19 39w2d   3.118 kg (6 lb 14 oz) M

## 2024-12-11 NOTE — PROGRESS NOTES
Dr. Ford called in, updated on patient status and MFM recommendations. Orders for discharge at this time, patient to follow up with him Friday morning

## 2024-12-12 ENCOUNTER — CARE COORDINATION (OUTPATIENT)
Dept: OTHER | Facility: CLINIC | Age: 29
End: 2024-12-12

## 2024-12-17 ENCOUNTER — ANCILLARY PROCEDURE (OUTPATIENT)
Dept: OBGYN CLINIC | Age: 29
End: 2024-12-17
Payer: COMMERCIAL

## 2024-12-17 ENCOUNTER — ROUTINE PRENATAL (OUTPATIENT)
Dept: OBGYN CLINIC | Age: 29
End: 2024-12-17
Payer: COMMERCIAL

## 2024-12-17 VITALS
TEMPERATURE: 97.5 F | HEART RATE: 104 BPM | WEIGHT: 277.7 LBS | HEIGHT: 64 IN | SYSTOLIC BLOOD PRESSURE: 138 MMHG | BODY MASS INDEX: 47.41 KG/M2 | OXYGEN SATURATION: 97 % | DIASTOLIC BLOOD PRESSURE: 81 MMHG

## 2024-12-17 DIAGNOSIS — O99.213 MATERNAL MORBID OBESITY IN THIRD TRIMESTER, ANTEPARTUM: ICD-10-CM

## 2024-12-17 DIAGNOSIS — Z98.890 HISTORY OF LOOP ELECTROSURGICAL EXCISION PROCEDURE (LEEP) OF CERVIX AFFECTING PREGNANCY IN THIRD TRIMESTER: ICD-10-CM

## 2024-12-17 DIAGNOSIS — O16.3 ELEVATED BLOOD PRESSURE AFFECTING PREGNANCY IN THIRD TRIMESTER, ANTEPARTUM: Primary | ICD-10-CM

## 2024-12-17 DIAGNOSIS — O34.219 HX SUCCESSFUL VBAC (VAGINAL BIRTH AFTER CESAREAN), CURRENTLY PREGNANT: ICD-10-CM

## 2024-12-17 DIAGNOSIS — O99.713 PRURITUS OF PREGNANCY, ANTEPARTUM, THIRD TRIMESTER: ICD-10-CM

## 2024-12-17 DIAGNOSIS — Z98.891 HISTORY OF C-SECTION: ICD-10-CM

## 2024-12-17 DIAGNOSIS — O34.43 HISTORY OF LOOP ELECTROSURGICAL EXCISION PROCEDURE (LEEP) OF CERVIX AFFECTING PREGNANCY IN THIRD TRIMESTER: ICD-10-CM

## 2024-12-17 DIAGNOSIS — E66.01 MATERNAL MORBID OBESITY IN THIRD TRIMESTER, ANTEPARTUM: ICD-10-CM

## 2024-12-17 DIAGNOSIS — R94.8 ABNORMAL PLACENTA FUNCTION TEST: ICD-10-CM

## 2024-12-17 DIAGNOSIS — K76.89 INTRAHEPATIC CHOLESTASIS: ICD-10-CM

## 2024-12-17 DIAGNOSIS — O26.643 CHOLESTASIS DURING PREGNANCY IN THIRD TRIMESTER: ICD-10-CM

## 2024-12-17 DIAGNOSIS — O12.03 EDEMA DURING PREGNANCY IN THIRD TRIMESTER: ICD-10-CM

## 2024-12-17 DIAGNOSIS — L29.9 PRURITUS OF PREGNANCY, ANTEPARTUM, THIRD TRIMESTER: ICD-10-CM

## 2024-12-17 DIAGNOSIS — O34.219 PREVIOUS CESAREAN DELIVERY AFFECTING PREGNANCY, ANTEPARTUM: ICD-10-CM

## 2024-12-17 LAB
GLUCOSE URINE, POC: NEGATIVE MG/DL
PROTEIN UA: NEGATIVE

## 2024-12-17 PROCEDURE — 76821 MIDDLE CEREBRAL ARTERY ECHO: CPT | Performed by: OBSTETRICS & GYNECOLOGY

## 2024-12-17 PROCEDURE — 76816 OB US FOLLOW-UP PER FETUS: CPT | Performed by: OBSTETRICS & GYNECOLOGY

## 2024-12-17 PROCEDURE — 76818 FETAL BIOPHYS PROFILE W/NST: CPT | Performed by: OBSTETRICS & GYNECOLOGY

## 2024-12-17 PROCEDURE — 76820 UMBILICAL ARTERY ECHO: CPT | Performed by: OBSTETRICS & GYNECOLOGY

## 2024-12-17 PROCEDURE — 81002 URINALYSIS NONAUTO W/O SCOPE: CPT | Performed by: OBSTETRICS & GYNECOLOGY

## 2024-12-17 PROCEDURE — 99999 PR OFFICE/OUTPT VISIT,PROCEDURE ONLY: CPT | Performed by: OBSTETRICS & GYNECOLOGY

## 2024-12-17 PROCEDURE — 99213 OFFICE O/P EST LOW 20 MIN: CPT | Performed by: OBSTETRICS & GYNECOLOGY

## 2024-12-17 NOTE — PROGRESS NOTES
Pt presents for bpp/nst. Pt denies bleeding and lof. Pt is having some cramping. Pt states good fetal movement. Pt is also c/o slight headache.  
Date of Delivery: 2025    2.  Maternal morbid obesity    3.  Severe pruritus beginning on the palms of the hands and the soles of the feet consistent with intrahepatic cholestasis    4.  Previous  section delivery followed by     5.  Lower extremity and upper extremity edema    6.  Estimated fetal weight 2858 g consistent with the 64th growth percentile on 2024    7.  Limited visualization of the fetal anatomy     8.  Reassuring biophysical profile, MCA and cord Doppler testing on 2024    9.  Panorama screen showed no increased risk for fetal aneuploidy  10.  Pruritus has significantly improved with ursodiol     PLAN:  I would recommend that the patient count fetal movements and call if she notices any subjective decrease in fetal movements, particularly if there are less than 10 major movements in 2 hours. Non-stress testing should be performed every 3 to 4 days through the balance of the pregnancy. Serial ultrasounds to assess fetal anatomy and growth should be performed. The patient is at increase risk for  morbidity and mortality secondary to her history.  Twice weekly BPP and cord Doppler testing should be performed, unless there is a clinical indication to perform the testing more frequently.    The patient was advised to check her blood pressures 3 times a day, and also if she has any symptoms of hypertension. She is to call if her blood pressure exceeds 140/90, or if she has any new clinical symptomatology, including but not limited to, headache, change in vision, shortness of breath, chest pain, abdominal pain or any new clinically significant symptomatology.     I recommended that the patient continue to take ursodiol 300 mg administered 4 times daily.  Intrahepatic cholestasis of pregnancy is consistent with the patient's history of symptomatic relief of her pruritus within about 5 days of beginning ursodiol therapy. Liver enzymes and serum bile acids may be within

## 2024-12-17 NOTE — PATIENT INSTRUCTIONS
Please arrive for your scheduled appointment at least 15 minutes early with your actual insurance card+ a photo ID. Also if you need any refills ordered or have questions, it may take up 48 hours to reply. Please allow ample time for your refills. Call me when you use last refill. Thank you for your cooperation. You might be having an NST at your next appt. Please eat a large snack or breakfast before coming to office. Thank youCall your primary obstetrician with bleeding, leaking of fluid, abdominal tenderness, headache, blurry vision, epigastric pain and increased urinary frequency.If you are experiencing an emergency and need immediate help, call 911 or go to go emergency room or labor and delivery. Do kick counts after dinner. Call your primary obstetrician if less than 10 kicks in 2 hours after dinner.     Call your primary obstetrician with bleeding, leaking of fluid, abdominal tenderness, headache, blurry vision, epigastric pain and increased urinary frequency.if you are sick, not feeling well or have an infectious process going on please reschedule your appointment by calling [phone]. Also if any family members are not feeling well, please do not bring them to your appointment. We appreciate your cooperation. We are doing this in order to protect our pregnant mothers+ their babies.if you are sick, not feeling well or have an infectious process going on please reschedule your appointment by calling [phone]. Also if any family members are not feeling well, please do not bring them to your appointment. We appreciate your cooperation. We are doing this in order to protect our pregnant mothers+ their babies.

## 2024-12-23 ENCOUNTER — ANESTHESIA (OUTPATIENT)
Dept: LABOR AND DELIVERY | Age: 29
End: 2024-12-23
Payer: COMMERCIAL

## 2024-12-23 ENCOUNTER — ANESTHESIA EVENT (OUTPATIENT)
Dept: LABOR AND DELIVERY | Age: 29
End: 2024-12-23
Payer: COMMERCIAL

## 2024-12-23 ENCOUNTER — HOSPITAL ENCOUNTER (INPATIENT)
Age: 29
LOS: 3 days | Discharge: HOME | End: 2024-12-26
Attending: OBSTETRICS & GYNECOLOGY | Admitting: OBSTETRICS & GYNECOLOGY
Payer: COMMERCIAL

## 2024-12-23 DIAGNOSIS — G89.18 POST-OP PAIN: Primary | ICD-10-CM

## 2024-12-23 PROBLEM — Z3A.37 37 WEEKS GESTATION OF PREGNANCY: Status: ACTIVE | Noted: 2024-12-23

## 2024-12-23 LAB
ABO + RH BLD: NORMAL
AMPHET UR QL SCN: NEGATIVE
ARM BAND NUMBER: NORMAL
BARBITURATES UR QL SCN: NEGATIVE
BENZODIAZ UR QL: NEGATIVE
BLOOD BANK SAMPLE EXPIRATION: NORMAL
BLOOD GROUP ANTIBODIES SERPL: NEGATIVE
BUPRENORPHINE UR QL: NEGATIVE
CANNABINOIDS UR QL SCN: NEGATIVE
COCAINE UR QL SCN: NEGATIVE
ERYTHROCYTE [DISTWIDTH] IN BLOOD BY AUTOMATED COUNT: 14.3 % (ref 11.5–15)
FENTANYL UR QL: NEGATIVE
HCT VFR BLD AUTO: 38 % (ref 34–48)
HGB BLD-MCNC: 12.6 G/DL (ref 11.5–15.5)
MCH RBC QN AUTO: 29 PG (ref 26–35)
MCHC RBC AUTO-ENTMCNC: 33.2 G/DL (ref 32–34.5)
MCV RBC AUTO: 87.6 FL (ref 80–99.9)
METHADONE UR QL: NEGATIVE
OPIATES UR QL SCN: NEGATIVE
OXYCODONE UR QL SCN: NEGATIVE
PCP UR QL SCN: NEGATIVE
PLATELET # BLD AUTO: 288 K/UL (ref 130–450)
PMV BLD AUTO: 10.3 FL (ref 7–12)
RBC # BLD AUTO: 4.34 M/UL (ref 3.5–5.5)
TEST INFORMATION: NORMAL
WBC OTHER # BLD: 12.9 K/UL (ref 4.5–11.5)

## 2024-12-23 PROCEDURE — 2580000003 HC RX 258: Performed by: OBSTETRICS & GYNECOLOGY

## 2024-12-23 PROCEDURE — 6370000000 HC RX 637 (ALT 250 FOR IP): Performed by: OBSTETRICS & GYNECOLOGY

## 2024-12-23 PROCEDURE — 86850 RBC ANTIBODY SCREEN: CPT

## 2024-12-23 PROCEDURE — 7100000000 HC PACU RECOVERY - FIRST 15 MIN: Performed by: OBSTETRICS & GYNECOLOGY

## 2024-12-23 PROCEDURE — 7100000001 HC PACU RECOVERY - ADDTL 15 MIN: Performed by: OBSTETRICS & GYNECOLOGY

## 2024-12-23 PROCEDURE — 2500000003 HC RX 250 WO HCPCS: Performed by: OBSTETRICS & GYNECOLOGY

## 2024-12-23 PROCEDURE — 6360000002 HC RX W HCPCS: Performed by: ANESTHESIOLOGY

## 2024-12-23 PROCEDURE — 36415 COLL VENOUS BLD VENIPUNCTURE: CPT

## 2024-12-23 PROCEDURE — 6370000000 HC RX 637 (ALT 250 FOR IP): Performed by: ANESTHESIOLOGY

## 2024-12-23 PROCEDURE — 6360000002 HC RX W HCPCS

## 2024-12-23 PROCEDURE — 2500000003 HC RX 250 WO HCPCS

## 2024-12-23 PROCEDURE — 85027 COMPLETE CBC AUTOMATED: CPT

## 2024-12-23 PROCEDURE — 3609079900 HC CESAREAN SECTION: Performed by: OBSTETRICS & GYNECOLOGY

## 2024-12-23 PROCEDURE — 3700000000 HC ANESTHESIA ATTENDED CARE: Performed by: OBSTETRICS & GYNECOLOGY

## 2024-12-23 PROCEDURE — 2580000003 HC RX 258

## 2024-12-23 PROCEDURE — 86901 BLOOD TYPING SEROLOGIC RH(D): CPT

## 2024-12-23 PROCEDURE — 86900 BLOOD TYPING SEROLOGIC ABO: CPT

## 2024-12-23 PROCEDURE — 2709999900 HC NON-CHARGEABLE SUPPLY: Performed by: OBSTETRICS & GYNECOLOGY

## 2024-12-23 PROCEDURE — 3700000001 HC ADD 15 MINUTES (ANESTHESIA): Performed by: OBSTETRICS & GYNECOLOGY

## 2024-12-23 PROCEDURE — 6360000002 HC RX W HCPCS: Performed by: OBSTETRICS & GYNECOLOGY

## 2024-12-23 PROCEDURE — 80307 DRUG TEST PRSMV CHEM ANLYZR: CPT

## 2024-12-23 PROCEDURE — 1220000000 HC SEMI PRIVATE OB R&B

## 2024-12-23 RX ORDER — SODIUM CHLORIDE 0.9 % (FLUSH) 0.9 %
5-40 SYRINGE (ML) INJECTION EVERY 12 HOURS SCHEDULED
Status: DISCONTINUED | OUTPATIENT
Start: 2024-12-23 | End: 2024-12-23

## 2024-12-23 RX ORDER — SODIUM CHLORIDE 0.9 % (FLUSH) 0.9 %
5-40 SYRINGE (ML) INJECTION PRN
Status: DISCONTINUED | OUTPATIENT
Start: 2024-12-23 | End: 2024-12-26 | Stop reason: HOSPADM

## 2024-12-23 RX ORDER — BUSPIRONE HYDROCHLORIDE 10 MG/1
10 TABLET ORAL 2 TIMES DAILY
Status: ON HOLD | COMMUNITY
End: 2024-12-26 | Stop reason: HOSPADM

## 2024-12-23 RX ORDER — DIPHENHYDRAMINE HCL 25 MG
25 TABLET ORAL EVERY 6 HOURS PRN
Status: DISCONTINUED | OUTPATIENT
Start: 2024-12-23 | End: 2024-12-23

## 2024-12-23 RX ORDER — ONDANSETRON 2 MG/ML
4 INJECTION INTRAMUSCULAR; INTRAVENOUS EVERY 6 HOURS PRN
Status: DISCONTINUED | OUTPATIENT
Start: 2024-12-23 | End: 2024-12-26 | Stop reason: HOSPADM

## 2024-12-23 RX ORDER — CITRIC ACID/SODIUM CITRATE 334-500MG
30 SOLUTION, ORAL ORAL ONCE
Status: COMPLETED | OUTPATIENT
Start: 2024-12-23 | End: 2024-12-23

## 2024-12-23 RX ORDER — IBUPROFEN 800 MG/1
800 TABLET, FILM COATED ORAL EVERY 8 HOURS PRN
Status: DISCONTINUED | OUTPATIENT
Start: 2024-12-23 | End: 2024-12-26 | Stop reason: HOSPADM

## 2024-12-23 RX ORDER — PHENYLEPHRINE HCL IN 0.9% NACL 1 MG/10 ML
SYRINGE (ML) INTRAVENOUS
Status: DISCONTINUED | OUTPATIENT
Start: 2024-12-23 | End: 2024-12-23 | Stop reason: SDUPTHER

## 2024-12-23 RX ORDER — DEXMEDETOMIDINE HYDROCHLORIDE 100 UG/ML
INJECTION, SOLUTION INTRAVENOUS
Status: DISCONTINUED | OUTPATIENT
Start: 2024-12-23 | End: 2024-12-23 | Stop reason: SDUPTHER

## 2024-12-23 RX ORDER — OXYCODONE HYDROCHLORIDE 5 MG/1
10 TABLET ORAL EVERY 4 HOURS PRN
Status: DISCONTINUED | OUTPATIENT
Start: 2024-12-23 | End: 2024-12-26 | Stop reason: HOSPADM

## 2024-12-23 RX ORDER — BUPIVACAINE HYDROCHLORIDE 7.5 MG/ML
INJECTION, SOLUTION INTRASPINAL
Status: COMPLETED | OUTPATIENT
Start: 2024-12-23 | End: 2024-12-23

## 2024-12-23 RX ORDER — OXYCODONE HYDROCHLORIDE 5 MG/1
5 TABLET ORAL EVERY 4 HOURS PRN
Status: DISCONTINUED | OUTPATIENT
Start: 2024-12-23 | End: 2024-12-26 | Stop reason: HOSPADM

## 2024-12-23 RX ORDER — ONDANSETRON 4 MG/1
4 TABLET, ORALLY DISINTEGRATING ORAL EVERY 8 HOURS PRN
Status: DISCONTINUED | OUTPATIENT
Start: 2024-12-23 | End: 2024-12-26 | Stop reason: HOSPADM

## 2024-12-23 RX ORDER — MORPHINE SULFATE 1 MG/ML
INJECTION, SOLUTION EPIDURAL; INTRATHECAL; INTRAVENOUS
Status: DISCONTINUED | OUTPATIENT
Start: 2024-12-23 | End: 2024-12-23 | Stop reason: SDUPTHER

## 2024-12-23 RX ORDER — SODIUM CHLORIDE 9 MG/ML
INJECTION, SOLUTION INTRAVENOUS PRN
Status: DISCONTINUED | OUTPATIENT
Start: 2024-12-23 | End: 2024-12-23

## 2024-12-23 RX ORDER — NALOXONE HYDROCHLORIDE 0.4 MG/ML
INJECTION, SOLUTION INTRAMUSCULAR; INTRAVENOUS; SUBCUTANEOUS PRN
Status: DISCONTINUED | OUTPATIENT
Start: 2024-12-23 | End: 2024-12-23

## 2024-12-23 RX ORDER — ACETAMINOPHEN 500 MG
1000 TABLET ORAL EVERY 8 HOURS PRN
Status: DISCONTINUED | OUTPATIENT
Start: 2024-12-23 | End: 2024-12-26 | Stop reason: HOSPADM

## 2024-12-23 RX ORDER — ONDANSETRON 2 MG/ML
4 INJECTION INTRAMUSCULAR; INTRAVENOUS EVERY 6 HOURS PRN
Status: CANCELLED | OUTPATIENT
Start: 2024-12-23

## 2024-12-23 RX ORDER — KETOROLAC TROMETHAMINE 30 MG/ML
30 INJECTION, SOLUTION INTRAMUSCULAR; INTRAVENOUS EVERY 6 HOURS PRN
Status: DISCONTINUED | OUTPATIENT
Start: 2024-12-23 | End: 2024-12-23

## 2024-12-23 RX ORDER — ONDANSETRON 4 MG/1
4 TABLET, ORALLY DISINTEGRATING ORAL EVERY 6 HOURS PRN
Status: DISCONTINUED | OUTPATIENT
Start: 2024-12-23 | End: 2024-12-23

## 2024-12-23 RX ORDER — DIPHENHYDRAMINE HYDROCHLORIDE 50 MG/ML
25 INJECTION INTRAMUSCULAR; INTRAVENOUS EVERY 6 HOURS PRN
Status: DISCONTINUED | OUTPATIENT
Start: 2024-12-23 | End: 2024-12-23

## 2024-12-23 RX ORDER — OXYCODONE HYDROCHLORIDE 5 MG/1
5 TABLET ORAL EVERY 4 HOURS PRN
Status: DISCONTINUED | OUTPATIENT
Start: 2024-12-23 | End: 2024-12-23

## 2024-12-23 RX ORDER — MODIFIED LANOLIN
OINTMENT (GRAM) TOPICAL
Status: DISCONTINUED | OUTPATIENT
Start: 2024-12-23 | End: 2024-12-26 | Stop reason: HOSPADM

## 2024-12-23 RX ORDER — SODIUM CHLORIDE 9 MG/ML
INJECTION, SOLUTION INTRAVENOUS PRN
Status: DISCONTINUED | OUTPATIENT
Start: 2024-12-23 | End: 2024-12-26 | Stop reason: HOSPADM

## 2024-12-23 RX ORDER — ONDANSETRON 2 MG/ML
4 INJECTION INTRAMUSCULAR; INTRAVENOUS EVERY 6 HOURS PRN
Status: DISCONTINUED | OUTPATIENT
Start: 2024-12-23 | End: 2024-12-23

## 2024-12-23 RX ORDER — DOCUSATE SODIUM 100 MG/1
100 CAPSULE, LIQUID FILLED ORAL 2 TIMES DAILY
Status: DISCONTINUED | OUTPATIENT
Start: 2024-12-23 | End: 2024-12-26 | Stop reason: HOSPADM

## 2024-12-23 RX ORDER — SODIUM CHLORIDE, SODIUM LACTATE, POTASSIUM CHLORIDE, CALCIUM CHLORIDE 600; 310; 30; 20 MG/100ML; MG/100ML; MG/100ML; MG/100ML
INJECTION, SOLUTION INTRAVENOUS
Status: DISCONTINUED | OUTPATIENT
Start: 2024-12-23 | End: 2024-12-23 | Stop reason: SDUPTHER

## 2024-12-23 RX ORDER — SODIUM CHLORIDE, SODIUM LACTATE, POTASSIUM CHLORIDE, AND CALCIUM CHLORIDE .6; .31; .03; .02 G/100ML; G/100ML; G/100ML; G/100ML
1000 INJECTION, SOLUTION INTRAVENOUS ONCE
Status: COMPLETED | OUTPATIENT
Start: 2024-12-23 | End: 2024-12-23

## 2024-12-23 RX ORDER — SODIUM CHLORIDE 0.9 % (FLUSH) 0.9 %
10 SYRINGE (ML) INJECTION PRN
Status: DISCONTINUED | OUTPATIENT
Start: 2024-12-23 | End: 2024-12-23

## 2024-12-23 RX ORDER — OXYCODONE HYDROCHLORIDE 5 MG/1
10 TABLET ORAL EVERY 4 HOURS PRN
Status: DISCONTINUED | OUTPATIENT
Start: 2024-12-23 | End: 2024-12-23

## 2024-12-23 RX ORDER — DIPHENHYDRAMINE HCL 25 MG
25 TABLET ORAL EVERY 6 HOURS PRN
Status: DISPENSED | OUTPATIENT
Start: 2024-12-23 | End: 2024-12-24

## 2024-12-23 RX ORDER — ONDANSETRON 2 MG/ML
INJECTION INTRAMUSCULAR; INTRAVENOUS
Status: DISCONTINUED | OUTPATIENT
Start: 2024-12-23 | End: 2024-12-23 | Stop reason: SDUPTHER

## 2024-12-23 RX ORDER — SODIUM CHLORIDE, SODIUM LACTATE, POTASSIUM CHLORIDE, CALCIUM CHLORIDE 600; 310; 30; 20 MG/100ML; MG/100ML; MG/100ML; MG/100ML
INJECTION, SOLUTION INTRAVENOUS CONTINUOUS
Status: CANCELLED | OUTPATIENT
Start: 2024-12-23

## 2024-12-23 RX ORDER — SODIUM CHLORIDE 0.9 % (FLUSH) 0.9 %
5-40 SYRINGE (ML) INJECTION EVERY 12 HOURS SCHEDULED
Status: DISCONTINUED | OUTPATIENT
Start: 2024-12-23 | End: 2024-12-26 | Stop reason: HOSPADM

## 2024-12-23 RX ADMIN — ONDANSETRON 8 MG: 2 INJECTION, SOLUTION INTRAMUSCULAR; INTRAVENOUS at 12:42

## 2024-12-23 RX ADMIN — ONDANSETRON 4 MG: 2 INJECTION, SOLUTION INTRAMUSCULAR; INTRAVENOUS at 16:32

## 2024-12-23 RX ADMIN — Medication 200 MCG: at 12:51

## 2024-12-23 RX ADMIN — Medication 200 MCG: at 12:40

## 2024-12-23 RX ADMIN — Medication 909 ML/HR: at 12:52

## 2024-12-23 RX ADMIN — DIPHENHYDRAMINE HCL 25 MG: 25 TABLET ORAL at 21:39

## 2024-12-23 RX ADMIN — WATER 3000 MG: 1 INJECTION INTRAMUSCULAR; INTRAVENOUS; SUBCUTANEOUS at 12:15

## 2024-12-23 RX ADMIN — Medication 200 MCG: at 12:47

## 2024-12-23 RX ADMIN — Medication 200 MCG: at 13:09

## 2024-12-23 RX ADMIN — KETOROLAC TROMETHAMINE 30 MG: 30 INJECTION, SOLUTION INTRAMUSCULAR at 14:36

## 2024-12-23 RX ADMIN — ACETAMINOPHEN 1000 MG: 500 TABLET ORAL at 18:45

## 2024-12-23 RX ADMIN — Medication 300 MCG: at 13:04

## 2024-12-23 RX ADMIN — SODIUM CHLORIDE, POTASSIUM CHLORIDE, SODIUM LACTATE AND CALCIUM CHLORIDE: 600; 310; 30; 20 INJECTION, SOLUTION INTRAVENOUS at 11:43

## 2024-12-23 RX ADMIN — DEXMEDETOMIDINE 10 MCG: 100 INJECTION, SOLUTION INTRAVENOUS at 12:42

## 2024-12-23 RX ADMIN — MORPHINE SULFATE 0.15 MG: 1 INJECTION, SOLUTION EPIDURAL; INTRATHECAL; INTRAVENOUS at 12:37

## 2024-12-23 RX ADMIN — SODIUM CHLORIDE, POTASSIUM CHLORIDE, SODIUM LACTATE AND CALCIUM CHLORIDE: 600; 310; 30; 20 INJECTION, SOLUTION INTRAVENOUS at 13:16

## 2024-12-23 RX ADMIN — Medication 200 MCG: at 12:55

## 2024-12-23 RX ADMIN — OXYCODONE 10 MG: 5 TABLET ORAL at 23:33

## 2024-12-23 RX ADMIN — OXYCODONE 10 MG: 5 TABLET ORAL at 18:45

## 2024-12-23 RX ADMIN — DOCUSATE SODIUM 100 MG: 100 CAPSULE, LIQUID FILLED ORAL at 20:30

## 2024-12-23 RX ADMIN — DIPHENHYDRAMINE HYDROCHLORIDE 25 MG: 50 INJECTION INTRAMUSCULAR; INTRAVENOUS at 15:46

## 2024-12-23 RX ADMIN — BUPIVACAINE HYDROCHLORIDE 12.75 MG: 7.5 INJECTION, SOLUTION SUBARACHNOID at 12:38

## 2024-12-23 RX ADMIN — IBUPROFEN 800 MG: 800 TABLET, FILM COATED ORAL at 20:30

## 2024-12-23 RX ADMIN — SODIUM CHLORIDE, POTASSIUM CHLORIDE, SODIUM LACTATE AND CALCIUM CHLORIDE 1000 ML: 600; 310; 30; 20 INJECTION, SOLUTION INTRAVENOUS at 11:06

## 2024-12-23 RX ADMIN — SODIUM CITRATE AND CITRIC ACID MONOHYDRATE 30 ML: 500; 334 SOLUTION ORAL at 12:16

## 2024-12-23 RX ADMIN — Medication 200 MCG: at 13:00

## 2024-12-23 ASSESSMENT — PAIN DESCRIPTION - LOCATION
LOCATION: ABDOMEN
LOCATION: ABDOMEN
LOCATION: ABDOMEN;INCISION

## 2024-12-23 ASSESSMENT — PAIN SCALES - GENERAL
PAINLEVEL_OUTOF10: 6
PAINLEVEL_OUTOF10: 6
PAINLEVEL_OUTOF10: 7

## 2024-12-23 ASSESSMENT — PAIN DESCRIPTION - DESCRIPTORS
DESCRIPTORS: ACHING;DISCOMFORT
DESCRIPTORS: DISCOMFORT;SORE
DESCRIPTORS: ACHING
DESCRIPTORS: ACHING

## 2024-12-23 ASSESSMENT — PAIN DESCRIPTION - ORIENTATION: ORIENTATION: LOWER

## 2024-12-23 ASSESSMENT — PAIN - FUNCTIONAL ASSESSMENT: PAIN_FUNCTIONAL_ASSESSMENT: ACTIVITIES ARE NOT PREVENTED

## 2024-12-23 NOTE — PROGRESS NOTES
Delivery of viable baby girl at 1252 via repeat LTCS. APGARS 7/8. NICU called at 9 minutes of life to evaluate for respiratory distress. O2 protocol started at 1320

## 2024-12-23 NOTE — ANESTHESIA PROCEDURE NOTES
Spinal Block    Patient location during procedure: OB  Reason for block: primary anesthetic  Staffing  Performed: resident/CRNA   Resident/CRNA: Julio Grant APRN - CRNA  Performed by: Julio Grant APRN - CRNA  Authorized by: Celena Mccarthy DO    Spinal Block  Patient position: sitting  Prep: ChloraPrep  Patient monitoring: continuous pulse ox and frequent blood pressure checks  Approach: midline  Location: L3/L4  Provider prep: mask and sterile gloves  Local infiltration: lidocaine  Needle  Needle type: Pencan   Needle gauge: 25 G  Needle length: 3.5 in  Assessment  Sensory level: T4  Swirl obtained: Yes  CSF: clear  Attempts: 1  Hemodynamics: stable  Preanesthetic Checklist  Completed: patient identified, IV checked, site marked, risks and benefits discussed, surgical/procedural consents, equipment checked, pre-op evaluation, timeout performed, anesthesia consent given, oxygen available, monitors applied/VS acknowledged, fire risk safety assessment completed and verbalized and blood product R/B/A discussed and consented

## 2024-12-23 NOTE — PROGRESS NOTES
Spiritual Health History and Assessment/Progress Note  [facility]    Initial Encounter, Spiritual/Emotional Needs,  ,  ,      Name: Sveta Stack MRN: [id number]    Age: 29 y.o.     Sex: female   Language: English   Congregation: Jewish   37 weeks gestation of pregnancy     Date: 12/23/2024                           Spiritual Assessment began in SEB 3X LD        Referral/Consult From: Rounding   Encounter Overview/Reason: Initial Encounter, Spiritual/Emotional Needs  Service Provided For: Patient, Significant other    Manasa, Belief, Meaning:   Patient identifies as spiritual, is connected with a manasa tradition or spiritual practice, and has beliefs or practices that help with coping during difficult times  Family/Friends identify as spiritual, are connected with a manasa tradition or spiritual practice, and have beliefs or practices that help with coping during difficult times      Importance and Influence:  Patient has spiritual/personal beliefs that influence decisions regarding their health  Family/Friends have spiritual/personal beliefs that influence decisions regarding the patient's health    Community:  Patient feels well-supported. Support system includes: Spouse/Partner  Family/Friends feel well-supported. Support system includes: Spouse/Partner    Assessment and Plan of Care:     Patient Interventions include: Facilitated expression of thoughts and feelings and Provided sacramental/Hinduism ritual  Family/Friends Interventions include: Facilitated expression of thoughts and feelings and Provided sacramental/Hinduism ritual    Patient Plan of Care: Spiritual Care available upon further referral  Family/Friends Plan of Care: Spiritual Care available upon further referral    Electronically signed by SALMA Ceron on 12/23/2024 at 2:29 PM

## 2024-12-23 NOTE — PROCEDURES
PROCEDURE NOTE  Date: 2024   Name: Sveta Stack  YOB: 1995    Procedures      PREOPERATIVE DIAGNOSES:     1.39@ intrauterine pregnancy.  2.  previous       POSTOPERATIVE DIAGNOSES:     Same.      PROCEDURE: repeat low transverse  section        SURGEON: Vladimir Ford MD       ASSISTANT:  dr mackey  In the absence of a resident they provided assistance with retraction, exposure, delivery of the infant and suture cutting/management throughout the entire procedure  ESTIMATED BLOOD LOSS:  500ml        COMPLICATIONS:  None.         ANESTHESIA:   Spinal anesthesia        FINDINGS:   Live Born  Sex:  Female  Fetal Position:  Cephalic, left occiput anterior  Apgars:  1 minute: 7; 5 minute: 8  Weight:  pending  Tubes, uterus, ovaries:  normal          DETAILS OF PROCEDURE:    The patient was taken to the operating room where Spinal anesthesia was administered and found to be adequate. Abdomen was prepped   and draped in the normal sterile fashion. Watson catheter had previously been   placed. Pfannenstiel skin incision made with a scalpel, carried down to the fascia with cautery. The fascia was nicked in the midline and extended laterally with   cautery. Muscles were  in the midline. Peritoneum was grasped with   hemostats, tented up, and entered sharply. Peritoneal incision was extended   superiorly and inferiorly with good visualization of bladder.  Delee bladder blade was introduced. Bladder flap was created with sharp dissection. Low transverse uterine incision was made with a scalpel and extended bluntly. Membranes ruptured for Clear fluid. A viable female infant was delivered in the cephalic presentation without diffiuclty.  Baby was bulb suctioned on the abdomen. Cord was clamped and cut, and handed to waiting R.N. Cord gases and blood were obtained. Placenta was manually extracted with 3 vessel cord. Uterus was exteriorized, cleared of all clot and debris.

## 2024-12-23 NOTE — PLAN OF CARE
Problem: Vaginal Birth or  Section  Goal: Fetal and maternal status remain reassuring during the birth process  Description:  Birth OB-Pregnancy care plan goal which identifies if the fetal and maternal status remain reassuring during the birth process  Outcome: Progressing     Problem: Postpartum  Goal: Experiences normal postpartum course  Description:  Postpartum OB-Pregnancy care plan goal which identifies if the mother is experiencing a normal postpartum course  Outcome: Progressing  Goal: Appropriate maternal -  bonding  Description:  Postpartum OB-Pregnancy care plan goal which identifies if the mother and  are bonding appropriately  Outcome: Progressing  Goal: Establishment of infant feeding pattern  Description:  Postpartum OB-Pregnancy care plan goal which identifies if the mother is establishing a feeding pattern with their   Outcome: Progressing  Goal: Incisions, wounds, or drain sites healing without S/S of infection  Outcome: Progressing  Flowsheets (Taken 2024 1620)  Incisions, Wounds, or Drain Sites Healing Without Sign and Symptoms of Infection: TWICE DAILY: Assess and document dressing/incision, wound bed, drain sites and surrounding tissue     Problem: Pain  Goal: Verbalizes/displays adequate comfort level or baseline comfort level  Outcome: Progressing  Flowsheets (Taken 2024 1620)  Verbalizes/displays adequate comfort level or baseline comfort level:   Encourage patient to monitor pain and request assistance   Assess pain using appropriate pain scale     Problem: Infection - Adult  Goal: Absence of infection at discharge  Outcome: Progressing  Goal: Absence of infection during hospitalization  Outcome: Progressing  Goal: Absence of fever/infection during anticipated neutropenic period  Outcome: Progressing     Problem: Safety - Adult  Goal: Free from fall injury  Outcome: Progressing     Problem: Discharge Planning  Goal: Discharge to home or other

## 2024-12-23 NOTE — H&P
Department of Obstetrics and Gynecology  Labor and Delivery   Obstetrics History and Physical      Patient Name: Sveta Stack  YOB: 1995   MRN: [id number]    CHIEF COMPLAINT:  scheduled C/S    HISTORY OF PRESENT ILLNESS:    The patient is a 29 y.o. female,  A0, Estimated Date of Delivery: 25, EGA: 37 and 0/7 weeks presents to L&D for scheduled C/S.  Patient denies leakage of fluid, vaginal bleeding,  or contractions.  Perceived fetal movement is good.    Her current obstetrical history is significant for: gHTN, cholestasis      Lab Review  A+  Rubella: Immune  RPR: NR  Hepatitis B Surface Antigen: NR  HIV: NR  GC/CT: negative   Group B Strep: positive      PAST OB HISTORY  OB History    Para Term  AB Living   3 2 2     2   SAB IAB Ectopic Molar Multiple Live Births           0 2      # Outcome Date GA Lbr Casey/2nd Weight Sex Type Anes PTL Lv   3 Current            2 Term 19 39w2d  3.118 kg (6 lb 14 oz) M Vag-Spont EPI N ANNA   1 Term 14 40w0d   M CS-LTranv EPI  ANNA      Complications: Failure to Progress in Second Stage      Obstetric Comments   Patient presents for initial prenatal visit.  Patient was trying to get pregnant.  Prenatal cultures, labs, and ultrasound were done today.  Patient started taking vitamins.  All pregnancy counseling concerns and questions were addressed and answered.  Patient's currently taking no other medications, was taking Cymbalta and Pristiq-stopped cold turkey.  Patient will return to the office in 4 weeks.  Patient will be offered first trimester testing at that time. Patient denies any family history of congenital defects or chromosomal abnormalities or genetic disorders that could be pertinent to this pregnancy.   Pt has had 2 deliveries via primary C/S followed by . No complications.       No cats in the house. All meat must be cooked well done, all lunch meat must be cooked, all dairy must be pasteurized, no queso at

## 2024-12-23 NOTE — ANESTHESIA PRE PROCEDURE
Department of Anesthesiology  Preprocedure Note       Name:  Sveta Stack   Age:  29 y.o.  :  1995                                          MRN:  [id number]         Date:  2024      Surgeon: Surgeon(s):  Vladimir Ford MD    Procedure: Procedure(s):   SECTION    Medications prior to admission:   Prior to Admission medications    Medication Sig Start Date End Date Taking? Authorizing Provider   famotidine (PEPCID) 20 MG tablet Take 1 tablet by mouth 2 times daily   Yes ProviderEster MD   cetirizine (ZYRTEC) 10 MG tablet Take 1 tablet by mouth daily   Yes Provider, MD Ester   ursodiol (ACTIGALL) 300 MG capsule Take 1 capsule by mouth 2 times daily   Yes ProviderEster MD   Prenatal Vit-Fe Fumarate-FA (PRENATAL PLUS) 27-1 MG TABS Take 1 tablet by mouth daily 24  Yes Latasha Clemente, APRN - CNP   Respiratory Therapy Supplies (FULL KIT NEBULIZER SET) MISC Use as directed with nebulized medication. 11/15/24   Vladimir Ford MD   Misc. Devices KIT Nature's Blend Prenatal Multivitamin with Minerals ([facility] Baby Box)  NDC: [address]-88;  Take 1 softgel by mouth daily or as instructed by provider;  #qs for 6 months 9/10/24   Ru Talbert MD       Current medications:    Current Facility-Administered Medications   Medication Dose Route Frequency Provider Last Rate Last Admin    lactated ringers bolus 1,000 mL  1,000 mL IntraVENous Once Vladimir Ford  mL/hr at 24 1106 1,000 mL at 24 1106    sodium chloride flush 0.9 % injection 5-40 mL  5-40 mL IntraVENous 2 times per day Vladimir Ford MD        sodium chloride flush 0.9 % injection 10 mL  10 mL IntraVENous PRN Vladimir Ford MD        0.9 % sodium chloride infusion   IntraVENous PRN Vladimir Ford MD        citric acid-sodium citrate (BICITRA) solution 30 mL  30 mL Oral Once Vladimir Ford MD        ceFAZolin (ANCEF) 3,000 mg in sterile water 30 mL IV syringe  3,000

## 2024-12-23 NOTE — PROGRESS NOTES
Patient admitted into room and oriented to surroundings. Introduced self and wrote this RN's name and phone extension on patient's white board. Phone and nurse's call light at patient's bedside and instructed to use for any needs. Patient instructed on [facility]  new admission informational packet at bedside with information on infant testing to be done when infant is 24 hours old including ODH labs, 24 hours blood sugar, and CCHD. Patient instructed on mom baby unit policies and procedures including infant safety and fall prevention, of need to keep infant in bassinet for transport in hallways, and for infant to sleep alone, on back, in an empty bassinet. Patient also instructed on unit visitation policy and that one same support person 18 years old or older may stay overnight if desired. Patient verbalized understanding of all of the above.    Tdap - had   influenza vaccine - had

## 2024-12-24 LAB
ERYTHROCYTE [DISTWIDTH] IN BLOOD BY AUTOMATED COUNT: 14.2 % (ref 11.5–15)
HCT VFR BLD AUTO: 36.2 % (ref 34–48)
HGB BLD-MCNC: 11.5 G/DL (ref 11.5–15.5)
MCH RBC QN AUTO: 29.4 PG (ref 26–35)
MCHC RBC AUTO-ENTMCNC: 31.8 G/DL (ref 32–34.5)
MCV RBC AUTO: 92.6 FL (ref 80–99.9)
PLATELET # BLD AUTO: 267 K/UL (ref 130–450)
PMV BLD AUTO: 10.6 FL (ref 7–12)
RBC # BLD AUTO: 3.91 M/UL (ref 3.5–5.5)
WBC OTHER # BLD: 14.9 K/UL (ref 4.5–11.5)

## 2024-12-24 PROCEDURE — 6370000000 HC RX 637 (ALT 250 FOR IP): Performed by: OBSTETRICS & GYNECOLOGY

## 2024-12-24 PROCEDURE — 36415 COLL VENOUS BLD VENIPUNCTURE: CPT

## 2024-12-24 PROCEDURE — 2500000003 HC RX 250 WO HCPCS: Performed by: OBSTETRICS & GYNECOLOGY

## 2024-12-24 PROCEDURE — 1220000000 HC SEMI PRIVATE OB R&B

## 2024-12-24 PROCEDURE — 6370000000 HC RX 637 (ALT 250 FOR IP): Performed by: ANESTHESIOLOGY

## 2024-12-24 PROCEDURE — 85027 COMPLETE CBC AUTOMATED: CPT

## 2024-12-24 RX ORDER — SIMETHICONE 80 MG
80 TABLET,CHEWABLE ORAL EVERY 6 HOURS PRN
Status: DISCONTINUED | OUTPATIENT
Start: 2024-12-24 | End: 2024-12-26 | Stop reason: HOSPADM

## 2024-12-24 RX ADMIN — DOCUSATE SODIUM 100 MG: 100 CAPSULE, LIQUID FILLED ORAL at 10:52

## 2024-12-24 RX ADMIN — ACETAMINOPHEN 1000 MG: 500 TABLET ORAL at 15:47

## 2024-12-24 RX ADMIN — DIPHENHYDRAMINE HCL 25 MG: 25 TABLET ORAL at 03:54

## 2024-12-24 RX ADMIN — DOCUSATE SODIUM 100 MG: 100 CAPSULE, LIQUID FILLED ORAL at 20:24

## 2024-12-24 RX ADMIN — SODIUM CHLORIDE, PRESERVATIVE FREE 10 ML: 5 INJECTION INTRAVENOUS at 10:57

## 2024-12-24 RX ADMIN — Medication: at 10:51

## 2024-12-24 RX ADMIN — OXYCODONE 10 MG: 5 TABLET ORAL at 03:49

## 2024-12-24 RX ADMIN — SIMETHICONE 80 MG: 80 TABLET, CHEWABLE ORAL at 15:53

## 2024-12-24 RX ADMIN — OXYCODONE 10 MG: 5 TABLET ORAL at 10:57

## 2024-12-24 RX ADMIN — ACETAMINOPHEN 1000 MG: 500 TABLET ORAL at 03:49

## 2024-12-24 RX ADMIN — IBUPROFEN 800 MG: 800 TABLET, FILM COATED ORAL at 21:57

## 2024-12-24 RX ADMIN — OXYCODONE 10 MG: 5 TABLET ORAL at 20:24

## 2024-12-24 RX ADMIN — IBUPROFEN 800 MG: 800 TABLET, FILM COATED ORAL at 06:20

## 2024-12-24 RX ADMIN — OXYCODONE 10 MG: 5 TABLET ORAL at 15:48

## 2024-12-24 ASSESSMENT — PAIN DESCRIPTION - ONSET
ONSET: GRADUAL

## 2024-12-24 ASSESSMENT — PAIN DESCRIPTION - LOCATION
LOCATION: ABDOMEN
LOCATION: ABDOMEN
LOCATION: ABDOMEN;INCISION

## 2024-12-24 ASSESSMENT — PAIN DESCRIPTION - FREQUENCY
FREQUENCY: INTERMITTENT

## 2024-12-24 ASSESSMENT — PAIN DESCRIPTION - ORIENTATION
ORIENTATION: LOWER;MID
ORIENTATION: LOWER
ORIENTATION: LOWER
ORIENTATION: LOWER;MID

## 2024-12-24 ASSESSMENT — PAIN DESCRIPTION - DESCRIPTORS
DESCRIPTORS: ACHING;DISCOMFORT;SORE
DESCRIPTORS: ACHING;DISCOMFORT;SORE
DESCRIPTORS: ACHING
DESCRIPTORS: ACHING
DESCRIPTORS: DISCOMFORT;SORE
DESCRIPTORS: DISCOMFORT;SORE

## 2024-12-24 ASSESSMENT — PAIN SCALES - GENERAL
PAINLEVEL_OUTOF10: 5
PAINLEVEL_OUTOF10: 8
PAINLEVEL_OUTOF10: 7
PAINLEVEL_OUTOF10: 7
PAINLEVEL_OUTOF10: 5
PAINLEVEL_OUTOF10: 7

## 2024-12-24 ASSESSMENT — PAIN DESCRIPTION - PAIN TYPE
TYPE: ACUTE PAIN;SURGICAL PAIN

## 2024-12-24 ASSESSMENT — PAIN - FUNCTIONAL ASSESSMENT
PAIN_FUNCTIONAL_ASSESSMENT: ACTIVITIES ARE NOT PREVENTED

## 2024-12-24 NOTE — LACTATION NOTE
Encouraged skin to skin and frequent attempts at breast to stimulate milk production. Instructed on normal infant behavior in the first 12-24 hours and importance of stimulating the baby frequently to eat during this time. Reviewed hand expression, and encouraged to hand express drops of colostrum when baby is sleepy. Instructed that baby may also feed 8-12 times a day- cluster feeding at times- as her milk supply is being established.  Reviewed signs of adequate I & O; allow baby to feed ad una and not to limit time at breast. Breastfeeding booklet provided with review of its contents. Encouraged to call with any concerns.

## 2024-12-24 NOTE — ANESTHESIA POSTPROCEDURE EVALUATION
Department of Anesthesiology  Postprocedure Note    Patient: Sveta Stack  MRN: [id number]  YOB: 1995  Date of evaluation: 2024    Procedure Summary       Date: 24 Room / Location: [address]    Anesthesia Start: 1223 Anesthesia Stop: 1318    Procedure:  SECTION (Uterus) Diagnosis:       S/P repeat low transverse       (S/P repeat low transverse  [Z98.891])    Surgeons: Vladimir Ford MD Responsible Provider: Celena Mccarthy DO    Anesthesia Type: spinal ASA Status: 2            Anesthesia Type: No value filed.    Uli Phase I: Uli Score: 9    Uli Phase II: Uli Score: 9    Anesthesia Post Evaluation    Patient location during evaluation: bedside  Patient participation: complete - patient participated  Level of consciousness: awake and alert  Pain score: 7  Airway patency: patent  Nausea & Vomiting: no nausea and no vomiting  Cardiovascular status: blood pressure returned to baseline and hemodynamically stable  Respiratory status: acceptable  Hydration status: stable  Comments: Discussed pain management and to continue taking medication as needed. Discussed importance of pain medication schedule and to notify nurse if needing additional medication.  Pain management: inadequate        No notable events documented.

## 2024-12-24 NOTE — PROGRESS NOTES
SUBJECTIVE:   Patient without complaint    OBJECTIVE:   BP (!) 151/76   Pulse 74   Temp 97.7 °F (36.5 °C) (Oral)   Resp 18   Ht 1.626 m (5' 4\")   Wt 125.6 kg (277 lb)   LMP 04/08/2024   SpO2 95%   Breastfeeding Unknown   BMI 47.55 kg/m²      Recent Labs     12/24/24  0527   WBC 14.9*   RBC 3.91   HGB 11.5   HCT 36.2   MCV 92.6   MCH 29.4   MCHC 31.8*   RDW 14.2      MPV 10.6      Incision clean, dry, intact   Abdomen soft, nontender    ASSESSMENT/PLAN:   Postoperative Day #1   Advance care   Acute blood loss anemia - FeSO4    Arturo Hernandez MD 12/24/2024 8:26 AM

## 2024-12-24 NOTE — PLAN OF CARE
Problem: Vaginal Birth or  Section  Goal: Fetal and maternal status remain reassuring during the birth process  Description:  Birth OB-Pregnancy care plan goal which identifies if the fetal and maternal status remain reassuring during the birth process  Outcome: Progressing     Problem: Postpartum  Goal: Experiences normal postpartum course  Description:  Postpartum OB-Pregnancy care plan goal which identifies if the mother is experiencing a normal postpartum course  2024 1139 by Rhona Ball RN  Outcome: Progressing     Problem: Postpartum  Goal: Appropriate maternal -  bonding  Description:  Postpartum OB-Pregnancy care plan goal which identifies if the mother and  are bonding appropriately  Outcome: Progressing     Problem: Postpartum  Goal: Establishment of infant feeding pattern  Description:  Postpartum OB-Pregnancy care plan goal which identifies if the mother is establishing a feeding pattern with their   Outcome: Progressing     Problem: Postpartum  Goal: Incisions, wounds, or drain sites healing without S/S of infection  Outcome: Progressing  Flowsheets (Taken 2024 1057)  Incisions, Wounds, or Drain Sites Healing Without Sign and Symptoms of Infection: TWICE DAILY: Assess and document dressing/incision, wound bed, drain sites and surrounding tissue     Problem: Pain  Goal: Verbalizes/displays adequate comfort level or baseline comfort level  Outcome: Progressing  Flowsheets (Taken 2024 1057)  Verbalizes/displays adequate comfort level or baseline comfort level:   Encourage patient to monitor pain and request assistance   Assess pain using appropriate pain scale   Administer analgesics based on type and severity of pain and evaluate response   Implement non-pharmacological measures as appropriate and evaluate response   Consider cultural and social influences on pain and pain management   Notify Licensed Independent Practitioner if interventions

## 2024-12-24 NOTE — PROGRESS NOTES
[employer] [facility] screening results were discussed with parent. Questions answered. Brochure given to parent. Advised to monitor developmental milestones and contact physician for any concerns.   Rosy Tinoco, AuD

## 2024-12-24 NOTE — PLAN OF CARE
Problem: Postpartum  Goal: Experiences normal postpartum course  Description:  Postpartum OB-Pregnancy care plan goal which identifies if the mother is experiencing a normal postpartum course  12/23/2024 2345 by Brittany Taavrez RN  Outcome: Progressing  12/23/2024 1647 by Carmelina Price RN  Outcome: Progressing

## 2024-12-25 PROCEDURE — 6370000000 HC RX 637 (ALT 250 FOR IP): Performed by: OBSTETRICS & GYNECOLOGY

## 2024-12-25 PROCEDURE — 1220000000 HC SEMI PRIVATE OB R&B

## 2024-12-25 RX ADMIN — IBUPROFEN 800 MG: 800 TABLET, FILM COATED ORAL at 20:49

## 2024-12-25 RX ADMIN — ACETAMINOPHEN 1000 MG: 500 TABLET ORAL at 17:55

## 2024-12-25 RX ADMIN — SIMETHICONE 80 MG: 80 TABLET, CHEWABLE ORAL at 10:27

## 2024-12-25 RX ADMIN — ACETAMINOPHEN 1000 MG: 500 TABLET ORAL at 05:20

## 2024-12-25 RX ADMIN — OXYCODONE 10 MG: 5 TABLET ORAL at 05:20

## 2024-12-25 RX ADMIN — OXYCODONE 10 MG: 5 TABLET ORAL at 22:55

## 2024-12-25 RX ADMIN — IBUPROFEN 800 MG: 800 TABLET, FILM COATED ORAL at 10:27

## 2024-12-25 RX ADMIN — DOCUSATE SODIUM 100 MG: 100 CAPSULE, LIQUID FILLED ORAL at 10:27

## 2024-12-25 RX ADMIN — SIMETHICONE 80 MG: 80 TABLET, CHEWABLE ORAL at 17:54

## 2024-12-25 RX ADMIN — DOCUSATE SODIUM 100 MG: 100 CAPSULE, LIQUID FILLED ORAL at 20:50

## 2024-12-25 RX ADMIN — OXYCODONE 10 MG: 5 TABLET ORAL at 17:54

## 2024-12-25 ASSESSMENT — PAIN DESCRIPTION - PAIN TYPE
TYPE: ACUTE PAIN;SURGICAL PAIN

## 2024-12-25 ASSESSMENT — PAIN - FUNCTIONAL ASSESSMENT
PAIN_FUNCTIONAL_ASSESSMENT: ACTIVITIES ARE NOT PREVENTED
PAIN_FUNCTIONAL_ASSESSMENT: PREVENTS OR INTERFERES SOME ACTIVE ACTIVITIES AND ADLS
PAIN_FUNCTIONAL_ASSESSMENT: ACTIVITIES ARE NOT PREVENTED
PAIN_FUNCTIONAL_ASSESSMENT: ACTIVITIES ARE NOT PREVENTED

## 2024-12-25 ASSESSMENT — PAIN DESCRIPTION - FREQUENCY
FREQUENCY: INTERMITTENT

## 2024-12-25 ASSESSMENT — PAIN SCALES - GENERAL
PAINLEVEL_OUTOF10: 8
PAINLEVEL_OUTOF10: 7
PAINLEVEL_OUTOF10: 7
PAINLEVEL_OUTOF10: 8
PAINLEVEL_OUTOF10: 7

## 2024-12-25 ASSESSMENT — PAIN DESCRIPTION - DESCRIPTORS
DESCRIPTORS: DISCOMFORT;SORE
DESCRIPTORS: CRAMPING;ACHING;SORE
DESCRIPTORS: ACHING;SORE;TENDER
DESCRIPTORS: DISCOMFORT;SORE

## 2024-12-25 ASSESSMENT — PAIN DESCRIPTION - ORIENTATION
ORIENTATION: LOWER
ORIENTATION: LOWER
ORIENTATION: ANTERIOR;LOWER
ORIENTATION: LOWER;MID

## 2024-12-25 ASSESSMENT — PAIN DESCRIPTION - LOCATION
LOCATION: ABDOMEN;INCISION

## 2024-12-25 ASSESSMENT — PAIN DESCRIPTION - ONSET
ONSET: GRADUAL

## 2024-12-25 NOTE — PROGRESS NOTES
SUBJECTIVE:   Patient without complaint    OBJECTIVE:   /60   Pulse (!) 106   Temp 98.1 °F (36.7 °C) (Oral)   Resp 18   Ht 1.626 m (5' 4\")   Wt 125.6 kg (277 lb)   LMP 04/08/2024   SpO2 96%   Breastfeeding Unknown   BMI 47.55 kg/m²    Incision clean, dry, intact   Abdomen soft, nontender    ASSESSMENT/PLAN:   Postoperative Day #2   Advance care       Arturo Hernandez MD 12/25/2024 2:15 PM

## 2024-12-25 NOTE — PLAN OF CARE
Problem: Vaginal Birth or  Section  Goal: Fetal and maternal status remain reassuring during the birth process  Description:  Birth OB-Pregnancy care plan goal which identifies if the fetal and maternal status remain reassuring during the birth process  Outcome: Progressing     Problem: Postpartum  Goal: Experiences normal postpartum course  Description:  Postpartum OB-Pregnancy care plan goal which identifies if the mother is experiencing a normal postpartum course  Outcome: Progressing     Problem: Postpartum  Goal: Appropriate maternal -  bonding  Description:  Postpartum OB-Pregnancy care plan goal which identifies if the mother and  are bonding appropriately  Outcome: Progressing     Problem: Postpartum  Goal: Establishment of infant feeding pattern  Description:  Postpartum OB-Pregnancy care plan goal which identifies if the mother is establishing a feeding pattern with their   Outcome: Progressing     Problem: Postpartum  Goal: Incisions, wounds, or drain sites healing without S/S of infection  Outcome: Progressing     Problem: Pain  Goal: Verbalizes/displays adequate comfort level or baseline comfort level  Outcome: Progressing  Flowsheets (Taken 2024 1027)  Verbalizes/displays adequate comfort level or baseline comfort level:   Encourage patient to monitor pain and request assistance   Assess pain using appropriate pain scale   Administer analgesics based on type and severity of pain and evaluate response   Implement non-pharmacological measures as appropriate and evaluate response   Consider cultural and social influences on pain and pain management   Notify Licensed Independent Practitioner if interventions unsuccessful or patient reports new pain     Problem: Infection - Adult  Goal: Absence of infection at discharge  Outcome: Progressing     Problem: Infection - Adult  Goal: Absence of infection during hospitalization  Outcome: Progressing     Problem: Infection -

## 2024-12-26 ENCOUNTER — CARE COORDINATION (OUTPATIENT)
Dept: OTHER | Facility: CLINIC | Age: 29
End: 2024-12-26

## 2024-12-26 VITALS
HEIGHT: 64 IN | DIASTOLIC BLOOD PRESSURE: 78 MMHG | HEART RATE: 91 BPM | SYSTOLIC BLOOD PRESSURE: 120 MMHG | OXYGEN SATURATION: 98 % | WEIGHT: 277 LBS | TEMPERATURE: 97.9 F | BODY MASS INDEX: 47.29 KG/M2 | RESPIRATION RATE: 16 BRPM

## 2024-12-26 PROCEDURE — 6370000000 HC RX 637 (ALT 250 FOR IP): Performed by: OBSTETRICS & GYNECOLOGY

## 2024-12-26 RX ORDER — HYDROCODONE BITARTRATE AND ACETAMINOPHEN 5; 325 MG/1; MG/1
1 TABLET ORAL EVERY 6 HOURS PRN
Qty: 12 TABLET | Refills: 0 | Status: SHIPPED | OUTPATIENT
Start: 2024-12-26 | End: 2024-12-29

## 2024-12-26 RX ADMIN — OXYCODONE 10 MG: 5 TABLET ORAL at 06:01

## 2024-12-26 RX ADMIN — SIMETHICONE 80 MG: 80 TABLET, CHEWABLE ORAL at 08:45

## 2024-12-26 RX ADMIN — DOCUSATE SODIUM 100 MG: 100 CAPSULE, LIQUID FILLED ORAL at 08:45

## 2024-12-26 RX ADMIN — OXYCODONE 10 MG: 5 TABLET ORAL at 11:03

## 2024-12-26 RX ADMIN — ACETAMINOPHEN 1000 MG: 500 TABLET ORAL at 01:59

## 2024-12-26 RX ADMIN — IBUPROFEN 800 MG: 800 TABLET, FILM COATED ORAL at 09:29

## 2024-12-26 ASSESSMENT — PAIN DESCRIPTION - LOCATION
LOCATION: ABDOMEN;INCISION
LOCATION: ABDOMEN;INCISION
LOCATION: ABDOMEN

## 2024-12-26 ASSESSMENT — PAIN DESCRIPTION - ORIENTATION
ORIENTATION: LOWER
ORIENTATION: ANTERIOR;LOWER
ORIENTATION: LOWER

## 2024-12-26 ASSESSMENT — PAIN DESCRIPTION - DESCRIPTORS
DESCRIPTORS: SORE;TENDER
DESCRIPTORS: CRAMPING;SORE;TENDER
DESCRIPTORS: SORE;TENDER

## 2024-12-26 ASSESSMENT — PAIN SCALES - GENERAL
PAINLEVEL_OUTOF10: 7
PAINLEVEL_OUTOF10: 7
PAINLEVEL_OUTOF10: 6

## 2024-12-26 NOTE — LACTATION NOTE
Mom reports breasts are filling, nipples are sore but feels baby has been latching well. Encouraged frequent feeds to establish milk supply. Reviewed benefits and safety of skin to skin. Inst on adequate I/O and importance of keeping track of diapers at home. Instructed on signs of dehydration such as infant refusing to feed, decreased wet diapers and infant becoming listless and notify provider if these occur. Reviewed with mom the importance of notifying the physician if baby looks more jaundiced. Lactation office # given if follow-up needed, as well as support group information. Encouraged to call with any concerns. Support and encouragement given.

## 2024-12-26 NOTE — DISCHARGE INSTRUCTIONS
bleeding has increased and you are saturating a pad in an hour.  Your abdomen is tender to touch.  You are passing blood clots bigger than the size of a lemon.  If you are experiencing extreme weakness or dizziness.   If you are having flu-like symptoms such as achy muscles or joints.  There is a foul smell or a green color to your vaginal bleeding.  If you have pain that cannot be relieved.  You have persistent burning with urination or frequency.   Call if you have concerns about your well-being.  You are unable to sleep, eat, or are having thoughts of harming yourself or your baby.   You have swelling, bleeding, drainage, foul odor, redness, or warmth in/around your incision or stitches.  You have a red, warm, tender area in you calf.Follow-up with your OB doctor in 1 week if  delivery or in  6 weeks for vaginal delivery unless otherwise instructed.   Call office for an appointment.    For breastfeeding support, you can contact our lactation specialists at [phone], [phone], or [phone].    DIET  Eat a well balanced diet focusing on foods high in fiber and protein  Drink plenty of fluids especially water.  To avoid constipation you may take a mild stool softener as recommended by your doctor or midwife.    ACTIVITY  Gradually increase your activity.  Resume exercise regimen only after advised by your doctor or midwife.  Avoid lifting anything heavier than your baby or a gallon of milk for SIX weeks.   Avoid driving until your doctor or midwife has given their approval.  Rise slowly from a lying to sitting and then a standing position.  Climb stairs one at a time.  Use caution when carrying your baby up and down the stairs.  No sexual activity for 6 weeks or until advised by your doctor - Nothing in vagina: intercourse, tampons, or douching.   Be prepared to discuss family planning at your follow-up OB visit.   You may feel tired or have a lack of energy.  You may continue your prenatal

## 2024-12-26 NOTE — PROGRESS NOTES
Pt completing [facility] discharge ed videos now.  Pt states she has a bassinette and pack and play for baby to sleep in once home. Discharge highlights reviewed with pt-questions answered and pt verbalized understanding.

## 2024-12-26 NOTE — CARE COORDINATION
Reached pt today but she was d/c from the hospital. Will plan to follow-up tomorrow with pp all to pt.

## 2024-12-26 NOTE — PROGRESS NOTES
Dr. Néstor valdez made aware of PPD score of 16 and that SS consult was placed with PPD packet given to pt.

## 2024-12-26 NOTE — PROGRESS NOTES
Progress Note    SUBJECTIVE: patient status post c section delivery day 3 doing well , normal postpartum course.  Accepted level of pain    OBJECTIVE:    VITALS:  /78   Pulse 91   Temp 97.9 °F (36.6 °C) (Oral)   Resp 16   Ht 1.626 m (5' 4\")   Wt 125.6 kg (277 lb)   LMP 2024   SpO2 98%   Breastfeeding Unknown   BMI 47.55 kg/m²   Physical Exam  lung:cta  Heart : regular rythm  Abdomen:soft  Appropriate tendernessr ,firm uterus ,bs present,incision clear and dry.  Extremities :normal no evidence of DVT  DATA:  CBC:   Lab Results   Component Value Date/Time    WBC 14.9 2024 05:27 AM    RBC 3.91 2024 05:27 AM    HGB 11.5 2024 05:27 AM    HCT 36.2 2024 05:27 AM    MCV 92.6 2024 05:27 AM    MCH 29.4 2024 05:27 AM    MCHC 31.8 2024 05:27 AM    RDW 14.2 2024 05:27 AM     2024 05:27 AM    MPV 10.6 2024 05:27 AM       ASSESSMENT AND PLAN:  Patient Active Problem List   Diagnosis    Morbid obesity    Anemia    Anxiety and depression    Chronic pain syndrome [G89.4]    Chronic bilateral low back pain with bilateral sciatica    Body mass index (BMI) 45.0-49.9, adult    Fibromyalgia    Pruritus of pregnancy, antepartum, third trimester    Intrahepatic cholestasis    Edema during pregnancy in third trimester    Shortness of breath    Sinus tachycardia    Chest pain    Elevated blood pressure affecting pregnancy in third trimester, antepartum    Cholestasis during pregnancy in third trimester    History of     History of loop electrosurgical excision procedure (LEEP) of cervix affecting pregnancy in third trimester    Anxiety during pregnancy    Occupational exposure in workplace    Abnormal placenta function test    Low ferritin    Low folate    Low vitamin B12 level    Vitamin D deficiency    Hypothyroxinemia    Tachycardia    Maternal morbid obesity in third trimester, antepartum    Nausea and vomiting    Dehydration during pregnancy

## 2024-12-26 NOTE — CARE COORDINATION
Social Work discharge planning  SW consult stating \"PPD score = 16\" noted. Mom of infant Katherine Sims (12/23/24). SW met with pt, hx of anxiety and on Buspar.  s OBGYN and Pediatric care will be with Dr Cortez. Pt's mother is in room and supportive. Gave PPD packet. Pt agreed to talk to Medical practitioner if PPD concerns arise.  Electronically signed by DONTE Sawant on 12/26/2024 at 11:22 AM

## 2024-12-27 ENCOUNTER — CARE COORDINATION (OUTPATIENT)
Dept: OTHER | Facility: CLINIC | Age: 29
End: 2024-12-27

## 2024-12-31 LAB — SURGICAL PATHOLOGY REPORT: NORMAL

## 2025-01-10 ENCOUNTER — CARE COORDINATION (OUTPATIENT)
Dept: OTHER | Facility: CLINIC | Age: 30
End: 2025-01-10

## 2025-01-10 NOTE — CARE COORDINATION
Call within 2 business days of discharge: Yes     Patient Current Location: Home: [address] [place] [address]  [address]  [place] [address]    Last Discharge Facility       Date Complaint Diagnosis Description Type Department Provider    24 Scheduled  Post-op pain Admission (Discharged) Vladimir Jaimes MD     Maternity Care Manager contacted the patient by telephone to discuss the maternity management program.  Patient agrees to care management services at this time. Verified name and  with patient as identifiers.     Risk Factors Identified:  primary c/s, 3rd baby       Needs to be reviewed by the provider   none         Method of communication with provider : none    Advance Care Planning:   Does patient have an Advance Directive:  not on file.     Does patient have OB/Gyn Selected? Yes    Discussed follow up appointments. If no appointment was previously scheduled, appointment scheduling offered: Yes  [facility] follow up appointment(s): No future appointments.  Non-[facility] follow up appointment(s): pt to make a f/u appointment fr incision check    OB History:   OB History    Para Term  AB Living   3 3 3     3   SAB IAB Ectopic Molar Multiple Live Births           0 3      # Outcome Date GA Lbr Casey/2nd Weight Sex Type Anes PTL Lv   3 Term 24 37w0d  3.55 kg (7 lb 13.2 oz) F CS-LTranv Spinal N ANNA   2 Term 19 39w2d  3.118 kg (6 lb 14 oz) M Vag-Spont EPI N ANNA   1 Term 14 40w0d   M CS-LTranv EPI  ANNA      Complications: Failure to Progress in Second Stage      Obstetric Comments   Patient presents for initial prenatal visit.  Patient was trying to get pregnant.  Prenatal cultures, labs, and ultrasound were done today.  Patient started taking vitamins.  All pregnancy counseling concerns and questions were addressed and answered.  Patient's currently taking no other medications, was taking Cymbalta and Pristiq-stopped cold turkey.  Patient will return to the office in 4

## 2025-01-17 ENCOUNTER — CARE COORDINATION (OUTPATIENT)
Dept: OTHER | Facility: CLINIC | Age: 30
End: 2025-01-17

## 2025-01-17 NOTE — CARE COORDINATION
cold turkey.  Patient will return to the office in 4 weeks.  Patient will be offered first trimester testing at that time. Patient denies any family history of congenital defects or chromosomal abnormalities or genetic disorders that could be pertinent to this pregnancy.   Pt has had 2 deliveries via primary C/S followed by . No complications.       No cats in the house. All meat must be cooked well done, all lunch meat must be cooked, all dairy must be pasteurized, no queso at Mexican restaurant, no shellfish, only low mercury-content fish once weekly, only Tylenol for headaches, fever, or pain.  For nausea, patient may take Unisom and vitamin B6 together at bedtime, with vitamin B6 in the morning and 1 vitamin B6 in the afternoon.  Patient may also try ginger candies, lollipops, ginger ale, peppermint candies, peppermint gum, or sea bands.       Upon completion of the visit all questions were answered and the patients follow-up and testing schedule were reviewed.             37w0d    Medication reconciliation was performed with patient, who verbalizes understanding of administration of home medications. Advised obtaining a 90-day supply of all daily and as-needed medications.     Barriers/Support system:  mother  Return to work planning? Yes      Postpartum Assessment: repeat c/s delivery 24. Pt reports feeling well with no concerns. Pt reports the baby is doing well at home with no additional respiratory concerns. The baby is not on medications and her f/u appointment is in 2w. Will continue to follow.    Breast Feeding Yes  Feeding [id number]    Patient given an opportunity to ask questions and does not have any further questions or concerns at this time. Reviewed appropriate site of care based on symptoms and resources available to patient including: When to call 911  Condition related references. The patient agrees to contact the OB/Gyn office for questions related to their

## 2025-02-26 ENCOUNTER — CARE COORDINATION (OUTPATIENT)
Dept: OTHER | Facility: CLINIC | Age: 30
End: 2025-02-26

## 2025-07-05 ENCOUNTER — HOSPITAL ENCOUNTER (EMERGENCY)
Age: 30
Discharge: HOME | End: 2025-07-05
Attending: EMERGENCY MEDICINE
Payer: COMMERCIAL

## 2025-07-05 VITALS
RESPIRATION RATE: 17 BRPM | TEMPERATURE: 97.6 F | SYSTOLIC BLOOD PRESSURE: 135 MMHG | DIASTOLIC BLOOD PRESSURE: 65 MMHG | HEART RATE: 88 BPM | OXYGEN SATURATION: 98 %

## 2025-07-05 DIAGNOSIS — T65.91XA INGESTION OF SUBSTANCE, ACCIDENTAL OR UNINTENTIONAL, INITIAL ENCOUNTER: Primary | ICD-10-CM

## 2025-07-05 LAB
ALBUMIN SERPL-MCNC: 4.1 G/DL (ref 3.5–5.2)
ALP SERPL-CCNC: 118 U/L (ref 35–104)
ALT SERPL-CCNC: <5 U/L (ref 0–35)
AMPHET UR QL SCN: POSITIVE
ANION GAP SERPL CALCULATED.3IONS-SCNC: 14 MMOL/L (ref 7–16)
APAP SERPL-MCNC: <5 UG/ML (ref 10–30)
AST SERPL-CCNC: 35 U/L (ref 0–35)
BARBITURATES UR QL SCN: NEGATIVE
BASOPHILS # BLD: 0.05 K/UL (ref 0–0.2)
BASOPHILS NFR BLD: 0 % (ref 0–2)
BENZODIAZ UR QL: NEGATIVE
BILIRUB SERPL-MCNC: 0.4 MG/DL (ref 0–1.2)
BUN SERPL-MCNC: 12 MG/DL (ref 6–20)
BUPRENORPHINE UR QL: NEGATIVE
CALCIUM SERPL-MCNC: 9.5 MG/DL (ref 8.6–10)
CANNABINOIDS UR QL SCN: POSITIVE
CHLORIDE SERPL-SCNC: 104 MMOL/L (ref 98–107)
CO2 SERPL-SCNC: 21 MMOL/L (ref 22–29)
COCAINE UR QL SCN: NEGATIVE
CREAT SERPL-MCNC: 0.8 MG/DL (ref 0.5–1)
EKG ATRIAL RATE: 102 BPM
EKG P AXIS: 40 DEGREES
EKG P-R INTERVAL: 184 MS
EKG Q-T INTERVAL: 338 MS
EKG QRS DURATION: 76 MS
EKG QTC CALCULATION (BAZETT): 440 MS
EKG R AXIS: 74 DEGREES
EKG T AXIS: 48 DEGREES
EKG VENTRICULAR RATE: 102 BPM
EOSINOPHIL # BLD: 0.02 K/UL (ref 0.05–0.5)
EOSINOPHILS RELATIVE PERCENT: 0 % (ref 0–6)
ERYTHROCYTE [DISTWIDTH] IN BLOOD BY AUTOMATED COUNT: 13.3 % (ref 11.5–15)
ETHANOLAMINE SERPL-MCNC: <10 MG/DL (ref 0–0.08)
FENTANYL UR QL: NEGATIVE
GFR, ESTIMATED: >90 ML/MIN/1.73M2
GLUCOSE SERPL-MCNC: 143 MG/DL (ref 74–99)
HCG, URINE, POC: NEGATIVE
HCT VFR BLD AUTO: 35.9 % (ref 34–48)
HGB BLD-MCNC: 12 G/DL (ref 11.5–15.5)
IMM GRANULOCYTES # BLD AUTO: 0.06 K/UL (ref 0–0.58)
IMM GRANULOCYTES NFR BLD: 0 % (ref 0–5)
LYMPHOCYTES NFR BLD: 1.45 K/UL (ref 1.5–4)
LYMPHOCYTES RELATIVE PERCENT: 10 % (ref 20–42)
Lab: NORMAL
MCH RBC QN AUTO: 30.3 PG (ref 26–35)
MCHC RBC AUTO-ENTMCNC: 33.4 G/DL (ref 32–34.5)
MCV RBC AUTO: 90.7 FL (ref 80–99.9)
METHADONE UR QL: NEGATIVE
MONOCYTES NFR BLD: 0.75 K/UL (ref 0.1–0.95)
MONOCYTES NFR BLD: 5 % (ref 2–12)
NEGATIVE QC PASS/FAIL: NORMAL
NEUTROPHILS NFR BLD: 84 % (ref 43–80)
NEUTS SEG NFR BLD: 12.08 K/UL (ref 1.8–7.3)
OPIATES UR QL SCN: NEGATIVE
OXYCODONE UR QL SCN: NEGATIVE
PCP UR QL SCN: NEGATIVE
PLATELET # BLD AUTO: 297 K/UL (ref 130–450)
PMV BLD AUTO: 9.7 FL (ref 7–12)
POSITIVE QC PASS/FAIL: NORMAL
POTASSIUM SERPL-SCNC: 4.7 MMOL/L (ref 3.5–5.1)
PROT SERPL-MCNC: 8 G/DL (ref 6.4–8.3)
RBC # BLD AUTO: 3.96 M/UL (ref 3.5–5.5)
SALICYLATES SERPL-MCNC: <0.5 MG/DL (ref 0–30)
SODIUM SERPL-SCNC: 139 MMOL/L (ref 136–145)
TEST INFORMATION: ABNORMAL
TOXIC TRICYCLIC SC,BLOOD: NEGATIVE
WBC OTHER # BLD: 14.4 K/UL (ref 4.5–11.5)

## 2025-07-05 PROCEDURE — 93005 ELECTROCARDIOGRAM TRACING: CPT | Performed by: EMERGENCY MEDICINE

## 2025-07-05 PROCEDURE — G0480 DRUG TEST DEF 1-7 CLASSES: HCPCS

## 2025-07-05 PROCEDURE — 80179 DRUG ASSAY SALICYLATE: CPT

## 2025-07-05 PROCEDURE — 80143 DRUG ASSAY ACETAMINOPHEN: CPT

## 2025-07-05 PROCEDURE — 80307 DRUG TEST PRSMV CHEM ANLYZR: CPT

## 2025-07-05 PROCEDURE — 99284 EMERGENCY DEPT VISIT MOD MDM: CPT

## 2025-07-05 PROCEDURE — 80053 COMPREHEN METABOLIC PANEL: CPT

## 2025-07-05 PROCEDURE — 85025 COMPLETE CBC W/AUTO DIFF WBC: CPT

## 2025-07-05 NOTE — ED PROVIDER NOTES
[facility] EMERGENCY DEPARTMENT  EMERGENCY DEPARTMENT ENCOUNTER        Pt Name: Sveta Stack  MRN: [id number]  Birthdate 1995  Date of evaluation: 7/5/2025  Provider: Humberto Pinedo DO  PCP: Robby Zimmerman DO  Note Started: 2:22 AM EDT 7/5/25    CHIEF COMPLAINT       Chief Complaint   Patient presents with    Ingestion     THC edible, feels off       HISTORY OF PRESENT ILLNESS: 1 or more Elements   History From: Patient    Limitations to history : None    Sveta Stack is a 30 y.o. female who presents to the emergency department for an ingestion.  The patient states that tonight she took an edible for the first time.  She states it was a 200 mg edible and she has never had an edible in the past.  The patient states that since ingesting this she has not been feeling well.  She states she feels out of this world.  Denies any chest pain or shortness of breath.  No abdominal pain.  No vomiting or diarrhea.    Nursing Notes were all reviewed and agreed with or any disagreements were addressed in the HPI.      REVIEW OF EXTERNAL NOTE :       PDMP Monitoring:    Last PDMP Toby as Reviewed:  Review User Review Instant Review Result   PHOEBE JULIUS 7/17/2024  8:58 AM Reviewed PDMP [1]     Last Controlled Substance Monitoring Documentation      Flowsheet Row Office Visit from 4/24/2024 in [facility]   Periodic Controlled Substance Monitoring Possible medication side effects, risk of tolerance/dependence & alternative treatments discussed., No signs of potential drug abuse or diversion identified., Assessed functional status (ability to engage in work or other purposeful activities, the pain intensity and its interference with activities of daily living, quality of family life and social activities, and the physical activity) filed at 04/24/2024 0931          Urine Drug Screenings (1 yr)       Drug Screen Multi Urine With Bup  Collected:

## 2025-07-05 NOTE — ED NOTES
Patient is sleeping in the room, with her child.  She is arousable but goes right back to sleep.  Labs have been drawn and sent. Patient is aware that a urine is needed, and she states that she is unable to go at this time.

## 2025-07-07 LAB
EKG ATRIAL RATE: 102 BPM
EKG P AXIS: 40 DEGREES
EKG P-R INTERVAL: 184 MS
EKG Q-T INTERVAL: 338 MS
EKG QRS DURATION: 76 MS
EKG QTC CALCULATION (BAZETT): 440 MS
EKG R AXIS: 74 DEGREES
EKG T AXIS: 48 DEGREES
EKG VENTRICULAR RATE: 102 BPM

## 2025-07-07 PROCEDURE — 93010 ELECTROCARDIOGRAM REPORT: CPT | Performed by: INTERNAL MEDICINE

## 2025-07-15 NOTE — PROGRESS NOTES
[facility] PRE-ADMISSION TESTING INSTRUCTIONS: [phone]  [address] [place] [address]    The Preadmission Testing patient is instructed accordingly using the following criteria (check applicable):    ARRIVAL INSTRUCTIONS:     Arrival Time: 0500    [x] Parking the day of Surgery is located in the Main Entrance lot.  Upon entering through the main entrance (Entrance A) make an immediate right to the surgery reception desk    [x] Bring photo ID and insurance card    [x] Bring in a copy of Living will or Durable Power of  papers.    [x] Please be sure to arrange for a responsible adult to provide transportation to and from the hospital    [x] Please arrange for a responsible adult to be with you for the 24 hour period post procedure, due to having anesthesia    [x] Please notify surgeon if you develop any illness between now and time of surgery (cold, cough, sore throat, fever, nausea, vomiting) or any signs of infections  including skin, wounds, and dental.    [x] If you awake am of surgery not feeling well or have temperature >100 please call [phone].    GENERAL INSTRUCTIONS:    [x] NOTHING BY MOUTH AFTER MIDNIGHT. You may only have up to 8oz of water from midnight until 4 hours prior to surgery. No gum, no candy, no mints.        [x] You may brush your teeth    [x] Take medications as instructed: TAKE Adderall, buspar and zoloft AM of procedure.     [x] Stop herbal supplements and vitamins 5 days prior to procedure    [x] Shower or bath with soap, lather and rinse well, AM of Surgery, no lotion, powders or creams to surgical site    [x] Please do not wear any nail polish, make up, hair products, body spray, aftershave, cologne or perfume on the day of surgery    [x] Jewelry, body piercings, eyeglasses, contact lenses and dentures are not permitted into surgery (bring cases)    [x] No tobacco products within 24 hours of surgery     [x] No alcohol or illegal drug use,

## 2025-07-21 ENCOUNTER — ANESTHESIA EVENT (OUTPATIENT)
Dept: OPERATING ROOM | Age: 30
End: 2025-07-21
Payer: COMMERCIAL

## 2025-07-21 ASSESSMENT — ENCOUNTER SYMPTOMS
DYSPNEA ACTIVITY LEVEL: NO INTERVAL CHANGE
SHORTNESS OF BREATH: 1

## 2025-07-21 ASSESSMENT — LIFESTYLE VARIABLES: SMOKING_STATUS: 0

## 2025-07-21 NOTE — ANESTHESIA PRE PROCEDURE
Anesthesia plan discussed and risks/benefits addressed.  Patient's concerns and questions answered.  NPO >8 hours.     Jace Haney DO  Staff Anesthesiologist  July 22, 2025  6:22 AM

## 2025-07-22 ENCOUNTER — ANESTHESIA (OUTPATIENT)
Dept: OPERATING ROOM | Age: 30
End: 2025-07-22
Payer: COMMERCIAL

## 2025-07-22 ENCOUNTER — HOSPITAL ENCOUNTER (OUTPATIENT)
Age: 30
Setting detail: OUTPATIENT SURGERY
Discharge: HOME | End: 2025-07-22
Attending: DENTIST | Admitting: DENTIST
Payer: COMMERCIAL

## 2025-07-22 VITALS
TEMPERATURE: 97.8 F | BODY MASS INDEX: 45.24 KG/M2 | OXYGEN SATURATION: 95 % | HEART RATE: 96 BPM | RESPIRATION RATE: 18 BRPM | HEIGHT: 64 IN | SYSTOLIC BLOOD PRESSURE: 146 MMHG | DIASTOLIC BLOOD PRESSURE: 93 MMHG | WEIGHT: 265 LBS

## 2025-07-22 DIAGNOSIS — K02.9 DENTAL CARIES: Primary | ICD-10-CM

## 2025-07-22 LAB
HCG, URINE, POC: NEGATIVE
Lab: NORMAL
NEGATIVE QC PASS/FAIL: NORMAL
POSITIVE QC PASS/FAIL: NORMAL

## 2025-07-22 PROCEDURE — 2500000003 HC RX 250 WO HCPCS: Performed by: NURSE ANESTHETIST, CERTIFIED REGISTERED

## 2025-07-22 PROCEDURE — 7100000001 HC PACU RECOVERY - ADDTL 15 MIN: Performed by: DENTIST

## 2025-07-22 PROCEDURE — 6360000002 HC RX W HCPCS: Performed by: ANESTHESIOLOGY

## 2025-07-22 PROCEDURE — 3700000001 HC ADD 15 MINUTES (ANESTHESIA): Performed by: DENTIST

## 2025-07-22 PROCEDURE — 7100000011 HC PHASE II RECOVERY - ADDTL 15 MIN: Performed by: DENTIST

## 2025-07-22 PROCEDURE — 2500000003 HC RX 250 WO HCPCS: Performed by: ANESTHESIOLOGY

## 2025-07-22 PROCEDURE — 7100000000 HC PACU RECOVERY - FIRST 15 MIN: Performed by: DENTIST

## 2025-07-22 PROCEDURE — 3600000002 HC SURGERY LEVEL 2 BASE: Performed by: DENTIST

## 2025-07-22 PROCEDURE — 3600000012 HC SURGERY LEVEL 2 ADDTL 15MIN: Performed by: DENTIST

## 2025-07-22 PROCEDURE — 6360000002 HC RX W HCPCS: Performed by: NURSE ANESTHETIST, CERTIFIED REGISTERED

## 2025-07-22 PROCEDURE — 2580000003 HC RX 258: Performed by: NURSE ANESTHETIST, CERTIFIED REGISTERED

## 2025-07-22 PROCEDURE — 6370000000 HC RX 637 (ALT 250 FOR IP)

## 2025-07-22 PROCEDURE — 3700000000 HC ANESTHESIA ATTENDED CARE: Performed by: DENTIST

## 2025-07-22 PROCEDURE — 7100000010 HC PHASE II RECOVERY - FIRST 15 MIN: Performed by: DENTIST

## 2025-07-22 PROCEDURE — 6370000000 HC RX 637 (ALT 250 FOR IP): Performed by: ANESTHESIOLOGY

## 2025-07-22 PROCEDURE — 2709999900 HC NON-CHARGEABLE SUPPLY: Performed by: DENTIST

## 2025-07-22 PROCEDURE — 6360000002 HC RX W HCPCS: Performed by: DENTIST

## 2025-07-22 RX ORDER — FENTANYL CITRATE 50 UG/ML
50 INJECTION, SOLUTION INTRAMUSCULAR; INTRAVENOUS ONCE
Status: COMPLETED | OUTPATIENT
Start: 2025-07-22 | End: 2025-07-22

## 2025-07-22 RX ORDER — MEPERIDINE HYDROCHLORIDE 50 MG/ML
12.5 INJECTION INTRAMUSCULAR; INTRAVENOUS; SUBCUTANEOUS
Status: DISCONTINUED | OUTPATIENT
Start: 2025-07-22 | End: 2025-07-22 | Stop reason: HOSPADM

## 2025-07-22 RX ORDER — ROCURONIUM BROMIDE 10 MG/ML
INJECTION, SOLUTION INTRAVENOUS
Status: DISCONTINUED | OUTPATIENT
Start: 2025-07-22 | End: 2025-07-22 | Stop reason: SDUPTHER

## 2025-07-22 RX ORDER — HYDRALAZINE HYDROCHLORIDE 20 MG/ML
5 INJECTION INTRAMUSCULAR; INTRAVENOUS
Status: DISCONTINUED | OUTPATIENT
Start: 2025-07-22 | End: 2025-07-22 | Stop reason: HOSPADM

## 2025-07-22 RX ORDER — HYDROCODONE BITARTRATE AND ACETAMINOPHEN 5; 325 MG/1; MG/1
1 TABLET ORAL EVERY 6 HOURS PRN
Qty: 16 TABLET | Refills: 0 | Status: SHIPPED | OUTPATIENT
Start: 2025-07-22 | End: 2025-07-26

## 2025-07-22 RX ORDER — FENTANYL CITRATE 50 UG/ML
25 INJECTION, SOLUTION INTRAMUSCULAR; INTRAVENOUS EVERY 5 MIN PRN
Status: DISCONTINUED | OUTPATIENT
Start: 2025-07-22 | End: 2025-07-22 | Stop reason: HOSPADM

## 2025-07-22 RX ORDER — SODIUM CHLORIDE 9 MG/ML
INJECTION, SOLUTION INTRAVENOUS PRN
Status: DISCONTINUED | OUTPATIENT
Start: 2025-07-22 | End: 2025-07-22 | Stop reason: HOSPADM

## 2025-07-22 RX ORDER — IBUPROFEN 800 MG/1
800 TABLET, FILM COATED ORAL
Qty: 30 TABLET | Refills: 0 | Status: SHIPPED | OUTPATIENT
Start: 2025-07-22

## 2025-07-22 RX ORDER — AMOXICILLIN 500 MG/1
500 CAPSULE ORAL 3 TIMES DAILY
Qty: 21 CAPSULE | Refills: 0 | Status: SHIPPED | OUTPATIENT
Start: 2025-07-22 | End: 2025-07-29

## 2025-07-22 RX ORDER — HYDROCODONE BITARTRATE AND ACETAMINOPHEN 5; 325 MG/1; MG/1
1 TABLET ORAL ONCE
Status: COMPLETED | OUTPATIENT
Start: 2025-07-22 | End: 2025-07-22

## 2025-07-22 RX ORDER — LIDOCAINE HYDROCHLORIDE AND EPINEPHRINE BITARTRATE 20; .01 MG/ML; MG/ML
INJECTION, SOLUTION SUBCUTANEOUS PRN
Status: DISCONTINUED | OUTPATIENT
Start: 2025-07-22 | End: 2025-07-22 | Stop reason: ALTCHOICE

## 2025-07-22 RX ORDER — LIDOCAINE HYDROCHLORIDE 20 MG/ML
INJECTION, SOLUTION INFILTRATION; PERINEURAL
Status: DISCONTINUED | OUTPATIENT
Start: 2025-07-22 | End: 2025-07-22 | Stop reason: SDUPTHER

## 2025-07-22 RX ORDER — SODIUM CHLORIDE 9 MG/ML
INJECTION, SOLUTION INTRAVENOUS
Status: DISCONTINUED | OUTPATIENT
Start: 2025-07-22 | End: 2025-07-22 | Stop reason: SDUPTHER

## 2025-07-22 RX ORDER — METOCLOPRAMIDE HYDROCHLORIDE 5 MG/ML
10 INJECTION INTRAMUSCULAR; INTRAVENOUS ONCE
Status: COMPLETED | OUTPATIENT
Start: 2025-07-22 | End: 2025-07-22

## 2025-07-22 RX ORDER — SODIUM CHLORIDE 0.9 % (FLUSH) 0.9 %
5-40 SYRINGE (ML) INJECTION PRN
Status: DISCONTINUED | OUTPATIENT
Start: 2025-07-22 | End: 2025-07-22 | Stop reason: HOSPADM

## 2025-07-22 RX ORDER — DEXAMETHASONE SODIUM PHOSPHATE 4 MG/ML
INJECTION, SOLUTION INTRA-ARTICULAR; INTRALESIONAL; INTRAMUSCULAR; INTRAVENOUS; SOFT TISSUE
Status: DISCONTINUED | OUTPATIENT
Start: 2025-07-22 | End: 2025-07-22 | Stop reason: SDUPTHER

## 2025-07-22 RX ORDER — GLYCOPYRROLATE 0.2 MG/ML
INJECTION INTRAMUSCULAR; INTRAVENOUS
Status: DISCONTINUED | OUTPATIENT
Start: 2025-07-22 | End: 2025-07-22 | Stop reason: SDUPTHER

## 2025-07-22 RX ORDER — ONDANSETRON 2 MG/ML
INJECTION INTRAMUSCULAR; INTRAVENOUS
Status: DISCONTINUED | OUTPATIENT
Start: 2025-07-22 | End: 2025-07-22 | Stop reason: SDUPTHER

## 2025-07-22 RX ORDER — SODIUM CHLORIDE 0.9 % (FLUSH) 0.9 %
5-40 SYRINGE (ML) INJECTION EVERY 12 HOURS SCHEDULED
Status: DISCONTINUED | OUTPATIENT
Start: 2025-07-22 | End: 2025-07-22 | Stop reason: HOSPADM

## 2025-07-22 RX ORDER — ACETAMINOPHEN 500 MG
1000 TABLET ORAL ONCE
Status: COMPLETED | OUTPATIENT
Start: 2025-07-22 | End: 2025-07-22

## 2025-07-22 RX ORDER — PROCHLORPERAZINE EDISYLATE 5 MG/ML
5 INJECTION INTRAMUSCULAR; INTRAVENOUS
Status: DISCONTINUED | OUTPATIENT
Start: 2025-07-22 | End: 2025-07-22 | Stop reason: HOSPADM

## 2025-07-22 RX ORDER — LABETALOL HYDROCHLORIDE 5 MG/ML
5 INJECTION, SOLUTION INTRAVENOUS
Status: DISCONTINUED | OUTPATIENT
Start: 2025-07-22 | End: 2025-07-22 | Stop reason: HOSPADM

## 2025-07-22 RX ORDER — HYDROCODONE BITARTRATE AND ACETAMINOPHEN 5; 325 MG/1; MG/1
TABLET ORAL
Status: COMPLETED
Start: 2025-07-22 | End: 2025-07-22

## 2025-07-22 RX ORDER — MIDAZOLAM HYDROCHLORIDE 1 MG/ML
INJECTION, SOLUTION INTRAMUSCULAR; INTRAVENOUS
Status: DISCONTINUED | OUTPATIENT
Start: 2025-07-22 | End: 2025-07-22 | Stop reason: SDUPTHER

## 2025-07-22 RX ORDER — DIPHENHYDRAMINE HYDROCHLORIDE 50 MG/ML
12.5 INJECTION, SOLUTION INTRAMUSCULAR; INTRAVENOUS
Status: DISCONTINUED | OUTPATIENT
Start: 2025-07-22 | End: 2025-07-22 | Stop reason: HOSPADM

## 2025-07-22 RX ORDER — FENTANYL CITRATE 50 UG/ML
INJECTION, SOLUTION INTRAMUSCULAR; INTRAVENOUS
Status: DISCONTINUED | OUTPATIENT
Start: 2025-07-22 | End: 2025-07-22 | Stop reason: SDUPTHER

## 2025-07-22 RX ORDER — PROPOFOL 10 MG/ML
INJECTION, EMULSION INTRAVENOUS
Status: DISCONTINUED | OUTPATIENT
Start: 2025-07-22 | End: 2025-07-22 | Stop reason: SDUPTHER

## 2025-07-22 RX ADMIN — PROPOFOL 30 MG: 10 INJECTION, EMULSION INTRAVENOUS at 07:22

## 2025-07-22 RX ADMIN — FAMOTIDINE 20 MG: 10 INJECTION, SOLUTION INTRAVENOUS at 06:34

## 2025-07-22 RX ADMIN — METOCLOPRAMIDE 10 MG: 5 INJECTION, SOLUTION INTRAMUSCULAR; INTRAVENOUS at 06:33

## 2025-07-22 RX ADMIN — FENTANYL CITRATE 50 MCG: 0.05 INJECTION, SOLUTION INTRAMUSCULAR; INTRAVENOUS at 08:37

## 2025-07-22 RX ADMIN — Medication 20 MG: at 07:06

## 2025-07-22 RX ADMIN — LIDOCAINE HYDROCHLORIDE 100 MG: 20 INJECTION, SOLUTION INFILTRATION; PERINEURAL at 07:06

## 2025-07-22 RX ADMIN — ACETAMINOPHEN 1000 MG: 500 TABLET ORAL at 06:34

## 2025-07-22 RX ADMIN — DEXAMETHASONE SODIUM PHOSPHATE 8 MG: 4 INJECTION, SOLUTION INTRAMUSCULAR; INTRAVENOUS at 07:16

## 2025-07-22 RX ADMIN — PROPOFOL 170 MG: 10 INJECTION, EMULSION INTRAVENOUS at 07:06

## 2025-07-22 RX ADMIN — ONDANSETRON 4 MG: 2 INJECTION, SOLUTION INTRAMUSCULAR; INTRAVENOUS at 07:24

## 2025-07-22 RX ADMIN — SODIUM CHLORIDE: 9 INJECTION, SOLUTION INTRAVENOUS at 06:50

## 2025-07-22 RX ADMIN — MIDAZOLAM 1 MG: 1 INJECTION INTRAMUSCULAR; INTRAVENOUS at 07:03

## 2025-07-22 RX ADMIN — GLYCOPYRROLATE 0.2 MG: 0.2 INJECTION INTRAMUSCULAR; INTRAVENOUS at 06:57

## 2025-07-22 RX ADMIN — ROCURONIUM BROMIDE 40 MG: 10 INJECTION, SOLUTION INTRAVENOUS at 07:06

## 2025-07-22 RX ADMIN — HYDROCODONE BITARTRATE AND ACETAMINOPHEN 1 TABLET: 5; 325 TABLET ORAL at 09:45

## 2025-07-22 RX ADMIN — MIDAZOLAM 1 MG: 1 INJECTION INTRAMUSCULAR; INTRAVENOUS at 06:57

## 2025-07-22 RX ADMIN — FENTANYL CITRATE 100 MCG: 50 INJECTION, SOLUTION INTRAMUSCULAR; INTRAVENOUS at 07:06

## 2025-07-22 RX ADMIN — SUGAMMADEX 200 MG: 100 INJECTION, SOLUTION INTRAVENOUS at 07:31

## 2025-07-22 RX ADMIN — PROPOFOL 50 MCG/KG/MIN: 10 INJECTION, EMULSION INTRAVENOUS at 07:12

## 2025-07-22 ASSESSMENT — PAIN DESCRIPTION - FREQUENCY
FREQUENCY: INTERMITTENT
FREQUENCY: CONTINUOUS
FREQUENCY: CONTINUOUS

## 2025-07-22 ASSESSMENT — PAIN SCALES - GENERAL
PAINLEVEL_OUTOF10: 9
PAINLEVEL_OUTOF10: 0
PAINLEVEL_OUTOF10: 0
PAINLEVEL_OUTOF10: 3
PAINLEVEL_OUTOF10: 6
PAINLEVEL_OUTOF10: 0
PAINLEVEL_OUTOF10: 4
PAINLEVEL_OUTOF10: 0

## 2025-07-22 ASSESSMENT — PAIN DESCRIPTION - DESCRIPTORS
DESCRIPTORS: SORE
DESCRIPTORS: DISCOMFORT
DESCRIPTORS: SORE

## 2025-07-22 ASSESSMENT — PAIN - FUNCTIONAL ASSESSMENT
PAIN_FUNCTIONAL_ASSESSMENT: 0-10
PAIN_FUNCTIONAL_ASSESSMENT: 0-10

## 2025-07-22 ASSESSMENT — PAIN DESCRIPTION - PAIN TYPE
TYPE: SURGICAL PAIN

## 2025-07-22 ASSESSMENT — PAIN DESCRIPTION - ORIENTATION
ORIENTATION: LOWER;UPPER
ORIENTATION: LOWER;UPPER
ORIENTATION: UPPER

## 2025-07-22 ASSESSMENT — PAIN DESCRIPTION - LOCATION
LOCATION: MOUTH

## 2025-07-22 ASSESSMENT — PAIN DESCRIPTION - ONSET
ONSET: ON-GOING
ONSET: GRADUAL

## 2025-07-22 NOTE — ANESTHESIA POSTPROCEDURE EVALUATION
Department of Anesthesiology  Postprocedure Note    Patient: Sveta Stack  MRN: [id number]  YOB: 1995  Date of evaluation: 7/22/2025    Procedure Summary       Date: 07/22/25 Room / Location: [address]    Anesthesia Start: 0657 Anesthesia Stop: 0748    Procedure: MULTIPLE DENTAL EXTRACTION (Mouth) Diagnosis:       Dental caries      (Dental caries [K02.9])    Surgeons: Edgard Montiel DDS Responsible Provider: Jace Haney DO    Anesthesia Type: general ASA Status: 3            Anesthesia Type: No value filed.    Uli Phase I: Uli Score: 10    Uli Phase II:      Anesthesia Post Evaluation    Patient location during evaluation: PACU  Patient participation: complete - patient participated  Level of consciousness: awake and alert  Pain score: 0  Airway patency: patent  Nausea & Vomiting: no nausea and no vomiting  Cardiovascular status: blood pressure returned to baseline  Respiratory status: acceptable  Hydration status: euvolemic  Pain management: adequate and satisfactory to patient        No notable events documented.

## 2025-07-23 NOTE — OP NOTE
[place] [address]                            OPERATIVE REPORT      PATIENT NAME: GURDEEP MARTINEZ          : 1995  MED REC NO: [id number]                        ROOM: [facility]  ACCOUNT NO: [id number]                       ADMIT DATE: 2025  PROVIDER: Edgard Montiel DDS      DATE OF PROCEDURE:  2025    SURGEON:  Edgard Montiel DDS    PREOPERATIVE DIAGNOSIS:  Dental caries.    POSTOPERATIVE DIAGNOSIS:  Dental caries.    ANESTHESIA:  General endotracheal.    ESTIMATED BLOOD LOSS:  Minimal.    FLUIDS:  600 mL normal saline.    MEDICATIONS:  None.    COMPLICATIONS:  None.    PROCEDURE:  Maxillary extractions, extraction of tooth 29, and a deep cleaning.    PREOPERATIVE NOTE:  Risks and benefits were discussed with the patient prior to surgery.  The patient consented.  The patient was not a candidate for in-office sedation due to unsuccessful attempts in the office and severe anxiety.    DESCRIPTION OF PROCEDURE:  The patient was brought to the OR in supine position, prepped and draped for oral procedure.  After review of films, we extracted remaining maxillary teeth due to severe periodontal disease and caries.  We did periodontal readings, and they were recorded in the chart.  On the maxilla, we extracted teeth 4, 6, 7, 8, 9, 10, 11, 12, 14.  We did 2 quadrants of alveoloplasty in the upper right and upper left maxilla.  3-0 Vicryl continuous suture was placed in extraction sites.  We now concentrated on the mandible and extracted tooth 29.  We also did a scaling and root planing on the lower mandibular remaining teeth, on the lower left and lower right mandible.  The throat pack was removed.  The patient was transferred to PACU in good condition.          EDGARD MONTIEL DDS      D:  2025 09:04:32     T:  2025 11:42:42     SHANIKA/DELLA  Job #:  [id number]     Doc#:  [id number]

## (undated) DEVICE — SYRINGE MED 10ML LUERLOCK TIP W/O SFTY DISP

## (undated) DEVICE — SYRINGE MED 10ML TRNSLUC BRL PLUNG BLK MRK POLYPR CTRL

## (undated) DEVICE — YANKAUER,OPEN TIP,W/O VENT,STERILE: Brand: MEDLINE INDUSTRIES, INC.

## (undated) DEVICE — BLOCK BITE 60FR CAREGUARD

## (undated) DEVICE — CONTAINER SPEC COLL 960ML POLYPR TRIANG GRAD INTAKE/OUTPUT

## (undated) DEVICE — GOWN ISOLATN REG YEL M WT MULTIPLY SIDETIE LEV 2

## (undated) DEVICE — NEEDLE HYPO 25GA L1.5IN BLU POLYPR HUB S STL REG BVL STR

## (undated) DEVICE — SYRINGE,EAR/ULCER, 2 OZ, STERILE: Brand: MEDLINE

## (undated) DEVICE — 4-PORT MANIFOLD: Brand: NEPTUNE 2

## (undated) DEVICE — CONTAINER SPEC 480ML CLR POLYSTYR 10% NEUT BUFF FRMLN ZN

## (undated) DEVICE — LUBRICANT SURG JELLY ST BACTER TUBE 4.25OZ

## (undated) DEVICE — Device

## (undated) DEVICE — CESAREAN BIRTH PACK: Brand: MEDLINE INDUSTRIES, INC.

## (undated) DEVICE — MEDI-VAC YANKAUER SUCTION HANDLE W/BULBOUS TIP: Brand: CARDINAL HEALTH

## (undated) DEVICE — TUBING SUCT 12FR MAL ALUM SHFT FN CAP VENT UNIV CONN W/ OBT

## (undated) DEVICE — SOLUTION IRRIG 500ML 0.9% SOD CHLO USP POUR PLAS BTL

## (undated) DEVICE — JELLY LUBRICATING 5G PETRO

## (undated) DEVICE — FORCEPS BX L240CM JAW DIA2.8MM L CAP W/ NDL MIC MESH TOOTH

## (undated) DEVICE — SUTURE STRATAFIX SYMMETRIC PDS + SZ 1 L18IN ABSRB VLT L48MM SXPP1A400

## (undated) DEVICE — MEDI-VAC NON-CONDUCTIVE SUCTION TUBING: Brand: CARDINAL HEALTH

## (undated) DEVICE — SUTURE CHROMIC GUT SZ 1 L36IN ABSRB BRN L48MM CTX 1/2 CIR 905H

## (undated) DEVICE — MASK,FACE,MAXFLUIDPROTECT,SHIELD/ERLPS: Brand: MEDLINE

## (undated) DEVICE — KIT BEDSIDE REVITAL OX 500ML

## (undated) DEVICE — GLOVE SURG SZ 8 CRM LTX FREE POLYISOPRENE POLYMER BEAD ANTI

## (undated) DEVICE — KENDALL 450 SERIES MONITORING FOAM ELECTRODE - RECTANGULAR SHAPE ( 3/PK): Brand: KENDALL

## (undated) DEVICE — 6 X 9  1.75MIL 4-WALL LABGUARD: Brand: MINIGRIP COMMERCIAL LLC

## (undated) DEVICE — 34" SINGLE PATIENT USE HOVERMATT BREATHABLE: Brand: SINGLE PATIENT USE HOVERMATT

## (undated) DEVICE — CATHETER ETER URETH 30FR BLLN 5CC LTX 2 W F BARDX LUB

## (undated) DEVICE — GOWN,BREATHABLE,IMP SLV 3XL AURORA: Brand: MEDLINE

## (undated) DEVICE — MARKER,SKIN,WI/RULER AND LABELS: Brand: MEDLINE

## (undated) DEVICE — STAPLER SKIN SQ 30 ABSRB STPL DISP INSORB ORDER VIA PHONE OR EMAIL

## (undated) DEVICE — COVER,LIGHT HANDLE,FLX,2/PK: Brand: MEDLINE INDUSTRIES, INC.

## (undated) DEVICE — SPONGE GZ 4IN 4IN 4 PLY N WVN AVANT

## (undated) DEVICE — VALVE SUCTION AIR H2O HYDR H2O JET CONN STRL ORCA POD + DISP

## (undated) DEVICE — BASIC SINGLE BASIN 1-LF: Brand: MEDLINE INDUSTRIES, INC.

## (undated) DEVICE — GLOVE ORANGE PI 7 1/2   MSG9075

## (undated) DEVICE — BLADE,STAINLESS-STEEL,15,STRL,DISPOSABLE: Brand: MEDLINE

## (undated) DEVICE — GLOVE SURG SZ 65 THK91MIL LTX FREE SYN POLYISOPRENE

## (undated) DEVICE — 2134367

## (undated) DEVICE — DRESSING FOAM POST OPERATIVE 4X10 IN MEPILEX BORDER AG

## (undated) DEVICE — TOWEL,OR,DSP,ST,BLUE,STD,6/PK,12PK/CS: Brand: MEDLINE